# Patient Record
Sex: FEMALE | Race: WHITE
[De-identification: names, ages, dates, MRNs, and addresses within clinical notes are randomized per-mention and may not be internally consistent; named-entity substitution may affect disease eponyms.]

---

## 2017-05-19 ENCOUNTER — HOSPITAL ENCOUNTER (EMERGENCY)
Dept: HOSPITAL 8 - ED | Age: 62
Discharge: HOME | End: 2017-05-19
Payer: SELF-PAY

## 2017-05-19 VITALS — DIASTOLIC BLOOD PRESSURE: 78 MMHG | SYSTOLIC BLOOD PRESSURE: 132 MMHG

## 2017-05-19 VITALS — HEIGHT: 67 IN | BODY MASS INDEX: 25.95 KG/M2 | WEIGHT: 165.35 LBS
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
## 2017-05-19 DIAGNOSIS — S00.83XA: ICD-10-CM
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
## 2017-05-19 DIAGNOSIS — S02.2XXA: Primary | ICD-10-CM
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
## 2017-05-19 LAB
AST SERPL-CCNC: 38 U/L (ref 15–37)
BUN SERPL-MCNC: 13 MG/DL (ref 7–18)
IS PT STATUS REG ER OR PRE ER?: YES

## 2017-05-19 PROCEDURE — 85610 PROTHROMBIN TIME: CPT

## 2017-05-19 PROCEDURE — 70486 CT MAXILLOFACIAL W/O DYE: CPT

## 2017-05-19 PROCEDURE — 93005 ELECTROCARDIOGRAM TRACING: CPT

## 2017-05-19 PROCEDURE — 83605 ASSAY OF LACTIC ACID: CPT

## 2017-05-19 PROCEDURE — 80053 COMPREHEN METABOLIC PANEL: CPT

## 2017-05-19 PROCEDURE — 70450 CT HEAD/BRAIN W/O DYE: CPT

## 2017-05-19 PROCEDURE — 85730 THROMBOPLASTIN TIME PARTIAL: CPT

## 2017-05-19 PROCEDURE — 84484 ASSAY OF TROPONIN QUANT: CPT

## 2017-05-19 PROCEDURE — 36415 COLL VENOUS BLD VENIPUNCTURE: CPT

## 2017-05-19 PROCEDURE — 85025 COMPLETE CBC W/AUTO DIFF WBC: CPT

## 2017-09-09 ENCOUNTER — HOSPITAL ENCOUNTER (OUTPATIENT)
Dept: HOSPITAL 8 - CARD | Age: 62
Discharge: HOME | End: 2017-09-09
Attending: PSYCHIATRY & NEUROLOGY
Payer: MEDICAID

## 2017-09-09 DIAGNOSIS — G40.209: Primary | ICD-10-CM

## 2017-09-09 PROCEDURE — 95819 EEG AWAKE AND ASLEEP: CPT

## 2017-10-10 ENCOUNTER — OFFICE VISIT (OUTPATIENT)
Dept: INTERNAL MEDICINE | Facility: MEDICAL CENTER | Age: 62
End: 2017-10-10
Payer: MEDICAID

## 2017-10-10 VITALS
DIASTOLIC BLOOD PRESSURE: 82 MMHG | SYSTOLIC BLOOD PRESSURE: 104 MMHG | WEIGHT: 153 LBS | TEMPERATURE: 97.4 F | BODY MASS INDEX: 24.59 KG/M2 | HEART RATE: 99 BPM | OXYGEN SATURATION: 96 % | HEIGHT: 66 IN

## 2017-10-10 DIAGNOSIS — Z00.00 HEALTH CARE MAINTENANCE: ICD-10-CM

## 2017-10-10 DIAGNOSIS — R56.9 SEIZURE (HCC): ICD-10-CM

## 2017-10-10 DIAGNOSIS — I10 ESSENTIAL HYPERTENSION: ICD-10-CM

## 2017-10-10 DIAGNOSIS — Z00.00 ENCOUNTER FOR MEDICAL EXAMINATION TO ESTABLISH CARE: ICD-10-CM

## 2017-10-10 DIAGNOSIS — E78.5 DYSLIPIDEMIA: ICD-10-CM

## 2017-10-10 PROCEDURE — 99204 OFFICE O/P NEW MOD 45 MIN: CPT | Mod: GC | Performed by: INTERNAL MEDICINE

## 2017-10-10 RX ORDER — PRAVASTATIN SODIUM 10 MG
10 TABLET ORAL NIGHTLY
COMMUNITY
End: 2017-10-10 | Stop reason: SDUPTHER

## 2017-10-10 RX ORDER — LISINOPRIL AND HYDROCHLOROTHIAZIDE 20; 12.5 MG/1; MG/1
1 TABLET ORAL DAILY
COMMUNITY
End: 2017-10-10

## 2017-10-10 RX ORDER — ASPIRIN 325 MG
325 TABLET ORAL EVERY 6 HOURS PRN
COMMUNITY
End: 2019-08-25

## 2017-10-10 RX ORDER — LEVETIRACETAM 500 MG/1
500 TABLET ORAL DAILY
COMMUNITY

## 2017-10-10 RX ORDER — PRAVASTATIN SODIUM 10 MG
10 TABLET ORAL DAILY
Qty: 30 TAB | Refills: 1 | Status: SHIPPED | OUTPATIENT
Start: 2017-10-10 | End: 2017-10-24 | Stop reason: RX

## 2017-10-10 RX ORDER — LISINOPRIL 20 MG/1
20 TABLET ORAL DAILY
Qty: 30 TAB | Refills: 3 | Status: ON HOLD | OUTPATIENT
Start: 2017-10-10 | End: 2021-04-19

## 2017-10-10 ASSESSMENT — PATIENT HEALTH QUESTIONNAIRE - PHQ9: CLINICAL INTERPRETATION OF PHQ2 SCORE: 0

## 2017-10-10 NOTE — LETTER
[employer]  Jeffry Seals M.D.  [address]  [place] [address]  Fax: [phone]   Authorization for Release/Disclosure of   Protected Health Information   Name: RORO LEON : 1955 SSN: [id number]   Address: [facility] [id number]  [place] [address] Phone:    [phone] (home)    I authorize the entity listed below to release/disclose the PHI below to:   [employer] [facility]/Jeffry Seals M.D. and Jeffry Seals M.D.   Provider or Entity Name: Dr Elmo Guerrero     Address   City, [facility], [facility]   Phone:      Fax:     Reason for request: continuity of care   Information to be released:    [  ] LAST COLONOSCOPY,  including any PATH REPORT and follow-up  [  ] LAST FIT/COLOGUARD RESULT [  ] LAST DEXA  [  ] LAST MAMMOGRAM  [  ] LAST PAP  [  ] LAST LABS [  ] RETINA EXAM REPORT  [  ] IMMUNIZATION RECORDS  [ X ] Release all info      [  ] Check here and initial the line next to each item to release ALL health information INCLUDING  _____ Care and treatment for drug and / or alcohol abuse  _____ HIV testing, infection status, or AIDS  _____ Genetic Testing    DATES OF SERVICE OR TIME PERIOD TO BE DISCLOSED: _____________  I understand and acknowledge that:  * This Authorization may be revoked at any time by you in writing, except if your health information has already been used or disclosed.  * Your health information that will be used or disclosed as a result of you signing this authorization could be re-disclosed by the recipient. If this occurs, your re-disclosed health information may no longer be protected by State or Federal laws.  * You may refuse to sign this Authorization. Your refusal will not affect your ability to obtain treatment.  * This Authorization becomes effective upon signing and will  on (date) __________.      If no date is indicated, this Authorization will  one (1) year from the signature date.    Name: Roro Leon    Signature:   Date:     10/10/2017       PLEASE  FAX REQUESTED RECORDS BACK TO: [phone]

## 2017-10-10 NOTE — LETTER
[employer]  Jeffry Seals M.D.  [address]  [place] [address]  Fax: [phone]   Authorization for Release/Disclosure of   Protected Health Information   Name: KIM LEYVA : 1955 SSN: [id number]   Address: [id number]  [place] [address] Phone:    [phone] (home)    I authorize the entity listed below to release/disclose the PHI below to:   [employer]/Jeffry Seals M.D. and Jeffry Seals M.D.   Provider or Entity Name: [facility]     Address                           [address]  City, State, [place], [place]   Phone:      Fax:     Reason for request: continuity of care   Information to be released:    [  ] LAST COLONOSCOPY,  including any PATH REPORT and follow-up  [  ] LAST FIT/COLOGUARD RESULT [  ] LAST DEXA  [  ] LAST MAMMOGRAM  [  ] LAST PAP  [  ] LAST LABS [  ] RETINA EXAM REPORT  [  ] IMMUNIZATION RECORDS  [ X ] Release all info      [  ] Check here and initial the line next to each item to release ALL health information INCLUDING  _____ Care and treatment for drug and / or alcohol abuse  _____ HIV testing, infection status, or AIDS  _____ Genetic Testing    DATES OF SERVICE OR TIME PERIOD TO BE DISCLOSED: _____________  I understand and acknowledge that:  * This Authorization may be revoked at any time by you in writing, except if your health information has already been used or disclosed.  * Your health information that will be used or disclosed as a result of you signing this authorization could be re-disclosed by the recipient. If this occurs, your re-disclosed health information may no longer be protected by State or Federal laws.  * You may refuse to sign this Authorization. Your refusal will not affect your ability to obtain treatment.  * This Authorization becomes effective upon signing and will  on (date) __________.      If no date is indicated, this Authorization will  one (1) year from the signature date.    Name:  Roro Leon    Signature:   Date:     10/10/2017       PLEASE FAX REQUESTED RECORDS BACK TO: [phone]

## 2017-10-10 NOTE — LETTER
[employer]  Jeffry Seals M.D.  [address]  [place] [address]  Fax: [phone]   Authorization for Release/Disclosure of   Protected Health Information   Name: RORO LEON : 1955 SSN: [id number]   Address: [facility] [id number]  [place] [address] Phone:    [phone] (home)    I authorize the entity listed below to release/disclose the PHI below to:   [facility]/Jeffry Seals M.D. and Jeffry Seals M.D.   Provider or Entity Name: [facility], [facility], [facility]   Phone:      Fax:     Reason for request: continuity of care   Information to be released:    [ X ] LAST COLONOSCOPY,  including any PATH REPORT and follow-up  [  ] LAST FIT/COLOGUARD RESULT [  ] LAST DEXA  [  ] LAST MAMMOGRAM  [  ] LAST PAP  [  ] LAST LABS [  ] RETINA EXAM REPORT  [  ] IMMUNIZATION RECORDS  [  ] Release all info      [  ] Check here and initial the line next to each item to release ALL health information INCLUDING  _____ Care and treatment for drug and / or alcohol abuse  _____ HIV testing, infection status, or AIDS  _____ Genetic Testing    DATES OF SERVICE OR TIME PERIOD TO BE DISCLOSED: _____________  I understand and acknowledge that:  * This Authorization may be revoked at any time by you in writing, except if your health information has already been used or disclosed.  * Your health information that will be used or disclosed as a result of you signing this authorization could be re-disclosed by the recipient. If this occurs, your re-disclosed health information may no longer be protected by State or Federal laws.  * You may refuse to sign this Authorization. Your refusal will not affect your ability to obtain treatment.  * This Authorization becomes effective upon signing and will  on (date) __________.      If no date is indicated, this Authorization will  one (1) year from the signature date.    Name: Roro Leon    Signature:   Date:      10/10/2017       PLEASE FAX REQUESTED RECORDS BACK TO: [phone]

## 2017-10-10 NOTE — PROGRESS NOTES
New Patient    Ms Leon presents today to establish care:    CC: Medication refill, establish care    HPI: Pt is a 62 y.o. female with PMH significant of one episode of seizure, HTN, dyslipidemia  Presented to the clinic as a new pt, with health concerns as follows    Seizure: Patient had unprovoked episode of seizure in May and she was taken to [facility] she had her CT head noncontrast done which was WNL and PET scan which was WNL. Electrolytes were normal, nonalcoholic. She was discharged on Keppra 500 twice a day. Patient is not taking Consistently no further reported seizure since then.  Patient has a neurologist whom she sees    HTN: Patient is taking lisinopril hydrochlorothiazide combination. She is running out and needs medication refill she reports one episode of slight dizziness while she was standing in the queue for the check out no further episodes of dizziness. No presyncope or syncopal episode.    Dyslipidemia: Patient is taking pravastatin 10 mg every day needs refill. No recent lab    Otherwise patient feels relatively well she is not driving right now because she can't have a car although her license was not revoked because of the seizure. 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 She is single with 2 grownup kids    Patient Active Problem List    Diagnosis Date Noted   • HTN (hypertension) 10/10/2017   • Seizure (CMS-HCC) 10/10/2017   • Dyslipidemia 10/10/2017       Past Medical History:   Diagnosis Date   • Seizure (CMS-HCC)    • Subdural hematoma (CMS-[facility])        Current Outpatient Prescriptions   Medication Sig Dispense Refill   • aspirin (ASA) 325 MG Tab Take 325 mg by mouth every 6 hours as needed (Every Morning).     • DiphenhydrAMINE HCl (ALLERGY MED PO) Take  by mouth as needed.     • levetiracetam (KEPPRA) 500 MG Tab Take 500 mg by mouth 2 times a day.     • pravastatin (PRAVACHOL) 10 MG Tab Take 1 Tab by  mouth every day. 30 Tab 1   • lisinopril (PRINIVIL) 20 MG Tab Take 1 Tab by mouth every day. 30 Tab 3     No current facility-administered medications for this visit.        Allergies as of 10/10/2017   • (No Known Allergies)       Social History     Social History   • Marital status: Single     Spouse name: N/A   • Number of children: 2   • Years of education: some college     Occupational History   • medical assistant      [employer]      Social History Main Topics   • Smoking status: Never Smoker   • Smokeless tobacco: Never Used   • Alcohol use 3.6 oz/week     6 Glasses of wine per week   • Drug use: No   • Sexual activity: Not on file     Other Topics Concern   • Not on file     Social History Narrative   • No narrative on file       Family History   Problem Relation Age of Onset   • Cancer Mother 77     colon cancer   • Heart Attack Father 67     MI   • Heart Disease Brother      angina   • Stroke Maternal Grandfather      parkinsons   • Dementia Maternal Aunt      parkinsons and alzhiemers       No past surgical history on file.      ROS: As per HPI.  Constitutional: negative for fever/ chills,  Malaise, weight loss and diaphoresis.   HENT: Negative for nosebleeds and sore throat.    Eyes: Negative for blurred vision and redness, vision loss  Respiratory: Negative for cough, hemoptysis and shortness of breath.    Cardiovascular: Negative for chest pain, palpitations, orthopnea, CABRAL and leg swelling   Gastrointestinal: Negative for heartburn, nausea, vomiting and abdominal pain.   Genitourinary: Negative for dysuria, frequency and hematuria.   Musculoskeletal: negative for joint swelling, pain. Negative for myalgias.  Skin: Negative for itching and rash.   Neurological: Negative for dizziness, tingling, sensory change, focal weakness, seizures and weakness.   Endo/Heme/Allergies: Does not bruise/bleed easily.   Psychiatric/Behavioral: Negative for depression and substance abuse.         /82    "Pulse 99   Temp 36.3 °C (97.4 °F)   Ht 1.664 m (5' 5.5\")   Wt 69.4 kg (153 lb)   SpO2 96%   BMI 25.07 kg/m²     Physical Exam   General: pt appears of stated age. Well developed and nourished. Pleasant and no apparent distress  Constitutional:  Weight same from last visit. Alert and  oriented to person, place, and time.  HENT  Eyes: Pupils are equal, round, and reactive to light. No scleral icterus. conjuctiva clear  Neck: Neck supple. No thyromegaly present. JVD  Cardiovascular: Normal rate, regular rhythm and normal heart sounds.  Exam reveals no gallop and no friction rub.  No murmur heard.  Pulmonary/Chest: b/l Breath sounds normal. Chest wall is not dull to percussion.   Musculoskeletal:   Joint appear normal, full ROM  Lymphadenopathy: no cervical adenopathy  Extremity: Distal pulses present. No edema noted  Neurological: No focal deficit   Skin: No cyanosis. Nails show no clubbing.    Note: I have reviewed all pertinent labs and diagnostic tests associated with this visit with specific comments listed under the assessment and plan below    Assessment and Plan     1. Dyslipidemia  - Patient takes pravastatin 10 mg, refilled no records for previous lipid profile.  - We will repeat lipid profile and adjust medications as on next visit    2. Encounter for medical examination to establish care  - Patient is new - she was welcomed to the practice   - We'll get basic lab work  - Follow up in 7 weeks      3. Health care maintenance  - Vaccinations- DTaP due- declined today patient was in hurry                           Flu- declined doesn't like to take                           Zoster- did not have one, recommended to get one if she wants to otherwise at the age of 65  Screening: We'll discuss screening on the next visit    4. Essential hypertension  - Her goal blood pressure is less than 150/90, her BP was 104/82  - Given her one episode of dizziness, could be secondary to blood pressure  - We'll discontinue " HCTZ and continue lisinopril 20  - Hypertension pressure is not within goal then may increase lisinopril to 40    5. Seizure (CMS-HCC)  - Recommended to take Keppra as directed  - Reinforced to not to drive until neurology clears her  - Has a neurologist    Followup: Return in about 10 weeks (around 12/19/2017).    Risk Assessment (discuss potential complications a function of chronic problems): Discussed    Complexity (discuss number of co-morbidities): Discussed    Signed by: Jeffry Seals M.D.

## 2017-10-17 ENCOUNTER — TELEPHONE (OUTPATIENT)
Dept: INTERNAL MEDICINE | Facility: MEDICAL CENTER | Age: 62
End: 2017-10-17

## 2017-10-17 NOTE — TELEPHONE ENCOUNTER
DOCUMENTATION OF PAR STATUS:    1. Name of Medication & Dose: Pravastatin 10mg     2. Name of Prescription Coverage Company & phone #: [facility] #[phone]    3. Date Prior Auth Submitted: N/A    4. What information was given to obtain insurance decision? N/A    5. Prior Auth Letter Approved or Denied? N/A-Called pt's insurance and they stated pt must try and failed 2 preferred medications. The preferred medications are Simvastatin, lovastatin, and Atorvastatin.   Please Advise regarding medication    6. Action Taken: Pharmacy/Patient Notified: No

## 2017-10-24 RX ORDER — ATORVASTATIN CALCIUM 20 MG/1
20 TABLET, FILM COATED ORAL DAILY
Qty: 30 TAB | Refills: 3 | Status: ON HOLD | OUTPATIENT
Start: 2017-10-24 | End: 2021-06-24

## 2017-10-24 NOTE — TELEPHONE ENCOUNTER
Called patient and notified.  Patient stated that she was confused due to her receiving a letter from her insurance stating that they would cover the Pravastatin if the doctor office send in all the information. She stated that insurance mention to her that because its a new medication and a new Doctor it would be cover.   She stated she will try the Lipitor for now.  She was just upset it was not approved.

## 2017-10-24 NOTE — TELEPHONE ENCOUNTER
- Switched the patient to Lipitor because her insurance is not covering Pravastatin, RX was sent, will discuss with Dr. Seals increasing the statin dose based on thew ASCVD risk

## 2019-07-15 ENCOUNTER — HOSPITAL ENCOUNTER (INPATIENT)
Dept: HOSPITAL 8 - ED | Age: 64
LOS: 7 days | Discharge: NURSING, REHAB OR LONG-TERM CARE | DRG: 640 | End: 2019-07-22
Attending: HOSPITALIST | Admitting: HOSPITALIST
Payer: MEDICAID

## 2019-07-15 VITALS — DIASTOLIC BLOOD PRESSURE: 64 MMHG | SYSTOLIC BLOOD PRESSURE: 101 MMHG

## 2019-07-15 VITALS — SYSTOLIC BLOOD PRESSURE: 103 MMHG | DIASTOLIC BLOOD PRESSURE: 66 MMHG

## 2019-07-15 VITALS — BODY MASS INDEX: 26.92 KG/M2 | HEIGHT: 67 IN | WEIGHT: 171.52 LBS

## 2019-07-15 DIAGNOSIS — K76.0: ICD-10-CM

## 2019-07-15 DIAGNOSIS — Z82.49: ICD-10-CM

## 2019-07-15 DIAGNOSIS — G40.909: ICD-10-CM

## 2019-07-15 DIAGNOSIS — Z80.0: ICD-10-CM

## 2019-07-15 DIAGNOSIS — E83.42: ICD-10-CM

## 2019-07-15 DIAGNOSIS — E87.1: ICD-10-CM

## 2019-07-15 DIAGNOSIS — K85.20: ICD-10-CM

## 2019-07-15 DIAGNOSIS — E83.51: ICD-10-CM

## 2019-07-15 DIAGNOSIS — D75.89: ICD-10-CM

## 2019-07-15 DIAGNOSIS — F10.239: ICD-10-CM

## 2019-07-15 DIAGNOSIS — R32: ICD-10-CM

## 2019-07-15 DIAGNOSIS — F10.229: ICD-10-CM

## 2019-07-15 DIAGNOSIS — I10: ICD-10-CM

## 2019-07-15 DIAGNOSIS — R62.7: Primary | ICD-10-CM

## 2019-07-15 DIAGNOSIS — E83.39: ICD-10-CM

## 2019-07-15 DIAGNOSIS — E16.2: ICD-10-CM

## 2019-07-15 DIAGNOSIS — E87.6: ICD-10-CM

## 2019-07-15 DIAGNOSIS — D72.825: ICD-10-CM

## 2019-07-15 DIAGNOSIS — E86.0: ICD-10-CM

## 2019-07-15 DIAGNOSIS — D53.9: ICD-10-CM

## 2019-07-15 LAB
<PLATELET ESTIMATE>: ADEQUATE
<PLT MORPHOLOGY>: (no result)
ALBUMIN SERPL-MCNC: 2.3 G/DL (ref 3.4–5)
ALP SERPL-CCNC: 155 U/L (ref 45–117)
ALT SERPL-CCNC: 48 U/L (ref 12–78)
ANION GAP SERPL CALC-SCNC: 18 MMOL/L (ref 5–15)
BAND#(MANUAL): 0.83 X10^3/UL
BASOPHILS NFR BLD MANUAL: 1 % (ref 0–1)
BASOS#(MANUAL): 0.1 X10^3/UL (ref 0–0.1)
BILIRUB SERPL-MCNC: 1 MG/DL (ref 0.2–1)
CALCIUM SERPL-MCNC: 9.3 MG/DL (ref 8.5–10.1)
CHLORIDE SERPL-SCNC: 100 MMOL/L (ref 98–107)
CREAT SERPL-MCNC: 1 MG/DL (ref 0.55–1.02)
CULTURE INDICATED?: NO
EOS#(MANUAL): 0.1 X10^3/UL (ref 0–0.4)
EOS% (MANUAL): 1 % (ref 1–7)
ERYTHROCYTE [DISTWIDTH] IN BLOOD BY AUTOMATED COUNT: 16.7 % (ref 9.6–15.2)
LYMPH#(MANUAL): 1.04 X10^3/UL (ref 1–3.4)
LYMPHS% (MANUAL): 10 % (ref 22–44)
MACROCYTES BLD QL SMEAR: (no result)
MCH RBC QN AUTO: 38.3 PG (ref 27–34.8)
MCHC RBC AUTO-ENTMCNC: 33.9 G/DL (ref 32.4–35.8)
MCV RBC AUTO: 112.9 FL (ref 80–100)
MD: YES
METAMYELOCYTES# (MANUAL): 0.1 X10^3/UL (ref 0–0)
METAMYELOCYTES% (MANUAL): 1 % (ref 0–1)
MICROSCOPIC: (no result)
MONOS#(MANUAL): 1.77 X10^3/UL (ref 0.3–2.7)
MONOS% (MANUAL): 17 % (ref 2–9)
MYELOCYTES# (MANUAL): 0.1 X10^3/UL (ref 0–0)
MYELOCYTES% (MANUAL): 1 % (ref 0–0)
NEUTS BAND NFR BLD: 8 % (ref 0–7)
O2 FLOW: (no result) L/MIN
PH BLDV: 7.37 PH (ref 7.32–7.42)
PLATELET # BLD AUTO: 143 X10^3/UL (ref 130–400)
PMV BLD AUTO: 8.2 FL (ref 7.4–10.4)
POLYCHROMASIA BLD QL SMEAR: (no result)
PROT SERPL-MCNC: 6.4 G/DL (ref 6.4–8.2)
RBC # BLD AUTO: 2.78 X10^6/UL (ref 3.82–5.3)
SEG#(MANUAL): 6.34 X10^3/UL (ref 1.8–6.8)
SEGS% (MANUAL): 61 % (ref 42–75)
T4 FREE SERPL-MCNC: 1.35 NG/DL (ref 0.76–1.46)

## 2019-07-15 PROCEDURE — 99285 EMERGENCY DEPT VISIT HI MDM: CPT

## 2019-07-15 PROCEDURE — 36415 COLL VENOUS BLD VENIPUNCTURE: CPT

## 2019-07-15 PROCEDURE — 84439 ASSAY OF FREE THYROXINE: CPT

## 2019-07-15 PROCEDURE — 93005 ELECTROCARDIOGRAM TRACING: CPT

## 2019-07-15 PROCEDURE — 84100 ASSAY OF PHOSPHORUS: CPT

## 2019-07-15 PROCEDURE — 80307 DRUG TEST PRSMV CHEM ANLYZR: CPT

## 2019-07-15 PROCEDURE — 96365 THER/PROPH/DIAG IV INF INIT: CPT

## 2019-07-15 PROCEDURE — 84443 ASSAY THYROID STIM HORMONE: CPT

## 2019-07-15 PROCEDURE — 71045 X-RAY EXAM CHEST 1 VIEW: CPT

## 2019-07-15 PROCEDURE — 82803 BLOOD GASES ANY COMBINATION: CPT

## 2019-07-15 PROCEDURE — 80053 COMPREHEN METABOLIC PANEL: CPT

## 2019-07-15 PROCEDURE — 82962 GLUCOSE BLOOD TEST: CPT

## 2019-07-15 PROCEDURE — 82607 VITAMIN B-12: CPT

## 2019-07-15 PROCEDURE — 85025 COMPLETE CBC W/AUTO DIFF WBC: CPT

## 2019-07-15 PROCEDURE — 76700 US EXAM ABDOM COMPLETE: CPT

## 2019-07-15 PROCEDURE — C9113 INJ PANTOPRAZOLE SODIUM, VIA: HCPCS

## 2019-07-15 PROCEDURE — 83690 ASSAY OF LIPASE: CPT

## 2019-07-15 PROCEDURE — 80048 BASIC METABOLIC PNL TOTAL CA: CPT

## 2019-07-15 PROCEDURE — 0T9B70Z DRAINAGE OF BLADDER WITH DRAINAGE DEVICE, VIA NATURAL OR ARTIFICIAL OPENING: ICD-10-PCS | Performed by: HOSPITALIST

## 2019-07-15 PROCEDURE — 96366 THER/PROPH/DIAG IV INF ADDON: CPT

## 2019-07-15 PROCEDURE — 81003 URINALYSIS AUTO W/O SCOPE: CPT

## 2019-07-15 PROCEDURE — 96361 HYDRATE IV INFUSION ADD-ON: CPT

## 2019-07-15 PROCEDURE — 83735 ASSAY OF MAGNESIUM: CPT

## 2019-07-15 RX ADMIN — ENOXAPARIN SODIUM SCH MG: 40 INJECTION SUBCUTANEOUS at 20:51

## 2019-07-15 RX ADMIN — LORAZEPAM PRN MG: 2 INJECTION INTRAMUSCULAR; INTRAVENOUS at 22:40

## 2019-07-15 RX ADMIN — DIBASIC SODIUM PHOSPHATE, MONOBASIC POTASSIUM PHOSPHATE AND MONOBASIC SODIUM PHOSPHATE SCH MG: 852; 155; 130 TABLET ORAL at 20:49

## 2019-07-15 RX ADMIN — LEVETIRACETAM SCH MLS/HR: 100 INJECTION, SOLUTION, CONCENTRATE INTRAVENOUS at 20:32

## 2019-07-15 RX ADMIN — DIBASIC SODIUM PHOSPHATE, MONOBASIC POTASSIUM PHOSPHATE AND MONOBASIC SODIUM PHOSPHATE SCH MG: 852; 155; 130 TABLET ORAL at 21:00

## 2019-07-15 RX ADMIN — SODIUM CHLORIDE SCH MLS/HR: 0.9 INJECTION, SOLUTION INTRAVENOUS at 20:33

## 2019-07-15 RX ADMIN — PRAVASTATIN SODIUM SCH MG: 20 TABLET ORAL at 20:50

## 2019-07-15 NOTE — NUR
PT REPORTS EPISODE OF URINARY INCONTINENCE. LINENS CHANGED. PUREWICK PLACED. 



PT MEDICATIONS LABELED AND WALKED TO PHARMACY. YELLOW RECEIPT ON CHART.
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
## 2019-07-16 VITALS — DIASTOLIC BLOOD PRESSURE: 58 MMHG | SYSTOLIC BLOOD PRESSURE: 94 MMHG

## 2019-07-16 VITALS — SYSTOLIC BLOOD PRESSURE: 113 MMHG | DIASTOLIC BLOOD PRESSURE: 82 MMHG

## 2019-07-16 VITALS — DIASTOLIC BLOOD PRESSURE: 74 MMHG | SYSTOLIC BLOOD PRESSURE: 109 MMHG

## 2019-07-16 VITALS — DIASTOLIC BLOOD PRESSURE: 74 MMHG | SYSTOLIC BLOOD PRESSURE: 106 MMHG

## 2019-07-16 LAB
<PLATELET ESTIMATE>: ADEQUATE
<PLT MORPHOLOGY>: (no result)
ALBUMIN SERPL-MCNC: 2.2 G/DL (ref 3.4–5)
ALP SERPL-CCNC: 134 U/L (ref 45–117)
ALT SERPL-CCNC: 41 U/L (ref 12–78)
ANION GAP SERPL CALC-SCNC: 10 MMOL/L (ref 5–15)
ANION GAP SERPL CALC-SCNC: 12 MMOL/L (ref 5–15)
ANISOCYTOSIS BLD QL SMEAR: (no result)
BAND#(MANUAL): 1.61 X10^3/UL
BASO STIPL BLD QL SMEAR: (no result)
BILIRUB SERPL-MCNC: 0.5 MG/DL (ref 0.2–1)
CALCIUM SERPL-MCNC: 8.4 MG/DL (ref 8.5–10.1)
CALCIUM SERPL-MCNC: 8.4 MG/DL (ref 8.5–10.1)
CHLORIDE SERPL-SCNC: 107 MMOL/L (ref 98–107)
CHLORIDE SERPL-SCNC: 107 MMOL/L (ref 98–107)
CREAT SERPL-MCNC: 0.85 MG/DL (ref 0.55–1.02)
CREAT SERPL-MCNC: 0.99 MG/DL (ref 0.55–1.02)
ERYTHROCYTE [DISTWIDTH] IN BLOOD BY AUTOMATED COUNT: 16.9 % (ref 9.6–15.2)
LYMPH#(MANUAL): 0.35 X10^3/UL (ref 1–3.4)
LYMPHS% (MANUAL): 3 % (ref 22–44)
MACROCYTES BLD QL SMEAR: (no result)
MCH RBC QN AUTO: 37.2 PG (ref 27–34.8)
MCHC RBC AUTO-ENTMCNC: 33.4 G/DL (ref 32.4–35.8)
MCV RBC AUTO: 111.4 FL (ref 80–100)
MD: YES
METAMYELOCYTES# (MANUAL): 0.12 X10^3/UL (ref 0–0)
METAMYELOCYTES% (MANUAL): 1 % (ref 0–1)
MONOS#(MANUAL): 1.27 X10^3/UL (ref 0.3–2.7)
MONOS% (MANUAL): 11 % (ref 2–9)
MYELOCYTES# (MANUAL): 0.23 X10^3/UL (ref 0–0)
MYELOCYTES% (MANUAL): 2 % (ref 0–0)
NEUTS BAND NFR BLD: 14 % (ref 0–7)
PLATELET # BLD AUTO: 150 X10^3/UL (ref 130–400)
PMV BLD AUTO: 7.9 FL (ref 7.4–10.4)
PROT SERPL-MCNC: 5.5 G/DL (ref 6.4–8.2)
RBC # BLD AUTO: 2.42 X10^6/UL (ref 3.82–5.3)
SEG#(MANUAL): 7.94 X10^3/UL (ref 1.8–6.8)
SEGS% (MANUAL): 69 % (ref 42–75)
TOXIC GRAN: (no result)
TSH SERPL-ACNC: 0.8 MIU/L (ref 0.36–3.74)

## 2019-07-16 RX ADMIN — DOCUSATE SODIUM 50MG AND SENNOSIDES 8.6MG SCH TAB: 8.6; 5 TABLET, FILM COATED ORAL at 07:16

## 2019-07-16 RX ADMIN — DIBASIC SODIUM PHOSPHATE, MONOBASIC POTASSIUM PHOSPHATE AND MONOBASIC SODIUM PHOSPHATE SCH MG: 852; 155; 130 TABLET ORAL at 16:20

## 2019-07-16 RX ADMIN — ENOXAPARIN SODIUM SCH MG: 40 INJECTION SUBCUTANEOUS at 23:23

## 2019-07-16 RX ADMIN — ENOXAPARIN SODIUM SCH MG: 40 INJECTION SUBCUTANEOUS at 22:01

## 2019-07-16 RX ADMIN — POTASSIUM CHLORIDE SCH MLS/HR: 2 INJECTION, SOLUTION, CONCENTRATE INTRAVENOUS at 00:27

## 2019-07-16 RX ADMIN — SODIUM CHLORIDE SCH MLS/HR: 0.9 INJECTION, SOLUTION INTRAVENOUS at 11:00

## 2019-07-16 RX ADMIN — PRAVASTATIN SODIUM SCH MG: 20 TABLET ORAL at 23:23

## 2019-07-16 RX ADMIN — SODIUM CHLORIDE SCH MLS/HR: 0.9 INJECTION, SOLUTION INTRAVENOUS at 03:00

## 2019-07-16 RX ADMIN — PANTOPRAZOLE SODIUM SCH MG: 40 INJECTION, POWDER, FOR SOLUTION INTRAVENOUS at 08:43

## 2019-07-16 RX ADMIN — DIBASIC SODIUM PHOSPHATE, MONOBASIC POTASSIUM PHOSPHATE AND MONOBASIC SODIUM PHOSPHATE SCH MG: 852; 155; 130 TABLET ORAL at 08:44

## 2019-07-16 RX ADMIN — DIBASIC SODIUM PHOSPHATE, MONOBASIC POTASSIUM PHOSPHATE AND MONOBASIC SODIUM PHOSPHATE SCH MG: 852; 155; 130 TABLET ORAL at 22:00

## 2019-07-16 RX ADMIN — SODIUM CHLORIDE SCH MLS/HR: 0.9 INJECTION, SOLUTION INTRAVENOUS at 01:14

## 2019-07-16 RX ADMIN — SODIUM CHLORIDE SCH MLS/HR: 0.9 INJECTION, SOLUTION INTRAVENOUS at 19:00

## 2019-07-16 RX ADMIN — LEVETIRACETAM SCH MLS/HR: 100 INJECTION, SOLUTION, CONCENTRATE INTRAVENOUS at 08:44

## 2019-07-16 RX ADMIN — LORAZEPAM PRN MG: 2 INJECTION INTRAMUSCULAR; INTRAVENOUS at 01:39

## 2019-07-16 RX ADMIN — LEVETIRACETAM SCH MLS/HR: 100 INJECTION, SOLUTION, CONCENTRATE INTRAVENOUS at 23:49

## 2019-07-17 VITALS — SYSTOLIC BLOOD PRESSURE: 113 MMHG | DIASTOLIC BLOOD PRESSURE: 72 MMHG

## 2019-07-17 VITALS — DIASTOLIC BLOOD PRESSURE: 84 MMHG | SYSTOLIC BLOOD PRESSURE: 119 MMHG

## 2019-07-17 VITALS — DIASTOLIC BLOOD PRESSURE: 74 MMHG | SYSTOLIC BLOOD PRESSURE: 109 MMHG

## 2019-07-17 VITALS — DIASTOLIC BLOOD PRESSURE: 73 MMHG | SYSTOLIC BLOOD PRESSURE: 110 MMHG

## 2019-07-17 LAB
<PLATELET ESTIMATE>: ADEQUATE
<PLT MORPHOLOGY>: (no result)
ALBUMIN SERPL-MCNC: 2.2 G/DL (ref 3.4–5)
ALP SERPL-CCNC: 123 U/L (ref 45–117)
ALT SERPL-CCNC: 34 U/L (ref 12–78)
ANION GAP SERPL CALC-SCNC: 8 MMOL/L (ref 5–15)
ANISOCYTOSIS BLD QL SMEAR: (no result)
BAND#(MANUAL): 0.71 X10^3/UL
BASOPHILS NFR BLD MANUAL: 1 % (ref 0–1)
BASOS#(MANUAL): 0.1 X10^3/UL (ref 0–0.1)
BILIRUB SERPL-MCNC: 0.5 MG/DL (ref 0.2–1)
CALCIUM SERPL-MCNC: 7.9 MG/DL (ref 8.5–10.1)
CHLORIDE SERPL-SCNC: 111 MMOL/L (ref 98–107)
CREAT SERPL-MCNC: 0.71 MG/DL (ref 0.55–1.02)
EOS#(MANUAL): 0.1 X10^3/UL (ref 0–0.4)
EOS% (MANUAL): 1 % (ref 1–7)
ERYTHROCYTE [DISTWIDTH] IN BLOOD BY AUTOMATED COUNT: 16.7 % (ref 9.6–15.2)
LYMPH#(MANUAL): 0.71 X10^3/UL (ref 1–3.4)
LYMPHS% (MANUAL): 7 % (ref 22–44)
MACROCYTES BLD QL SMEAR: (no result)
MCH RBC QN AUTO: 37.1 PG (ref 27–34.8)
MCHC RBC AUTO-ENTMCNC: 33.2 G/DL (ref 32.4–35.8)
MCV RBC AUTO: 111.8 FL (ref 80–100)
MD: YES
MONOS#(MANUAL): 0.71 X10^3/UL (ref 0.3–2.7)
MONOS% (MANUAL): 7 % (ref 2–9)
MYELOCYTES# (MANUAL): 0.1 X10^3/UL (ref 0–0)
MYELOCYTES% (MANUAL): 1 % (ref 0–0)
NEUTS BAND NFR BLD: 7 % (ref 0–7)
PLATELET # BLD AUTO: 178 X10^3/UL (ref 130–400)
PMV BLD AUTO: 7.4 FL (ref 7.4–10.4)
POLYCHROMASIA BLD QL SMEAR: (no result)
PROT SERPL-MCNC: 5.5 G/DL (ref 6.4–8.2)
RBC # BLD AUTO: 2.47 X10^6/UL (ref 3.82–5.3)
SEG#(MANUAL): 7.68 X10^3/UL (ref 1.8–6.8)
SEGS% (MANUAL): 76 % (ref 42–75)
TOXIC GRAN: (no result)

## 2019-07-17 RX ADMIN — ENOXAPARIN SODIUM SCH MG: 40 INJECTION SUBCUTANEOUS at 23:31

## 2019-07-17 RX ADMIN — SODIUM CHLORIDE SCH MLS/HR: 0.9 INJECTION, SOLUTION INTRAVENOUS at 03:00

## 2019-07-17 RX ADMIN — SODIUM CHLORIDE SCH MLS/HR: 0.9 INJECTION, SOLUTION INTRAVENOUS at 17:45

## 2019-07-17 RX ADMIN — POTASSIUM CHLORIDE SCH MLS/HR: 2 INJECTION, SOLUTION, CONCENTRATE INTRAVENOUS at 00:37

## 2019-07-17 RX ADMIN — LEVETIRACETAM SCH MLS/HR: 100 INJECTION, SOLUTION, CONCENTRATE INTRAVENOUS at 23:31

## 2019-07-17 RX ADMIN — DOCUSATE SODIUM 50MG AND SENNOSIDES 8.6MG SCH TAB: 8.6; 5 TABLET, FILM COATED ORAL at 07:52

## 2019-07-17 RX ADMIN — PRAVASTATIN SODIUM SCH MG: 20 TABLET ORAL at 21:30

## 2019-07-17 RX ADMIN — LEVETIRACETAM SCH MLS/HR: 100 INJECTION, SOLUTION, CONCENTRATE INTRAVENOUS at 10:52

## 2019-07-17 RX ADMIN — PANTOPRAZOLE SODIUM SCH MG: 40 INJECTION, POWDER, FOR SOLUTION INTRAVENOUS at 07:52

## 2019-07-17 RX ADMIN — DIBASIC SODIUM PHOSPHATE, MONOBASIC POTASSIUM PHOSPHATE AND MONOBASIC SODIUM PHOSPHATE SCH MG: 852; 155; 130 TABLET ORAL at 07:52

## 2019-07-18 VITALS — SYSTOLIC BLOOD PRESSURE: 106 MMHG | DIASTOLIC BLOOD PRESSURE: 73 MMHG

## 2019-07-18 VITALS — SYSTOLIC BLOOD PRESSURE: 117 MMHG | DIASTOLIC BLOOD PRESSURE: 81 MMHG

## 2019-07-18 VITALS — DIASTOLIC BLOOD PRESSURE: 81 MMHG | SYSTOLIC BLOOD PRESSURE: 118 MMHG

## 2019-07-18 VITALS — SYSTOLIC BLOOD PRESSURE: 124 MMHG | DIASTOLIC BLOOD PRESSURE: 86 MMHG

## 2019-07-18 LAB
<PLATELET ESTIMATE>: ADEQUATE
<PLT MORPHOLOGY>: (no result)
ALBUMIN SERPL-MCNC: 2 G/DL (ref 3.4–5)
ALP SERPL-CCNC: 110 U/L (ref 45–117)
ALT SERPL-CCNC: 29 U/L (ref 12–78)
ANION GAP SERPL CALC-SCNC: 6 MMOL/L (ref 5–15)
ANISOCYTOSIS BLD QL SMEAR: (no result)
BAND#(MANUAL): 0.17 X10^3/UL
BILIRUB SERPL-MCNC: 0.4 MG/DL (ref 0.2–1)
CALCIUM SERPL-MCNC: 7.7 MG/DL (ref 8.5–10.1)
CHLORIDE SERPL-SCNC: 115 MMOL/L (ref 98–107)
CREAT SERPL-MCNC: 0.6 MG/DL (ref 0.55–1.02)
EOS#(MANUAL): 0.17 X10^3/UL (ref 0–0.4)
EOS% (MANUAL): 2 % (ref 1–7)
ERYTHROCYTE [DISTWIDTH] IN BLOOD BY AUTOMATED COUNT: 17.1 % (ref 9.6–15.2)
LYMPH#(MANUAL): 1.18 X10^3/UL (ref 1–3.4)
LYMPHS% (MANUAL): 14 % (ref 22–44)
MACROCYTES BLD QL SMEAR: (no result)
MCH RBC QN AUTO: 38 PG (ref 27–34.8)
MCHC RBC AUTO-ENTMCNC: 33.2 G/DL (ref 32.4–35.8)
MCV RBC AUTO: 114.3 FL (ref 80–100)
MD: YES
MONOS#(MANUAL): 0.92 X10^3/UL (ref 0.3–2.7)
MONOS% (MANUAL): 11 % (ref 2–9)
MYELOCYTES# (MANUAL): 0.08 X10^3/UL (ref 0–0)
MYELOCYTES% (MANUAL): 1 % (ref 0–0)
NEUTS BAND NFR BLD: 2 % (ref 0–7)
PLATELET # BLD AUTO: 193 X10^3/UL (ref 130–400)
PMV BLD AUTO: 7.9 FL (ref 7.4–10.4)
PROT SERPL-MCNC: 5.2 G/DL (ref 6.4–8.2)
RBC # BLD AUTO: 2.39 X10^6/UL (ref 3.82–5.3)
SEG#(MANUAL): 5.88 X10^3/UL (ref 1.8–6.8)
SEGS% (MANUAL): 70 % (ref 42–75)

## 2019-07-18 RX ADMIN — POTASSIUM CHLORIDE SCH MLS/HR: 2 INJECTION, SOLUTION, CONCENTRATE INTRAVENOUS at 00:47

## 2019-07-18 RX ADMIN — ENOXAPARIN SODIUM SCH MG: 40 INJECTION SUBCUTANEOUS at 23:18

## 2019-07-18 RX ADMIN — LEVETIRACETAM SCH MLS/HR: 100 INJECTION, SOLUTION, CONCENTRATE INTRAVENOUS at 23:18

## 2019-07-18 RX ADMIN — SODIUM CHLORIDE SCH MLS/HR: 0.9 INJECTION, SOLUTION INTRAVENOUS at 17:41

## 2019-07-18 RX ADMIN — PRAVASTATIN SODIUM SCH MG: 20 TABLET ORAL at 20:33

## 2019-07-18 RX ADMIN — DOCUSATE SODIUM 50MG AND SENNOSIDES 8.6MG SCH TAB: 8.6; 5 TABLET, FILM COATED ORAL at 08:10

## 2019-07-18 RX ADMIN — LEVETIRACETAM SCH MLS/HR: 100 INJECTION, SOLUTION, CONCENTRATE INTRAVENOUS at 12:46

## 2019-07-18 RX ADMIN — PANTOPRAZOLE SODIUM SCH MG: 40 INJECTION, POWDER, FOR SOLUTION INTRAVENOUS at 08:10

## 2019-07-18 RX ADMIN — SODIUM CHLORIDE SCH MLS/HR: 0.9 INJECTION, SOLUTION INTRAVENOUS at 10:00

## 2019-07-18 RX ADMIN — SODIUM CHLORIDE SCH MLS/HR: 0.9 INJECTION, SOLUTION INTRAVENOUS at 02:00

## 2019-07-19 VITALS — SYSTOLIC BLOOD PRESSURE: 146 MMHG | DIASTOLIC BLOOD PRESSURE: 94 MMHG

## 2019-07-19 VITALS — SYSTOLIC BLOOD PRESSURE: 121 MMHG | DIASTOLIC BLOOD PRESSURE: 83 MMHG

## 2019-07-19 VITALS — DIASTOLIC BLOOD PRESSURE: 94 MMHG | SYSTOLIC BLOOD PRESSURE: 131 MMHG

## 2019-07-19 VITALS — DIASTOLIC BLOOD PRESSURE: 82 MMHG | SYSTOLIC BLOOD PRESSURE: 118 MMHG

## 2019-07-19 LAB
ANION GAP SERPL CALC-SCNC: 6 MMOL/L (ref 5–15)
CALCIUM SERPL-MCNC: 7.7 MG/DL (ref 8.5–10.1)
CHLORIDE SERPL-SCNC: 114 MMOL/L (ref 98–107)
CREAT SERPL-MCNC: 0.73 MG/DL (ref 0.55–1.02)

## 2019-07-19 RX ADMIN — DOCUSATE SODIUM 50MG AND SENNOSIDES 8.6MG SCH TAB: 8.6; 5 TABLET, FILM COATED ORAL at 07:19

## 2019-07-19 RX ADMIN — POTASSIUM CHLORIDE SCH MLS/HR: 2 INJECTION, SOLUTION, CONCENTRATE INTRAVENOUS at 00:24

## 2019-07-19 RX ADMIN — PANTOPRAZOLE SODIUM SCH MG: 40 INJECTION, POWDER, FOR SOLUTION INTRAVENOUS at 07:19

## 2019-07-19 RX ADMIN — LEVETIRACETAM SCH MLS/HR: 100 INJECTION, SOLUTION, CONCENTRATE INTRAVENOUS at 11:57

## 2019-07-19 RX ADMIN — LEVETIRACETAM SCH MLS/HR: 100 INJECTION, SOLUTION, CONCENTRATE INTRAVENOUS at 23:21

## 2019-07-19 RX ADMIN — SODIUM CHLORIDE SCH MLS/HR: 0.9 INJECTION, SOLUTION INTRAVENOUS at 01:47

## 2019-07-19 RX ADMIN — ENOXAPARIN SODIUM SCH MG: 40 INJECTION SUBCUTANEOUS at 23:21

## 2019-07-19 RX ADMIN — SODIUM CHLORIDE SCH MLS/HR: 0.9 INJECTION, SOLUTION INTRAVENOUS at 20:27

## 2019-07-19 RX ADMIN — PRAVASTATIN SODIUM SCH MG: 20 TABLET ORAL at 20:27

## 2019-07-19 RX ADMIN — SODIUM CHLORIDE SCH MLS/HR: 0.9 INJECTION, SOLUTION INTRAVENOUS at 11:30

## 2019-07-20 VITALS — DIASTOLIC BLOOD PRESSURE: 80 MMHG | SYSTOLIC BLOOD PRESSURE: 145 MMHG

## 2019-07-20 VITALS — DIASTOLIC BLOOD PRESSURE: 83 MMHG | SYSTOLIC BLOOD PRESSURE: 129 MMHG

## 2019-07-20 VITALS — SYSTOLIC BLOOD PRESSURE: 130 MMHG | DIASTOLIC BLOOD PRESSURE: 87 MMHG

## 2019-07-20 VITALS — SYSTOLIC BLOOD PRESSURE: 127 MMHG | DIASTOLIC BLOOD PRESSURE: 84 MMHG

## 2019-07-20 LAB
<PLATELET ESTIMATE>: ADEQUATE
<PLT MORPHOLOGY>: (no result)
ANION GAP SERPL CALC-SCNC: 9 MMOL/L (ref 5–15)
ANISOCYTOSIS BLD QL SMEAR: (no result)
BASOPHILS NFR BLD MANUAL: 2 % (ref 0–1)
BASOS#(MANUAL): 0.14 X10^3/UL (ref 0–0.1)
CALCIUM SERPL-MCNC: 7.7 MG/DL (ref 8.5–10.1)
CHLORIDE SERPL-SCNC: 112 MMOL/L (ref 98–107)
CREAT SERPL-MCNC: 0.63 MG/DL (ref 0.55–1.02)
EOS#(MANUAL): 0.14 X10^3/UL (ref 0–0.4)
EOS% (MANUAL): 2 % (ref 1–7)
ERYTHROCYTE [DISTWIDTH] IN BLOOD BY AUTOMATED COUNT: 16.9 % (ref 9.6–15.2)
LYMPH#(MANUAL): 1.08 X10^3/UL (ref 1–3.4)
LYMPHS% (MANUAL): 15 % (ref 22–44)
MACROCYTES BLD QL SMEAR: (no result)
MCH RBC QN AUTO: 37.4 PG (ref 27–34.8)
MCHC RBC AUTO-ENTMCNC: 32.9 G/DL (ref 32.4–35.8)
MCV RBC AUTO: 113.8 FL (ref 80–100)
MD: YES
MONOS#(MANUAL): 0.58 X10^3/UL (ref 0.3–2.7)
MONOS% (MANUAL): 8 % (ref 2–9)
PLATELET # BLD AUTO: 230 X10^3/UL (ref 130–400)
PMV BLD AUTO: 8 FL (ref 7.4–10.4)
RBC # BLD AUTO: 2.44 X10^6/UL (ref 3.82–5.3)
SEG#(MANUAL): 5.26 X10^3/UL (ref 1.8–6.8)
SEGS% (MANUAL): 73 % (ref 42–75)

## 2019-07-20 RX ADMIN — LEVETIRACETAM SCH MLS/HR: 100 INJECTION, SOLUTION, CONCENTRATE INTRAVENOUS at 11:59

## 2019-07-20 RX ADMIN — ENOXAPARIN SODIUM SCH MG: 40 INJECTION SUBCUTANEOUS at 23:23

## 2019-07-20 RX ADMIN — SODIUM CHLORIDE SCH MLS/HR: 0.9 INJECTION, SOLUTION INTRAVENOUS at 19:20

## 2019-07-20 RX ADMIN — SODIUM CHLORIDE SCH MLS/HR: 0.9 INJECTION, SOLUTION INTRAVENOUS at 03:39

## 2019-07-20 RX ADMIN — DOCUSATE SODIUM 50MG AND SENNOSIDES 8.6MG SCH TAB: 8.6; 5 TABLET, FILM COATED ORAL at 07:46

## 2019-07-20 RX ADMIN — SODIUM CHLORIDE SCH MLS/HR: 0.9 INJECTION, SOLUTION INTRAVENOUS at 11:59

## 2019-07-20 RX ADMIN — LEVETIRACETAM SCH MLS/HR: 100 INJECTION, SOLUTION, CONCENTRATE INTRAVENOUS at 23:23

## 2019-07-20 RX ADMIN — PRAVASTATIN SODIUM SCH MG: 20 TABLET ORAL at 19:19

## 2019-07-20 RX ADMIN — POTASSIUM CHLORIDE SCH MLS/HR: 2 INJECTION, SOLUTION, CONCENTRATE INTRAVENOUS at 01:07

## 2019-07-20 RX ADMIN — PANTOPRAZOLE SODIUM SCH MG: 40 INJECTION, POWDER, FOR SOLUTION INTRAVENOUS at 07:39

## 2019-07-21 VITALS — SYSTOLIC BLOOD PRESSURE: 130 MMHG | DIASTOLIC BLOOD PRESSURE: 87 MMHG

## 2019-07-21 VITALS — DIASTOLIC BLOOD PRESSURE: 84 MMHG | SYSTOLIC BLOOD PRESSURE: 127 MMHG

## 2019-07-21 VITALS — DIASTOLIC BLOOD PRESSURE: 90 MMHG | SYSTOLIC BLOOD PRESSURE: 133 MMHG

## 2019-07-21 VITALS — DIASTOLIC BLOOD PRESSURE: 91 MMHG | SYSTOLIC BLOOD PRESSURE: 137 MMHG

## 2019-07-21 LAB
ANION GAP SERPL CALC-SCNC: 8 MMOL/L (ref 5–15)
CALCIUM SERPL-MCNC: 7.8 MG/DL (ref 8.5–10.1)
CHLORIDE SERPL-SCNC: 112 MMOL/L (ref 98–107)
CREAT SERPL-MCNC: 0.66 MG/DL (ref 0.55–1.02)

## 2019-07-21 RX ADMIN — POTASSIUM CHLORIDE SCH MLS/HR: 2 INJECTION, SOLUTION, CONCENTRATE INTRAVENOUS at 00:31

## 2019-07-21 RX ADMIN — LEVETIRACETAM SCH MLS/HR: 100 INJECTION, SOLUTION, CONCENTRATE INTRAVENOUS at 23:19

## 2019-07-21 RX ADMIN — SODIUM CHLORIDE SCH MLS/HR: 0.9 INJECTION, SOLUTION INTRAVENOUS at 04:34

## 2019-07-21 RX ADMIN — PANTOPRAZOLE SODIUM SCH MG: 40 TABLET, DELAYED RELEASE ORAL at 08:41

## 2019-07-21 RX ADMIN — ENOXAPARIN SODIUM SCH MG: 40 INJECTION SUBCUTANEOUS at 23:19

## 2019-07-21 RX ADMIN — DOCUSATE SODIUM 50MG AND SENNOSIDES 8.6MG SCH TAB: 8.6; 5 TABLET, FILM COATED ORAL at 07:37

## 2019-07-21 RX ADMIN — LEVETIRACETAM SCH MLS/HR: 100 INJECTION, SOLUTION, CONCENTRATE INTRAVENOUS at 11:36

## 2019-07-21 RX ADMIN — SODIUM CHLORIDE SCH MLS/HR: 0.9 INJECTION, SOLUTION INTRAVENOUS at 11:38

## 2019-07-21 RX ADMIN — PRAVASTATIN SODIUM SCH MG: 20 TABLET ORAL at 21:09

## 2019-07-22 VITALS — DIASTOLIC BLOOD PRESSURE: 78 MMHG | SYSTOLIC BLOOD PRESSURE: 120 MMHG

## 2019-07-22 VITALS — DIASTOLIC BLOOD PRESSURE: 91 MMHG | SYSTOLIC BLOOD PRESSURE: 144 MMHG

## 2019-07-22 LAB
ANION GAP SERPL CALC-SCNC: 7 MMOL/L (ref 5–15)
CALCIUM SERPL-MCNC: 7.9 MG/DL (ref 8.5–10.1)
CHLORIDE SERPL-SCNC: 109 MMOL/L (ref 98–107)
CREAT SERPL-MCNC: 0.63 MG/DL (ref 0.55–1.02)

## 2019-07-22 RX ADMIN — DOCUSATE SODIUM 50MG AND SENNOSIDES 8.6MG SCH TAB: 8.6; 5 TABLET, FILM COATED ORAL at 08:43

## 2019-07-22 RX ADMIN — LEVETIRACETAM SCH MLS/HR: 100 INJECTION, SOLUTION, CONCENTRATE INTRAVENOUS at 11:32

## 2019-07-22 RX ADMIN — PANTOPRAZOLE SODIUM SCH MG: 40 TABLET, DELAYED RELEASE ORAL at 08:45

## 2019-08-20 ENCOUNTER — HOME HEALTH ADMISSION (OUTPATIENT)
Dept: HOME HEALTH SERVICES | Facility: HOME HEALTHCARE | Age: 64
End: 2019-08-20
Payer: MEDICAID

## 2019-08-25 ENCOUNTER — HOME CARE VISIT (OUTPATIENT)
Dept: HOME HEALTH SERVICES | Facility: HOME HEALTHCARE | Age: 64
End: 2019-08-25
Payer: MEDICAID

## 2019-08-25 VITALS
RESPIRATION RATE: 16 BRPM | SYSTOLIC BLOOD PRESSURE: 144 MMHG | DIASTOLIC BLOOD PRESSURE: 84 MMHG | WEIGHT: 128 LBS | HEART RATE: 74 BPM | BODY MASS INDEX: 22.68 KG/M2 | HEIGHT: 63 IN | TEMPERATURE: 98 F | OXYGEN SATURATION: 100 %

## 2019-08-25 PROCEDURE — 665001 SOC-HOME HEALTH

## 2019-08-25 PROCEDURE — G0493 RN CARE EA 15 MIN HH/HOSPICE: HCPCS

## 2019-08-25 ASSESSMENT — ENCOUNTER SYMPTOMS
DIFFICULTY THINKING: 1
VOMITING: DENIES
NAUSEA: DENIES

## 2019-08-25 ASSESSMENT — PATIENT HEALTH QUESTIONNAIRE - PHQ9
CLINICAL INTERPRETATION OF PHQ2 SCORE: 0
1. LITTLE INTEREST OR PLEASURE IN DOING THINGS: 00
2. FEELING DOWN, DEPRESSED, IRRITABLE, OR HOPELESS: 00

## 2019-08-26 ENCOUNTER — ANTICOAGULATION MONITORING (OUTPATIENT)
Dept: MEDICAL GROUP | Facility: PHYSICIAN GROUP | Age: 64
End: 2019-08-26

## 2019-08-26 NOTE — PROGRESS NOTES
Received referral from [facility]. Medications reviewed.     She is on a large dose of aspirin 325 mg every 4 hours.  This can increase her risk of GI bleeds.  She might want to consider another medication especially since it looks like she has stomach problems/GERD and is taking ranitidine.

## 2019-08-27 ENCOUNTER — HOME CARE VISIT (OUTPATIENT)
Dept: HOME HEALTH SERVICES | Facility: HOME HEALTHCARE | Age: 64
End: 2019-08-27
Payer: MEDICAID

## 2019-08-27 ENCOUNTER — HOME CARE VISIT (OUTPATIENT)
Dept: HOME HEALTH SERVICES | Facility: HOME HEALTHCARE | Age: 64
End: 2019-08-27

## 2019-08-27 VITALS
TEMPERATURE: 97.9 F | RESPIRATION RATE: 16 BRPM | SYSTOLIC BLOOD PRESSURE: 100 MMHG | OXYGEN SATURATION: 98 % | DIASTOLIC BLOOD PRESSURE: 68 MMHG | HEART RATE: 88 BPM

## 2019-08-27 VITALS
DIASTOLIC BLOOD PRESSURE: 68 MMHG | SYSTOLIC BLOOD PRESSURE: 100 MMHG | TEMPERATURE: 97.9 F | HEART RATE: 88 BPM | RESPIRATION RATE: 16 BRPM | OXYGEN SATURATION: 98 %

## 2019-08-27 PROCEDURE — G0152 HHCP-SERV OF OT,EA 15 MIN: HCPCS

## 2019-08-27 PROCEDURE — G0151 HHCP-SERV OF PT,EA 15 MIN: HCPCS

## 2019-08-27 ASSESSMENT — ACTIVITIES OF DAILY LIVING (ADL)
TELEPHONE_ASSISTANCE: 0
HOUSEKEEPING_ASSISTANCE: 6
IADLS_COMMENTS: <!--EPICS-->SEE OT EVAL<!--EPICE-->
EATING_ASSISTANCE: 0
MEAL_PREP_ASSISTANCE: 6
TOILETING_ASSISTANCE: 0
TRANSPORTATION_ASSISTANCE: 6
BATHING_ASSISTANCE: 0
LAUNDRY_ASSISTANCE: 0
DRESSING_UB_ASSISTANCE: 0
GROOMING_ASSISTANCE: 0
ORAL_CARE_ASSISTANCE: 0
SHOPPING_ASSISTANCE: 6
ADLS_COMMENTS: <!--EPICS-->SEE OT EVAL<!--EPICE-->
DRESSING_LB_ASSISTANCE: 0

## 2019-08-28 ENCOUNTER — HOME CARE VISIT (OUTPATIENT)
Dept: HOME HEALTH SERVICES | Facility: HOME HEALTHCARE | Age: 64
End: 2019-08-28
Payer: MEDICAID

## 2019-08-28 VITALS
RESPIRATION RATE: 16 BRPM | OXYGEN SATURATION: 98 % | HEART RATE: 86 BPM | DIASTOLIC BLOOD PRESSURE: 64 MMHG | SYSTOLIC BLOOD PRESSURE: 110 MMHG | TEMPERATURE: 98.2 F

## 2019-08-28 PROCEDURE — G0495 RN CARE TRAIN/EDU IN HH: HCPCS

## 2019-08-29 ENCOUNTER — HOME CARE VISIT (OUTPATIENT)
Dept: HOME HEALTH SERVICES | Facility: HOME HEALTHCARE | Age: 64
End: 2019-08-29
Payer: MEDICAID

## 2019-08-29 VITALS
SYSTOLIC BLOOD PRESSURE: 114 MMHG | RESPIRATION RATE: 17 BRPM | TEMPERATURE: 98.6 F | OXYGEN SATURATION: 99 % | HEART RATE: 78 BPM | DIASTOLIC BLOOD PRESSURE: 60 MMHG

## 2019-08-29 PROCEDURE — G0151 HHCP-SERV OF PT,EA 15 MIN: HCPCS

## 2019-09-03 ENCOUNTER — HOME CARE VISIT (OUTPATIENT)
Dept: HOME HEALTH SERVICES | Facility: HOME HEALTHCARE | Age: 64
End: 2019-09-03
Payer: MEDICAID

## 2019-09-03 VITALS
SYSTOLIC BLOOD PRESSURE: 117 MMHG | DIASTOLIC BLOOD PRESSURE: 61 MMHG | HEART RATE: 78 BPM | OXYGEN SATURATION: 99 % | RESPIRATION RATE: 18 BRPM | TEMPERATURE: 98.3 F

## 2019-09-03 VITALS
TEMPERATURE: 98.3 F | RESPIRATION RATE: 16 BRPM | DIASTOLIC BLOOD PRESSURE: 61 MMHG | SYSTOLIC BLOOD PRESSURE: 117 MMHG | OXYGEN SATURATION: 99 % | HEART RATE: 71 BPM

## 2019-09-03 PROCEDURE — G0151 HHCP-SERV OF PT,EA 15 MIN: HCPCS

## 2019-09-03 PROCEDURE — G0495 RN CARE TRAIN/EDU IN HH: HCPCS

## 2019-09-03 ASSESSMENT — ENCOUNTER SYMPTOMS
NAUSEA: DENIED
VOMITING: DENIED

## 2019-09-03 ASSESSMENT — ACTIVITIES OF DAILY LIVING (ADL): TRANSPORTATION COMMENTS: YES

## 2019-09-04 ENCOUNTER — HOME CARE VISIT (OUTPATIENT)
Dept: HOME HEALTH SERVICES | Facility: HOME HEALTHCARE | Age: 64
End: 2019-09-04
Payer: MEDICAID

## 2019-09-04 VITALS
DIASTOLIC BLOOD PRESSURE: 60 MMHG | RESPIRATION RATE: 17 BRPM | OXYGEN SATURATION: 97 % | TEMPERATURE: 98.4 F | SYSTOLIC BLOOD PRESSURE: 117 MMHG | HEART RATE: 85 BPM

## 2019-09-04 PROCEDURE — G0151 HHCP-SERV OF PT,EA 15 MIN: HCPCS

## 2019-09-04 PROCEDURE — G0152 HHCP-SERV OF OT,EA 15 MIN: HCPCS

## 2019-09-05 ENCOUNTER — HOME CARE VISIT (OUTPATIENT)
Dept: HOME HEALTH SERVICES | Facility: HOME HEALTHCARE | Age: 64
End: 2019-09-05
Payer: MEDICAID

## 2019-09-05 VITALS
DIASTOLIC BLOOD PRESSURE: 75 MMHG | HEART RATE: 85 BPM | RESPIRATION RATE: 17 BRPM | TEMPERATURE: 98.4 F | SYSTOLIC BLOOD PRESSURE: 115 MMHG | OXYGEN SATURATION: 97 %

## 2019-09-05 VITALS
OXYGEN SATURATION: 98 % | RESPIRATION RATE: 18 BRPM | HEART RATE: 80 BPM | DIASTOLIC BLOOD PRESSURE: 74 MMHG | TEMPERATURE: 99.2 F | SYSTOLIC BLOOD PRESSURE: 115 MMHG

## 2019-09-05 PROCEDURE — G0495 RN CARE TRAIN/EDU IN HH: HCPCS

## 2019-09-05 ASSESSMENT — ENCOUNTER SYMPTOMS
VOMITING: DENIES ANY VOMITING
NAUSEA: DENIES C/O NAUSEA

## 2019-09-05 ASSESSMENT — ACTIVITIES OF DAILY LIVING (ADL): OASIS_M1830: 05

## 2019-09-09 ENCOUNTER — HOME CARE VISIT (OUTPATIENT)
Dept: HOME HEALTH SERVICES | Facility: HOME HEALTHCARE | Age: 64
End: 2019-09-09
Payer: MEDICAID

## 2019-09-10 ENCOUNTER — HOME CARE VISIT (OUTPATIENT)
Dept: HOME HEALTH SERVICES | Facility: HOME HEALTHCARE | Age: 64
End: 2019-09-10
Payer: MEDICAID

## 2019-09-12 ENCOUNTER — HOME CARE VISIT (OUTPATIENT)
Dept: HOME HEALTH SERVICES | Facility: HOME HEALTHCARE | Age: 64
End: 2019-09-12
Payer: MEDICAID

## 2019-09-12 VITALS
SYSTOLIC BLOOD PRESSURE: 123 MMHG | OXYGEN SATURATION: 98 % | RESPIRATION RATE: 16 BRPM | HEART RATE: 84 BPM | DIASTOLIC BLOOD PRESSURE: 60 MMHG | TEMPERATURE: 98.1 F

## 2019-09-12 PROCEDURE — G0151 HHCP-SERV OF PT,EA 15 MIN: HCPCS

## 2019-09-13 ENCOUNTER — HOME CARE VISIT (OUTPATIENT)
Dept: HOME HEALTH SERVICES | Facility: HOME HEALTHCARE | Age: 64
End: 2019-09-13
Payer: MEDICAID

## 2019-09-13 VITALS
TEMPERATURE: 98.1 F | DIASTOLIC BLOOD PRESSURE: 56 MMHG | OXYGEN SATURATION: 97 % | RESPIRATION RATE: 18 BRPM | HEART RATE: 91 BPM | SYSTOLIC BLOOD PRESSURE: 118 MMHG

## 2019-09-13 PROCEDURE — G0495 RN CARE TRAIN/EDU IN HH: HCPCS

## 2019-09-13 ASSESSMENT — ENCOUNTER SYMPTOMS
DEPRESSED MOOD: 1
VOMITING: DENIES ANY VOMITING
NAUSEA: DENIES C/O NAUSEA

## 2019-09-16 ENCOUNTER — HOME CARE VISIT (OUTPATIENT)
Dept: HOME HEALTH SERVICES | Facility: HOME HEALTHCARE | Age: 64
End: 2019-09-16
Payer: MEDICAID

## 2019-09-16 VITALS
RESPIRATION RATE: 18 BRPM | DIASTOLIC BLOOD PRESSURE: 62 MMHG | TEMPERATURE: 98 F | HEART RATE: 94 BPM | SYSTOLIC BLOOD PRESSURE: 104 MMHG | OXYGEN SATURATION: 96 %

## 2019-09-16 PROCEDURE — G0495 RN CARE TRAIN/EDU IN HH: HCPCS

## 2019-09-16 ASSESSMENT — ENCOUNTER SYMPTOMS
VOMITING: DENIES ANY VOMITING
DEPRESSED MOOD: 1
NAUSEA: DENIES C/O NAUSEA

## 2019-09-18 ENCOUNTER — HOME CARE VISIT (OUTPATIENT)
Dept: HOME HEALTH SERVICES | Facility: HOME HEALTHCARE | Age: 64
End: 2019-09-18
Payer: MEDICAID

## 2019-09-19 ENCOUNTER — HOME CARE VISIT (OUTPATIENT)
Dept: HOME HEALTH SERVICES | Facility: HOME HEALTHCARE | Age: 64
End: 2019-09-19
Payer: MEDICAID

## 2019-09-24 ENCOUNTER — HOME CARE VISIT (OUTPATIENT)
Dept: HOME HEALTH SERVICES | Facility: HOME HEALTHCARE | Age: 64
End: 2019-09-24
Payer: MEDICAID

## 2019-09-24 VITALS
OXYGEN SATURATION: 94 % | SYSTOLIC BLOOD PRESSURE: 118 MMHG | RESPIRATION RATE: 18 BRPM | HEART RATE: 86 BPM | DIASTOLIC BLOOD PRESSURE: 64 MMHG | TEMPERATURE: 98.3 F

## 2019-09-24 PROCEDURE — G0495 RN CARE TRAIN/EDU IN HH: HCPCS

## 2019-09-24 ASSESSMENT — ENCOUNTER SYMPTOMS
DEPRESSED MOOD: 1
NAUSEA: DENIES C/O NAUSEA
VOMITING: DENIES ANY VOMITING

## 2019-09-26 ENCOUNTER — HOME CARE VISIT (OUTPATIENT)
Dept: HOME HEALTH SERVICES | Facility: HOME HEALTHCARE | Age: 64
End: 2019-09-26
Payer: MEDICAID

## 2019-09-27 ENCOUNTER — HOME CARE VISIT (OUTPATIENT)
Dept: HOME HEALTH SERVICES | Facility: HOME HEALTHCARE | Age: 64
End: 2019-09-27
Payer: MEDICAID

## 2019-10-03 ENCOUNTER — HOME CARE VISIT (OUTPATIENT)
Dept: HOME HEALTH SERVICES | Facility: HOME HEALTHCARE | Age: 64
End: 2019-10-03

## 2019-10-04 ENCOUNTER — HOME CARE VISIT (OUTPATIENT)
Dept: HOME HEALTH SERVICES | Facility: HOME HEALTHCARE | Age: 64
End: 2019-10-04
Payer: MEDICAID

## 2019-10-06 ASSESSMENT — ACTIVITIES OF DAILY LIVING (ADL)
OASIS_M1830: 00
HOME_HEALTH_OASIS: 00

## 2019-10-06 ASSESSMENT — ENCOUNTER SYMPTOMS: DEPRESSED MOOD: 1

## 2020-09-08 ENCOUNTER — HOSPITAL ENCOUNTER (OUTPATIENT)
Facility: MEDICAL CENTER | Age: 65
End: 2020-09-08
Attending: NURSE PRACTITIONER
Payer: MEDICARE

## 2020-09-08 PROCEDURE — 87186 SC STD MICRODIL/AGAR DIL: CPT

## 2020-09-08 PROCEDURE — 87086 URINE CULTURE/COLONY COUNT: CPT

## 2020-09-08 PROCEDURE — 87077 CULTURE AEROBIC IDENTIFY: CPT

## 2020-09-13 LAB
BACTERIA UR CULT: ABNORMAL
BACTERIA UR CULT: ABNORMAL
SIGNIFICANT IND 70042: ABNORMAL
SITE SITE: ABNORMAL
SOURCE SOURCE: ABNORMAL

## 2021-03-03 DIAGNOSIS — Z23 NEED FOR VACCINATION: ICD-10-CM

## 2021-04-16 ENCOUNTER — HOSPITAL ENCOUNTER (INPATIENT)
Facility: MEDICAL CENTER | Age: 66
LOS: 3 days | DRG: 918 | End: 2021-04-19
Attending: EMERGENCY MEDICINE | Admitting: HOSPITALIST
Payer: MEDICARE

## 2021-04-16 ENCOUNTER — APPOINTMENT (OUTPATIENT)
Dept: RADIOLOGY | Facility: MEDICAL CENTER | Age: 66
DRG: 918 | End: 2021-04-16
Attending: EMERGENCY MEDICINE
Payer: MEDICARE

## 2021-04-16 DIAGNOSIS — T24.119A: ICD-10-CM

## 2021-04-16 DIAGNOSIS — R53.81 DEBILITY: ICD-10-CM

## 2021-04-16 PROBLEM — R79.89 ELEVATED LACTIC ACID LEVEL: Status: ACTIVE | Noted: 2021-04-16

## 2021-04-16 PROBLEM — T58.91XA TOXIC EFFECT OF CARBON MONOXIDE, UNINTENTIONAL: Status: ACTIVE | Noted: 2021-04-16

## 2021-04-16 PROBLEM — R79.89 ELEVATED SERUM CREATININE: Status: ACTIVE | Noted: 2021-04-16

## 2021-04-16 LAB
ALBUMIN SERPL BCP-MCNC: 3.4 G/DL (ref 3.2–4.9)
ALBUMIN/GLOB SERPL: 1.2 G/DL
ALP SERPL-CCNC: 96 U/L (ref 30–99)
ALT SERPL-CCNC: 10 U/L (ref 2–50)
ANION GAP SERPL CALC-SCNC: 16 MMOL/L (ref 7–16)
AST SERPL-CCNC: 15 U/L (ref 12–45)
BASOPHILS # BLD AUTO: 1 % (ref 0–1.8)
BASOPHILS # BLD: 0.09 K/UL (ref 0–0.12)
BILIRUB SERPL-MCNC: <0.2 MG/DL (ref 0.1–1.5)
BUN SERPL-MCNC: 21 MG/DL (ref 8–22)
CALCIUM SERPL-MCNC: 8.1 MG/DL (ref 8.5–10.5)
CHLORIDE SERPL-SCNC: 106 MMOL/L (ref 96–112)
CO2 SERPL-SCNC: 15 MMOL/L (ref 20–33)
COHGB MFR BLD: 13.2 % (ref 0–4.9)
COHGB MFR BLD: 2.3 % (ref 0–4.9)
CREAT SERPL-MCNC: 1.31 MG/DL (ref 0.5–1.4)
EOSINOPHIL # BLD AUTO: 0.47 K/UL (ref 0–0.51)
EOSINOPHIL NFR BLD: 5 % (ref 0–6.9)
ERYTHROCYTE [DISTWIDTH] IN BLOOD BY AUTOMATED COUNT: 47.6 FL (ref 35.9–50)
FLUAV RNA SPEC QL NAA+PROBE: NEGATIVE
FLUBV RNA SPEC QL NAA+PROBE: NEGATIVE
GLOBULIN SER CALC-MCNC: 2.9 G/DL (ref 1.9–3.5)
GLUCOSE SERPL-MCNC: 92 MG/DL (ref 65–99)
HCT VFR BLD AUTO: 30.9 % (ref 37–47)
HGB BLD-MCNC: 10.1 G/DL (ref 12–16)
IMM GRANULOCYTES # BLD AUTO: 0.03 K/UL (ref 0–0.11)
IMM GRANULOCYTES NFR BLD AUTO: 0.3 % (ref 0–0.9)
LACTATE BLD-SCNC: 3.1 MMOL/L (ref 0.5–2)
LACTATE BLD-SCNC: 3.1 MMOL/L (ref 0.5–2)
LYMPHOCYTES # BLD AUTO: 4.93 K/UL (ref 1–4.8)
LYMPHOCYTES NFR BLD: 52.8 % (ref 22–41)
MCH RBC QN AUTO: 32.9 PG (ref 27–33)
MCHC RBC AUTO-ENTMCNC: 32.7 G/DL (ref 33.6–35)
MCV RBC AUTO: 100.7 FL (ref 81.4–97.8)
MONOCYTES # BLD AUTO: 0.57 K/UL (ref 0–0.85)
MONOCYTES NFR BLD AUTO: 6.1 % (ref 0–13.4)
NEUTROPHILS # BLD AUTO: 3.25 K/UL (ref 2–7.15)
NEUTROPHILS NFR BLD: 34.8 % (ref 44–72)
NRBC # BLD AUTO: 0 K/UL
NRBC BLD-RTO: 0 /100 WBC
PLATELET # BLD AUTO: 376 K/UL (ref 164–446)
PMV BLD AUTO: 10.3 FL (ref 9–12.9)
POTASSIUM SERPL-SCNC: 3.7 MMOL/L (ref 3.6–5.5)
PROT SERPL-MCNC: 6.3 G/DL (ref 6–8.2)
RBC # BLD AUTO: 3.07 M/UL (ref 4.2–5.4)
RSV RNA SPEC QL NAA+PROBE: NEGATIVE
SARS-COV-2 RNA RESP QL NAA+PROBE: NOTDETECTED
SODIUM SERPL-SCNC: 137 MMOL/L (ref 135–145)
SPECIMEN SOURCE: NORMAL
WBC # BLD AUTO: 9.3 K/UL (ref 4.8–10.8)

## 2021-04-16 PROCEDURE — 700105 HCHG RX REV CODE 258: Performed by: HOSPITALIST

## 2021-04-16 PROCEDURE — 85025 COMPLETE CBC W/AUTO DIFF WBC: CPT

## 2021-04-16 PROCEDURE — 99285 EMERGENCY DEPT VISIT HI MDM: CPT

## 2021-04-16 PROCEDURE — C9803 HOPD COVID-19 SPEC COLLECT: HCPCS | Performed by: EMERGENCY MEDICINE

## 2021-04-16 PROCEDURE — 83605 ASSAY OF LACTIC ACID: CPT

## 2021-04-16 PROCEDURE — 82600 ASSAY OF CYANIDE: CPT

## 2021-04-16 PROCEDURE — 99223 1ST HOSP IP/OBS HIGH 75: CPT | Mod: AI | Performed by: HOSPITALIST

## 2021-04-16 PROCEDURE — 71045 X-RAY EXAM CHEST 1 VIEW: CPT

## 2021-04-16 PROCEDURE — 770020 HCHG ROOM/CARE - TELE (206)

## 2021-04-16 PROCEDURE — 700111 HCHG RX REV CODE 636 W/ 250 OVERRIDE (IP): Performed by: HOSPITALIST

## 2021-04-16 PROCEDURE — 82375 ASSAY CARBOXYHB QUANT: CPT

## 2021-04-16 PROCEDURE — 700102 HCHG RX REV CODE 250 W/ 637 OVERRIDE(OP): Performed by: HOSPITALIST

## 2021-04-16 PROCEDURE — 0241U HCHG SARS-COV-2 COVID-19 NFCT DS RESP RNA 4 TRGT MIC: CPT

## 2021-04-16 PROCEDURE — A9270 NON-COVERED ITEM OR SERVICE: HCPCS | Performed by: HOSPITALIST

## 2021-04-16 PROCEDURE — 80053 COMPREHEN METABOLIC PANEL: CPT

## 2021-04-16 PROCEDURE — 700105 HCHG RX REV CODE 258: Performed by: EMERGENCY MEDICINE

## 2021-04-16 RX ORDER — HEPARIN SODIUM 5000 [USP'U]/ML
5000 INJECTION, SOLUTION INTRAVENOUS; SUBCUTANEOUS EVERY 8 HOURS
Status: DISCONTINUED | OUTPATIENT
Start: 2021-04-16 | End: 2021-04-19 | Stop reason: HOSPADM

## 2021-04-16 RX ORDER — AMOXICILLIN 250 MG
2 CAPSULE ORAL 2 TIMES DAILY
Status: DISCONTINUED | OUTPATIENT
Start: 2021-04-17 | End: 2021-04-19 | Stop reason: HOSPADM

## 2021-04-16 RX ORDER — OXYCODONE HYDROCHLORIDE 5 MG/1
5 TABLET ORAL
Status: DISCONTINUED | OUTPATIENT
Start: 2021-04-16 | End: 2021-04-19 | Stop reason: HOSPADM

## 2021-04-16 RX ORDER — SODIUM CHLORIDE 9 MG/ML
INJECTION, SOLUTION INTRAVENOUS CONTINUOUS
Status: ACTIVE | OUTPATIENT
Start: 2021-04-16 | End: 2021-04-17

## 2021-04-16 RX ORDER — LEVETIRACETAM 500 MG/1
500 TABLET ORAL DAILY
Status: DISCONTINUED | OUTPATIENT
Start: 2021-04-17 | End: 2021-04-19 | Stop reason: HOSPADM

## 2021-04-16 RX ORDER — ACETAMINOPHEN 325 MG/1
650 TABLET ORAL EVERY 6 HOURS PRN
Status: DISCONTINUED | OUTPATIENT
Start: 2021-04-16 | End: 2021-04-19 | Stop reason: HOSPADM

## 2021-04-16 RX ORDER — SULFAMETHOXAZOLE AND TRIMETHOPRIM 800; 160 MG/1; MG/1
TABLET ORAL
COMMUNITY
End: 2021-04-16

## 2021-04-16 RX ORDER — POLYETHYLENE GLYCOL 3350 17 G/17G
1 POWDER, FOR SOLUTION ORAL
Status: DISCONTINUED | OUTPATIENT
Start: 2021-04-16 | End: 2021-04-19 | Stop reason: HOSPADM

## 2021-04-16 RX ORDER — OXYCODONE HYDROCHLORIDE 5 MG/1
2.5 TABLET ORAL
Status: DISCONTINUED | OUTPATIENT
Start: 2021-04-16 | End: 2021-04-19 | Stop reason: HOSPADM

## 2021-04-16 RX ORDER — NITROFURANTOIN 25; 75 MG/1; MG/1
CAPSULE ORAL
COMMUNITY
End: 2021-04-16

## 2021-04-16 RX ORDER — SODIUM CHLORIDE, SODIUM LACTATE, POTASSIUM CHLORIDE, CALCIUM CHLORIDE 600; 310; 30; 20 MG/100ML; MG/100ML; MG/100ML; MG/100ML
1000 INJECTION, SOLUTION INTRAVENOUS ONCE
Status: COMPLETED | OUTPATIENT
Start: 2021-04-16 | End: 2021-04-16

## 2021-04-16 RX ORDER — BISACODYL 10 MG
10 SUPPOSITORY, RECTAL RECTAL
Status: DISCONTINUED | OUTPATIENT
Start: 2021-04-16 | End: 2021-04-19 | Stop reason: HOSPADM

## 2021-04-16 RX ORDER — CEPHALEXIN 500 MG/1
CAPSULE ORAL
COMMUNITY
End: 2021-04-16

## 2021-04-16 RX ORDER — HYDROMORPHONE HYDROCHLORIDE 1 MG/ML
0.25 INJECTION, SOLUTION INTRAMUSCULAR; INTRAVENOUS; SUBCUTANEOUS
Status: DISCONTINUED | OUTPATIENT
Start: 2021-04-16 | End: 2021-04-19 | Stop reason: HOSPADM

## 2021-04-16 RX ORDER — NAPROXEN 500 MG/1
500 TABLET ORAL DAILY
COMMUNITY
End: 2021-06-17

## 2021-04-16 RX ORDER — MELOXICAM 15 MG/1
TABLET ORAL
COMMUNITY
End: 2021-04-16

## 2021-04-16 RX ADMIN — ASPIRIN 162 MG: 81 TABLET, COATED ORAL at 23:06

## 2021-04-16 RX ADMIN — OXYCODONE 5 MG: 5 TABLET ORAL at 23:40

## 2021-04-16 RX ADMIN — OXYCODONE 2.5 MG: 5 TABLET ORAL at 20:11

## 2021-04-16 RX ADMIN — HEPARIN SODIUM 5000 UNITS: 5000 INJECTION, SOLUTION INTRAVENOUS; SUBCUTANEOUS at 23:17

## 2021-04-16 RX ADMIN — SODIUM CHLORIDE: 9 INJECTION, SOLUTION INTRAVENOUS at 23:18

## 2021-04-16 RX ADMIN — SODIUM CHLORIDE, POTASSIUM CHLORIDE, SODIUM LACTATE AND CALCIUM CHLORIDE 1000 ML: 600; 310; 30; 20 INJECTION, SOLUTION INTRAVENOUS at 18:16

## 2021-04-16 ASSESSMENT — ENCOUNTER SYMPTOMS
FALLS: 0
NAUSEA: 0
LOSS OF CONSCIOUSNESS: 0
SORE THROAT: 0
SHORTNESS OF BREATH: 0
DOUBLE VISION: 0
SPUTUM PRODUCTION: 0
FEVER: 0
PALPITATIONS: 0
HEARTBURN: 0
BACK PAIN: 1
HEADACHES: 0
DIZZINESS: 0
ABDOMINAL PAIN: 0
CHILLS: 0
COUGH: 0
BLURRED VISION: 0

## 2021-04-16 ASSESSMENT — PAIN DESCRIPTION - PAIN TYPE
TYPE: ACUTE PAIN

## 2021-04-16 ASSESSMENT — LIFESTYLE VARIABLES: DO YOU DRINK ALCOHOL: YES

## 2021-04-17 PROBLEM — N17.9 AKI (ACUTE KIDNEY INJURY) (HCC): Status: ACTIVE | Noted: 2021-04-16

## 2021-04-17 PROBLEM — T24.119A: Status: ACTIVE | Noted: 2021-04-17

## 2021-04-17 PROBLEM — R53.81 DEBILITY: Status: ACTIVE | Noted: 2021-04-17

## 2021-04-17 LAB
ALBUMIN SERPL BCP-MCNC: 3.2 G/DL (ref 3.2–4.9)
ALBUMIN/GLOB SERPL: 1.2 G/DL
ALP SERPL-CCNC: 95 U/L (ref 30–99)
ALT SERPL-CCNC: 9 U/L (ref 2–50)
ANION GAP SERPL CALC-SCNC: 12 MMOL/L (ref 7–16)
AST SERPL-CCNC: 13 U/L (ref 12–45)
BILIRUB SERPL-MCNC: <0.2 MG/DL (ref 0.1–1.5)
BUN SERPL-MCNC: 19 MG/DL (ref 8–22)
CALCIUM SERPL-MCNC: 7.9 MG/DL (ref 8.5–10.5)
CHLORIDE SERPL-SCNC: 110 MMOL/L (ref 96–112)
CO2 SERPL-SCNC: 16 MMOL/L (ref 20–33)
CREAT SERPL-MCNC: 1.13 MG/DL (ref 0.5–1.4)
ERYTHROCYTE [DISTWIDTH] IN BLOOD BY AUTOMATED COUNT: 47.4 FL (ref 35.9–50)
GLOBULIN SER CALC-MCNC: 2.6 G/DL (ref 1.9–3.5)
GLUCOSE SERPL-MCNC: 74 MG/DL (ref 65–99)
HCT VFR BLD AUTO: 28.6 % (ref 37–47)
HGB BLD-MCNC: 9.6 G/DL (ref 12–16)
LACTATE BLD-SCNC: 1.8 MMOL/L (ref 0.5–2)
LACTATE BLD-SCNC: 2.8 MMOL/L (ref 0.5–2)
MCH RBC QN AUTO: 33.6 PG (ref 27–33)
MCHC RBC AUTO-ENTMCNC: 33.6 G/DL (ref 33.6–35)
MCV RBC AUTO: 100 FL (ref 81.4–97.8)
PLATELET # BLD AUTO: 325 K/UL (ref 164–446)
PMV BLD AUTO: 10.2 FL (ref 9–12.9)
POTASSIUM SERPL-SCNC: 4.2 MMOL/L (ref 3.6–5.5)
PROT SERPL-MCNC: 5.8 G/DL (ref 6–8.2)
RBC # BLD AUTO: 2.86 M/UL (ref 4.2–5.4)
SODIUM SERPL-SCNC: 138 MMOL/L (ref 135–145)
WBC # BLD AUTO: 11.9 K/UL (ref 4.8–10.8)

## 2021-04-17 PROCEDURE — 85027 COMPLETE CBC AUTOMATED: CPT

## 2021-04-17 PROCEDURE — 700111 HCHG RX REV CODE 636 W/ 250 OVERRIDE (IP): Performed by: HOSPITALIST

## 2021-04-17 PROCEDURE — 80053 COMPREHEN METABOLIC PANEL: CPT

## 2021-04-17 PROCEDURE — A9270 NON-COVERED ITEM OR SERVICE: HCPCS | Performed by: HOSPITALIST

## 2021-04-17 PROCEDURE — 36415 COLL VENOUS BLD VENIPUNCTURE: CPT

## 2021-04-17 PROCEDURE — 83605 ASSAY OF LACTIC ACID: CPT

## 2021-04-17 PROCEDURE — 700102 HCHG RX REV CODE 250 W/ 637 OVERRIDE(OP): Performed by: HOSPITALIST

## 2021-04-17 PROCEDURE — 700105 HCHG RX REV CODE 258: Performed by: HOSPITALIST

## 2021-04-17 PROCEDURE — 770020 HCHG ROOM/CARE - TELE (206)

## 2021-04-17 PROCEDURE — 99232 SBSQ HOSP IP/OBS MODERATE 35: CPT | Performed by: INTERNAL MEDICINE

## 2021-04-17 RX ADMIN — ASPIRIN 162 MG: 81 TABLET, COATED ORAL at 17:41

## 2021-04-17 RX ADMIN — OXYCODONE 5 MG: 5 TABLET ORAL at 14:27

## 2021-04-17 RX ADMIN — OXYCODONE 5 MG: 5 TABLET ORAL at 06:02

## 2021-04-17 RX ADMIN — HEPARIN SODIUM 5000 UNITS: 5000 INJECTION, SOLUTION INTRAVENOUS; SUBCUTANEOUS at 05:49

## 2021-04-17 RX ADMIN — OXYCODONE 5 MG: 5 TABLET ORAL at 20:15

## 2021-04-17 RX ADMIN — SODIUM CHLORIDE: 9 INJECTION, SOLUTION INTRAVENOUS at 13:15

## 2021-04-17 RX ADMIN — ASPIRIN 162 MG: 81 TABLET, COATED ORAL at 05:49

## 2021-04-17 RX ADMIN — HEPARIN SODIUM 5000 UNITS: 5000 INJECTION, SOLUTION INTRAVENOUS; SUBCUTANEOUS at 14:00

## 2021-04-17 RX ADMIN — HEPARIN SODIUM 5000 UNITS: 5000 INJECTION, SOLUTION INTRAVENOUS; SUBCUTANEOUS at 20:58

## 2021-04-17 RX ADMIN — LEVETIRACETAM 500 MG: 500 TABLET ORAL at 05:49

## 2021-04-17 ASSESSMENT — PAIN DESCRIPTION - PAIN TYPE
TYPE: ACUTE PAIN

## 2021-04-17 ASSESSMENT — COGNITIVE AND FUNCTIONAL STATUS - GENERAL
WALKING IN HOSPITAL ROOM: A LITTLE
CLIMB 3 TO 5 STEPS WITH RAILING: A LITTLE
DAILY ACTIVITIY SCORE: 20
SUGGESTED CMS G CODE MODIFIER DAILY ACTIVITY: CJ
DRESSING REGULAR UPPER BODY CLOTHING: A LITTLE
STANDING UP FROM CHAIR USING ARMS: A LITTLE
PERSONAL GROOMING: A LITTLE
MOBILITY SCORE: 21
DRESSING REGULAR LOWER BODY CLOTHING: A LITTLE
HELP NEEDED FOR BATHING: A LITTLE
SUGGESTED CMS G CODE MODIFIER MOBILITY: CJ

## 2021-04-17 ASSESSMENT — ENCOUNTER SYMPTOMS
SPUTUM PRODUCTION: 0
WHEEZING: 0
FLANK PAIN: 0
SORE THROAT: 0
ABDOMINAL PAIN: 0
BLURRED VISION: 0
CHILLS: 0
NAUSEA: 0
BACK PAIN: 0
MYALGIAS: 0
BLOOD IN STOOL: 0
FOCAL WEAKNESS: 0
FEVER: 0
DIARRHEA: 0
SEIZURES: 0
DIAPHORESIS: 0
HEADACHES: 0
BRUISES/BLEEDS EASILY: 0
COUGH: 0
NECK PAIN: 0
PALPITATIONS: 0
VOMITING: 0
DIZZINESS: 0
SHORTNESS OF BREATH: 1

## 2021-04-17 ASSESSMENT — LIFESTYLE VARIABLES
EVER FELT BAD OR GUILTY ABOUT YOUR DRINKING: NO
HAVE PEOPLE ANNOYED YOU BY CRITICIZING YOUR DRINKING: NO
DOES PATIENT WANT TO STOP DRINKING: NO
AVERAGE NUMBER OF DAYS PER WEEK YOU HAVE A DRINK CONTAINING ALCOHOL: 1
HOW MANY TIMES IN THE PAST YEAR HAVE YOU HAD 5 OR MORE DRINKS IN A DAY: 0
ALCOHOL_USE: YES
HAVE YOU EVER FELT YOU SHOULD CUT DOWN ON YOUR DRINKING: NO
EVER HAD A DRINK FIRST THING IN THE MORNING TO STEADY YOUR NERVES TO GET RID OF A HANGOVER: NO
TOTAL SCORE: 0
CONSUMPTION TOTAL: NEGATIVE
TOTAL SCORE: 0
TOTAL SCORE: 0
ON A TYPICAL DAY WHEN YOU DRINK ALCOHOL HOW MANY DRINKS DO YOU HAVE: 1

## 2021-04-17 ASSESSMENT — FIBROSIS 4 INDEX: FIB4 SCORE: .8666666666666666667

## 2021-04-17 ASSESSMENT — PATIENT HEALTH QUESTIONNAIRE - PHQ9
1. LITTLE INTEREST OR PLEASURE IN DOING THINGS: NOT AT ALL
SUM OF ALL RESPONSES TO PHQ9 QUESTIONS 1 AND 2: 0
2. FEELING DOWN, DEPRESSED, IRRITABLE, OR HOPELESS: NOT AT ALL

## 2021-04-17 NOTE — PROGRESS NOTES
2 RN Skin Check    2 RN skin check complete.     Skin assessed under devices: N\A.  Wound consult placed No.  The following interventions in place Pillows.    Mild erythremia on buttocks  skin intact     Erythremia on bilateral upper legs/knees
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
## 2021-04-17 NOTE — ED NOTES
Med rec complete per interview with pt at bedside and rx bottles at bedside (returned). Allergies reviewed. Pt denies antibiotic use in the past 14 days.   Pt is prescribed keppra 500 mg 1 tab twice daily but is only taking it once a day.
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
## 2021-04-17 NOTE — CARE PLAN
Problem: Safety  Goal: Will remain free from injury  Outcome: PROGRESSING AS EXPECTED     Problem: Infection  Goal: Will remain free from infection  Outcome: PROGRESSING AS EXPECTED     Problem: Communication  Goal: The ability to communicate needs accurately and effectively will improve  Outcome: MET

## 2021-04-17 NOTE — PROGRESS NOTES
Assumed care at 0700. Bedside report received from Paco. Patient's chart and MAR reviewed. Pt complains of pain in knees and foot at this time. Pt is A & O 4. Patient was updated on plan of care for the day. Questions answered and concerns addressed.  Pt denies any additional needs at this time. White board updated. Call light, phone and personal belongings within reach.

## 2021-04-17 NOTE — CARE PLAN
Pt educated regarding plan of care and medications. All questions answered.  Pt educated on the importance fall prevention methods, such as treaded sock and the bed alarm. Pt stated they will use the call light prior to any attempts of ambulation. Ambulatory ability assessed, treaded socks in place, bed locked and in low position, frequent trips to bathroom offered, and call light and phone within reach.

## 2021-04-17 NOTE — PROGRESS NOTES
Pt received from ED. Tele monitor in place. VSS. Pt oriented to room. Educated on use of the call light. Pt demonstrated use of the call light. Discussed POC. All questions answered.
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
## 2021-04-18 LAB
ANION GAP SERPL CALC-SCNC: 6 MMOL/L (ref 7–16)
BASOPHILS # BLD AUTO: 0.9 % (ref 0–1.8)
BASOPHILS # BLD: 0.07 K/UL (ref 0–0.12)
BUN SERPL-MCNC: 14 MG/DL (ref 8–22)
CALCIUM SERPL-MCNC: 8.4 MG/DL (ref 8.5–10.5)
CHLORIDE SERPL-SCNC: 109 MMOL/L (ref 96–112)
CO2 SERPL-SCNC: 21 MMOL/L (ref 20–33)
CREAT SERPL-MCNC: 0.86 MG/DL (ref 0.5–1.4)
EOSINOPHIL # BLD AUTO: 0.4 K/UL (ref 0–0.51)
EOSINOPHIL NFR BLD: 5.1 % (ref 0–6.9)
ERYTHROCYTE [DISTWIDTH] IN BLOOD BY AUTOMATED COUNT: 47.8 FL (ref 35.9–50)
GLUCOSE SERPL-MCNC: 87 MG/DL (ref 65–99)
HCT VFR BLD AUTO: 28.9 % (ref 37–47)
HGB BLD-MCNC: 9.4 G/DL (ref 12–16)
IMM GRANULOCYTES # BLD AUTO: 0.02 K/UL (ref 0–0.11)
IMM GRANULOCYTES NFR BLD AUTO: 0.3 % (ref 0–0.9)
LYMPHOCYTES # BLD AUTO: 3.26 K/UL (ref 1–4.8)
LYMPHOCYTES NFR BLD: 41.7 % (ref 22–41)
MCH RBC QN AUTO: 32.9 PG (ref 27–33)
MCHC RBC AUTO-ENTMCNC: 32.5 G/DL (ref 33.6–35)
MCV RBC AUTO: 101 FL (ref 81.4–97.8)
MONOCYTES # BLD AUTO: 0.95 K/UL (ref 0–0.85)
MONOCYTES NFR BLD AUTO: 12.2 % (ref 0–13.4)
NEUTROPHILS # BLD AUTO: 3.11 K/UL (ref 2–7.15)
NEUTROPHILS NFR BLD: 39.8 % (ref 44–72)
NRBC # BLD AUTO: 0 K/UL
NRBC BLD-RTO: 0 /100 WBC
PLATELET # BLD AUTO: 287 K/UL (ref 164–446)
PMV BLD AUTO: 10.6 FL (ref 9–12.9)
POTASSIUM SERPL-SCNC: 3.6 MMOL/L (ref 3.6–5.5)
RBC # BLD AUTO: 2.86 M/UL (ref 4.2–5.4)
SODIUM SERPL-SCNC: 136 MMOL/L (ref 135–145)
WBC # BLD AUTO: 7.8 K/UL (ref 4.8–10.8)

## 2021-04-18 PROCEDURE — 97166 OT EVAL MOD COMPLEX 45 MIN: CPT

## 2021-04-18 PROCEDURE — 770020 HCHG ROOM/CARE - TELE (206)

## 2021-04-18 PROCEDURE — 700102 HCHG RX REV CODE 250 W/ 637 OVERRIDE(OP): Performed by: HOSPITALIST

## 2021-04-18 PROCEDURE — 80048 BASIC METABOLIC PNL TOTAL CA: CPT

## 2021-04-18 PROCEDURE — 99232 SBSQ HOSP IP/OBS MODERATE 35: CPT | Performed by: INTERNAL MEDICINE

## 2021-04-18 PROCEDURE — 97161 PT EVAL LOW COMPLEX 20 MIN: CPT

## 2021-04-18 PROCEDURE — 85025 COMPLETE CBC W/AUTO DIFF WBC: CPT

## 2021-04-18 PROCEDURE — A9270 NON-COVERED ITEM OR SERVICE: HCPCS | Performed by: HOSPITALIST

## 2021-04-18 PROCEDURE — 700111 HCHG RX REV CODE 636 W/ 250 OVERRIDE (IP): Performed by: HOSPITALIST

## 2021-04-18 RX ADMIN — OXYCODONE 5 MG: 5 TABLET ORAL at 05:20

## 2021-04-18 RX ADMIN — LEVETIRACETAM 500 MG: 500 TABLET ORAL at 05:20

## 2021-04-18 RX ADMIN — HEPARIN SODIUM 5000 UNITS: 5000 INJECTION, SOLUTION INTRAVENOUS; SUBCUTANEOUS at 05:19

## 2021-04-18 RX ADMIN — ASPIRIN 162 MG: 81 TABLET, COATED ORAL at 05:20

## 2021-04-18 RX ADMIN — OXYCODONE 2.5 MG: 5 TABLET ORAL at 11:51

## 2021-04-18 RX ADMIN — HEPARIN SODIUM 5000 UNITS: 5000 INJECTION, SOLUTION INTRAVENOUS; SUBCUTANEOUS at 15:16

## 2021-04-18 RX ADMIN — HEPARIN SODIUM 5000 UNITS: 5000 INJECTION, SOLUTION INTRAVENOUS; SUBCUTANEOUS at 21:27

## 2021-04-18 RX ADMIN — OXYCODONE 2.5 MG: 5 TABLET ORAL at 21:27

## 2021-04-18 RX ADMIN — ASPIRIN 162 MG: 81 TABLET, COATED ORAL at 17:21

## 2021-04-18 ASSESSMENT — GAIT ASSESSMENTS
ASSISTIVE DEVICE: FRONT WHEEL WALKER
DEVIATION: ANTALGIC;STEP TO;OTHER (COMMENT)
GAIT LEVEL OF ASSIST: SUPERVISED
DISTANCE (FEET): 10
DISTANCE (FEET): 20

## 2021-04-18 ASSESSMENT — ACTIVITIES OF DAILY LIVING (ADL): TOILETING: INDEPENDENT

## 2021-04-18 ASSESSMENT — ENCOUNTER SYMPTOMS
BRUISES/BLEEDS EASILY: 0
NECK PAIN: 0
COUGH: 0
ABDOMINAL PAIN: 0
SHORTNESS OF BREATH: 1
CHILLS: 0
FEVER: 0
PALPITATIONS: 0
DIZZINESS: 0
BLOOD IN STOOL: 0
BLURRED VISION: 0
BACK PAIN: 0
HEADACHES: 0
WHEEZING: 0
SORE THROAT: 0
FOCAL WEAKNESS: 0
SEIZURES: 0
SPUTUM PRODUCTION: 0
FLANK PAIN: 0
VOMITING: 0
NAUSEA: 0
MYALGIAS: 0
DIAPHORESIS: 0
DIARRHEA: 0

## 2021-04-18 ASSESSMENT — COGNITIVE AND FUNCTIONAL STATUS - GENERAL
WALKING IN HOSPITAL ROOM: A LITTLE
TURNING FROM BACK TO SIDE WHILE IN FLAT BAD: A LITTLE
MOVING FROM LYING ON BACK TO SITTING ON SIDE OF FLAT BED: A LITTLE
DAILY ACTIVITIY SCORE: 19
MOVING TO AND FROM BED TO CHAIR: A LITTLE
TOILETING: A LITTLE
DRESSING REGULAR UPPER BODY CLOTHING: A LITTLE
HELP NEEDED FOR BATHING: A LITTLE
SUGGESTED CMS G CODE MODIFIER MOBILITY: CK
MOBILITY SCORE: 17
CLIMB 3 TO 5 STEPS WITH RAILING: A LOT
PERSONAL GROOMING: A LITTLE
DRESSING REGULAR LOWER BODY CLOTHING: A LITTLE
SUGGESTED CMS G CODE MODIFIER DAILY ACTIVITY: CK
STANDING UP FROM CHAIR USING ARMS: A LITTLE

## 2021-04-18 ASSESSMENT — PAIN DESCRIPTION - PAIN TYPE
TYPE: ACUTE PAIN

## 2021-04-18 NOTE — PROGRESS NOTES
Bedside report received from day shift RN. POC discussed with patient; communication board updated, call bed within reach and bed locked in lowest position. all questions answered at this time.

## 2021-04-18 NOTE — DISCHARGE PLANNING
SILVIA RN delivered 2nd IMM to patient.  Patient doesn't have her glasses, patient provided verbal signature at 0959.

## 2021-04-18 NOTE — PROGRESS NOTES
Assumed care of patient, bedside report received from MIRIAN Wilcox. Updated on POC, call light within reach and fall precautions in place. Bed locked and in lowest position. Patient instructed to call for assistance before getting out of bed. All questions answered, no other needs at this time.

## 2021-04-18 NOTE — CARE PLAN
Problem: Safety  Goal: Will remain free from injury  Outcome: PROGRESSING AS EXPECTED     Problem: Venous Thromboembolism (VTW)/Deep Vein Thrombosis (DVT) Prevention:  Goal: Patient will participate in Venous Thrombosis (VTE)/Deep Vein Thrombosis (DVT)Prevention Measures  Outcome: PROGRESSING AS EXPECTED     Problem: Infection  Goal: Will remain free from infection  Outcome: MET

## 2021-04-18 NOTE — DISCHARGE PLANNING
Anticipated Discharge Disposition: Home    Action: SILVIA RN met with patient at bedside to obtain DME choice for walker.  Patient provided verbal choice for [facility].  Choice form faxed to PRAVIN Wright.    Barriers to Discharge: Medical clearance     Plan: Case coordination to continue to follow up with medical team to discuss discharge barriers.

## 2021-04-18 NOTE — CARE PLAN
Problem: Safety  Goal: Will remain free from injury  Fall precautions in place. Patient educated.   Outcome: PROGRESSING AS EXPECTED  Goal: Will remain free from falls  Outcome: PROGRESSING AS EXPECTED     Problem: Venous Thromboembolism (VTW)/Deep Vein Thrombosis (DVT) Prevention:  Goal: Patient will participate in Venous Thrombosis (VTE)/Deep Vein Thrombosis (DVT)Prevention Measures  Outcome: PROGRESSING AS EXPECTED     Problem: Knowledge Deficit  Goal: Knowledge of disease process/condition, treatment plan, diagnostic tests, and medications will improve  Discussed POC, answered all current questions.   Outcome: PROGRESSING AS EXPECTED  Goal: Knowledge of the prescribed therapeutic regimen will improve  Outcome: PROGRESSING AS EXPECTED
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
## 2021-04-18 NOTE — DISCHARGE PLANNING
Received Choice form at 9852  Agency/Facility Name: [facility]  Referral sent per Choice form @ 2180
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
## 2021-04-18 NOTE — FACE TO FACE
Face to Face Note  -  Durable Medical Equipment    Willy Dorado M.D. - NPI: [id number]  I certify that this patient is under my care and that they had a durable medical equipment(DME)face to face encounter by myself that meets the physician DME face-to-face encounter requirements with this patient on:    Date of encounter:   Patient:                    MRN:                       YOB: 2021  Roro Leon  [id number]  1955     The encounter with the patient was in whole, or in part, for the following medical condition, which is the primary reason for durable medical equipment:  Other - abnormal gait    I certify that, based on my findings, the following durable medical equipment is medically necessary:  Walkers.    HOME O2 Saturation Measurements:(Values must be present for Home Oxygen orders)         ,     ,         My Clinical findings support the need for the above equipment due to:  Abnormal Gait, Other - difficulty ambulating    Supporting Symptoms: difficulty ambulating

## 2021-04-19 VITALS
WEIGHT: 141.31 LBS | SYSTOLIC BLOOD PRESSURE: 122 MMHG | OXYGEN SATURATION: 97 % | DIASTOLIC BLOOD PRESSURE: 76 MMHG | RESPIRATION RATE: 16 BRPM | HEIGHT: 63 IN | HEART RATE: 81 BPM | BODY MASS INDEX: 25.04 KG/M2 | TEMPERATURE: 98 F

## 2021-04-19 PROBLEM — R79.89 ELEVATED LACTIC ACID LEVEL: Status: RESOLVED | Noted: 2021-04-16 | Resolved: 2021-04-19

## 2021-04-19 PROBLEM — N17.9 AKI (ACUTE KIDNEY INJURY) (HCC): Status: RESOLVED | Noted: 2021-04-16 | Resolved: 2021-04-19

## 2021-04-19 PROBLEM — T24.119A: Status: RESOLVED | Noted: 2021-04-17 | Resolved: 2021-04-19

## 2021-04-19 PROBLEM — T58.91XA TOXIC EFFECT OF CARBON MONOXIDE, UNINTENTIONAL: Status: RESOLVED | Noted: 2021-04-16 | Resolved: 2021-04-19

## 2021-04-19 LAB
ANION GAP SERPL CALC-SCNC: 7 MMOL/L (ref 7–16)
BASOPHILS # BLD AUTO: 1.1 % (ref 0–1.8)
BASOPHILS # BLD: 0.07 K/UL (ref 0–0.12)
BUN SERPL-MCNC: 9 MG/DL (ref 8–22)
CALCIUM SERPL-MCNC: 8.6 MG/DL (ref 8.5–10.5)
CHLORIDE SERPL-SCNC: 114 MMOL/L (ref 96–112)
CO2 SERPL-SCNC: 22 MMOL/L (ref 20–33)
CREAT SERPL-MCNC: 0.8 MG/DL (ref 0.5–1.4)
EOSINOPHIL # BLD AUTO: 0.55 K/UL (ref 0–0.51)
EOSINOPHIL NFR BLD: 8.4 % (ref 0–6.9)
ERYTHROCYTE [DISTWIDTH] IN BLOOD BY AUTOMATED COUNT: 46.5 FL (ref 35.9–50)
GLUCOSE SERPL-MCNC: 82 MG/DL (ref 65–99)
HCT VFR BLD AUTO: 30.5 % (ref 37–47)
HGB BLD-MCNC: 9.7 G/DL (ref 12–16)
IMM GRANULOCYTES # BLD AUTO: 0.02 K/UL (ref 0–0.11)
IMM GRANULOCYTES NFR BLD AUTO: 0.3 % (ref 0–0.9)
LYMPHOCYTES # BLD AUTO: 2.37 K/UL (ref 1–4.8)
LYMPHOCYTES NFR BLD: 36.4 % (ref 22–41)
MCH RBC QN AUTO: 32.2 PG (ref 27–33)
MCHC RBC AUTO-ENTMCNC: 31.8 G/DL (ref 33.6–35)
MCV RBC AUTO: 101.3 FL (ref 81.4–97.8)
MONOCYTES # BLD AUTO: 0.68 K/UL (ref 0–0.85)
MONOCYTES NFR BLD AUTO: 10.4 % (ref 0–13.4)
NEUTROPHILS # BLD AUTO: 2.82 K/UL (ref 2–7.15)
NEUTROPHILS NFR BLD: 43.4 % (ref 44–72)
NRBC # BLD AUTO: 0 K/UL
NRBC BLD-RTO: 0 /100 WBC
PLATELET # BLD AUTO: 272 K/UL (ref 164–446)
PMV BLD AUTO: 10.7 FL (ref 9–12.9)
POTASSIUM SERPL-SCNC: 3.9 MMOL/L (ref 3.6–5.5)
RBC # BLD AUTO: 3.01 M/UL (ref 4.2–5.4)
SODIUM SERPL-SCNC: 143 MMOL/L (ref 135–145)
WBC # BLD AUTO: 6.5 K/UL (ref 4.8–10.8)

## 2021-04-19 PROCEDURE — 80048 BASIC METABOLIC PNL TOTAL CA: CPT

## 2021-04-19 PROCEDURE — 700111 HCHG RX REV CODE 636 W/ 250 OVERRIDE (IP): Performed by: HOSPITALIST

## 2021-04-19 PROCEDURE — 85025 COMPLETE CBC W/AUTO DIFF WBC: CPT

## 2021-04-19 PROCEDURE — 700102 HCHG RX REV CODE 250 W/ 637 OVERRIDE(OP): Performed by: HOSPITALIST

## 2021-04-19 PROCEDURE — A9270 NON-COVERED ITEM OR SERVICE: HCPCS | Performed by: HOSPITALIST

## 2021-04-19 PROCEDURE — 99239 HOSP IP/OBS DSCHRG MGMT >30: CPT | Performed by: INTERNAL MEDICINE

## 2021-04-19 RX ORDER — OXYCODONE HYDROCHLORIDE 5 MG/1
5 TABLET ORAL EVERY 6 HOURS PRN
Qty: 20 TABLET | Refills: 0 | Status: SHIPPED | OUTPATIENT
Start: 2021-04-19 | End: 2021-04-24

## 2021-04-19 RX ADMIN — HEPARIN SODIUM 5000 UNITS: 5000 INJECTION, SOLUTION INTRAVENOUS; SUBCUTANEOUS at 05:14

## 2021-04-19 RX ADMIN — OXYCODONE 2.5 MG: 5 TABLET ORAL at 05:14

## 2021-04-19 RX ADMIN — ASPIRIN 162 MG: 81 TABLET, COATED ORAL at 05:14

## 2021-04-19 RX ADMIN — LEVETIRACETAM 500 MG: 500 TABLET ORAL at 05:14

## 2021-04-19 ASSESSMENT — PAIN DESCRIPTION - PAIN TYPE
TYPE: ACUTE PAIN
TYPE: CHRONIC PAIN

## 2021-04-19 NOTE — DISCHARGE INSTRUCTIONS
Burn Care, Adult  A burn is an injury to the skin or the tissues under the skin. There are three types of burns:  · First degree. These burns may cause the skin to be red and a bit swollen.  · Second degree. These burns are very painful and cause the skin to be very red. The skin may also leak fluid, look shiny, and start to have blisters.  · Third degree. These burns cause permanent damage. They turn the skin white or black and make it look charred, dry, and leathery.  Taking care of your burn properly can help to prevent pain and infection. It can also help the burn to heal more quickly.  How is this treated?  Right after a burn:  · Rinse or soak the burn under cool water. Do this for several minutes. Do not put ice on your burn. That can cause more damage.  · Lightly cover the burn with a clean (sterile) cloth (dressing).  Burn care  · Raise (elevate) the injured area above the level of your heart while sitting or lying down.  · Follow instructions from your doctor about:  ? How to clean and take care of the burn.  ? When to change and remove the cloth.  · Check your burn every day for signs of infection. Check for:  ? More redness, swelling, or pain.  ? Warmth.  ? Pus or a bad smell.  Medicine    · Take over-the-counter and prescription medicines only as told by your doctor.  · If you were prescribed antibiotic medicine, take or apply it as told by your doctor. Do not stop using the antibiotic even if your condition improves.  General instructions  · To prevent infection:  ? Do not put butter, oil, or other home treatments on the burn.  ? Do not scratch or pick at the burn.  ? Do not break any blisters.  ? Do not peel skin.  · Do not rub your burn, even when you are cleaning it.  · Protect your burn from the sun.  Contact a doctor if:  · Your condition does not get better.  · Your condition gets worse.  · You have a fever.  · Your burn looks different or starts to have black or red spots on it.  · Your burn  feels warm to the touch.  · Your pain is not controlled with medicine.  Get help right away if:  · You have redness, swelling, or pain at the site of the burn.  · You have fluid, blood, or pus coming from your burn.  · You have red streaks near the burn.  · You have very bad pain.  This information is not intended to replace advice given to you by your health care provider. Make sure you discuss any questions you have with your health care provider.  Document Released: 09/26/2009 Document Revised: 04/08/2020 Document Reviewed: 06/06/2017  [employer] Patient Education © 2020 [employer]    Discharge Instructions    Discharged to home by car with relative. Discharged via wheelchair, hospital escort: Yes.  Special equipment needed: Not Applicable    Be sure to schedule a follow-up appointment with your primary care doctor or any specialists as instructed.     Discharge Plan:   Diet Plan: Discussed  Activity Level: Discussed  Confirmed Follow up Appointment: Patient to Call and Schedule Appointment  Confirmed Symptoms Management: Discussed  Medication Reconciliation Updated: Yes    I understand that a diet low in cholesterol, fat, and sodium is recommended for good health. Unless I have been given specific instructions below for another diet, I accept this instruction as my diet prescription.   Other diet: Regular    Special Instructions: None    · Is patient discharged on Warfarin / Coumadin?   No     Depression / Suicide Risk    As you are discharged from this [facility], it is important to learn how to keep safe from harming yourself.    Recognize the warning signs:  · Abrupt changes in personality, positive or negative- including increase in energy   · Giving away possessions  · Change in eating patterns- significant weight changes-  positive or negative  · Change in sleeping patterns- unable to sleep or sleeping all the time   · Unwillingness or inability to communicate  · Depression  · Unusual sadness,  discouragement and loneliness  · Talk of wanting to die  · Neglect of personal appearance   · Rebelliousness- reckless behavior  · Withdrawal from people/activities they love  · Confusion- inability to concentrate     If you or a loved one observes any of these behaviors or has concerns about self-harm, here's what you can do:  · Talk about it- your feelings and reasons for harming yourself  · Remove any means that you might use to hurt yourself (examples: pills, rope, extension cords, firearm)  · Get professional help from the community (Mental Health, Substance Abuse, psychological counseling)  · Do not be alone:Call your Safe Contact- someone whom you trust who will be there for you.  · Call your local CRISIS HOTLINE 679-5147 or 570-882-0482  · Call your local Children's Mobile Crisis Response Team [place] (696) 457-1401 or www.[employer]  · Call the toll free National Suicide Prevention Hotlines   · National Suicide Prevention Lifeline 240-960-SQRV (7243)  · National Hope Line Network 800-SUICIDE (134-2444)
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
## 2021-04-19 NOTE — PROGRESS NOTES
Patient discharged home in stable condition, PIVs removed, discharge instructions reviewed and signed on chart, all questions answered.  Awaiting pick-up in discharge lounge.

## 2021-04-19 NOTE — PROGRESS NOTES
Patient sent to discharge beléne. Patient left with clothes, walker, and boot placed on foot. Patient didn't have shoes when she came to hospital.

## 2021-04-19 NOTE — PROGRESS NOTES
Received evening report from day RN. Pt is comfortable. Bed is locked in low position with call light and belongings within reach. POC discussed and all questions answered. All needs and requirements met at this time.

## 2021-04-19 NOTE — CARE PLAN
Problem: Safety  Goal: Will remain free from injury  Outcome: PROGRESSING AS EXPECTED  Goal: Will remain free from falls  Outcome: PROGRESSING AS EXPECTED     Problem: Discharge Barriers/Planning  Goal: Patient's continuum of care needs will be met  Outcome: PROGRESSING AS EXPECTED    Pt understands needing to use commode vs the purwik, but have appropriate help in transporting.
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
## 2021-04-22 LAB — CYANIDE BLD-MCNC: NORMAL MCG/ML

## 2021-05-09 ENCOUNTER — HOSPITAL ENCOUNTER (EMERGENCY)
Facility: MEDICAL CENTER | Age: 66
End: 2021-05-09
Attending: EMERGENCY MEDICINE
Payer: MEDICARE

## 2021-05-09 VITALS
RESPIRATION RATE: 16 BRPM | WEIGHT: 141.09 LBS | DIASTOLIC BLOOD PRESSURE: 73 MMHG | TEMPERATURE: 97 F | HEART RATE: 106 BPM | OXYGEN SATURATION: 100 % | HEIGHT: 64 IN | SYSTOLIC BLOOD PRESSURE: 116 MMHG | BODY MASS INDEX: 24.09 KG/M2

## 2021-05-09 DIAGNOSIS — H55.00 NYSTAGMUS: ICD-10-CM

## 2021-05-09 DIAGNOSIS — R42 DIZZINESS: ICD-10-CM

## 2021-05-09 DIAGNOSIS — Z59.00 HOMELESSNESS: ICD-10-CM

## 2021-05-09 PROCEDURE — A9270 NON-COVERED ITEM OR SERVICE: HCPCS | Performed by: EMERGENCY MEDICINE

## 2021-05-09 PROCEDURE — 700102 HCHG RX REV CODE 250 W/ 637 OVERRIDE(OP): Performed by: EMERGENCY MEDICINE

## 2021-05-09 PROCEDURE — 99283 EMERGENCY DEPT VISIT LOW MDM: CPT

## 2021-05-09 RX ORDER — MECLIZINE HYDROCHLORIDE 25 MG/1
25 TABLET ORAL ONCE
Status: COMPLETED | OUTPATIENT
Start: 2021-05-09 | End: 2021-05-09

## 2021-05-09 RX ADMIN — MECLIZINE HYDROCHLORIDE 25 MG: 25 TABLET ORAL at 14:02

## 2021-05-09 SDOH — ECONOMIC STABILITY - HOUSING INSECURITY: HOMELESSNESS UNSPECIFIED: Z59.00

## 2021-05-09 ASSESSMENT — FIBROSIS 4 INDEX: FIB4 SCORE: 1.04

## 2021-05-09 NOTE — ED NOTES
"Pt assisted to get dressed.  Pt reports that she has an apartment and just has to call her friend who has her keys to get into the home.  Pt than asked the tech for housing resources.  Registration verified pt address and pt reported to them that she has an apartment to go to.  Homeless resources provided to pt but pt reports \"Im not going to a shelter\", discussed with pt that she just said that she could call her friend to get her into her home.  Pt than reports \"but I don't know if he will answer, and I cant stay in the [address] anymore.\"  Provided pt with her belongings so she could call her friend.    "

## 2021-05-09 NOTE — ED PROVIDER NOTES
"ED Provider Note    Scribed for Kris Rose M.D. by Stefano Harvey. 5/9/2021  1:22 PM    Primary care provider: Ellis Art M.D.  Means of arrival: Ambulance  History obtained from: Patient  History limited by: None    CHIEF COMPLAINT  Chief Complaint   Patient presents with   • Homeless       HPI  Roro Leon is a 65 y.o. female who presents to the Emergency Department for \"dizziness\" when walking. She adds that she has a right foot injury and has been using a walker for approximately a year. Patient denies being evicted from a [employer] at this time. Patient notes that she takes Keppra for seizures. She adds that she is supposed to take 500 mg twice a day, but she only takes it once a day. Patient denies any nausea, vomiting, or room spinning dizziness. She adds that she has been taking this medication for multiple years. Patient states that she sometimes experiences difficulty ambulating to the restroom. Patient states that she has an apartment and is not interested in a homeless shelter. Patient states that she will take a cab to her apartment. Patient has history of hypertension, seizure, and subdural hemorrhage. Per triage note, patient denies any tobacco use.   PPE Note: I personally donned full PPE for all patient encounters during this visit, including being clean-shaven with an N95 respirator mask and gloves.    Scribe remained outside the patient's room and did not have any contact with the patient for the duration of patient encounter.     REVIEW OF SYSTEMS  Pertinent negatives include no medical complaints.  See HPI for further details.     PAST MEDICAL HISTORY   has a past medical history of Hypertension, Seizure (HCC), and Subdural hematoma (HCC).    SURGICAL HISTORY  patient denies any surgical history    SOCIAL HISTORY  Social History     Tobacco Use   • Smoking status: Never Smoker   • Smokeless tobacco: Never Used   Substance Use Topics   • Alcohol use: Yes     Alcohol/week: 3.6 oz     " "Types: 6 Glasses of wine per week   • Drug use: No      Social History     Substance and Sexual Activity   Drug Use No       FAMILY HISTORY  Family History   Problem Relation Age of Onset   • Cancer Mother 77        colon cancer   • Heart Attack Father 67        MI   • Heart Disease Brother         angina   • Stroke Maternal Grandfather         parkinsons   • Dementia Maternal Aunt         parkinsons and alzhiemers       CURRENT MEDICATIONS  Home Medications     Reviewed by Schuyler B Collett, R.N. (Registered Nurse) on 05/09/21 at 1126  Med List Status: <None>   Medication Last Dose Status   alendronate (FOSAMAX) 70 MG Tab  Active   aspirin 81 MG EC tablet  Active   atorvastatin (LIPITOR) 20 MG Tab  Active   B Complex Vitamins (B COMPLEX 1 PO)  Active   CHOLECALCIFEROL PO  Active   levetiracetam (KEPPRA) 500 MG Tab  Active   lisinopril-hydrochlorothiazide (PRINZIDE, ZESTORETIC) 20-12.5 MG per tablet  Active   naproxen (NAPROSYN) 500 MG Tab  Active   VITAMIN E PO  Active                ALLERGIES  No Known Allergies    PHYSICAL EXAM  VITAL SIGNS: /69   Pulse 60   Temp 37.1 °C (98.7 °F) (Temporal)   Resp 16   Ht 1.626 m (5' 4\")   Wt 64 kg (141 lb 1.5 oz)   SpO2 97%   BMI 24.22 kg/m²   Constitutional: Well developed, Well nourished, No acute distress, Non-toxic appearance.   HENT: normal  Eyes: normal  Neck: normal  Skin: normal  Back: normal  Extremities: normal   Neurologic: Alert. No focal deficits.     COURSE & MEDICAL DECISION MAKING  Nursing notes, VS, PMSFHx reviewed in chart.    1:22 PM Patient seen and examined at bedside. Patient was informed that she is to be discharged in stable condition. Patient verbalized her understanding and agreement with the plan of discharge.       The patient unfortunately was not truthful about her situation and being evicted from [employer].  She says that she has an apartment which is not correct.  She is very difficult to get history from.  I think she has some " medication induced nystagmus and I gave her single dose meclizine to improve her dizziness but she needs to have a follow-up appointment with Dr. Whitfield, her primary care doctor to review her medications.    Patient has had high blood pressure while in the emergency department, felt likely secondary to medical condition. Counseled patient to monitor blood pressure at home and follow up with primary care physician.      The patient will return for new or worsening symptoms and is stable at the time of discharge.    DISPOSITION:  Patient will be discharged home in stable condition.  Patient is ambulatory with her walker    FINAL IMPRESSION  1. Dizziness    2. Nystagmus    3. Homelessness          Stefano VENEGAS (Shanice), am scribing for, and in the presence of, Kris Rose M.D..    Electronically signed by: Stefano Harvey (Shanice), 5/9/2021    IKris M.D. personally performed the services described in this documentation, as scribed by Stefano Harvey in my presence, and it is both accurate and complete. E    The note accurately reflects work and decisions made by me.  Kris Rose M.D.  5/9/2021  2:45 PM

## 2021-05-09 NOTE — ED NOTES
Pt c/o chronic pain and reports that she can't walk but than started to get out of w/c by herself.  Pt pt reports pain to left foot, and back.  Pt called 911 in lobby while waiting for room to go to [facility], Charge RN aware and going to come and talk with pt again.

## 2021-05-09 NOTE — ED NOTES
"Charge RN: Spoke with patient. Apologized for wait times. Educated patient to refrain from asking other patients around her when she will go back.   Patient states, \"I've been here for hours.\" Pt educated on recorded wait time of one hour. Again apologized for wait time. Assured patient that this RN is working on a room assignment and patient will be brought back soon as possible.    "

## 2021-05-09 NOTE — ED NOTES
Patient disturbing other patients asking them if they know when she's going to get a room, security spoke to patient about not disturbing other patients as well as triage process. Apologized for wait time.

## 2021-05-09 NOTE — ED TRIAGE NOTES
"Roro Leon presents to triage via ems, wearing a mask, reporting:  Chief Complaint   Patient presents with   • Homeless   Per ems pt was just evicted from the [employer] and has nowhere to go. Pt denies acute medical complaints but reports \"I have trouble walking\" thus was unable to vacate motel.    Pt denies any respiratory or flu like symptoms at this time.    Pt speaking in full sentences, NAD noted. Pt educated of triage process, placed back in waiting area pending room assignment.    "

## 2021-05-09 NOTE — ED NOTES
"Patient continues to ask about room assignment, apologized for wait time. Pt stating \"should I go to another hospital via ambulance\"   "

## 2021-05-09 NOTE — ED NOTES
Discharge instructions given.  All questions answered.  Pt to follow-up with PCP, return to ER if symptoms worsen as discussed.  Pt verbalized understanding.  All belongings with pt.  Pt escorted to lobby.

## 2021-06-17 ENCOUNTER — APPOINTMENT (OUTPATIENT)
Dept: RADIOLOGY | Facility: MEDICAL CENTER | Age: 66
DRG: 380 | End: 2021-06-17
Attending: HOSPITALIST
Payer: MEDICARE

## 2021-06-17 ENCOUNTER — APPOINTMENT (OUTPATIENT)
Dept: RADIOLOGY | Facility: MEDICAL CENTER | Age: 66
DRG: 380 | End: 2021-06-17
Attending: EMERGENCY MEDICINE
Payer: MEDICARE

## 2021-06-17 ENCOUNTER — HOSPITAL ENCOUNTER (INPATIENT)
Facility: MEDICAL CENTER | Age: 66
LOS: 9 days | DRG: 380 | End: 2021-06-26
Attending: EMERGENCY MEDICINE | Admitting: INTERNAL MEDICINE
Payer: MEDICARE

## 2021-06-17 DIAGNOSIS — D64.9 SEVERE ANEMIA: ICD-10-CM

## 2021-06-17 DIAGNOSIS — K92.1 GASTROINTESTINAL HEMORRHAGE WITH MELENA: ICD-10-CM

## 2021-06-17 PROBLEM — K92.2 GI BLEED: Status: ACTIVE | Noted: 2021-06-17

## 2021-06-17 PROBLEM — A41.9 SEPSIS (HCC): Status: ACTIVE | Noted: 2021-06-17

## 2021-06-17 PROBLEM — I95.9 HYPOTENSION: Status: ACTIVE | Noted: 2021-06-17

## 2021-06-17 PROBLEM — S06.5XAA SDH (SUBDURAL HEMATOMA) (HCC): Status: ACTIVE | Noted: 2021-06-17

## 2021-06-17 LAB
ABO + RH BLD: NORMAL
ABO GROUP BLD: NORMAL
ALBUMIN SERPL BCP-MCNC: 2.8 G/DL (ref 3.2–4.9)
ALBUMIN/GLOB SERPL: 1.1 G/DL
ALP SERPL-CCNC: 74 U/L (ref 30–99)
ALT SERPL-CCNC: 43 U/L (ref 2–50)
AMORPH CRY #/AREA URNS HPF: PRESENT /HPF
ANION GAP SERPL CALC-SCNC: 19 MMOL/L (ref 7–16)
ANION GAP SERPL CALC-SCNC: 19 MMOL/L (ref 7–16)
ANISOCYTOSIS BLD QL SMEAR: ABNORMAL
APPEARANCE UR: CLEAR
AST SERPL-CCNC: 57 U/L (ref 12–45)
BACTERIA #/AREA URNS HPF: ABNORMAL /HPF
BARCODED ABORH UBTYP: 2800
BARCODED ABORH UBTYP: 5100
BARCODED ABORH UBTYP: 6200
BARCODED ABORH UBTYP: 8400
BARCODED PRD CODE UBPRD: NORMAL
BARCODED UNIT NUM UBUNT: NORMAL
BASOPHILS # BLD AUTO: 0 % (ref 0–1.8)
BASOPHILS # BLD: 0 K/UL (ref 0–0.12)
BILIRUB SERPL-MCNC: <0.2 MG/DL (ref 0.1–1.5)
BILIRUB UR QL STRIP.AUTO: NEGATIVE
BLD GP AB SCN SERPL QL: NORMAL
BUN SERPL-MCNC: 116 MG/DL (ref 8–22)
BUN SERPL-MCNC: 87 MG/DL (ref 8–22)
CALCIUM SERPL-MCNC: 8.3 MG/DL (ref 8.5–10.5)
CALCIUM SERPL-MCNC: 8.4 MG/DL (ref 8.5–10.5)
CHLORIDE SERPL-SCNC: 105 MMOL/L (ref 96–112)
CHLORIDE SERPL-SCNC: 108 MMOL/L (ref 96–112)
CK SERPL-CCNC: 891 U/L (ref 0–154)
CO2 SERPL-SCNC: 12 MMOL/L (ref 20–33)
CO2 SERPL-SCNC: 14 MMOL/L (ref 20–33)
COLOR UR: YELLOW
COMPONENT F 8504F: NORMAL
COMPONENT P 8504P: NORMAL
COMPONENT R 8504R: NORMAL
CREAT SERPL-MCNC: 2.19 MG/DL (ref 0.5–1.4)
CREAT SERPL-MCNC: 3.18 MG/DL (ref 0.5–1.4)
EOSINOPHIL # BLD AUTO: 0 K/UL (ref 0–0.51)
EOSINOPHIL NFR BLD: 0 % (ref 0–6.9)
EPI CELLS #/AREA URNS HPF: ABNORMAL /HPF
ERYTHROCYTE [DISTWIDTH] IN BLOOD BY AUTOMATED COUNT: 77.1 FL (ref 35.9–50)
ETHANOL BLD-MCNC: <10.1 MG/DL (ref 0–10)
GLOBULIN SER CALC-MCNC: 2.6 G/DL (ref 1.9–3.5)
GLUCOSE SERPL-MCNC: 107 MG/DL (ref 65–99)
GLUCOSE SERPL-MCNC: 114 MG/DL (ref 65–99)
GLUCOSE UR STRIP.AUTO-MCNC: NEGATIVE MG/DL
HCT VFR BLD AUTO: 9.2 % (ref 37–47)
HGB BLD-MCNC: 2.9 G/DL (ref 12–16)
HGB BLD-MCNC: 9.1 G/DL (ref 12–16)
HGB BLD-MCNC: 9.9 G/DL (ref 12–16)
HYALINE CASTS #/AREA URNS LPF: ABNORMAL /LPF
HYPOCHROMIA BLD QL SMEAR: ABNORMAL
KETONES UR STRIP.AUTO-MCNC: NEGATIVE MG/DL
LACTATE BLD-SCNC: 1.5 MMOL/L (ref 0.5–2)
LACTATE BLD-SCNC: 11.3 MMOL/L (ref 0.5–2)
LACTATE BLD-SCNC: 4.9 MMOL/L (ref 0.5–2)
LEUKOCYTE ESTERASE UR QL STRIP.AUTO: ABNORMAL
LG PLATELETS BLD QL SMEAR: NORMAL
LIPASE SERPL-CCNC: 40 U/L (ref 11–82)
LYMPHOCYTES # BLD AUTO: 0.81 K/UL (ref 1–4.8)
LYMPHOCYTES NFR BLD: 5.2 % (ref 22–41)
MACROCYTES BLD QL SMEAR: ABNORMAL
MANUAL DIFF BLD: NORMAL
MCH RBC QN AUTO: 32.2 PG (ref 27–33)
MCHC RBC AUTO-ENTMCNC: 31.5 G/DL (ref 33.6–35)
MCV RBC AUTO: 102.2 FL (ref 81.4–97.8)
MICRO URNS: ABNORMAL
MICROCYTES BLD QL SMEAR: ABNORMAL
MONOCYTES # BLD AUTO: 1.76 K/UL (ref 0–0.85)
MONOCYTES NFR BLD AUTO: 11.3 % (ref 0–13.4)
MORPHOLOGY BLD-IMP: NORMAL
MYELOCYTES NFR BLD MANUAL: 1.7 %
NEUTROPHILS # BLD AUTO: 12.76 K/UL (ref 2–7.15)
NEUTROPHILS NFR BLD: 81.8 % (ref 44–72)
NITRITE UR QL STRIP.AUTO: NEGATIVE
NRBC # BLD AUTO: 0.77 K/UL
NRBC BLD-RTO: 4.9 /100 WBC
PH UR STRIP.AUTO: 5 [PH] (ref 5–8)
PLATELET # BLD AUTO: 223 K/UL (ref 164–446)
PLATELET BLD QL SMEAR: NORMAL
PMV BLD AUTO: 11.5 FL (ref 9–12.9)
POLYCHROMASIA BLD QL SMEAR: NORMAL
POTASSIUM SERPL-SCNC: 4.1 MMOL/L (ref 3.6–5.5)
POTASSIUM SERPL-SCNC: 5.3 MMOL/L (ref 3.6–5.5)
PROCALCITONIN SERPL-MCNC: 0.4 NG/ML
PROCALCITONIN SERPL-MCNC: 0.51 NG/ML
PRODUCT TYPE UPROD: NORMAL
PROT SERPL-MCNC: 5.4 G/DL (ref 6–8.2)
PROT UR QL STRIP: NEGATIVE MG/DL
RBC # BLD AUTO: 0.9 M/UL (ref 4.2–5.4)
RBC # URNS HPF: ABNORMAL /HPF
RBC BLD AUTO: PRESENT
RBC UR QL AUTO: ABNORMAL
RH BLD: NORMAL
SODIUM SERPL-SCNC: 136 MMOL/L (ref 135–145)
SODIUM SERPL-SCNC: 141 MMOL/L (ref 135–145)
SP GR UR STRIP.AUTO: 1.02
TARGETS BLD QL SMEAR: NORMAL
TROPONIN T SERPL-MCNC: 56 NG/L (ref 6–19)
UNIT STATUS USTAT: NORMAL
UROBILINOGEN UR STRIP.AUTO-MCNC: 0.2 MG/DL
WBC # BLD AUTO: 15.6 K/UL (ref 4.8–10.8)
WBC #/AREA URNS HPF: ABNORMAL /HPF

## 2021-06-17 PROCEDURE — 99291 CRITICAL CARE FIRST HOUR: CPT | Mod: 25 | Performed by: INTERNAL MEDICINE

## 2021-06-17 PROCEDURE — 99223 1ST HOSP IP/OBS HIGH 75: CPT | Mod: AI | Performed by: HOSPITALIST

## 2021-06-17 PROCEDURE — 81001 URINALYSIS AUTO W/SCOPE: CPT

## 2021-06-17 PROCEDURE — 700105 HCHG RX REV CODE 258: Performed by: INTERNAL MEDICINE

## 2021-06-17 PROCEDURE — 70450 CT HEAD/BRAIN W/O DYE: CPT | Mod: ME

## 2021-06-17 PROCEDURE — 700111 HCHG RX REV CODE 636 W/ 250 OVERRIDE (IP): Mod: JA | Performed by: NURSE PRACTITIONER

## 2021-06-17 PROCEDURE — 06JY3ZZ INSPECTION OF LOWER VEIN, PERCUTANEOUS APPROACH: ICD-10-PCS | Performed by: EMERGENCY MEDICINE

## 2021-06-17 PROCEDURE — 770022 HCHG ROOM/CARE - ICU (200)

## 2021-06-17 PROCEDURE — 84145 PROCALCITONIN (PCT): CPT

## 2021-06-17 PROCEDURE — 700111 HCHG RX REV CODE 636 W/ 250 OVERRIDE (IP): Performed by: HOSPITALIST

## 2021-06-17 PROCEDURE — 86901 BLOOD TYPING SEROLOGIC RH(D): CPT

## 2021-06-17 PROCEDURE — 83690 ASSAY OF LIPASE: CPT

## 2021-06-17 PROCEDURE — 96374 THER/PROPH/DIAG INJ IV PUSH: CPT

## 2021-06-17 PROCEDURE — 83605 ASSAY OF LACTIC ACID: CPT | Mod: 91

## 2021-06-17 PROCEDURE — B548ZZA ULTRASONOGRAPHY OF SUPERIOR VENA CAVA, GUIDANCE: ICD-10-PCS | Performed by: INTERNAL MEDICINE

## 2021-06-17 PROCEDURE — 36556 INSERT NON-TUNNEL CV CATH: CPT

## 2021-06-17 PROCEDURE — 85007 BL SMEAR W/DIFF WBC COUNT: CPT

## 2021-06-17 PROCEDURE — 700105 HCHG RX REV CODE 258: Performed by: EMERGENCY MEDICINE

## 2021-06-17 PROCEDURE — 99291 CRITICAL CARE FIRST HOUR: CPT

## 2021-06-17 PROCEDURE — 76775 US EXAM ABDO BACK WALL LIM: CPT

## 2021-06-17 PROCEDURE — 700111 HCHG RX REV CODE 636 W/ 250 OVERRIDE (IP)

## 2021-06-17 PROCEDURE — 700105 HCHG RX REV CODE 258: Performed by: HOSPITALIST

## 2021-06-17 PROCEDURE — C9113 INJ PANTOPRAZOLE SODIUM, VIA: HCPCS | Performed by: INTERNAL MEDICINE

## 2021-06-17 PROCEDURE — 85027 COMPLETE CBC AUTOMATED: CPT

## 2021-06-17 PROCEDURE — 71045 X-RAY EXAM CHEST 1 VIEW: CPT

## 2021-06-17 PROCEDURE — C1751 CATH, INF, PER/CENT/MIDLINE: HCPCS

## 2021-06-17 PROCEDURE — 96375 TX/PRO/DX INJ NEW DRUG ADDON: CPT

## 2021-06-17 PROCEDURE — 36430 TRANSFUSION BLD/BLD COMPNT: CPT

## 2021-06-17 PROCEDURE — P9016 RBC LEUKOCYTES REDUCED: HCPCS

## 2021-06-17 PROCEDURE — 82550 ASSAY OF CK (CPK): CPT

## 2021-06-17 PROCEDURE — 86923 COMPATIBILITY TEST ELECTRIC: CPT

## 2021-06-17 PROCEDURE — 86900 BLOOD TYPING SEROLOGIC ABO: CPT

## 2021-06-17 PROCEDURE — 30243N1 TRANSFUSION OF NONAUTOLOGOUS RED BLOOD CELLS INTO CENTRAL VEIN, PERCUTANEOUS APPROACH: ICD-10-PCS | Performed by: EMERGENCY MEDICINE

## 2021-06-17 PROCEDURE — 700111 HCHG RX REV CODE 636 W/ 250 OVERRIDE (IP): Performed by: INTERNAL MEDICINE

## 2021-06-17 PROCEDURE — 700105 HCHG RX REV CODE 258: Performed by: NURSE PRACTITIONER

## 2021-06-17 PROCEDURE — P9017 PLASMA 1 DONOR FRZ W/IN 8 HR: HCPCS | Mod: 91

## 2021-06-17 PROCEDURE — 02HV33Z INSERTION OF INFUSION DEVICE INTO SUPERIOR VENA CAVA, PERCUTANEOUS APPROACH: ICD-10-PCS | Performed by: INTERNAL MEDICINE

## 2021-06-17 PROCEDURE — 80048 BASIC METABOLIC PNL TOTAL CA: CPT | Mod: 91

## 2021-06-17 PROCEDURE — 85018 HEMOGLOBIN: CPT

## 2021-06-17 PROCEDURE — 700101 HCHG RX REV CODE 250: Performed by: INTERNAL MEDICINE

## 2021-06-17 PROCEDURE — P9034 PLATELETS, PHERESIS: HCPCS

## 2021-06-17 PROCEDURE — 84484 ASSAY OF TROPONIN QUANT: CPT | Mod: 91

## 2021-06-17 PROCEDURE — 80053 COMPREHEN METABOLIC PANEL: CPT

## 2021-06-17 PROCEDURE — HZ2ZZZZ DETOXIFICATION SERVICES FOR SUBSTANCE ABUSE TREATMENT: ICD-10-PCS | Performed by: INTERNAL MEDICINE

## 2021-06-17 PROCEDURE — 82077 ASSAY SPEC XCP UR&BREATH IA: CPT

## 2021-06-17 PROCEDURE — 700111 HCHG RX REV CODE 636 W/ 250 OVERRIDE (IP): Performed by: EMERGENCY MEDICINE

## 2021-06-17 PROCEDURE — 86850 RBC ANTIBODY SCREEN: CPT

## 2021-06-17 PROCEDURE — 36556 INSERT NON-TUNNEL CV CATH: CPT | Mod: RT | Performed by: INTERNAL MEDICINE

## 2021-06-17 PROCEDURE — 87040 BLOOD CULTURE FOR BACTERIA: CPT

## 2021-06-17 PROCEDURE — C9113 INJ PANTOPRAZOLE SODIUM, VIA: HCPCS | Performed by: HOSPITALIST

## 2021-06-17 RX ORDER — LEVETIRACETAM 5 MG/ML
500 INJECTION INTRAVASCULAR EVERY 12 HOURS
Status: DISCONTINUED | OUTPATIENT
Start: 2021-06-17 | End: 2021-06-21

## 2021-06-17 RX ORDER — SODIUM CHLORIDE, SODIUM LACTATE, POTASSIUM CHLORIDE, AND CALCIUM CHLORIDE .6; .31; .03; .02 G/100ML; G/100ML; G/100ML; G/100ML
30 INJECTION, SOLUTION INTRAVENOUS ONCE
Status: DISCONTINUED | OUTPATIENT
Start: 2021-06-17 | End: 2021-06-17

## 2021-06-17 RX ORDER — HEPARIN SODIUM 5000 [USP'U]/ML
5000 INJECTION, SOLUTION INTRAVENOUS; SUBCUTANEOUS EVERY 8 HOURS
Status: DISCONTINUED | OUTPATIENT
Start: 2021-06-18 | End: 2021-06-17

## 2021-06-17 RX ORDER — SODIUM CHLORIDE, SODIUM LACTATE, POTASSIUM CHLORIDE, CALCIUM CHLORIDE 600; 310; 30; 20 MG/100ML; MG/100ML; MG/100ML; MG/100ML
INJECTION, SOLUTION INTRAVENOUS CONTINUOUS
Status: DISCONTINUED | OUTPATIENT
Start: 2021-06-17 | End: 2021-06-19

## 2021-06-17 RX ORDER — SODIUM CHLORIDE 9 MG/ML
INJECTION, SOLUTION INTRAVENOUS CONTINUOUS
Status: ACTIVE | OUTPATIENT
Start: 2021-06-17 | End: 2021-06-18

## 2021-06-17 RX ORDER — LORAZEPAM 2 MG/ML
4 INJECTION INTRAMUSCULAR
Status: DISCONTINUED | OUTPATIENT
Start: 2021-06-17 | End: 2021-06-26 | Stop reason: HOSPADM

## 2021-06-17 RX ORDER — CALCIUM CHLORIDE 100 MG/ML
2 INJECTION INTRAVENOUS; INTRAVENTRICULAR ONCE
Status: DISCONTINUED | OUTPATIENT
Start: 2021-06-17 | End: 2021-06-17

## 2021-06-17 RX ORDER — ENALAPRILAT 1.25 MG/ML
1.25 INJECTION INTRAVENOUS EVERY 6 HOURS PRN
Status: DISCONTINUED | OUTPATIENT
Start: 2021-06-17 | End: 2021-06-17

## 2021-06-17 RX ORDER — PANTOPRAZOLE SODIUM 40 MG/10ML
80 INJECTION, POWDER, LYOPHILIZED, FOR SOLUTION INTRAVENOUS ONCE
Status: DISCONTINUED | OUTPATIENT
Start: 2021-06-17 | End: 2021-06-17

## 2021-06-17 RX ORDER — SODIUM CHLORIDE, SODIUM LACTATE, POTASSIUM CHLORIDE, AND CALCIUM CHLORIDE .6; .31; .03; .02 G/100ML; G/100ML; G/100ML; G/100ML
1000 INJECTION, SOLUTION INTRAVENOUS
Status: DISCONTINUED | OUTPATIENT
Start: 2021-06-17 | End: 2021-06-19

## 2021-06-17 RX ORDER — LABETALOL HYDROCHLORIDE 5 MG/ML
10 INJECTION, SOLUTION INTRAVENOUS EVERY 4 HOURS PRN
Status: DISCONTINUED | OUTPATIENT
Start: 2021-06-17 | End: 2021-06-26 | Stop reason: HOSPADM

## 2021-06-17 RX ORDER — AMOXICILLIN 250 MG
2 CAPSULE ORAL 2 TIMES DAILY
Status: DISCONTINUED | OUTPATIENT
Start: 2021-06-17 | End: 2021-06-26 | Stop reason: HOSPADM

## 2021-06-17 RX ORDER — SODIUM CHLORIDE, SODIUM LACTATE, POTASSIUM CHLORIDE, AND CALCIUM CHLORIDE .6; .31; .03; .02 G/100ML; G/100ML; G/100ML; G/100ML
30 INJECTION, SOLUTION INTRAVENOUS ONCE
Status: COMPLETED | OUTPATIENT
Start: 2021-06-17 | End: 2021-06-17

## 2021-06-17 RX ORDER — ACETAMINOPHEN 325 MG/1
650 TABLET ORAL EVERY 6 HOURS PRN
Status: DISCONTINUED | OUTPATIENT
Start: 2021-06-17 | End: 2021-06-26 | Stop reason: HOSPADM

## 2021-06-17 RX ORDER — ONDANSETRON 2 MG/ML
8 INJECTION INTRAMUSCULAR; INTRAVENOUS ONCE
Status: COMPLETED | OUTPATIENT
Start: 2021-06-17 | End: 2021-06-17

## 2021-06-17 RX ORDER — NOREPINEPHRINE BITARTRATE 0.03 MG/ML
0-30 INJECTION, SOLUTION INTRAVENOUS CONTINUOUS
Status: DISCONTINUED | OUTPATIENT
Start: 2021-06-17 | End: 2021-06-19

## 2021-06-17 RX ORDER — POLYETHYLENE GLYCOL 3350 17 G/17G
1 POWDER, FOR SOLUTION ORAL
Status: DISCONTINUED | OUTPATIENT
Start: 2021-06-17 | End: 2021-06-26 | Stop reason: HOSPADM

## 2021-06-17 RX ORDER — LORAZEPAM 2 MG/ML
INJECTION INTRAMUSCULAR
Status: COMPLETED
Start: 2021-06-17 | End: 2021-06-17

## 2021-06-17 RX ORDER — LORAZEPAM 2 MG/ML
1 INJECTION INTRAMUSCULAR ONCE
Status: COMPLETED | OUTPATIENT
Start: 2021-06-17 | End: 2021-06-17

## 2021-06-17 RX ORDER — BISACODYL 10 MG
10 SUPPOSITORY, RECTAL RECTAL
Status: DISCONTINUED | OUTPATIENT
Start: 2021-06-17 | End: 2021-06-26 | Stop reason: HOSPADM

## 2021-06-17 RX ADMIN — OCTREOTIDE ACETATE 50 MCG/HR: 200 INJECTION, SOLUTION INTRAVENOUS; SUBCUTANEOUS at 16:08

## 2021-06-17 RX ADMIN — SODIUM CHLORIDE, POTASSIUM CHLORIDE, SODIUM LACTATE AND CALCIUM CHLORIDE: 600; 310; 30; 20 INJECTION, SOLUTION INTRAVENOUS at 14:55

## 2021-06-17 RX ADMIN — CALCIUM CHLORIDE 1000 MG: 100 INJECTION, SOLUTION INTRAVENOUS at 14:56

## 2021-06-17 RX ADMIN — SODIUM CHLORIDE, POTASSIUM CHLORIDE, SODIUM LACTATE AND CALCIUM CHLORIDE: 600; 310; 30; 20 INJECTION, SOLUTION INTRAVENOUS at 21:18

## 2021-06-17 RX ADMIN — LORAZEPAM 1 MG: 2 INJECTION INTRAMUSCULAR at 13:00

## 2021-06-17 RX ADMIN — NOREPINEPHRINE BITARTRATE 5 MCG/MIN: 1 INJECTION, SOLUTION, CONCENTRATE INTRAVENOUS at 15:20

## 2021-06-17 RX ADMIN — ONDANSETRON 8 MG: 2 INJECTION INTRAMUSCULAR; INTRAVENOUS at 13:50

## 2021-06-17 RX ADMIN — LEVETIRACETAM INJECTION 500 MG: 5 INJECTION INTRAVENOUS at 18:18

## 2021-06-17 RX ADMIN — CEFTRIAXONE SODIUM 2 G: 10 INJECTION, POWDER, FOR SOLUTION INTRAVENOUS at 11:24

## 2021-06-17 RX ADMIN — PANTOPRAZOLE SODIUM 80 MG: 40 INJECTION, POWDER, FOR SOLUTION INTRAVENOUS at 14:56

## 2021-06-17 RX ADMIN — PANTOPRAZOLE SODIUM 8 MG/HR: 40 INJECTION, POWDER, FOR SOLUTION INTRAVENOUS at 15:11

## 2021-06-17 RX ADMIN — SODIUM CHLORIDE 2000 MG: 9 INJECTION, SOLUTION INTRAVENOUS at 10:55

## 2021-06-17 RX ADMIN — SODIUM CHLORIDE, POTASSIUM CHLORIDE, SODIUM LACTATE AND CALCIUM CHLORIDE 1905 ML: 600; 310; 30; 20 INJECTION, SOLUTION INTRAVENOUS at 11:13

## 2021-06-17 RX ADMIN — LORAZEPAM 1 MG: 2 INJECTION INTRAMUSCULAR; INTRAVENOUS at 13:00

## 2021-06-17 ASSESSMENT — COPD QUESTIONNAIRES
DO YOU EVER COUGH UP ANY MUCUS OR PHLEGM?: NO/ONLY WITH OCCASIONAL COLDS OR INFECTIONS
DURING THE PAST 4 WEEKS HOW MUCH DID YOU FEEL SHORT OF BREATH: NONE/LITTLE OF THE TIME
COPD SCREENING SCORE: 2
HAVE YOU SMOKED AT LEAST 100 CIGARETTES IN YOUR ENTIRE LIFE: NO/DON'T KNOW

## 2021-06-17 ASSESSMENT — ENCOUNTER SYMPTOMS
WEIGHT LOSS: 0
VOMITING: 0
SORE THROAT: 0
VOMITING: 1
FOCAL WEAKNESS: 0
BLOOD IN STOOL: 0
HALLUCINATIONS: 0
WEAKNESS: 1
STRIDOR: 0
SHORTNESS OF BREATH: 0
DIARRHEA: 0
FEVER: 0
BLURRED VISION: 0
COUGH: 0
NAUSEA: 0
SHORTNESS OF BREATH: 1
MUSCULOSKELETAL NEGATIVE: 1
HEADACHES: 0
BACK PAIN: 0
NERVOUS/ANXIOUS: 0
TINGLING: 0
SINUS PAIN: 0
CHILLS: 0
TREMORS: 0
SPUTUM PRODUCTION: 0
DEPRESSION: 0
NAUSEA: 1
MYALGIAS: 0
SEIZURES: 1
CLAUDICATION: 0
DOUBLE VISION: 0
SPEECH CHANGE: 0
ABDOMINAL PAIN: 1
EYE DISCHARGE: 0
ABDOMINAL PAIN: 0
DIZZINESS: 0
CONSTIPATION: 0
SENSORY CHANGE: 0
PALPITATIONS: 0

## 2021-06-17 ASSESSMENT — PATIENT HEALTH QUESTIONNAIRE - PHQ9
2. FEELING DOWN, DEPRESSED, IRRITABLE, OR HOPELESS: NOT AT ALL
SUM OF ALL RESPONSES TO PHQ9 QUESTIONS 1 AND 2: 0
1. LITTLE INTEREST OR PLEASURE IN DOING THINGS: NOT AT ALL

## 2021-06-17 ASSESSMENT — COGNITIVE AND FUNCTIONAL STATUS - GENERAL
SUGGESTED CMS G CODE MODIFIER DAILY ACTIVITY: CK
SUGGESTED CMS G CODE MODIFIER MOBILITY: CK
MOBILITY SCORE: 18
STANDING UP FROM CHAIR USING ARMS: A LITTLE
TURNING FROM BACK TO SIDE WHILE IN FLAT BAD: A LITTLE
MOVING TO AND FROM BED TO CHAIR: A LITTLE
WALKING IN HOSPITAL ROOM: A LITTLE
PERSONAL GROOMING: A LITTLE
DRESSING REGULAR LOWER BODY CLOTHING: A LITTLE
EATING MEALS: A LITTLE
HELP NEEDED FOR BATHING: A LITTLE
DRESSING REGULAR UPPER BODY CLOTHING: A LITTLE
DAILY ACTIVITIY SCORE: 18
MOVING FROM LYING ON BACK TO SITTING ON SIDE OF FLAT BED: A LITTLE
TOILETING: A LITTLE
CLIMB 3 TO 5 STEPS WITH RAILING: A LITTLE

## 2021-06-17 ASSESSMENT — LIFESTYLE VARIABLES
AVERAGE NUMBER OF DAYS PER WEEK YOU HAVE A DRINK CONTAINING ALCOHOL: 5
CONSUMPTION TOTAL: POSITIVE
HOW MANY TIMES IN THE PAST YEAR HAVE YOU HAD 5 OR MORE DRINKS IN A DAY: 250
EVER FELT BAD OR GUILTY ABOUT YOUR DRINKING: NO
TOTAL SCORE: 0
ALCOHOL_USE: YES
ON A TYPICAL DAY WHEN YOU DRINK ALCOHOL HOW MANY DRINKS DO YOU HAVE: 4
TOTAL SCORE: 0
HAVE YOU EVER FELT YOU SHOULD CUT DOWN ON YOUR DRINKING: NO
HAVE PEOPLE ANNOYED YOU BY CRITICIZING YOUR DRINKING: NO
TOTAL SCORE: 0
EVER HAD A DRINK FIRST THING IN THE MORNING TO STEADY YOUR NERVES TO GET RID OF A HANGOVER: NO
DOES PATIENT WANT TO STOP DRINKING: NO

## 2021-06-17 ASSESSMENT — FIBROSIS 4 INDEX
FIB4 SCORE: 2.53
FIB4 SCORE: 1.04

## 2021-06-17 ASSESSMENT — PAIN DESCRIPTION - PAIN TYPE
TYPE: ACUTE PAIN

## 2021-06-17 NOTE — ASSESSMENT & PLAN NOTE
EEG 6/18 found Peptic ulcer disease with duodenal ulcers x3. Unable to obtain biopsy  Start Omeprazole 20mg PO BID, sucralfate solution 1 gram PO 4 times daily for 14 days- will check for H-pylori, and will stop  pantoprazole gtt  Serial monitor hg and transfuse < 7

## 2021-06-17 NOTE — ASSESSMENT & PLAN NOTE
S/p massive transfusion protocol in ER for Hg of 2.9 with shock 2nd to GI bleed  EEG today found Peptic ulcer disease with duodenal ulcers x3. Unable to obtain biopsy  6/18Omeprazole 20mg PO BID, sucralfate solution 1 gram PO 4 times daily for 14 days- will check for H-pylori, and will stop  pantoprazole gtt  6/19 HH stable- continue to monitor HH and transfuse based on assessment and lab findings- if remains stable this afternoon then will transfer

## 2021-06-17 NOTE — ED NOTES
Admitting MD and RNs at bedside.    1315 timeout conducted  1320 R IJ placed by MD with ultrasound guidance  1324 mass transfusion protocol initiated. Pt received 2 units of RBCs, 4 units of FFP, 2 more units of RBCs and 1 unit of platelets.    Pt briefly nauseous during transfusion, given prn zofran  VSS at termination of transfusion stable and patient in NAD

## 2021-06-17 NOTE — ASSESSMENT & PLAN NOTE
NOT SEPSIS  Pt was tachycardic appropriately for severe anemia  Mild leukocytosis likely stress response      Resolved.

## 2021-06-17 NOTE — PROGRESS NOTES
Sterile placed central line in cordis for further access since multiple IV infusing medications    Jesus Velásquez MD  Critical Care Medicine

## 2021-06-17 NOTE — ASSESSMENT & PLAN NOTE
keppra 500mg BID  Seizure precautions  Occurred after a Subdural hematoma in 2004  aspirationr precautions.  Head CT benign

## 2021-06-17 NOTE — ED TRIAGE NOTES
Roro Leon  65 y.o. female  Chief Complaint   Patient presents with   • Weakness     Patient brought in by EMS from home. Friend called EMS regarding patient had been altered and lethargic the past few days. Upon arrival, EMS reports blood pressure was 50/30, 500 ml of LR given en route. EMS states patient was also covered in urine. History of seizures, patient states she has not been taking her Keppra.     Pre-hospital vitals:   BP 60/30    RR 22  Oxygen Saturation 96% on 6 liters per nasal cannula

## 2021-06-17 NOTE — ASSESSMENT & PLAN NOTE
S/p EGD 6/18 with findings of 3 peptic ulcers and severe errosive esophagitis  No interventions on EGD as pt desaturated not allowing time  Cont PPI   Follow H&H; transfuse to goal >7.

## 2021-06-17 NOTE — CONSULTS
Gastroenterology Consult Note:    KIMBERLYN Castillo  Date & Time note created:    6/17/2021   1:55 PM     Referring MD:  Dr. Jesus Velásquez    Patient ID:  Name:             Roro Leon     YOB: 1955  Age:                 65 y.o.  female   MRN:               [id number]                                                             Reason for Consult:      1.  Melena  2.  Weakness x4 days  3.  Profound anemia    History of Present Illness:    This is a 65-year-old female, PMH seizures secondary to subdural hematoma 10 years ago, hypertension, alcohol induced pancreatitis, depression, alcohol abuse, who presented to the emergency room 6/17/2021 with progressive weakness over the past 4-5 days.  Complains of severe nausea with vomiting.  She has not been drinking or eating much over the last few days.  Denies hematemesis/coffee-ground emesis/melena/hematochezia.  Paramedics-hypotensive, systolic 50s.  Then she had a tonic clonic seizure in the emergency room.  Alcohol use-usually 1-2 drinks every other day.  States she had a history of alcohol abuse several years ago.    ER labs: WBC 15.6.  Hemoglobin 2.9.  Hematocrit 9.2.  Platelet count 223.  Sodium 136.  Potassium 5.3.  Glucose 107.  .  Creatinine 3.18.  AST 57.  ALT 43.  Alkaline phosphatase 74.  Total bilirubin<0.2.  Lactic acid 11.3.  Poor calcitonin 0.51.    Received 4 units packed red blood cells, 4 FFP and 2 platelets.    Review of Systems:      Review of Systems   Constitutional: Positive for malaise/fatigue.   HENT: Negative for congestion and nosebleeds.    Eyes: Negative for blurred vision and double vision.   Respiratory: Positive for shortness of breath. Negative for cough and sputum production.    Cardiovascular: Negative for chest pain and palpitations.   Gastrointestinal: Positive for abdominal pain, melena, nausea and vomiting.   Genitourinary: Negative.    Musculoskeletal: Negative.    Skin: Negative.     Neurological: Positive for seizures and weakness.             Physical Exam:  Vitals/ General Appearance:   Weight/BMI: Body mass index is 22.6 kg/m².    Vitals:    06/17/21 1300 06/17/21 1341 06/17/21 1345 06/17/21 1350   BP: 107/63 (!) 97/72 108/68 112/54   Pulse: (!) 110 (!) 193     Resp: (!) 25 (!) 21 (!) 23 18   Temp:       TempSrc:       SpO2: 96% 89%  95%   Weight:       Height:         Oxygen Therapy:  Pulse Oximetry: 95 %, O2 (LPM): 6, O2 Delivery Device: Silicone Nasal Cannula    Physical Exam  Constitutional:       General: She is in acute distress.      Appearance: She is ill-appearing and toxic-appearing.   HENT:      Head: Normocephalic and atraumatic.      Mouth/Throat:      Mouth: Mucous membranes are dry.   Eyes:      General: No scleral icterus.     Extraocular Movements: Extraocular movements intact.      Pupils: Pupils are equal, round, and reactive to light.   Neck:      Comments: Central line right side of neck  Cardiovascular:      Rate and Rhythm: Regular rhythm. Tachycardia present.   Pulmonary:      Breath sounds: Rhonchi and rales present. No wheezing.   Abdominal:      General: Abdomen is flat. Bowel sounds are normal.      Palpations: Abdomen is soft.      Tenderness: There is abdominal tenderness (epigastric/RUQ).   Musculoskeletal:      Cervical back: Normal range of motion and neck supple.      Right lower leg: No edema.      Left lower leg: Edema present.   Skin:     General: Skin is warm and dry.   Neurological:      Mental Status: She is alert and oriented to person, place, and time.      Motor: Weakness present.         Past Medical History:   Past Medical History:   Diagnosis Date   • YEISON (acute kidney injury) (HCC)    • Hypertension    • Seizure (HCC)    • Subdural hematoma (HCC)        Past Surgical History:  History reviewed. No pertinent surgical history.    Hospital Medications:    Current Facility-Administered Medications:   •  levETIRAcetam (Keppra) 500 mg in 100 mL NaCl  IV premix, 500 mg, Intravenous, Q12HRS, CHELSEY MotaO.  •  senna-docusate (PERICOLACE or SENOKOT S) 8.6-50 MG per tablet 2 tablet, 2 tablet, Oral, BID **AND** polyethylene glycol/lytes (MIRALAX) PACKET 1 Packet, 1 Packet, Oral, QDAY PRN **AND** magnesium hydroxide (MILK OF MAGNESIA) suspension 30 mL, 30 mL, Oral, QDAY PRN **AND** bisacodyl (DULCOLAX) suppository 10 mg, 10 mg, Rectal, QDAY PRN, CHELSEY MotaO.  •  Pharmacy Consult Request - to monitor for nephrotoxic agents, 1 Each, Other, PHARMACY TO DOSE, Luis Gil D.O.  •  Respiratory Therapy Consult, , Nebulization, Continuous RT, FERNANDA Mota.MICHELLE.  •  lactated ringers infusion (BOLUS), 1,000 mL, Intravenous, Once PRN, Luis Gil D.O.  •  lactated ringers infusion, , Intravenous, Continuous, FERNANDA Mota.O.  •  acetaminophen (Tylenol) tablet 650 mg, 650 mg, Oral, Q6HRS PRN, CHELSEY MotaO.  •  [START ON 6/18/2021] cefTRIAXone (Rocephin) syringe 2 g, 2 g, Intravenous, DAILY, CHELSEY MotaO.  •  labetalol (NORMODYNE/TRANDATE) injection 10 mg, 10 mg, Intravenous, Q4HRS PRN, CHELSEY MotaO.  •  LORazepam (ATIVAN) injection 4 mg, 4 mg, Intravenous, Q10 MIN PRN, CHELSEY MotaO.  •  NS infusion, , Intravenous, Continuous, Chon Trievdi M.D.  •  calcium CHLORIDE injection 2 g, 2 g, Intravenous, Once, Chon Trivedi M.D.  •  pantoprazole (PROTONIX) 80 mg in  mL IVPB, 80 mg, Intravenous, Once, CHELSEY MotaO.  •  pantoprazole (PROTONIX) 80 mg in  mL Infusion, 8 mg/hr, Intravenous, Continuous, Jesus Velásquez M.D.    Current Outpatient Medications:   •  alendronate (FOSAMAX) 70 MG Tab, Take 70 mg by mouth every 7 days. Take 70 mg every Wednesday, Disp: , Rfl:   •  lisinopril-hydrochlorothiazide (PRINZIDE, ZESTORETIC) 20-12.5 MG per tablet, Take 1 Tab by mouth every day., Disp: , Rfl:   •  aspirin 81 MG EC tablet, Take 162 mg by mouth 2 times a day as needed (Pain). 2 tablets = 162 mg., Disp: , Rfl:    •  atorvastatin (LIPITOR) 20 MG Tab, Take 1 Tab by mouth every day. (Patient not taking: Reported on 4/16/2021), Disp: 30 Tab, Rfl: 3  •  levetiracetam (KEPPRA) 500 MG Tab, Take 500 mg by mouth 2 times a day., Disp: , Rfl:     Current Outpatient Medications:  Current Facility-Administered Medications   Medication Dose Route Frequency Provider Last Rate Last Admin   • levETIRAcetam (Keppra) 500 mg in 100 mL NaCl IV premix  500 mg Intravenous Q12HRS Luis Gil D.O.       • senna-docusate (PERICOLACE or SENOKOT S) 8.6-50 MG per tablet 2 tablet  2 tablet Oral BID Luis Gil D.O.        And   • polyethylene glycol/lytes (MIRALAX) PACKET 1 Packet  1 Packet Oral QDAY PRN Luis Gil D.O.        And   • magnesium hydroxide (MILK OF MAGNESIA) suspension 30 mL  30 mL Oral QDAY PRN Luis Gil D.O.        And   • bisacodyl (DULCOLAX) suppository 10 mg  10 mg Rectal QDAY PRN Luis Gil D.O.       • Pharmacy Consult Request - to monitor for nephrotoxic agents  1 Each Other PHARMACY TO DOSE Luis Gil D.O.       • Respiratory Therapy Consult   Nebulization Continuous RT Luis Gil D.O.       • lactated ringers infusion (BOLUS)  1,000 mL Intravenous Once PRN Luis Gil D.O.       • lactated ringers infusion   Intravenous Continuous Luis Gil D.O.       • acetaminophen (Tylenol) tablet 650 mg  650 mg Oral Q6HRS PRN Luis Gil D.O.       • [START ON 6/18/2021] cefTRIAXone (Rocephin) syringe 2 g  2 g Intravenous DAILY Luis Gil D.O.       • labetalol (NORMODYNE/TRANDATE) injection 10 mg  10 mg Intravenous Q4HRS PRN Luis Gil D.O.       • LORazepam (ATIVAN) injection 4 mg  4 mg Intravenous Q10 MIN PRN Luis Gil D.O.       • NS infusion   Intravenous Continuous Chon Trivedi M.D.       • calcium CHLORIDE injection 2 g  2 g Intravenous Once Chon Trivedi M.D.       • pantoprazole (PROTONIX) 80 mg in  mL IVPB  80 mg Intravenous Once Luis Gil D.O.        • pantoprazole (PROTONIX) 80 mg in  mL Infusion  8 mg/hr Intravenous Continuous Jesus Velásquez M.D.         Current Outpatient Medications   Medication Sig Dispense Refill   • alendronate (FOSAMAX) 70 MG Tab Take 70 mg by mouth every 7 days. Take 70 mg every Wednesday     • lisinopril-hydrochlorothiazide (PRINZIDE, ZESTORETIC) 20-12.5 MG per tablet Take 1 Tab by mouth every day.     • aspirin 81 MG EC tablet Take 162 mg by mouth 2 times a day as needed (Pain). 2 tablets = 162 mg.     • atorvastatin (LIPITOR) 20 MG Tab Take 1 Tab by mouth every day. (Patient not taking: Reported on 4/16/2021) 30 Tab 3   • levetiracetam (KEPPRA) 500 MG Tab Take 500 mg by mouth 2 times a day.         Medication Allergy:  No Known Allergies    Family History:  Family History   Problem Relation Age of Onset   • Cancer Mother 77        colon cancer   • Heart Attack Father 67        MI   • Heart Disease Brother         angina   • Stroke Maternal Grandfather         parkinsons   • Dementia Maternal Aunt         parkinsons and alzhiemers       Social History:  Social History     Socioeconomic History   • Marital status: Single     Spouse name: Not on file   • Number of children: 2   • Years of education: some college   • Highest education level: Not on file   Occupational History   • Occupation: medical assistant     Comment: [employer] [place]    Tobacco Use   • Smoking status: Never Smoker   • Smokeless tobacco: Never Used   Vaping Use   • Vaping Use: Never assessed   Substance and Sexual Activity   • Alcohol use: Yes     Alcohol/week: 3.6 oz     Types: 6 Glasses of wine per week     Comment: weekly   • Drug use: No   • Sexual activity: Not on file   Other Topics Concern   • Not on file   Social History Narrative   • Not on file     Social Determinants of Health     Financial Resource Strain:    • Difficulty of Paying Living Expenses:    Food Insecurity:    • Worried About Running Out of Food in the Last Year:    • Ran Out of  Food in the Last Year:    Transportation Needs:    • Lack of Transportation (Medical):    • Lack of Transportation (Non-Medical):    Physical Activity:    • Days of Exercise per Week:    • Minutes of Exercise per Session:    Stress:    • Feeling of Stress :    Social Connections:    • Frequency of Communication with Friends and Family:    • Frequency of Social Gatherings with Friends and Family:    • Attends [employer] Services:    • Active Member of Clubs or Organizations:    • Attends Club or Organization Meetings:    • Marital Status:    Intimate Partner Violence:    • Fear of Current or Ex-Partner:    • Emotionally Abused:    • Physically Abused:    • Sexually Abused:          MDM (Data Review):     Records reviewed and summarized in current documentation    Lab Data Review:  Recent Results (from the past 24 hour(s))   CMP    Collection Time: 06/17/21  9:50 AM   Result Value Ref Range    Sodium 136 135 - 145 mmol/L    Potassium 5.3 3.6 - 5.5 mmol/L    Chloride 105 96 - 112 mmol/L    Co2 12 (L) 20 - 33 mmol/L    Anion Gap 19.0 (H) 7.0 - 16.0    Glucose 107 (H) 65 - 99 mg/dL    Bun 116 (HH) 8 - 22 mg/dL    Creatinine 3.18 (H) 0.50 - 1.40 mg/dL    Calcium 8.3 (L) 8.5 - 10.5 mg/dL    AST(SGOT) 57 (H) 12 - 45 U/L    ALT(SGPT) 43 2 - 50 U/L    Alkaline Phosphatase 74 30 - 99 U/L    Total Bilirubin <0.2 0.1 - 1.5 mg/dL    Albumin 2.8 (L) 3.2 - 4.9 g/dL    Total Protein 5.4 (L) 6.0 - 8.2 g/dL    Globulin 2.6 1.9 - 3.5 g/dL    A-G Ratio 1.1 g/dL   PROCALCITONIN    Collection Time: 06/17/21  9:50 AM   Result Value Ref Range    Procalcitonin 0.51 (H) <0.25 ng/mL   ESTIMATED GFR    Collection Time: 06/17/21  9:50 AM   Result Value Ref Range    GFR If  18 (A) >60 mL/min/1.73 m 2    GFR If Non African American 15 (A) >60 mL/min/1.73 m 2   ABO Rh Confirm    Collection Time: 06/17/21  9:50 AM   Result Value Ref Range    ABO Rh Confirm O POS    LACTIC ACID    Collection Time: 06/17/21 10:20 AM   Result Value Ref  Range    Lactic Acid 11.3 (HH) 0.5 - 2.0 mmol/L   URINALYSIS,CULTURE IF INDICATED    Collection Time: 06/17/21 11:21 AM    Specimen: Urine   Result Value Ref Range    Color Yellow     Character Clear     Specific Gravity 1.016 <1.035    Ph 5.0 5.0 - 8.0    Glucose Negative Negative mg/dL    Ketones Negative Negative mg/dL    Protein Negative Negative mg/dL    Bilirubin Negative Negative    Urobilinogen, Urine 0.2 Negative    Nitrite Negative Negative    Leukocyte Esterase Small (A) Negative    Occult Blood Trace (A) Negative    Micro Urine Req Microscopic    URINE MICROSCOPIC (W/UA)    Collection Time: 06/17/21 11:21 AM   Result Value Ref Range    WBC 2-5 /hpf    RBC 0-2 /hpf    Bacteria Rare (A) None /hpf    Epithelial Cells Few /hpf    Amorphous Crystal Present /hpf    Hyaline Cast 0-2 /lpf   CBC WITH DIFFERENTIAL    Collection Time: 06/17/21 11:40 AM   Result Value Ref Range    WBC 15.6 (H) 4.8 - 10.8 K/uL    RBC 0.90 (L) 4.20 - 5.40 M/uL    Hemoglobin 2.9 (LL) 12.0 - 16.0 g/dL    Hematocrit 9.2 (LL) 37.0 - 47.0 %    .2 (H) 81.4 - 97.8 fL    MCH 32.2 27.0 - 33.0 pg    MCHC 31.5 (L) 33.6 - 35.0 g/dL    RDW 77.1 (H) 35.9 - 50.0 fL    Platelet Count 223 164 - 446 K/uL    MPV 11.5 9.0 - 12.9 fL    Neutrophils-Polys 81.80 (H) 44.00 - 72.00 %    Lymphocytes 5.20 (L) 22.00 - 41.00 %    Monocytes 11.30 0.00 - 13.40 %    Eosinophils 0.00 0.00 - 6.90 %    Basophils 0.00 0.00 - 1.80 %    Nucleated RBC 4.90 /100 WBC    Neutrophils (Absolute) 12.76 (H) 2.00 - 7.15 K/uL    Lymphs (Absolute) 0.81 (L) 1.00 - 4.80 K/uL    Monos (Absolute) 1.76 (H) 0.00 - 0.85 K/uL    Eos (Absolute) 0.00 0.00 - 0.51 K/uL    Baso (Absolute) 0.00 0.00 - 0.12 K/uL    NRBC (Absolute) 0.77 K/uL    Hypochromia 2+ (A)     Anisocytosis 3+ (A)     Macrocytosis 2+ (A)     Microcytosis 1+    COD - Adult (Type and Screen)    Collection Time: 06/17/21 11:40 AM   Result Value Ref Range    ABO Grouping Only O     Rh Grouping Only POS     Antibody  Screen-Cod NEG    DIFFERENTIAL MANUAL    Collection Time: 06/17/21 11:40 AM   Result Value Ref Range    Myelocytes 1.70 %    Manual Diff Status PERFORMED    PERIPHERAL SMEAR REVIEW    Collection Time: 06/17/21 11:40 AM   Result Value Ref Range    Peripheral Smear Review see below    PLATELET ESTIMATE    Collection Time: 06/17/21 11:40 AM   Result Value Ref Range    Plt Estimation Normal    MORPHOLOGY    Collection Time: 06/17/21 11:40 AM   Result Value Ref Range    RBC Morphology Present     Large Platelets 1+     Polychromia 1+     Target Cells 1+    LACTIC ACID    Collection Time: 06/17/21 12:28 PM   Result Value Ref Range    Lactic Acid 4.9 (HH) 0.5 - 2.0 mmol/L   MASSIVE TRANSFUSION    Collection Time: 06/17/21 12:49 PM   Result Value Ref Range    Component R       R99                 Red Cells, LR       [id number]   issued       06/17/21   12:52      Product Type R99     Dispense Status issued     Unit Number (Barcoded) [id number]     Product Code (Barcoded) [id number]     Blood Type (Barcoded) 5100     Component R       R33                 Red Blood Cells     [id number]   issued       06/17/21   12:52      Product Type Red Blood Cells LR Pheresis     Dispense Status issued     Unit Number (Barcoded) [id number]     Product Code (Barcoded) W6566K14     Blood Type (Barcoded) 5100     Component R       R33                 Red Blood Cells     [id number]   issued       06/17/21   12:52      Product Type Red Blood Cells LR Pheresis     Dispense Status issued     Unit Number (Barcoded) [id number]     Product Code (Barcoded) S4139C11     Blood Type (Barcoded) 5100     Component R       R33                 Red Blood Cells     [id number]   issued       06/17/21   12:52      Product Type Red Blood Cells LR Pheresis     Dispense Status issued     Unit Number (Barcoded) [id number]     Product Code (Barcoded) G9397R54     Blood Type (Barcoded) 5100     Component F       TA2                 Thawed Plasma  2     [id number]   issued       06/17/21   12:52      Product Type Thawed Apheresis Plasma 2nd Cont     Dispense Status issued     Unit Number (Barcoded) [id number]     Product Code (Barcoded) I0928W28     Blood Type (Barcoded) 8400     Component F       TA2                 Thawed Plasma 2     [id number]   issued       06/17/21   12:52      Product Type Thawed Apheresis Plasma 2nd Cont     Dispense Status issued     Unit Number (Barcoded) [id number]     Product Code (Barcoded) F0153U42     Blood Type (Barcoded) 2800     Component F       TA3                 Thawed Plasma 3     [id number]   issued       06/17/21   12:52      Product Type Thawed Apheresis Plasma 3rd Cont     Dispense Status issued     Unit Number (Barcoded) [id number]     Product Code (Barcoded) M1568E65     Blood Type (Barcoded) 8400     Component F       TA1                 Thawed Plasma 1     [id number]   issued       06/17/21   12:52      Product Type Thawed Apheresis Plasma 1st Cont     Dispense Status issued     Unit Number (Barcoded) [id number]     Product Code (Barcoded) V4652L13     Blood Type (Barcoded) 8400     Component P       P0                  Plts,Pheresis       [id number]   issued       06/17/21   12:52      Product Type Platelets  Pheresis LR     Dispense Status issued     Unit Number (Barcoded) [id number]     Product Code (Barcoded) I4141A47     Blood Type (Barcoded) 6200        Imaging/Procedures Review:      CT-HEAD W/O   Final Result    Examination is limited by patient motion.   1.  No acute intracranial abnormality is identified.   2.  Atrophy   3.  There are periventricular and subcortical white matter changes present.  This finding is nonspecific and could be from previous small vessel ischemia, demyelination, or gliosis.      DX-CHEST-PORTABLE (1 VIEW)   Final Result      No acute cardiopulmonary findings.      US-RENAL    (Results Pending)   DX-CHEST-PORTABLE (1 VIEW)    (Results Pending)         MDM (Assessment and Plan):     Patient Active Problem List    Diagnosis Date Noted   • Severe anemia 06/17/2021   • Sepsis (HCC) 06/17/2021   • Hypotension 06/17/2021   • GI bleed 06/17/2021   • SDH (subdural hematoma) (HCC) 06/17/2021   • Debility 04/17/2021   • YEISON (acute kidney injury) ([facility]) 04/16/2021   • Lactate blood increased 04/16/2021   • Essential hypertension 10/10/2017   • Seizure (HCC) 10/10/2017   • Dyslipidemia 10/10/2017     This is a 65-year-old female, with a 4-5-day history of progressive weakness, nausea/vomiting, anorexia-minimal fluids who was found to be hypotensive-systolic 50s, WBC 15.6.  Hemoglobin 2.9.  .  Creatinine 3.18.  Lactic acid 11.3.  She has received multiple units of PRBCs, FFP and platelets.  Continues to complain of severe nausea, dry heaving.  Denies hematemesis/coffee-ground emesis.  Rectal exam-melenic stool.  Denies hematochezia.  Takes Advil occasionally.  Alcohol-1-2 drinks of wine every other day.  History of alcohol abuse many years ago.    ASSESSMENT:  1. GI bleed: Likely upper GI bleed with melenic stool.  2.  Profound anemia-hemoglobin 2.9.  3.  Hypotension-secondary to #1 #2   4.  YEISON-.  Creatinine 3.18.  Admits to not drinking fluids or eating for the past several days due to nausea/vomiting  5.  Nausea/vomiting-denies hematemesis or coffee-ground emesis  6.  Sepsis  7.  Tachycardia-likely due to #2  8.  Alcoholic abuse-patient stated she has a history of alcohol abuse.  Currently drinking 1-2 glasses of wine every other day.  Denies history of alcohol withdrawals.  9.  Elevated lactic acid-lactic acid level 0.3.  10. Covid: negative    PLAN:  1.  Risk versus benefits of EGD was discussed with patient.  Risk include heart and lung event secondary to anesthesia.  Other risk include bleeding, perforation, infection.  Questions were answered.  2.  Continue Protonix 8 mg/h IV  3.  Continue ceftriaxone 2 g IV  4.  Trend hemoglobin, transfuse >7  8.   N.p.o.  9.   Covid negative  10.  Start octreotide 50 mcg/h IV  11.  IV fluid resuscitation, antiemetics IV per intensivist  12. [facility] protocol      Thank your for the opportunity to assist in the care of your patient.  Please call for any questions or concerns.    RADHA Castillo.

## 2021-06-17 NOTE — ED NOTES
Pt had tonic/clonic seizure while MD at bedside, ativan pulled but held as seizure stopped, pt post-ictal protecting airway, VSS on 6L NC, RN's remain at bedside.

## 2021-06-17 NOTE — CONSULTS
Critical Care Consultation    Date of consult: 6/17/2021    Referring Physician  YASMIN Trivedi    Reason for Consultation  Hemorrhagic shock    History of Presenting Illness  65 y.o. female who presented 6/17/2021 with SDH, sz, HTN, that present for 1 weak of weakness and poor oral intake. No fever chills, cough or shortness of breath. That was bought by EMS was hypotensive. In ER she had generalized tonic clonic seizure a lactate post seizure or 11 that repeat was 4.9, yeison Na 136, k 5.3, co2 12, BUN > 112, cr 3.18, hg was 2.9 and rectal showed elia melena. She had a cordis placed and massive transfusion protocol given. I have been consulted for the above. No prior GI bleeds occasion wine drinking, no NSAIDs or blood thinners per patient.     Code Status  Full Code    Review of Systems  Review of Systems   Constitutional: Negative for fever, malaise/fatigue and weight loss.   HENT: Negative for hearing loss, sinus pain and sore throat.    Eyes: Negative for double vision and discharge.   Respiratory: Negative for cough and shortness of breath.    Cardiovascular: Negative for chest pain, claudication and leg swelling.   Gastrointestinal: Negative for abdominal pain, blood in stool, constipation, diarrhea, melena, nausea and vomiting.   Genitourinary: Negative for hematuria and urgency.   Musculoskeletal: Negative for joint pain and myalgias.   Neurological: Positive for seizures and weakness. Negative for tingling, tremors, sensory change, speech change, focal weakness and headaches.   Psychiatric/Behavioral: Negative for depression and hallucinations. The patient is not nervous/anxious.        Past Medical History   has a past medical history of YEISON (acute kidney injury) (HCC), Hypertension, Seizure (HCC), and Subdural hematoma (HCC). She also has no past medical history of Anginal syndrome (HCC), Arrhythmia, Arthritis, Asthma, At risk for sleep apnea, Back pain, Blood clotting disorder (HCC), Bronchitis, Cancer  (HCC), Cataract, Congestive heart failure (HCC), COPD (chronic obstructive pulmonary disease) (HCC), Diabetes (HCC), Dialysis patient (HCC), Disorder of thyroid, Fall, Glaucoma, Gynecological disorder, Heart murmur, Heart valve disease, Hemorrhagic disorder (HCC), Indigestion, Infectious disease, Jaundice, Myocardial infarct (HCC), Pacemaker, Pneumonia, Psychiatric problem, Rheumatic fever, Stroke (HCC), or Urinary incontinence.    Surgical History   has no past surgical history on file.    Family History  family history includes Cancer (age of onset: 77) in her mother; Dementia in her maternal aunt; Heart Attack (age of onset: 67) in her father; Heart Disease in her brother; Stroke in her maternal grandfather.    Social History   reports that she has never smoked. She has never used smokeless tobacco. She reports current alcohol use of about 3.6 oz of alcohol per week. She reports that she does not use drugs.    Medications  Home Medications     Reviewed by Angelina Uriostegui (Pharmacy Tech) on 06/17/21 at 1222  Med List Status: Complete   Medication Last Dose Status   alendronate (FOSAMAX) 70 MG Tab >3 weeks Active   aspirin 81 MG EC tablet 6/15/2021 Active   atorvastatin (LIPITOR) 20 MG Tab NOT TAKING Active   levetiracetam (KEPPRA) 500 MG Tab >3 weeks Active   lisinopril-hydrochlorothiazide (PRINZIDE, ZESTORETIC) 20-12.5 MG per tablet >1 week Active              Current Facility-Administered Medications   Medication Dose Route Frequency Provider Last Rate Last Admin   • levETIRAcetam (Keppra) 500 mg in 100 mL NaCl IV premix  500 mg Intravenous Q12HRS Luis Gil D.O.       • senna-docusate (PERICOLACE or SENOKOT S) 8.6-50 MG per tablet 2 tablet  2 tablet Oral BID Luis Gil D.O.        And   • polyethylene glycol/lytes (MIRALAX) PACKET 1 Packet  1 Packet Oral QDAY PRN Luis Gil D.O.        And   • magnesium hydroxide (MILK OF MAGNESIA) suspension 30 mL  30 mL Oral QDAY PRN Luis Gil D.O.         And   • bisacodyl (DULCOLAX) suppository 10 mg  10 mg Rectal QDAY PRN Luis Gil D.O.       • Pharmacy Consult Request - to monitor for nephrotoxic agents  1 Each Other PHARMACY TO DOSE Luis Gil D.O.       • Respiratory Therapy Consult   Nebulization Continuous RT Luis Gil D.O.       • lactated ringers infusion (BOLUS)  1,000 mL Intravenous Once PRN Luis Gil D.O.       • lactated ringers infusion   Intravenous Continuous Luis Gil D.O.       • acetaminophen (Tylenol) tablet 650 mg  650 mg Oral Q6HRS PRN Luis Gil D.O.       • [START ON 6/18/2021] cefTRIAXone (Rocephin) syringe 2 g  2 g Intravenous DAILY Luis Gil D.O.       • labetalol (NORMODYNE/TRANDATE) injection 10 mg  10 mg Intravenous Q4HRS PRN Luis Gil D.O.       • LORazepam (ATIVAN) injection 4 mg  4 mg Intravenous Q10 MIN PRN Luis Gil D.O.       • NS infusion   Intravenous Continuous Chon Trivedi M.D.       • calcium CHLORIDE injection 2 g  2 g Intravenous Once Chon Trivedi M.D.       • pantoprazole (PROTONIX) 80 mg in  mL IVPB  80 mg Intravenous Once Luis Gil D.O.       • pantoprazole (PROTONIX) 80 mg in  mL Infusion  8 mg/hr Intravenous Continuous Jesus Velásquez M.D.         Current Outpatient Medications   Medication Sig Dispense Refill   • alendronate (FOSAMAX) 70 MG Tab Take 70 mg by mouth every 7 days. Take 70 mg every Wednesday     • lisinopril-hydrochlorothiazide (PRINZIDE, ZESTORETIC) 20-12.5 MG per tablet Take 1 Tab by mouth every day.     • aspirin 81 MG EC tablet Take 162 mg by mouth 2 times a day as needed (Pain). 2 tablets = 162 mg.     • atorvastatin (LIPITOR) 20 MG Tab Take 1 Tab by mouth every day. (Patient not taking: Reported on 4/16/2021) 30 Tab 3   • levetiracetam (KEPPRA) 500 MG Tab Take 500 mg by mouth 2 times a day.         Allergies  No Known Allergies    Vital Signs last 24 hours  Temp:  [35.9 °C (96.7 °F)] 35.9 °C (96.7 °F)  Pulse:  []  193  Resp:  [18-51] 18  BP: ()/(22-72) 112/54  SpO2:  [84 %-99 %] 95 %    Physical Exam  Physical Exam  Vitals and nursing note reviewed.   Constitutional:       Appearance: She is ill-appearing. She is not toxic-appearing or diaphoretic.      Comments: Pale older then stated age   HENT:      Head: Normocephalic.      Mouth/Throat:      Mouth: Mucous membranes are dry.   Eyes:      Pupils: Pupils are equal, round, and reactive to light.      Comments: conjunctiva pallor   Cardiovascular:      Rate and Rhythm: Tachycardia present.      Heart sounds: No murmur heard.   No friction rub. No gallop.    Pulmonary:      Effort: No respiratory distress.      Breath sounds: No stridor. No wheezing or rhonchi.   Abdominal:      General: There is no distension.      Palpations: There is no mass.      Tenderness: There is no abdominal tenderness. There is no guarding or rebound.      Hernia: No hernia is present.   Musculoskeletal:         General: No swelling or tenderness.   Skin:     Coloration: Skin is pale. Skin is not jaundiced.      Findings: No bruising or erythema.   Neurological:      Mental Status: She is alert and oriented to person, place, and time.      Cranial Nerves: No cranial nerve deficit.      Sensory: No sensory deficit.      Motor: No weakness.      Coordination: Coordination normal.   Psychiatric:         Mood and Affect: Mood normal.         Fluids    Intake/Output Summary (Last 24 hours) at 6/17/2021 1354  Last data filed at 6/17/2021 1113  Gross per 24 hour   Intake 100 ml   Output --   Net 100 ml       Laboratory  Recent Results (from the past 48 hour(s))   CMP    Collection Time: 06/17/21  9:50 AM   Result Value Ref Range    Sodium 136 135 - 145 mmol/L    Potassium 5.3 3.6 - 5.5 mmol/L    Chloride 105 96 - 112 mmol/L    Co2 12 (L) 20 - 33 mmol/L    Anion Gap 19.0 (H) 7.0 - 16.0    Glucose 107 (H) 65 - 99 mg/dL    Bun 116 (HH) 8 - 22 mg/dL    Creatinine 3.18 (H) 0.50 - 1.40 mg/dL    Calcium 8.3  (L) 8.5 - 10.5 mg/dL    AST(SGOT) 57 (H) 12 - 45 U/L    ALT(SGPT) 43 2 - 50 U/L    Alkaline Phosphatase 74 30 - 99 U/L    Total Bilirubin <0.2 0.1 - 1.5 mg/dL    Albumin 2.8 (L) 3.2 - 4.9 g/dL    Total Protein 5.4 (L) 6.0 - 8.2 g/dL    Globulin 2.6 1.9 - 3.5 g/dL    A-G Ratio 1.1 g/dL   PROCALCITONIN    Collection Time: 06/17/21  9:50 AM   Result Value Ref Range    Procalcitonin 0.51 (H) <0.25 ng/mL   ESTIMATED GFR    Collection Time: 06/17/21  9:50 AM   Result Value Ref Range    GFR If  18 (A) >60 mL/min/1.73 m 2    GFR If Non African American 15 (A) >60 mL/min/1.73 m 2   ABO Rh Confirm    Collection Time: 06/17/21  9:50 AM   Result Value Ref Range    ABO Rh Confirm O POS    LACTIC ACID    Collection Time: 06/17/21 10:20 AM   Result Value Ref Range    Lactic Acid 11.3 (HH) 0.5 - 2.0 mmol/L   URINALYSIS,CULTURE IF INDICATED    Collection Time: 06/17/21 11:21 AM    Specimen: Urine   Result Value Ref Range    Color Yellow     Character Clear     Specific Gravity 1.016 <1.035    Ph 5.0 5.0 - 8.0    Glucose Negative Negative mg/dL    Ketones Negative Negative mg/dL    Protein Negative Negative mg/dL    Bilirubin Negative Negative    Urobilinogen, Urine 0.2 Negative    Nitrite Negative Negative    Leukocyte Esterase Small (A) Negative    Occult Blood Trace (A) Negative    Micro Urine Req Microscopic    URINE MICROSCOPIC (W/UA)    Collection Time: 06/17/21 11:21 AM   Result Value Ref Range    WBC 2-5 /hpf    RBC 0-2 /hpf    Bacteria Rare (A) None /hpf    Epithelial Cells Few /hpf    Amorphous Crystal Present /hpf    Hyaline Cast 0-2 /lpf   CBC WITH DIFFERENTIAL    Collection Time: 06/17/21 11:40 AM   Result Value Ref Range    WBC 15.6 (H) 4.8 - 10.8 K/uL    RBC 0.90 (L) 4.20 - 5.40 M/uL    Hemoglobin 2.9 (LL) 12.0 - 16.0 g/dL    Hematocrit 9.2 (LL) 37.0 - 47.0 %    .2 (H) 81.4 - 97.8 fL    MCH 32.2 27.0 - 33.0 pg    MCHC 31.5 (L) 33.6 - 35.0 g/dL    RDW 77.1 (H) 35.9 - 50.0 fL    Platelet Count 223  164 - 446 K/uL    MPV 11.5 9.0 - 12.9 fL    Neutrophils-Polys 81.80 (H) 44.00 - 72.00 %    Lymphocytes 5.20 (L) 22.00 - 41.00 %    Monocytes 11.30 0.00 - 13.40 %    Eosinophils 0.00 0.00 - 6.90 %    Basophils 0.00 0.00 - 1.80 %    Nucleated RBC 4.90 /100 WBC    Neutrophils (Absolute) 12.76 (H) 2.00 - 7.15 K/uL    Lymphs (Absolute) 0.81 (L) 1.00 - 4.80 K/uL    Monos (Absolute) 1.76 (H) 0.00 - 0.85 K/uL    Eos (Absolute) 0.00 0.00 - 0.51 K/uL    Baso (Absolute) 0.00 0.00 - 0.12 K/uL    NRBC (Absolute) 0.77 K/uL    Hypochromia 2+ (A)     Anisocytosis 3+ (A)     Macrocytosis 2+ (A)     Microcytosis 1+    COD - Adult (Type and Screen)    Collection Time: 06/17/21 11:40 AM   Result Value Ref Range    ABO Grouping Only O     Rh Grouping Only POS     Antibody Screen-Cod NEG    DIFFERENTIAL MANUAL    Collection Time: 06/17/21 11:40 AM   Result Value Ref Range    Myelocytes 1.70 %    Manual Diff Status PERFORMED    PERIPHERAL SMEAR REVIEW    Collection Time: 06/17/21 11:40 AM   Result Value Ref Range    Peripheral Smear Review see below    PLATELET ESTIMATE    Collection Time: 06/17/21 11:40 AM   Result Value Ref Range    Plt Estimation Normal    MORPHOLOGY    Collection Time: 06/17/21 11:40 AM   Result Value Ref Range    RBC Morphology Present     Large Platelets 1+     Polychromia 1+     Target Cells 1+    LACTIC ACID    Collection Time: 06/17/21 12:28 PM   Result Value Ref Range    Lactic Acid 4.9 (HH) 0.5 - 2.0 mmol/L   MASSIVE TRANSFUSION    Collection Time: 06/17/21 12:49 PM   Result Value Ref Range    Component R       R99                 Red Cells, LR       [id number]   issued       06/17/21   12:52      Product Type R99     Dispense Status issued     Unit Number (Barcoded) [id number]     Product Code (Barcoded) V8232V57     Blood Type (Barcoded) 5100     Component R       R33                 Red Blood Cells     [id number]   issued       06/17/21   12:52      Product Type Red Blood Cells LR Pheresis      Dispense Status issued     Unit Number (Barcoded) [id number]     Product Code (Barcoded) A1088B61     Blood Type (Barcoded) 5100     Component R       R33                 Red Blood Cells     [id number]   issued       06/17/21   12:52      Product Type Red Blood Cells LR Pheresis     Dispense Status issued     Unit Number (Barcoded) [id number]     Product Code (Barcoded) W3038K00     Blood Type (Barcoded) 5100     Component R       R33                 Red Blood Cells     [id number]   issued       06/17/21   12:52      Product Type Red Blood Cells LR Pheresis     Dispense Status issued     Unit Number (Barcoded) [id number]     Product Code (Barcoded) R7376V95     Blood Type (Barcoded) 5100     Component F       TA2                 Thawed Plasma 2     [id number]   issued       06/17/21   12:52      Product Type Thawed Apheresis Plasma 2nd Cont     Dispense Status issued     Unit Number (Barcoded) [id number]     Product Code (Barcoded) N6871X44     Blood Type (Barcoded) 8400     Component F       TA2                 Thawed Plasma 2     [id number]   issued       06/17/21   12:52      Product Type Thawed Apheresis Plasma 2nd Cont     Dispense Status issued     Unit Number (Barcoded) [id number]     Product Code (Barcoded) O8432V14     Blood Type (Barcoded) 2800     Component F       TA3                 Thawed Plasma 3     [id number]   issued       06/17/21   12:52      Product Type Thawed Apheresis Plasma 3rd Cont     Dispense Status issued     Unit Number (Barcoded) [id number]     Product Code (Barcoded) Y3394S28     Blood Type (Barcoded) 8400     Component F       TA1                 Thawed Plasma 1     [id number]   issued       06/17/21   12:52      Product Type Thawed Apheresis Plasma 1st Cont     Dispense Status issued     Unit Number (Barcoded) [id number]     Product Code (Barcoded) H7794H53     Blood Type (Barcoded) 8400     Component P       P0                  Plts,Pheresis        [id number]   issued       06/17/21   12:52      Product Type Platelets  Pheresis LR     Dispense Status issued     Unit Number (Barcoded) [id number]     Product Code (Barcoded) S0523K91     Blood Type (Barcoded) 6200        Imaging  CT-HEAD W/O   Final Result    Examination is limited by patient motion.   1.  No acute intracranial abnormality is identified.   2.  Atrophy   3.  There are periventricular and subcortical white matter changes present.  This finding is nonspecific and could be from previous small vessel ischemia, demyelination, or gliosis.      DX-CHEST-PORTABLE (1 VIEW)   Final Result      No acute cardiopulmonary findings.      US-RENAL    (Results Pending)   DX-CHEST-PORTABLE (1 VIEW)    (Results Pending)       Assessment/Plan  SDH (subdural hematoma) (HCC)  Assessment & Plan  Hx of    GI bleed  Assessment & Plan  Melena on rectal with severe anemia and BUN > then expected for hydration  GI consulted   PPI gtt  Serial monitor hg and transfuse < 7  Correct and monitor coagulopathy      Hypotension  Assessment & Plan  Hemorrhagic shock    Sepsis (HCC)  Assessment & Plan  Doubt uti likely all severe anemia   Follow up cultures and de-escalate     Severe anemia  Assessment & Plan  Hg 2.9 with shock   S/p massive transfusion protocol in ER  From GI bleed    Lactate blood increased  Assessment & Plan  Related to post seizure and hemorrhagic shock  Doubt main  is sepsis    YEISON (acute kidney injury) ([facility])- (present on admission)  Assessment & Plan  Maintain euvolemia and monitor fluid responsiveness (avoid NaCL and renal congestion)  MAP > 65 uses pressors or inotropic trial  Monitor urine output and I&O's  Avoid and review nephrotoxin medication  Rule out post obstruction  Consider renal U/S if no renal images  U/a and CPK    Greater then appreciated with GI bleed  Intravascular dehydration and blood loss    Seizure (HCC)- (present on admission)  Assessment & Plan  History of with medication  noncompliant  S/p SZ in ER  Secondary since SDH  Seizure precautions  Avoid seizure lower drugs  Prn Ativan  keppra    Essential hypertension  Assessment & Plan  Hx of hold with antihypertensives with hemorrhagic shock      Discussed patient condition and risk of morbidity and/or mortality with Hospitalist, RN, RT, Pharmacy, Charge nurse / hot rounds and Patient.      The patient remains critically ill with gi bleed and hemorrhagic shock s/p massive transfusion protocol.  Critical care time = 50 minutes in directly providing and coordinating critical care and extensive data review.  No time overlap and excludes procedures.

## 2021-06-17 NOTE — ED NOTES
Lab called re possible diluted CBC, redraw sent, pt resting in bed, VSS on 2L NC, GCS 14 mentation improving s/p seizure x 1, NAD, aware POC now for CT.

## 2021-06-17 NOTE — ED NOTES
Pt brought to CT by RN. Pt became hypotensive in CT 56/47. Pt brought back to room. MD Trivedi made aware. Called lab to initiate red box per MD. MD to bedside to place cordis.

## 2021-06-17 NOTE — ED PROVIDER NOTES
ED Provider Note    CHIEF COMPLAINT  Chief Complaint   Patient presents with   • Weakness       HPI  Roro Leon is a 65 y.o. female who presents brought in by EMS after patient's neighbor called 911.  Apparently patient's neighbor was concerned this patient has had episodes of confusion and they were worried about her.  Patient has a history of subdural hemorrhage, seizure disorder and hypertension.  Patient reports that she has not taken her seizure medication for the last few weeks.  She is unsure if she has had any seizures but she does not believe she has.  Patient denies any fevers or chills.  She denies any associated chest pain shortness of breath or pain otherwise.  Patient denies any head trauma.  Patient has no complaints.  Patient denies any black or bloody stool hematemesis or bleeding otherwise.    REVIEW OF SYSTEMS  ROS    See HPI for further details. All other systems are negative.     PAST MEDICAL HISTORY   has a past medical history of YEISON (acute kidney injury) (HCC), Hypertension, Seizure (HCC), and Subdural hematoma (HCC).    SOCIAL HISTORY  Social History     Tobacco Use   • Smoking status: Never Smoker   • Smokeless tobacco: Never Used   Vaping Use   • Vaping Use: Never assessed   Substance and Sexual Activity   • Alcohol use: Yes     Alcohol/week: 3.6 oz     Types: 6 Glasses of wine per week     Comment: weekly   • Drug use: No   • Sexual activity: Not on file       SURGICAL HISTORY  patient denies any surgical history    CURRENT MEDICATIONS  Home Medications    **Home medications have not yet been reviewed for this encounter**         ALLERGIES  No Known Allergies    PHYSICAL EXAM  Vitals:    06/17/21 0951   BP: (!) 95/54   Pulse: 99   Resp: (!) 22   Temp: 35.9 °C (96.7 °F)   SpO2: 98%       Physical Exam  Constitutional:       Appearance: She is well-developed.   HENT:      Head: Normocephalic and atraumatic.   Eyes:      Conjunctiva/sclera: Conjunctivae normal.   Cardiovascular:       Rate and Rhythm: Normal rate and regular rhythm.   Pulmonary:      Effort: Pulmonary effort is normal.      Breath sounds: Normal breath sounds.   Abdominal:      General: Bowel sounds are normal. There is no distension.      Palpations: Abdomen is soft.      Tenderness: There is no abdominal tenderness. There is no rebound.   Musculoskeletal:      Cervical back: Normal range of motion and neck supple.   Skin:     General: Skin is warm and dry.      Findings: No rash.   Neurological:      Mental Status: She is alert and oriented to person, place, and time.      Comments: Distal and proximal strength 5/5 in upper and lower extremities. Normal gait. No dysmetria. No sensation deficits. No visual field deficits. Cranial nerves intact.       While I was tonic-clonic seizure with associated postictal phase, this terminated without intervention   Psychiatric:         Behavior: Behavior normal.             DIAGNOSTIC STUDIES / PROCEDURES    LABS  Results for orders placed or performed during the hospital encounter of 06/17/21   CMP   Result Value Ref Range    Sodium 136 135 - 145 mmol/L    Potassium 5.3 3.6 - 5.5 mmol/L    Chloride 105 96 - 112 mmol/L    Co2 12 (L) 20 - 33 mmol/L    Anion Gap 19.0 (H) 7.0 - 16.0    Glucose 107 (H) 65 - 99 mg/dL    Bun 116 (HH) 8 - 22 mg/dL    Creatinine 3.18 (H) 0.50 - 1.40 mg/dL    Calcium 8.3 (L) 8.5 - 10.5 mg/dL    AST(SGOT) 57 (H) 12 - 45 U/L    ALT(SGPT) 43 2 - 50 U/L    Alkaline Phosphatase 74 30 - 99 U/L    Total Bilirubin <0.2 0.1 - 1.5 mg/dL    Albumin 2.8 (L) 3.2 - 4.9 g/dL    Total Protein 5.4 (L) 6.0 - 8.2 g/dL    Globulin 2.6 1.9 - 3.5 g/dL    A-G Ratio 1.1 g/dL   URINALYSIS,CULTURE IF INDICATED    Specimen: Urine   Result Value Ref Range    Color Yellow     Character Clear     Specific Gravity 1.016 <1.035    Ph 5.0 5.0 - 8.0    Glucose Negative Negative mg/dL    Ketones Negative Negative mg/dL    Protein Negative Negative mg/dL    Bilirubin Negative Negative    Urobilinogen,  Urine 0.2 Negative    Nitrite Negative Negative    Leukocyte Esterase Small (A) Negative    Occult Blood Trace (A) Negative    Micro Urine Req Microscopic    LACTIC ACID   Result Value Ref Range    Lactic Acid 11.3 (HH) 0.5 - 2.0 mmol/L   PROCALCITONIN   Result Value Ref Range    Procalcitonin 0.51 (H) <0.25 ng/mL   ESTIMATED GFR   Result Value Ref Range    GFR If  18 (A) >60 mL/min/1.73 m 2    GFR If Non African American 15 (A) >60 mL/min/1.73 m 2   URINE MICROSCOPIC (W/UA)   Result Value Ref Range    WBC 2-5 /hpf    RBC 0-2 /hpf    Bacteria Rare (A) None /hpf    Epithelial Cells Few /hpf    Amorphous Crystal Present /hpf    Hyaline Cast 0-2 /lpf   CBC WITH DIFFERENTIAL   Result Value Ref Range    WBC 15.6 (H) 4.8 - 10.8 K/uL    RBC 0.90 (L) 4.20 - 5.40 M/uL    Hemoglobin 2.9 (LL) 12.0 - 16.0 g/dL    Hematocrit 9.2 (LL) 37.0 - 47.0 %    .2 (H) 81.4 - 97.8 fL    MCH 32.2 27.0 - 33.0 pg    MCHC 31.5 (L) 33.6 - 35.0 g/dL    RDW 77.1 (H) 35.9 - 50.0 fL    Platelet Count 223 164 - 446 K/uL    MPV 11.5 9.0 - 12.9 fL    Neutrophils-Polys 81.80 (H) 44.00 - 72.00 %    Lymphocytes 5.20 (L) 22.00 - 41.00 %    Monocytes 11.30 0.00 - 13.40 %    Eosinophils 0.00 0.00 - 6.90 %    Basophils 0.00 0.00 - 1.80 %    Nucleated RBC 4.90 /100 WBC    Neutrophils (Absolute) 12.76 (H) 2.00 - 7.15 K/uL    Lymphs (Absolute) 0.81 (L) 1.00 - 4.80 K/uL    Monos (Absolute) 1.76 (H) 0.00 - 0.85 K/uL    Eos (Absolute) 0.00 0.00 - 0.51 K/uL    Baso (Absolute) 0.00 0.00 - 0.12 K/uL    NRBC (Absolute) 0.77 K/uL    Hypochromia 2+ (A)     Anisocytosis 3+ (A)     Macrocytosis 2+ (A)     Microcytosis 1+    LACTIC ACID   Result Value Ref Range    Lactic Acid 4.9 (HH) 0.5 - 2.0 mmol/L   COD - Adult (Type and Screen)   Result Value Ref Range    ABO Grouping Only O     Rh Grouping Only POS     Antibody Screen-Cod NEG    ABO Rh Confirm   Result Value Ref Range    ABO Rh Confirm O POS    MASSIVE TRANSFUSION   Result Value Ref Range     Component R       R99                 Red Cells, LR       [id number]   issued       06/17/21   12:52      Product Type R99     Dispense Status issued     Unit Number (Barcoded) [id number]     Product Code (Barcoded) T1009A74     Blood Type (Barcoded) 5100     Component R       R33                 Red Blood Cells     [id number]   issued       06/17/21   12:52      Product Type Red Blood Cells LR Pheresis     Dispense Status issued     Unit Number (Barcoded) [id number]     Product Code (Barcoded) A8251H49     Blood Type (Barcoded) 5100     Component R       R33                 Red Blood Cells     [id number]   issued       06/17/21   12:52      Product Type Red Blood Cells LR Pheresis     Dispense Status issued     Unit Number (Barcoded) [id number]     Product Code (Barcoded) F9945F96     Blood Type (Barcoded) 5100     Component R       R33                 Red Blood Cells     [id number]   issued       06/17/21   12:52      Product Type Red Blood Cells LR Pheresis     Dispense Status issued     Unit Number (Barcoded) [id number]     Product Code (Barcoded) X6006L44     Blood Type (Barcoded) 5100     Component F       TA2                 Thawed Plasma 2     [id number]   issued       06/17/21   12:52      Product Type Thawed Apheresis Plasma 2nd Cont     Dispense Status issued     Unit Number (Barcoded) [id number]     Product Code (Barcoded) S3809Y15     Blood Type (Barcoded) 8400     Component F       TA2                 Thawed Plasma 2     [id number]   issued       06/17/21   12:52      Product Type Thawed Apheresis Plasma 2nd Cont     Dispense Status issued     Unit Number (Barcoded) [id number]     Product Code (Barcoded) X0621U51     Blood Type (Barcoded) 2800     Component F       TA3                 Thawed Plasma 3     [id number]   issued       06/17/21   12:52      Product Type Thawed Apheresis Plasma 3rd Cont     Dispense Status issued     Unit Number (Barcoded) [id number]      Product Code (Barcoded) R5693S05     Blood Type (Barcoded) 8400     Component F       TA1                 Thawed Plasma 1     [id number]   issued       06/17/21   12:52      Product Type Thawed Apheresis Plasma 1st Cont     Dispense Status issued     Unit Number (Barcoded) [id number]     Product Code (Barcoded) Q1276O73     Blood Type (Barcoded) 8400     Component P       P0                  Plts,Pheresis       [id number]   issued       06/17/21   12:52      Product Type Platelets  Pheresis LR     Dispense Status issued     Unit Number (Barcoded) [id number]     Product Code (Barcoded) A8705U10     Blood Type (Barcoded) 6200    DIFFERENTIAL MANUAL   Result Value Ref Range    Myelocytes 1.70 %    Manual Diff Status PERFORMED    PERIPHERAL SMEAR REVIEW   Result Value Ref Range    Peripheral Smear Review see below    PLATELET ESTIMATE   Result Value Ref Range    Plt Estimation Normal    MORPHOLOGY   Result Value Ref Range    RBC Morphology Present     Large Platelets 1+     Polychromia 1+     Target Cells 1+          RADIOLOGY  CT-HEAD W/O   Final Result    Examination is limited by patient motion.   1.  No acute intracranial abnormality is identified.   2.  Atrophy   3.  There are periventricular and subcortical white matter changes present.  This finding is nonspecific and could be from previous small vessel ischemia, demyelination, or gliosis.      DX-CHEST-PORTABLE (1 VIEW)   Final Result      No acute cardiopulmonary findings.      US-RENAL    (Results Pending)   DX-CHEST-PORTABLE (1 VIEW)    (Results Pending)       45 minutes of critical care were spent with this patient    Central Line Insertion    Date/Time: 6/17/2021 3:29 PM  Performed by: Chon Trivedi M.D.  Authorized by: Chon Trivedi M.D.     Consent:     Consent obtained:  Emergent situation    Consent given by:  Patient    Risks discussed:  Arterial puncture, bleeding, infection and incorrect placement  Pre-procedure details:     Hand  hygiene: Hand hygiene performed prior to insertion      Sterile barrier technique: All elements of maximal sterile technique followed      Skin preparation:  2% chlorhexidine    Skin preparation agent: Skin preparation agent completely dried prior to procedure    Procedure details:     Location:  L femoral    Patient position:  Flat    Catheter size: cordis.    Landmarks identified: yes      Ultrasound guidance: yes      Sterile ultrasound techniques: Sterile gel and sterile probe covers were used      Number of attempts:  2    Successful placement: no    Post-procedure details:     Patient tolerance of procedure:  Procedure terminated at patient's request  Comments:      intensivist offered to place cordis in R IJ after I had some difficulty threading the wire on patient's left femoral vein, he switched to the right IJ and placed successfully        COURSE & MEDICAL DECISION MAKING  Pertinent Labs & Imaging studies reviewed. (See chart for details)    Patient here with untreated seizure disorder, actively seizing in the emergency department.  This stopped without intervention.  Patient returned to baseline mental status after a brief period of postictal confusion, patient was loaded with Keppra.  Will check basic labs.  Patient is hypotensive here without clear etiology.  Sending lactic acid and blood cultures though patient without any obvious evidence of infection.  We will also send procalcitonin.  Patient satting at 96% but desaturated when having her seizure.  I believe the confusion may have been secondary to a seizure as an outpatient.  I question whether patient is able to continue taking care of herself I do believe that she would benefit from admission.  Patient's lactic acid is significantly elevated, this is likely secondary to patient's recent seizure, she has been given aggressive fluids.  I have covered patient with ceftriaxone.  Patient basic labs reveal YEISON, and significantly elevated BUN.  Her  hemoglobin is critically low.  I performed a rectal exam on patient, she has gross melena from below.  Patient started on pantoprazole.  Patient case discussed with GI.  Patient is hypotensive.  I discussed with critical care, they would like me to place a Cordis and start patient on massive transfusion protocol which I have started.  I was placing patient's Cordis in her right groin when the intensivist came in, as my initial attempt was unsuccessful they offered to take over the procedure and placed a Cordis in patient's right IJ.  Patient admitted to the ICU.  Lactic acidosis has improved.    he patient will return for worsening symptoms and is stable at the time of discharge. The patient verbalizes understanding and will comply.    FINAL IMPRESSION    1. Severe anemia    Acute upper GI bleed, seizure disorder, lactic acidosis, hypovolemic shock         Electronically signed by: Chon Trivedi M.D., 6/17/2021 10:14 AM

## 2021-06-17 NOTE — ED NOTES
Med rec completed per Pt at bedside and Pt's pharmacy [facility] on [address] ([phone], Pt has also filled at [facility] on [address]).  Allergies reviewed with Pt. No known drug allergies.  Pt states that she is OUT of her alendronate, levetiracetam, and lisinopril-hydrochlorothiazide. Pt's pharmacy was contacted to confirm dosing for Pt's medications, however, per Pt's pharmacy, Pt had been dispensed an 84 day supply of alendronate on 3/23/2021, a 30 day supply of levetiracetam on 5/23/2021, and a 90 day supply of lisinopril-hydrochlorothiazide on 5/23/2021.  Unable to verify times of last doses.  Pt denies oral antibiotic usage in last 14 days. Per [facility], no oral antibiotics dispensed in last 30 days.

## 2021-06-17 NOTE — ASSESSMENT & PLAN NOTE
Maintain euvolemia and monitor fluid responsiveness (avoid NaCL and renal congestion)  MAP > 65 uses pressors or inotropic trial  Monitor urine output and I&O's  Avoid and review nephrotoxin medication  Cr imporving

## 2021-06-17 NOTE — ASSESSMENT & PLAN NOTE
Prerenal due to hemorrhagic shock and severe anemia  Improving now s/p resuscitation  Con to follow BMP  Renally dose medications as appropriate    Resolved.

## 2021-06-17 NOTE — H&P
Hospital Medicine History & Physical Note    Date of Service  6/17/2021    Primary Care Physician  Ellis Art M.D.    Consultants  Intensivist    Code Status  Full Code    Chief Complaint  Chief Complaint   Patient presents with   • Weakness       History of Presenting Illness  65 y.o. female with a history of seizures secondary to subdural hematoma over 10 years ago, history of hypertension, who presented 6/17/2021 with increasing weakness over the past week.  Patient states that she has not been drinking much and last week was having episodes of vomiting every time she ate.  She denies any hematochezia or melena nor any bright red blood in emesis.  Paramedics had noted the patient initially with a low blood pressure in the 50s out in the field she was given fluids on arrival had improved blood pressures subsequently she was noted to have a tonic-clonic seizure in the ER.  She was later found to have a hemoglobin of 2.9 and a WBC of 15.  Patient denies any recent illnesses her urine does show questionable UTI.  The patient presents with new acute renal failure, hypotension.  New acute anemia and possible sepsis.  I have discussed the case with Dr. Trivedi emergency room physician as well as ICU intensivist Dr. Kenneth Velásquez.    Review of Systems  Review of Systems   Constitutional: Positive for malaise/fatigue. Negative for chills and fever.   HENT: Negative for sore throat.    Eyes: Negative for discharge.   Respiratory: Negative for cough, shortness of breath and stridor.    Cardiovascular: Negative for chest pain, palpitations and leg swelling.   Gastrointestinal: Positive for nausea and vomiting. Negative for abdominal pain, blood in stool, diarrhea and melena.   Genitourinary: Negative for dysuria and hematuria.   Musculoskeletal: Negative for back pain and joint pain.   Skin: Negative for rash.   Neurological: Negative for dizziness, speech change and headaches.   Psychiatric/Behavioral: The patient is not  nervous/anxious.        Past Medical History   has a past medical history of YEISON (acute kidney injury) (HCC), Hypertension, Seizure (HCC), and Subdural hematoma (HCC).    Surgical History  Cataract surgery     Family History  family history includes Cancer (age of onset: 77) in her mother; Dementia in her maternal aunt; Heart Attack (age of onset: 67) in her father; Heart Disease in her brother; Stroke in her maternal grandfather.  Father  of acute heart attack age 66, mother  of cancer at age 77    Social History   reports that she has never smoked. She has never used smokeless tobacco. She reports current alcohol use of about 3.6 oz of alcohol per week. She reports that she does not use drugs.  Patient is single she has 2 children.  She is retired.    Allergies  No Known Allergies    Medications  Prior to Admission Medications   Prescriptions Last Dose Informant Patient Reported? Taking?   alendronate (FOSAMAX) 70 MG Tab Unknown at Unknown time Patient's Home Pharmacy Yes No   Sig: Take 70 mg by mouth every 7 days. Take 70 mg every Wednesday   aspirin 81 MG EC tablet 6/15/2021 at PRN Patient Yes No   Sig: Take 162 mg by mouth 2 times a day as needed (Pain). 2 tablets = 162 mg.   atorvastatin (LIPITOR) 20 MG Tab NOT TAKING at NOT TAKING Patient No No   Sig: Take 1 Tab by mouth every day.   Patient not taking: Reported on 2021   levetiracetam (KEPPRA) 500 MG Tab Unknown at Unknown time Patient's Home Pharmacy Yes No   Sig: Take 500 mg by mouth 2 times a day.   lisinopril-hydrochlorothiazide (PRINZIDE, ZESTORETIC) 20-12.5 MG per tablet Unknown at Unknown time Patient's Home Pharmacy Yes No   Sig: Take 1 Tab by mouth every day.      Facility-Administered Medications: None       Physical Exam  Temp:  [35.9 °C (96.7 °F)] 35.9 °C (96.7 °F)  Pulse:  [] 110  Resp:  [18-26] 26  BP: ()/(41-71) 118/55  SpO2:  [91 %-99 %] 99 %    Physical Exam  Vitals reviewed.   Constitutional:       Appearance:  Normal appearance. She is not diaphoretic.   HENT:      Head: Normocephalic and atraumatic.      Nose: Nose normal.      Mouth/Throat:      Mouth: Mucous membranes are moist.      Pharynx: No oropharyngeal exudate.   Eyes:      General: No scleral icterus.        Right eye: No discharge.         Left eye: No discharge.      Extraocular Movements: Extraocular movements intact.      Conjunctiva/sclera: Conjunctivae normal.   Neck:      Vascular: No carotid bruit.   Cardiovascular:      Rate and Rhythm: Regular rhythm. Tachycardia present.      Pulses:           Radial pulses are 2+ on the right side and 2+ on the left side.        Dorsalis pedis pulses are 2+ on the right side and 2+ on the left side.      Heart sounds: No murmur heard.     Pulmonary:      Effort: Pulmonary effort is normal. No respiratory distress.      Breath sounds: Normal breath sounds. No wheezing or rales.   Abdominal:      General: Bowel sounds are normal. There is no distension.      Palpations: Abdomen is soft. There is no mass.      Tenderness: There is no abdominal tenderness.   Musculoskeletal:         General: No swelling or tenderness.      Cervical back: No muscular tenderness.      Right lower leg: Edema (trace ankle) present.      Left lower leg: Edema (trace ankle) present.   Lymphadenopathy:      Cervical: No cervical adenopathy.   Skin:     Coloration: Skin is pale. Skin is not jaundiced.   Neurological:      General: No focal deficit present.      Mental Status: She is alert and oriented to person, place, and time. Mental status is at baseline.      Cranial Nerves: No cranial nerve deficit.      Motor: Tremor present.   Psychiatric:         Mood and Affect: Mood normal.         Behavior: Behavior normal.         Laboratory:      Recent Labs     06/17/21  0950   SODIUM 136   POTASSIUM 5.3   CHLORIDE 105   CO2 12*   GLUCOSE 107*   BUN >112*   CREATININE 3.18*   CALCIUM 8.3*     Recent Labs     06/17/21  0950   ALTSGPT 43   ASTSGOT  57*   ALKPHOSPHAT 74   TBILIRUBIN <0.2   GLUCOSE 107*         No results for input(s): NTPROBNP in the last 72 hours.      No results for input(s): TROPONINT in the last 72 hours.    Imaging:  CT-HEAD W/O   Final Result    Examination is limited by patient motion.   1.  No acute intracranial abnormality is identified.   2.  Atrophy   3.  There are periventricular and subcortical white matter changes present.  This finding is nonspecific and could be from previous small vessel ischemia, demyelination, or gliosis.      DX-CHEST-PORTABLE (1 VIEW)   Final Result      No acute cardiopulmonary findings.      US-RENAL    (Results Pending)         Assessment/Plan:  I anticipate this patient will require at least two midnights for appropriate medical management, necessitating inpatient admission.    Hypotension  Assessment & Plan  IV fluids and blood transfusions  Check cortisol  Cbc   May need pressor support to keep SBP >90 and MAP>65  Strict I/O's    Sepsis ([facility])  Assessment & Plan  This is Sepsis Present on admission  SIRS criteria identified on my evaluation include: Tachycardia, with heart rate greater than 90 BPM and Leukocytosis, with WBC greater than 12,000  Source is UTI   Sepsis protocol initiated  Fluid resuscitation ordered per protocol  IV antibiotics as appropriate for source of sepsis  While organ dysfunction may be noted elsewhere in this problem list or in the chart, degree of organ dysfunction does not meet CMS criteria for severe sepsis    Severe anemia  Assessment & Plan  Unclear etiology.  Bilirubin normal.  Denies hx of bleeding.  Guaiac patient  Transfuse to keep Hgb>7  Check reticulocyte count  Start PPI    YEISON (acute kidney injury) ([facility])- (present on admission)  Assessment & Plan  IV fluids and blood transfusions  Checking U/S Renal if no improvement  Monitor BMP  Strict I/O's  Avoiding nephrotoxins.    Seizure ([facility])- (present on admission)  Assessment & Plan  keppra 500mg IV BID  Seizure  precautions  Occurred after a Subdural hematoma in 2004  aspirationr precautions.  Head CT pending.    Debility- (present on admission)  Assessment & Plan  Uses walker at home due to shake  She states she doesn't know if she has parkinsons and isn't on any home meds for such.  PT/OT once stable.

## 2021-06-17 NOTE — ASSESSMENT & PLAN NOTE
Uses walker at home due to shake  She states she doesn't know if she has parkinsons and isn't on any home meds for such.  PT/OT-recommend postacute placement

## 2021-06-17 NOTE — PROCEDURES
Central Line Insertion    Date/Time: 6/17/2021 1:41 PM  Performed by: Jesus Velásquez M.D.  Authorized by: Jesus Velásquez M.D.     Consent:     Consent obtained:  Emergent situation    Consent given by:  Patient    Risks discussed:  Arterial puncture, infection, bleeding and pneumothorax    Alternatives discussed:  No treatment  Pre-procedure details:     Hand hygiene: Hand hygiene performed prior to insertion      Sterile barrier technique: All elements of maximal sterile technique followed      Skin preparation:  2% chlorhexidine    Skin preparation agent: Skin preparation agent completely dried prior to procedure    Anesthesia:     Anesthesia method:  Local infiltration    Local anesthetic:  Lidocaine 1% w/o epi  Procedure details:     Location:  R internal jugular    Patient position:  Reverse Trendelenburg    Procedural supplies:  Cordis    Catheter size:  7 Fr    Landmarks identified: yes      Ultrasound guidance: yes      Sterile ultrasound techniques: Sterile gel and sterile probe covers were used      Number of attempts:  2    Successful placement: yes    Post-procedure details:     Post-procedure:  Dressing applied    Assessment:  Blood return through all ports and free fluid flow    Patient tolerance of procedure:  Tolerated well, no immediate complications  Comments:      US guided and confirmed right IJ cordis placed for massive transfusion.

## 2021-06-17 NOTE — ASSESSMENT & PLAN NOTE
History of with medication noncompliant  S/p SZ in ER  Secondary since SDH  Seizure precautions  Avoid seizure lower drugs  Prn Ativan  keppra  XIANG protocol- librium - 4 doses

## 2021-06-17 NOTE — ED NOTES
Lab called with critical lactic of 11.3, this lab was pulled as a redraw after the pt had a seizure, Dr. Trivedi notified of critical lab. Pt now GCS 14 confused to year and situation but oriented to self and location, VSS on 2L NC, NAD, aware POC for CT scan and medication.

## 2021-06-18 LAB
ANION GAP SERPL CALC-SCNC: 10 MMOL/L (ref 7–16)
ANION GAP SERPL CALC-SCNC: 11 MMOL/L (ref 7–16)
ANION GAP SERPL CALC-SCNC: 15 MMOL/L (ref 7–16)
BASOPHILS # BLD AUTO: 0.4 % (ref 0–1.8)
BASOPHILS # BLD: 0.06 K/UL (ref 0–0.12)
BUN SERPL-MCNC: 59 MG/DL (ref 8–22)
BUN SERPL-MCNC: 77 MG/DL (ref 8–22)
BUN SERPL-MCNC: 89 MG/DL (ref 8–22)
CALCIUM SERPL-MCNC: 8 MG/DL (ref 8.5–10.5)
CALCIUM SERPL-MCNC: 8.2 MG/DL (ref 8.5–10.5)
CALCIUM SERPL-MCNC: 8.3 MG/DL (ref 8.5–10.5)
CHLORIDE SERPL-SCNC: 107 MMOL/L (ref 96–112)
CHLORIDE SERPL-SCNC: 111 MMOL/L (ref 96–112)
CHLORIDE SERPL-SCNC: 112 MMOL/L (ref 96–112)
CO2 SERPL-SCNC: 15 MMOL/L (ref 20–33)
CO2 SERPL-SCNC: 17 MMOL/L (ref 20–33)
CO2 SERPL-SCNC: 20 MMOL/L (ref 20–33)
CREAT SERPL-MCNC: 1.79 MG/DL (ref 0.5–1.4)
CREAT SERPL-MCNC: 2.2 MG/DL (ref 0.5–1.4)
CREAT SERPL-MCNC: 2.27 MG/DL (ref 0.5–1.4)
EOSINOPHIL # BLD AUTO: 0.07 K/UL (ref 0–0.51)
EOSINOPHIL NFR BLD: 0.4 % (ref 0–6.9)
ERYTHROCYTE [DISTWIDTH] IN BLOOD BY AUTOMATED COUNT: 54.9 FL (ref 35.9–50)
GLUCOSE SERPL-MCNC: 123 MG/DL (ref 65–99)
GLUCOSE SERPL-MCNC: 134 MG/DL (ref 65–99)
GLUCOSE SERPL-MCNC: 203 MG/DL (ref 65–99)
HCT VFR BLD AUTO: 25.1 % (ref 37–47)
HGB BLD-MCNC: 8.4 G/DL (ref 12–16)
IMM GRANULOCYTES # BLD AUTO: 0.16 K/UL (ref 0–0.11)
IMM GRANULOCYTES NFR BLD AUTO: 1 % (ref 0–0.9)
LACTATE BLD-SCNC: 1 MMOL/L (ref 0.5–2)
LACTATE BLD-SCNC: 1.2 MMOL/L (ref 0.5–2)
LYMPHOCYTES # BLD AUTO: 1.17 K/UL (ref 1–4.8)
LYMPHOCYTES NFR BLD: 7.5 % (ref 22–41)
MCH RBC QN AUTO: 29.9 PG (ref 27–33)
MCHC RBC AUTO-ENTMCNC: 33.5 G/DL (ref 33.6–35)
MCV RBC AUTO: 89.3 FL (ref 81.4–97.8)
MONOCYTES # BLD AUTO: 1.53 K/UL (ref 0–0.85)
MONOCYTES NFR BLD AUTO: 9.8 % (ref 0–13.4)
NEUTROPHILS # BLD AUTO: 12.62 K/UL (ref 2–7.15)
NEUTROPHILS NFR BLD: 80.9 % (ref 44–72)
NRBC # BLD AUTO: 0.67 K/UL
NRBC BLD-RTO: 4.3 /100 WBC
PLATELET # BLD AUTO: 202 K/UL (ref 164–446)
PMV BLD AUTO: 10.9 FL (ref 9–12.9)
POTASSIUM SERPL-SCNC: 3.6 MMOL/L (ref 3.6–5.5)
POTASSIUM SERPL-SCNC: 3.8 MMOL/L (ref 3.6–5.5)
POTASSIUM SERPL-SCNC: 4.1 MMOL/L (ref 3.6–5.5)
RBC # BLD AUTO: 2.81 M/UL (ref 4.2–5.4)
SODIUM SERPL-SCNC: 137 MMOL/L (ref 135–145)
SODIUM SERPL-SCNC: 139 MMOL/L (ref 135–145)
SODIUM SERPL-SCNC: 142 MMOL/L (ref 135–145)
TROPONIN T SERPL-MCNC: 63 NG/L (ref 6–19)
WBC # BLD AUTO: 15.6 K/UL (ref 4.8–10.8)

## 2021-06-18 PROCEDURE — 0DJ08ZZ INSPECTION OF UPPER INTESTINAL TRACT, VIA NATURAL OR ARTIFICIAL OPENING ENDOSCOPIC: ICD-10-PCS | Performed by: INTERNAL MEDICINE

## 2021-06-18 PROCEDURE — 160207 HCHG ENDO MINUTES - EA ADDL 1 MIN LEVEL 3: Performed by: INTERNAL MEDICINE

## 2021-06-18 PROCEDURE — 83605 ASSAY OF LACTIC ACID: CPT

## 2021-06-18 PROCEDURE — 700101 HCHG RX REV CODE 250: Performed by: NURSE PRACTITIONER

## 2021-06-18 PROCEDURE — 99156 MOD SED OTH PHYS/QHP 5/>YRS: CPT | Performed by: INTERNAL MEDICINE

## 2021-06-18 PROCEDURE — 92950 HEART/LUNG RESUSCITATION CPR: CPT

## 2021-06-18 PROCEDURE — C9113 INJ PANTOPRAZOLE SODIUM, VIA: HCPCS | Performed by: INTERNAL MEDICINE

## 2021-06-18 PROCEDURE — 700111 HCHG RX REV CODE 636 W/ 250 OVERRIDE (IP): Performed by: HOSPITALIST

## 2021-06-18 PROCEDURE — 700105 HCHG RX REV CODE 258: Performed by: HOSPITALIST

## 2021-06-18 PROCEDURE — 160202 HCHG ENDO MINUTES - 1ST 30 MINS LEVEL 3: Performed by: INTERNAL MEDICINE

## 2021-06-18 PROCEDURE — 85025 COMPLETE CBC W/AUTO DIFF WBC: CPT

## 2021-06-18 PROCEDURE — 700105 HCHG RX REV CODE 258: Performed by: INTERNAL MEDICINE

## 2021-06-18 PROCEDURE — A9270 NON-COVERED ITEM OR SERVICE: HCPCS | Performed by: INTERNAL MEDICINE

## 2021-06-18 PROCEDURE — 700111 HCHG RX REV CODE 636 W/ 250 OVERRIDE (IP): Performed by: INTERNAL MEDICINE

## 2021-06-18 PROCEDURE — 770022 HCHG ROOM/CARE - ICU (200)

## 2021-06-18 PROCEDURE — 94799 UNLISTED PULMONARY SVC/PX: CPT

## 2021-06-18 PROCEDURE — 99291 CRITICAL CARE FIRST HOUR: CPT | Performed by: NURSE PRACTITIONER

## 2021-06-18 PROCEDURE — 160048 HCHG OR STATISTICAL LEVEL 1-5: Performed by: INTERNAL MEDICINE

## 2021-06-18 PROCEDURE — 700111 HCHG RX REV CODE 636 W/ 250 OVERRIDE (IP): Performed by: NURSE PRACTITIONER

## 2021-06-18 PROCEDURE — 80048 BASIC METABOLIC PNL TOTAL CA: CPT

## 2021-06-18 PROCEDURE — 700102 HCHG RX REV CODE 250 W/ 637 OVERRIDE(OP): Performed by: INTERNAL MEDICINE

## 2021-06-18 RX ORDER — LORAZEPAM 2 MG/ML
1-2 INJECTION INTRAMUSCULAR
Status: DISCONTINUED | OUTPATIENT
Start: 2021-06-18 | End: 2021-06-19

## 2021-06-18 RX ORDER — CLONIDINE HYDROCHLORIDE 0.1 MG/1
0.1 TABLET ORAL
Status: DISCONTINUED | OUTPATIENT
Start: 2021-06-18 | End: 2021-06-26 | Stop reason: HOSPADM

## 2021-06-18 RX ORDER — OMEPRAZOLE 20 MG/1
20 CAPSULE, DELAYED RELEASE ORAL 2 TIMES DAILY
Status: DISCONTINUED | OUTPATIENT
Start: 2021-06-18 | End: 2021-06-26 | Stop reason: HOSPADM

## 2021-06-18 RX ORDER — GAUZE BANDAGE 2" X 2"
100 BANDAGE TOPICAL DAILY
Status: DISPENSED | OUTPATIENT
Start: 2021-06-19 | End: 2021-06-23

## 2021-06-18 RX ORDER — SUCRALFATE ORAL 1 G/10ML
1 SUSPENSION ORAL EVERY 6 HOURS
Status: DISCONTINUED | OUTPATIENT
Start: 2021-06-18 | End: 2021-06-26 | Stop reason: HOSPADM

## 2021-06-18 RX ORDER — LORAZEPAM 2 MG/ML
2 INJECTION INTRAMUSCULAR EVERY 4 HOURS
Status: DISCONTINUED | OUTPATIENT
Start: 2021-06-18 | End: 2021-06-19

## 2021-06-18 RX ORDER — DEXMEDETOMIDINE HYDROCHLORIDE 4 UG/ML
.1-1 INJECTION, SOLUTION INTRAVENOUS CONTINUOUS
Status: DISCONTINUED | OUTPATIENT
Start: 2021-06-18 | End: 2021-06-18

## 2021-06-18 RX ORDER — PROPOFOL 10 MG/ML
50-200 INJECTION, EMULSION INTRAVENOUS ONCE
Status: COMPLETED | OUTPATIENT
Start: 2021-06-18 | End: 2021-06-18

## 2021-06-18 RX ORDER — FOLIC ACID 1 MG/1
1 TABLET ORAL DAILY
Status: DISPENSED | OUTPATIENT
Start: 2021-06-19 | End: 2021-06-23

## 2021-06-18 RX ADMIN — PANTOPRAZOLE SODIUM 8 MG/HR: 40 INJECTION, POWDER, FOR SOLUTION INTRAVENOUS at 01:26

## 2021-06-18 RX ADMIN — THIAMINE HYDROCHLORIDE: 100 INJECTION, SOLUTION INTRAMUSCULAR; INTRAVENOUS at 16:12

## 2021-06-18 RX ADMIN — SUCRALFATE 1 G: 1 SUSPENSION ORAL at 17:51

## 2021-06-18 RX ADMIN — CEFTRIAXONE SODIUM 2 G: 10 INJECTION, POWDER, FOR SOLUTION INTRAVENOUS at 05:02

## 2021-06-18 RX ADMIN — LEVETIRACETAM INJECTION 500 MG: 5 INJECTION INTRAVENOUS at 17:52

## 2021-06-18 RX ADMIN — OMEPRAZOLE 20 MG: 20 CAPSULE, DELAYED RELEASE ORAL at 17:51

## 2021-06-18 RX ADMIN — PROPOFOL 140 MG: 10 INJECTION, EMULSION INTRAVENOUS at 13:19

## 2021-06-18 RX ADMIN — LORAZEPAM 2 MG: 2 INJECTION INTRAMUSCULAR; INTRAVENOUS at 16:20

## 2021-06-18 RX ADMIN — SODIUM CHLORIDE, POTASSIUM CHLORIDE, SODIUM LACTATE AND CALCIUM CHLORIDE: 600; 310; 30; 20 INJECTION, SOLUTION INTRAVENOUS at 11:30

## 2021-06-18 RX ADMIN — SODIUM CHLORIDE, POTASSIUM CHLORIDE, SODIUM LACTATE AND CALCIUM CHLORIDE: 600; 310; 30; 20 INJECTION, SOLUTION INTRAVENOUS at 03:43

## 2021-06-18 RX ADMIN — FENTANYL CITRATE 50 MCG: 50 INJECTION, SOLUTION INTRAMUSCULAR; INTRAVENOUS at 13:20

## 2021-06-18 RX ADMIN — LORAZEPAM 2 MG: 2 INJECTION INTRAMUSCULAR; INTRAVENOUS at 22:35

## 2021-06-18 RX ADMIN — LEVETIRACETAM INJECTION 500 MG: 5 INJECTION INTRAVENOUS at 05:02

## 2021-06-18 RX ADMIN — SUCRALFATE 1 G: 1 SUSPENSION ORAL at 13:24

## 2021-06-18 RX ADMIN — PANTOPRAZOLE SODIUM 8 MG/HR: 40 INJECTION, POWDER, FOR SOLUTION INTRAVENOUS at 11:30

## 2021-06-18 ASSESSMENT — LIFESTYLE VARIABLES
AUDITORY DISTURBANCES: NOT PRESENT
TREMOR: MODERATE TREMOR WITH ARMS EXTENDED
ORIENTATION AND CLOUDING OF SENSORIUM: ORIENTED AND CAN DO SERIAL ADDITIONS
NAUSEA AND VOMITING: NO NAUSEA AND NO VOMITING
TOTAL SCORE: 5
VISUAL DISTURBANCES: NOT PRESENT
NAUSEA AND VOMITING: NO NAUSEA AND NO VOMITING
TOTAL SCORE: 5
PAROXYSMAL SWEATS: NO SWEAT VISIBLE
VISUAL DISTURBANCES: NOT PRESENT
HEADACHE, FULLNESS IN HEAD: NOT PRESENT
AUDITORY DISTURBANCES: NOT PRESENT
ANXIETY: MILDLY ANXIOUS
ANXIETY: MILDLY ANXIOUS
HEADACHE, FULLNESS IN HEAD: NOT PRESENT
AGITATION: NORMAL ACTIVITY
TREMOR: MODERATE TREMOR WITH ARMS EXTENDED
ORIENTATION AND CLOUDING OF SENSORIUM: ORIENTED AND CAN DO SERIAL ADDITIONS
PAROXYSMAL SWEATS: NO SWEAT VISIBLE
AGITATION: NORMAL ACTIVITY

## 2021-06-18 ASSESSMENT — ENCOUNTER SYMPTOMS
MYALGIAS: 1
PALPITATIONS: 0
NAUSEA: 0
HEARTBURN: 0
CHILLS: 0
ABDOMINAL PAIN: 0
FEVER: 0
BRUISES/BLEEDS EASILY: 0
VOMITING: 0
BLURRED VISION: 0
HEMOPTYSIS: 0
SPUTUM PRODUCTION: 0
COUGH: 0
WEAKNESS: 1
SHORTNESS OF BREATH: 0

## 2021-06-18 ASSESSMENT — PAIN DESCRIPTION - PAIN TYPE
TYPE: ACUTE PAIN

## 2021-06-18 NOTE — DOCUMENTATION QUERY
[facility]                                                                       Query Response Note      PATIENT:               KIM LEYVA  ACCT #:                  [id number]  MRN:                     [id number]  :                      1955  ADMIT DATE:       2021 9:33 AM  DISCH DATE:          RESPONDING  PROVIDER #:        [id number]           QUERY TEXT:    Anemia is documented in the presence of an acute hemorrhagic condition in the Medical Record. Please further specify the type of anemia, if possible.    NOTE:  If the appropriate response is not listed below, please respond with a new note.    The patient's Clinical Indicators include:  Clinical indicators-  Per Critical Care Consult: Severe anemia. Hg 2.9 with shock. S/p massive transfusion protocol in ER. From GI bleed   H&H 2.9 / 9.2, .2    Treatments-  Central line placement; Massive transfusion protocol; Repeat H&H    Risks-  Severe anemia; Melena; Hemorrhagic shock    Thank you,  KATILYN Ruggiero RN  Connect via Voalte  Options provided:   -- Blood loss anemia, acute   -- Macrocytic anemia   -- Normocytic anemia   -- Other type of anemia, (please specify type of anemia)   -- Unable to determine      Query created by: Shivani Haskins on 2021 10:04 AM    RESPONSE TEXT:    Blood loss anemia, acute          Electronically signed by:  CRAIG NARAYANAN MD 2021 3:46 PM

## 2021-06-18 NOTE — PROGRESS NOTES
Critical Care Progress Note    Date of admission  6/17/2021    Chief Complaint  65 y.o. female admitted 6/17/2021 with generalized weakness    Hospital Course  65 y.o. female who presented 6/17/2021 with SDH, sz, HTN, that present for 1 weak of weakness and poor oral intake. No fever chills, cough or shortness of breath. That was bought by EMS was hypotensive. In ER she had generalized tonic clonic seizure a lactate post seizure or 11 that repeat was 4.9, nohemy Na 136, k 5.3, co2 12, BUN > 112, cr 3.18, hg was 2.9 and rectal showed elia melena. She had a cordis placed and massive transfusion protocol given. Critical care team consulted for the above. No prior GI bleeds drinks approx 2-3 wine bottles a week, regular use of NSAIDs, denies taking blood thinners.       Interval Problem Update  Reviewed last 24 hour events:  EEG today found Peptic ulcer disease with duodenal ulcers x3. Unable to obtain biopsy, Severe ulcerative esophagitis with melena and anemia  Start Omeprazole 20mg PO BID, sucralfate solution 1 gram PO 4 times daily for 14 days- will check for H-pylori, and will stop  pantoprazole gtt. If remains stable then likely dc to floor tomorrow am (6/19/21)      Review of Systems  Review of Systems   Constitutional: Positive for malaise/fatigue. Negative for chills and fever.   Eyes: Negative for blurred vision.   Respiratory: Negative for cough, hemoptysis, sputum production and shortness of breath.    Cardiovascular: Negative for chest pain and palpitations.   Gastrointestinal: Negative for abdominal pain, heartburn, nausea and vomiting.   Musculoskeletal: Positive for myalgias.   Skin: Negative for rash.   Neurological: Positive for weakness.        Difficulty with balance    Endo/Heme/Allergies: Does not bruise/bleed easily.        Vital Signs for last 24 hours   Temp:  [36.1 °C (96.9 °F)-36.6 °C (97.9 °F)] 36.5 °C (97.7 °F)  Pulse:  [] 98  Resp:  [11-44] 26  BP: ()/(51-87) 137/72  SpO2:  [78  %-100 %] 99 %    Hemodynamic parameters for last 24 hours       Respiratory Information for the last 24 hours       Physical Exam   Physical Exam  Vitals and nursing note reviewed.   Constitutional:       Appearance: Normal appearance.   HENT:      Head: Normocephalic and atraumatic.      Mouth/Throat:      Mouth: Mucous membranes are moist.   Eyes:      Pupils: Pupils are equal, round, and reactive to light.   Cardiovascular:      Rate and Rhythm: Normal rate.      Comments: +2 pedal edema  SR 90s  Abdominal:      General: Abdomen is flat. Bowel sounds are normal.      Palpations: Abdomen is soft.   Genitourinary:     Comments: Hood to down drain clear yellow urine  Musculoskeletal:         General: Normal range of motion.   Skin:     General: Skin is warm.   Neurological:      Mental Status: She is alert and oriented to person, place, and time.      GCS: GCS eye subscore is 4. GCS verbal subscore is 5. GCS motor subscore is 6.      Sensory: Sensation is intact.      Motor: Motor function is intact.      Comments: Tremor present to upper extrem   Psychiatric:         Attention and Perception: Attention and perception normal.         Mood and Affect: Mood normal.         Speech: Speech normal.         Behavior: Behavior normal.         Medications  Current Facility-Administered Medications   Medication Dose Route Frequency Provider Last Rate Last Admin   • sucralfate (CARAFATE) 1 GM/10ML suspension 1 g  1 g Oral Q6HRS Frankie Castellon M.D.   1 g at 06/18/21 1324   • omeprazole (PRILOSEC) capsule 20 mg  20 mg Oral BID Frankie Castellon M.D.       • LORazepam (ATIVAN) injection 2 mg  2 mg Intravenous Q4HRS Jyothi Amor A.P.R.N.       • LORazepam (ATIVAN) injection 1-2 mg  1-2 mg Intravenous Q2HRS PRN Jyothi Amor A.P.R.N.       • detox IV 1000 mL (D5LR + magnesium 1 g + thiamine 100 mg + folic acid 1 mg) infusion   Intravenous Once GHAZALA Partida.P.R.N.        And   • [START ON 6/19/2021] thiamine (Vitamin B-1)  tablet 100 mg  100 mg Oral DAILY Jyothi Amor A.P.R.N.        And   • [START ON 6/19/2021] multivitamin (THERAGRAN) tablet 1 tablet  1 tablet Oral DAILY Jyothi Amor A.P.R.N.        And   • [START ON 6/19/2021] folic acid (FOLVITE) tablet 1 mg  1 mg Oral DAILY Jyotih Amor A.P.R.N.       • cloNIDine (CATAPRES) tablet 0.1 mg  0.1 mg Oral Q HOUR PRN Jyothi Amor A.P.R.N.       • levETIRAcetam (Keppra) 500 mg in 100 mL NaCl IV premix  500 mg Intravenous Q12HRS Luis Gil D.O.   Stopped at 06/18/21 0517   • senna-docusate (PERICOLACE or SENOKOT S) 8.6-50 MG per tablet 2 tablet  2 tablet Oral BID Luis Gil D.O.        And   • polyethylene glycol/lytes (MIRALAX) PACKET 1 Packet  1 Packet Oral QDAY PRN Luis Gil D.O.        And   • magnesium hydroxide (MILK OF MAGNESIA) suspension 30 mL  30 mL Oral QDAY PRN Luis Gil D.O.        And   • bisacodyl (DULCOLAX) suppository 10 mg  10 mg Rectal QDAY PRN Luis Gil D.O.       • Pharmacy Consult Request - to monitor for nephrotoxic agents  1 Each Other PHARMACY TO DOSE Luis Gil D.O.       • Respiratory Therapy Consult   Nebulization Continuous RT Luis Gil D.O.       • lactated ringers infusion (BOLUS)  1,000 mL Intravenous Once PRN Luis Gil D.O.       • lactated ringers infusion   Intravenous Continuous Luis Gil D.O. 150 mL/hr at 06/18/21 1130 New Bag at 06/18/21 1130   • acetaminophen (Tylenol) tablet 650 mg  650 mg Oral Q6HRS PRN Luis Gil D.O.       • cefTRIAXone (Rocephin) syringe 2 g  2 g Intravenous DAILY CHELSEY MotaOSabrina   2 g at 06/18/21 0502   • labetalol (NORMODYNE/TRANDATE) injection 10 mg  10 mg Intravenous Q4HRS PRN Luis Gil D.O.       • LORazepam (ATIVAN) injection 4 mg  4 mg Intravenous Q10 MIN PRN Luis Gil D.O.       • norepinephrine (Levophed) 8 mg in 250 mL NS infusion (premix)  0-30 mcg/min Intravenous Continuous Jesus Velásquez M.D. 11.3 mL/hr at 06/18/21 1417 6 mcg/min at 06/18/21  1417       Fluids    Intake/Output Summary (Last 24 hours) at 6/18/2021 1559  Last data filed at 6/18/2021 1500  Gross per 24 hour   Intake 5203.17 ml   Output 2310 ml   Net 2893.17 ml       Laboratory      Recent Labs     06/17/21  1600   CPKTOTAL 891*     Recent Labs     06/17/21  1600 06/17/21  2300 06/18/21  0540   SODIUM 141 137 139   POTASSIUM 4.1 4.1 3.8   CHLORIDE 108 107 111   CO2 14* 15* 17*   BUN 87* 89* 77*   CREATININE 2.19* 2.27* 2.20*   CALCIUM 8.4* 8.3* 8.2*     Recent Labs     06/17/21  0950 06/17/21  0950 06/17/21 1600 06/17/21 2300 06/18/21  0540   ALTSGPT 43  --   --   --   --    ASTSGOT 57*  --   --   --   --    ALKPHOSPHAT 74  --   --   --   --    TBILIRUBIN <0.2  --   --   --   --    LIPASE  --   --  40  --   --    GLUCOSE 107*   < > 114* 134* 123*    < > = values in this interval not displayed.     Recent Labs     06/17/21  0950 06/17/21  1140 06/18/21  0540   WBC  --  15.6* 15.6*   NEUTSPOLYS  --  81.80* 80.90*   LYMPHOCYTES  --  5.20* 7.50*   MONOCYTES  --  11.30 9.80   EOSINOPHILS  --  0.00 0.40   BASOPHILS  --  0.00 0.40   ASTSGOT 57*  --   --    ALTSGPT 43  --   --    ALKPHOSPHAT 74  --   --    TBILIRUBIN <0.2  --   --      Recent Labs     06/17/21  1140 06/17/21  1140 06/17/21 1600 06/17/21 2300 06/18/21  0540   RBC 0.90*  --   --   --  2.81*   HEMOGLOBIN 2.9*   < > 9.9* 9.1* 8.4*   HEMATOCRIT 9.2*  --   --   --  25.1*   PLATELETCT 223  --   --   --  202    < > = values in this interval not displayed.       Imaging  X-Ray:  I have personally reviewed the images and compared with prior images.    Assessment/Plan  SDH (subdural hematoma) (HCC)  Assessment & Plan  Hx of    GI bleed  Assessment & Plan  EEG today found Peptic ulcer disease with duodenal ulcers x3. Unable to obtain biopsy  Start Omeprazole 20mg PO BID, sucralfate solution 1 gram PO 4 times daily for 14 days- will check for H-pylori, and will stop  pantoprazole gtt  Serial monitor hg and transfuse <  7        Hypotension  Assessment & Plan  Hemorrhagic shock    Sepsis (HCC)  Assessment & Plan  Doubt uti likely all severe anemia   Follow up cultures and de-escalate     Severe anemia  Assessment & Plan  S/p massive transfusion protocol in ER for Hg of 2.9 with shock 2nd to GI bleed  EEG today found Peptic ulcer disease with duodenal ulcers x3. Unable to obtain biopsy  Start Omeprazole 20mg PO BID, sucralfate solution 1 gram PO 4 times daily for 14 days- will check for H-pylori, and will stop  pantoprazole gtt    Lactate blood increased  Assessment & Plan  Related to post seizure and hemorrhagic shock  Doubt main  is sepsis    YEISON (acute kidney injury) (HCC)- (present on admission)  Assessment & Plan  Maintain euvolemia and monitor fluid responsiveness (avoid NaCL and renal congestion)  MAP > 65 uses pressors or inotropic trial  Monitor urine output and I&O's  Avoid and review nephrotoxin medication  Rule out post obstruction  Consider renal U/S if no renal images  U/a and CPK    Greater then appreciated with GI bleed  Intravascular dehydration and blood loss    Seizure (HCC)- (present on admission)  Assessment & Plan  History of with medication noncompliant  S/p SZ in ER  Secondary since SDH  Seizure precautions  Avoid seizure lower drugs  Prn Ativan  keppra  XIANG protocol    Essential hypertension  Assessment & Plan  Hx of hold with antihypertensives with hemorrhagic shock       VTE:  Contraindicated  Ulcer: PPI  Lines: Central Line  Ongoing indication addressed    I have performed a physical exam and reviewed and updated ROS and Plan today (6/18/2021). In review of yesterday's note (6/17/2021), there are no changes except as documented above.     Discussed patient condition and risk of morbidity and/or mortality with Hospitalist, RN, Therapies, Pharmacy, , Patient and GI  The patient remains critically ill.  Critical care time =36minutes in directly providing and coordinating critical care and  extensive data review.  No time overlap and excludes procedures.

## 2021-06-18 NOTE — CARE PLAN
The patient is Watcher - Medium risk of patient condition declining or worsening    Shift Goals  Clinical Goals: Stable Hgb, MAP < 65  Patient Goals: Rest  Family Goals: N/A        Problem: Knowledge Deficit - Standard  Goal: Patient and family/care givers will demonstrate understanding of plan of care, disease process/condition, diagnostic tests and medications  Outcome: Progressing  Note: POC discussed with patient. Educated regarding need for NPO-GI procedure pending.      Problem: Risk for Fluid Volume Deficit Related to Bleeding  Goal: Patient will show no signs and symptoms of excessive bleeding  Note: Continuous iv fluids running. Patient on vasopressor, titrating as appropriate.

## 2021-06-18 NOTE — OR SURGEON
Immediate Post OP Note    PreOp Diagnosis: melena, upper GI hemorrhage, anemia      PostOp Diagnosis: Duodenal ulcers, ulcerative esophagitis, gastritis, melena, upper GI hemorrhage, anemia       Procedure(s):  COLONOSCOPY  GASTROSCOPY    Surgeon(s):  Frankie Castellon M.D.    Anesthesiologist/Type of Anesthesia:  Anesthesiologist: (Unknown)/* No anesthesia type entered *    Surgical Staff:  Circulator: (Unknown)  Endoscopy Technician: (Unknown)  Endoscopy Team: (Unknown)  Endoscopy Nurse: (Unknown)    Specimens removed if any:  * No specimens in log *    Estimated Blood Loss: < 5 cc    Findings: see detailed dictated note.  1)Duodenal ulcers x three  2) Severe LA Class Grade D uilcerative esophagitis      Complications: no immediate          6/18/2021 1:07 PM Frankie Castellon M.D.

## 2021-06-18 NOTE — DISCHARGE PLANNING
Hospital Care Management Team Assessment    In the case of an emergency, pt's NOK are Chetna Gagnon (Daughter) and Hong Gagnon (Son) phone numbers unknown.     This RNCM spoke with the patient at the bedside and obtained the information used in this assessment. Pt verified accuracy of facesheet.     Pt lives alone in a one story apartment. Pt has housing through "[employer] and previously was at [facility]. Pt wants to change her pharmacy to [facility] on [address]- pharmacy added to demographics. Prior to current hospitalization, pt was independent with ADLS and some IADLS. Pt unable to drive, cook, or shop for herself. Pt has meals on wheels and receives taxi vouchers from [employer]. Pt has a shower chair and a walker at home. Pt has no income but states she has no monthly expenses. Pt has prescription coverage through Medicaid. Pt states she is going to quit drinking and requesting SA resources- resources provided. Pt declined need for MH resources. Pt has support from her friends and states her two children live in [place]. Pt could not remember her children's phone numbers and doesn't have her cell phone with her. Pt has no AD, packet provided. Pt updated that currently there isn't a notary for the hospital but discussed other options to have double witness/ outside notary. Pt's discharge plan is likely home with HH. Pt would like HH services on d/c and states she was happy with [facility] in the past- choice form provided so she can choose second and third options. RN CM discussed having PT/OT assess her for HH need as she gets closer to d/c. RN CM will follow up with MD for PT, OT, HH orders.     Care Transition Team Assessment    Information Source  Orientation Level: Oriented X4  Information Given By: Patient  Who is responsible for making decisions for patient? : Patient    Readmission Evaluation  Is this a readmission?: No    Elopement Risk  Legal Hold: No  Ambulatory or Self Mobile in  Wheelchair: No-Not an Elopement Risk  Elopement Risk: Not at Risk for Elopement    Interdisciplinary Discharge Planning  Lives with - Patient's Self Care Capacity: Alone and Able to Care For Self  Patient or legal guardian wants to designate a caregiver: No  Support Systems: Friends / Neighbors  Housing / Facility: 1 Story Apartment / Condo  Prior Services: Meals on Wheels  Patient Prefers to be Discharged to:: home with home health  Durable Medical Equipment: Walker, Other - Specify (shower chair)    Discharge Preparedness  What is your plan after discharge?: Uncertain - pending medical team collaboration  What are your discharge supports?: Other (comment) (friends)  Prior Functional Level: Ambulatory, Independent with Activities of Daily Living, Uses Walker  Difficulity with ADLs: None  Difficulity with IADLs: Driving, Cooking, Shopping  Difficulity with IADL Comments: Pt is unable drive, cook, or shop    Functional Assesment  Prior Functional Level: Ambulatory, Independent with Activities of Daily Living, Uses Walker    Finances  Financial Barriers to Discharge: Yes  Average Monthly Income: 0 $ (Pt however has no monthly expenses )  Source of Income: None  Prescription Coverage: Yes    Vision / Hearing Impairment  Vision Impairment : Yes  Right Eye Vision: Impaired, Wears Glasses  Left Eye Vision: Impaired, Wears Glasses  Hearing Impairment : No    Advance Directive  Advance Directive?: None  Advance Directive offered?: AD Booklet given    Domestic Abuse  Have you ever been the victim of abuse or violence?: No  Physical Abuse or Sexual Abuse: No  Verbal Abuse or Emotional Abuse: No  Possible Abuse/Neglect Reported to:: Not Applicable    Psychological Assessment  History of Substance Abuse: Alcohol  Substance Abuse Comments: Pt accepted SA resources  History of Psychiatric Problems: No  Non-compliant with Treatment: No  Newly Diagnosed Illness: Yes    Anticipated Discharge Information  Discharge Disposition: Still  a Patient (30)

## 2021-06-18 NOTE — PROGRESS NOTES
Critical care progress note.    Seen per myself and seen/discussed with APRN.  Agree with findings except as noted in my note.  Please see my note below for further details.    65 y.o. female who presented 6/17/2021 with past medical history of subdural hematoma, seizure disorder and hypertension presented for 1 weak of weakness and poor oral intake. No fever chills, cough or shortness of breath. Hypotensive on arrival.  In ER she had generalized tonic clonic seizure.  Elevated lactic acid that normalized.  YEISON present on labs and hemoglobin 2.9.  Rectal exam showed melena.  She had a cordis placed and massive transfusion protocol given. I have been consulted for the above. No prior GI bleeds occasional wine drinking, no NSAIDs or blood thinners per patient. Hemoglobin up to 9.9, slowly downtrended to 8.4 overnight.  Metabolic acidosis improving but present.  Lactic acidosis resolved.  K improved to 3.8, prior hyperkalemia.  Negative blood cultures.  CT head negative for acute pathology.  US renal no hydronephrosis. Cxray post central line adequate placement.  No pneumothorax and no consolidation for pneumonia or significant edema.  On ppi drip, octreotide. Levophed for map > 65 and sbp > 90.    Addendum  EGD afternoon with duodenal ulcers, multiple, no active bleeding. + esophagitis. Changed to omeprazole 20 mg bid, stopped ppi drip.  Sucralfate scheduled.  Ok to eat.    Reviewed chart notes, orders, labs and imaging    Assessment and Plan  Seizure, resolved  Severe anemia, down to 2.9, s/p transfusions. Cordis in place in event of needing massive transfusion.    GI bleed, melena present, gi consulted.  ETOH abuse, ciwa for now, may need precedex.    Patient is critically ill. Levophed for map > 65 and sbp > 90.  Active Gi bleeding.  PPI drip, octreotide drip.  Transfuse for hb less than 7.  If untreated there is a high chance of deterioration and eventually death. The critical that has been undertaken is medically  complex.     See APRN note for critical care billing time.

## 2021-06-18 NOTE — PROCEDURES
DATE OF PROCEDURE:  06/18/2021     PROCEDURE PERFORMED:  Esophagogastroduodenoscopy.     INSTRUMENT UTILIZED:  Olympus flexible forward viewing gastroscope.     INDICATIONS:  The patient with a history of alcohol abuse, presents with   melena and profound anemia.     CONSENT:  Full RBA discussion held prior to the procedure and signed a   witnessed consent form placed on the chart.     SEDATION/ANESTHESIA:  Deep sedation was provided by Dr. Lozano.     TOLERANCE:  Fair.  The patient had transient hypoxia, which responded to   supplemental oxygen and jaw thrust.     The patient was also on pressors and we did have to escalate the dose slightly   during the procedure due to relative hypotension.     PATHOLOGY SPECIMENS:  None.     PROCEDURAL DETAIL:  After adequate sedation, the Olympus flexible forward   viewing gastroscope was advanced per the oral route into the esophagus.  The   esophageal mucosa was noted to be inflamed with severe circumferential [place]   Venecia classification grade D ulcerative esophagitis extending from   approximately 28 cm distal to the incisors down to the level of the   gastroesophageal junction at 35 cm from the incisors.  There was no active   bleeding nor visible vessel noted.  The instrument was passed into the stomach   where air was insufflated and the gastric mucosa inspected.  There was   clear-to-yellow opaque fluid within the dependent portion of the stomach,   which was removed via the suction channel of the endoscope.  The instrument   was passed through the patent pyloric channel into the duodenum.  In the   distal duodenal bulb, there were three clean-based ulcerations measuring 4 mm,   5 mm and 8 mm respectively.  The most proximal ulceration with the 8 mm   ulceration and it had a flat purple pigmented spot within the center.  No   visible vessel, no active bleeding, no adherent clot.  The instrument was   carefully advanced beyond the ulcerations into the proximal third  portion of   the duodenum.  There was intermittent free flow of clear to yellow bile per   the ampullary orifice.  There was no evidence of active hemorrhage nor old   blood on this examination.     No immediate complications.     IMPRESSIONS AND FINDINGS:  1.  Peptic ulcer disease with duodenal ulcers x3.  I was unable to biopsy the   antrum and fundus secondary to the patient's transient hypoxia, which   necessitated aborting the procedure.  2.  Severe ulcerative esophagitis.  3.  Melena - see above.  4.  Anemia - see above.     PLAN AND RECOMMENDATIONS:  1.  Observe for any adverse events from this procedure.  2.  Omeprazole 20 mg orally twice daily.  Stop pantoprazole drip now.  3.  Sucralfate solution 1 gram orally 4 times daily for the next 14 days.  4.  No nonsteroidal anti-inflammatory type medications.  5.  Check Helicobacter pylori stool antigen test and treat if positive.  6.  I will plan to arrange for followup in our clinic in approximately 12-16   weeks.  GI to sign off now.  Okay to advance diet as tolerated from a GI   standpoint.        ______________________________  MD ERIC NELSON/DAVID    DD:  06/18/2021 13:14  DT:  06/18/2021 14:22    Job#:  [id number]    CC:MD Ellis WINTER MD

## 2021-06-18 NOTE — HOSPITAL COURSE
65 y.o. female who presented 6/17/2021 with SDH, sz, HTN, that present for 1 weak of weakness and poor oral intake. No fever chills, cough or shortness of breath. That was bought by EMS was hypotensive. In ER she had generalized tonic clonic seizure a lactate post seizure or 11 that repeat was 4.9, nohemy Na 136, k 5.3, co2 12, BUN > 112, cr 3.18, hg was 2.9 and rectal showed elia melena. She had a cordis placed and massive transfusion protocol given. Critical care team consulted for the above. No prior GI bleeds drinks approx 2-3 wine bottles a week, regular use of NSAIDs, denies taking blood thinners.   6/18- EEG found Peptic ulcer disease with duodenal ulcers x3. Unable to obtain biopsy, Severe ulcerative esophagitis with melena and anemia  Start Omeprazole 20mg PO BID, sucralfate solution 1 gram PO 4 times daily for 14 days- will check for H-pylori, and will stop  pantoprazole gt

## 2021-06-18 NOTE — PROCEDURES
Procedures  Moderate conscious sedation  Reason, EGD per Dr Castellon for severe anemia and GI bleed  Details:  Given multiple doses of propofol for total 140 mg and 50 mcg fentanyl and tolerated adequately with exception of at end of procedure hypoxia to 65-80% short duration requiring bag mask ventilation with quick resolution of hypoxia.  Patient arousable following procedure and adequate hemodynamics.  Levophed titrated from 3 mcg/min up to 10 mcg/min with sedation medications and weaned following procedure.      Total time of procedure 15 minutes

## 2021-06-18 NOTE — DOCUMENTATION QUERY
[facility]                                                                       Query Response Note      PATIENT:               KIM LEYVA  ACCT #:                  [id number]  MRN:                     [id number]  :                      1955  ADMIT DATE:       2021 9:33 AM  DISCH DATE:          RESPONDING  PROVIDER #:        [id number]           QUERY TEXT:    Lactic acidosis is documented in the ER Physician provider notes. Please clarify status of this condition:    NOTE:  If an appropriate response is not listed below, please respond with a new note.    The patient's Clinical Indicators include:  Clinical indicators-   Lactic acid: 11.3  Admitted with GI bleed, seizure, hemorrhagic shock, anemia, acute renal failure, ?sepsis, UTI  Per ER MD PN: Lactic acidosis. Lactic acid is significantly elevated, likely 2/2 seizure, has been given aggressive fluids. Covered with ceftriaxone.     Treatments-  Aggressive IVF; Repeat lactic acid level; Sepsis protocol    Risks-  Lactic acidosis per ER MD PN; Seizure; Hemorrhagic shock; YEISON; ?Sepsis    Thank you,  Shivani Haskins, KAITLYN RN  Connect via Voalte  Options provided:   -- Lactic acidosis is ruled in   -- Lactic acidosis is ruled out   -- Unable to determine      Query created by: Shivani Haskins on 2021 9:56 AM    RESPONSE TEXT:    Lactic acidosis is ruled in          Electronically signed by:  CRAIG NARAYANAN MD 2021 2:28 PM

## 2021-06-18 NOTE — PROGRESS NOTES
Dr. Carter notified of pupillary changes, at bedside. Order for neuro check q2h.  Per Dr. Carter, discontinue troponin q6h.

## 2021-06-19 PROBLEM — F10.10 ETOH ABUSE: Status: ACTIVE | Noted: 2021-06-19

## 2021-06-19 LAB
ANION GAP SERPL CALC-SCNC: 9 MMOL/L (ref 7–16)
BUN SERPL-MCNC: 50 MG/DL (ref 8–22)
CALCIUM SERPL-MCNC: 8.4 MG/DL (ref 8.5–10.5)
CHLORIDE SERPL-SCNC: 110 MMOL/L (ref 96–112)
CO2 SERPL-SCNC: 21 MMOL/L (ref 20–33)
CREAT SERPL-MCNC: 1.51 MG/DL (ref 0.5–1.4)
GLUCOSE SERPL-MCNC: 107 MG/DL (ref 65–99)
HCT VFR BLD AUTO: 25 % (ref 37–47)
HGB BLD-MCNC: 7.8 G/DL (ref 12–16)
HGB BLD-MCNC: 8 G/DL (ref 12–16)
MAGNESIUM SERPL-MCNC: 1.7 MG/DL (ref 1.5–2.5)
PHOSPHATE SERPL-MCNC: 2.8 MG/DL (ref 2.5–4.5)
POTASSIUM SERPL-SCNC: 3.6 MMOL/L (ref 3.6–5.5)
SODIUM SERPL-SCNC: 140 MMOL/L (ref 135–145)

## 2021-06-19 PROCEDURE — 84100 ASSAY OF PHOSPHORUS: CPT

## 2021-06-19 PROCEDURE — A9270 NON-COVERED ITEM OR SERVICE: HCPCS | Performed by: INTERNAL MEDICINE

## 2021-06-19 PROCEDURE — 770001 HCHG ROOM/CARE - MED/SURG/GYN PRIV*

## 2021-06-19 PROCEDURE — 99233 SBSQ HOSP IP/OBS HIGH 50: CPT | Performed by: NURSE PRACTITIONER

## 2021-06-19 PROCEDURE — 700111 HCHG RX REV CODE 636 W/ 250 OVERRIDE (IP): Performed by: INTERNAL MEDICINE

## 2021-06-19 PROCEDURE — 83735 ASSAY OF MAGNESIUM: CPT

## 2021-06-19 PROCEDURE — A9270 NON-COVERED ITEM OR SERVICE: HCPCS | Performed by: HOSPITALIST

## 2021-06-19 PROCEDURE — 700102 HCHG RX REV CODE 250 W/ 637 OVERRIDE(OP): Performed by: HOSPITALIST

## 2021-06-19 PROCEDURE — 700111 HCHG RX REV CODE 636 W/ 250 OVERRIDE (IP): Performed by: HOSPITALIST

## 2021-06-19 PROCEDURE — 700102 HCHG RX REV CODE 250 W/ 637 OVERRIDE(OP): Performed by: NURSE PRACTITIONER

## 2021-06-19 PROCEDURE — 85018 HEMOGLOBIN: CPT | Mod: 91

## 2021-06-19 PROCEDURE — 85014 HEMATOCRIT: CPT

## 2021-06-19 PROCEDURE — 700102 HCHG RX REV CODE 250 W/ 637 OVERRIDE(OP): Performed by: INTERNAL MEDICINE

## 2021-06-19 PROCEDURE — 700105 HCHG RX REV CODE 258: Performed by: INTERNAL MEDICINE

## 2021-06-19 PROCEDURE — 700111 HCHG RX REV CODE 636 W/ 250 OVERRIDE (IP): Performed by: NURSE PRACTITIONER

## 2021-06-19 PROCEDURE — 99233 SBSQ HOSP IP/OBS HIGH 50: CPT | Performed by: HOSPITALIST

## 2021-06-19 PROCEDURE — A9270 NON-COVERED ITEM OR SERVICE: HCPCS | Performed by: NURSE PRACTITIONER

## 2021-06-19 PROCEDURE — 80048 BASIC METABOLIC PNL TOTAL CA: CPT

## 2021-06-19 PROCEDURE — 700105 HCHG RX REV CODE 258: Performed by: NURSE PRACTITIONER

## 2021-06-19 RX ORDER — CHLORDIAZEPOXIDE HYDROCHLORIDE 25 MG/1
50 CAPSULE, GELATIN COATED ORAL EVERY 6 HOURS
Status: COMPLETED | OUTPATIENT
Start: 2021-06-19 | End: 2021-06-20

## 2021-06-19 RX ORDER — LORAZEPAM 2 MG/ML
.5-1 INJECTION INTRAMUSCULAR
Status: DISCONTINUED | OUTPATIENT
Start: 2021-06-19 | End: 2021-06-26 | Stop reason: HOSPADM

## 2021-06-19 RX ORDER — MAGNESIUM SULFATE HEPTAHYDRATE 40 MG/ML
2 INJECTION, SOLUTION INTRAVENOUS ONCE
Status: COMPLETED | OUTPATIENT
Start: 2021-06-19 | End: 2021-06-19

## 2021-06-19 RX ORDER — SODIUM CHLORIDE, SODIUM LACTATE, POTASSIUM CHLORIDE, CALCIUM CHLORIDE 600; 310; 30; 20 MG/100ML; MG/100ML; MG/100ML; MG/100ML
INJECTION, SOLUTION INTRAVENOUS CONTINUOUS
Status: DISCONTINUED | OUTPATIENT
Start: 2021-06-19 | End: 2021-06-21

## 2021-06-19 RX ORDER — POTASSIUM CHLORIDE 14.9 MG/ML
20 INJECTION INTRAVENOUS ONCE
Status: COMPLETED | OUTPATIENT
Start: 2021-06-19 | End: 2021-06-19

## 2021-06-19 RX ORDER — CALCIUM CHLORIDE 100 MG/ML
1 INJECTION INTRAVENOUS; INTRAVENTRICULAR ONCE
Status: DISCONTINUED | OUTPATIENT
Start: 2021-06-19 | End: 2021-06-19

## 2021-06-19 RX ADMIN — THERA TABS 1 TABLET: TAB at 05:31

## 2021-06-19 RX ADMIN — LEVETIRACETAM INJECTION 500 MG: 5 INJECTION INTRAVENOUS at 17:45

## 2021-06-19 RX ADMIN — DOCUSATE SODIUM 50 MG AND SENNOSIDES 8.6 MG 2 TABLET: 8.6; 5 TABLET, FILM COATED ORAL at 05:30

## 2021-06-19 RX ADMIN — MAGNESIUM SULFATE IN WATER 2 G: 40 INJECTION, SOLUTION INTRAVENOUS at 07:45

## 2021-06-19 RX ADMIN — LORAZEPAM 2 MG: 2 INJECTION INTRAMUSCULAR; INTRAVENOUS at 01:59

## 2021-06-19 RX ADMIN — FOLIC ACID 1 MG: 1 TABLET ORAL at 05:31

## 2021-06-19 RX ADMIN — SODIUM CHLORIDE, POTASSIUM CHLORIDE, SODIUM LACTATE AND CALCIUM CHLORIDE: 600; 310; 30; 20 INJECTION, SOLUTION INTRAVENOUS at 09:00

## 2021-06-19 RX ADMIN — SUCRALFATE 1 G: 1 SUSPENSION ORAL at 17:43

## 2021-06-19 RX ADMIN — OMEPRAZOLE 20 MG: 20 CAPSULE, DELAYED RELEASE ORAL at 17:43

## 2021-06-19 RX ADMIN — SUCRALFATE 1 G: 1 SUSPENSION ORAL at 05:55

## 2021-06-19 RX ADMIN — Medication 100 MG: at 05:31

## 2021-06-19 RX ADMIN — DOCUSATE SODIUM 50 MG AND SENNOSIDES 8.6 MG 2 TABLET: 8.6; 5 TABLET, FILM COATED ORAL at 17:42

## 2021-06-19 RX ADMIN — CHLORDIAZEPOXIDE HYDROCHLORIDE 50 MG: 25 CAPSULE ORAL at 17:42

## 2021-06-19 RX ADMIN — SODIUM CHLORIDE, POTASSIUM CHLORIDE, SODIUM LACTATE AND CALCIUM CHLORIDE: 600; 310; 30; 20 INJECTION, SOLUTION INTRAVENOUS at 18:00

## 2021-06-19 RX ADMIN — SUCRALFATE 1 G: 1 SUSPENSION ORAL at 00:49

## 2021-06-19 RX ADMIN — OMEPRAZOLE 20 MG: 20 CAPSULE, DELAYED RELEASE ORAL at 05:31

## 2021-06-19 RX ADMIN — CALCIUM CHLORIDE 1000 MG: 100 INJECTION, SOLUTION INTRAVENOUS at 09:00

## 2021-06-19 RX ADMIN — LEVETIRACETAM INJECTION 500 MG: 5 INJECTION INTRAVENOUS at 05:31

## 2021-06-19 RX ADMIN — CEFTRIAXONE SODIUM 2 G: 10 INJECTION, POWDER, FOR SOLUTION INTRAVENOUS at 06:06

## 2021-06-19 RX ADMIN — CHLORDIAZEPOXIDE HYDROCHLORIDE 50 MG: 25 CAPSULE ORAL at 11:51

## 2021-06-19 RX ADMIN — LORAZEPAM 0.5 MG: 2 INJECTION INTRAMUSCULAR; INTRAVENOUS at 15:39

## 2021-06-19 RX ADMIN — LORAZEPAM 1 MG: 2 INJECTION INTRAMUSCULAR; INTRAVENOUS at 07:15

## 2021-06-19 RX ADMIN — SUCRALFATE 1 G: 1 SUSPENSION ORAL at 11:51

## 2021-06-19 RX ADMIN — POTASSIUM CHLORIDE 20 MEQ: 14.9 INJECTION, SOLUTION INTRAVENOUS at 10:00

## 2021-06-19 ASSESSMENT — ENCOUNTER SYMPTOMS
SPUTUM PRODUCTION: 0
HEMOPTYSIS: 0
CHILLS: 0
BACK PAIN: 0
SEIZURES: 0
MYALGIAS: 1
NAUSEA: 0
DIZZINESS: 0
VOMITING: 0
BRUISES/BLEEDS EASILY: 0
BLURRED VISION: 0
HEADACHES: 0
LOSS OF CONSCIOUSNESS: 0
TREMORS: 1
ABDOMINAL PAIN: 0
FEVER: 0
PALPITATIONS: 0
SORE THROAT: 0
SHORTNESS OF BREATH: 0
DOUBLE VISION: 0
COUGH: 0
DIARRHEA: 0
HEARTBURN: 0

## 2021-06-19 ASSESSMENT — LIFESTYLE VARIABLES
HEADACHE, FULLNESS IN HEAD: NOT PRESENT
TREMOR: MODERATE TREMOR WITH ARMS EXTENDED
TOTAL SCORE: 5
AUDITORY DISTURBANCES: NOT PRESENT
AUDITORY DISTURBANCES: NOT PRESENT
AGITATION: SOMEWHAT MORE THAN NORMAL ACTIVITY
ORIENTATION AND CLOUDING OF SENSORIUM: ORIENTED AND CAN DO SERIAL ADDITIONS
VISUAL DISTURBANCES: NOT PRESENT
ANXIETY: NO ANXIETY (AT EASE)
PAROXYSMAL SWEATS: NO SWEAT VISIBLE
NAUSEA AND VOMITING: NO NAUSEA AND NO VOMITING
NAUSEA AND VOMITING: NO NAUSEA AND NO VOMITING
PAROXYSMAL SWEATS: NO SWEAT VISIBLE
ANXIETY: NO ANXIETY (AT EASE)
HEADACHE, FULLNESS IN HEAD: NOT PRESENT
AGITATION: SOMEWHAT MORE THAN NORMAL ACTIVITY
VISUAL DISTURBANCES: NOT PRESENT
TOTAL SCORE: 5
TREMOR: MODERATE TREMOR WITH ARMS EXTENDED
ORIENTATION AND CLOUDING OF SENSORIUM: ORIENTED AND CAN DO SERIAL ADDITIONS

## 2021-06-19 NOTE — CARE PLAN
Problem: Knowledge Deficit - Standard  Goal: Patient and family/care givers will demonstrate understanding of plan of care, disease process/condition, diagnostic tests and medications  Outcome: Progressing     Problem: Hemodynamics  Goal: Patient's hemodynamics, fluid balance and neurologic status will be stable or improve  Outcome: Progressing     Problem: Fluid Volume  Goal: Fluid volume balance will be maintained  Outcome: Progressing     Problem: Urinary - Renal Perfusion  Goal: Ability to achieve and maintain adequate renal perfusion and functioning will improve  Outcome: Progressing     Problem: Respiratory  Goal: Patient will achieve/maintain optimum respiratory ventilation and gas exchange  Outcome: Progressing     Problem: Mechanical Ventilation  Goal: Safe management of artificial airway and ventilation  Outcome: Progressing  Goal: Successful weaning off mechanical ventilator, spontaneously maintains adequate gas exchange  Outcome: Progressing  Goal: Patient will be able to express needs and understand communication  Outcome: Progressing     Problem: Physical Regulation  Goal: Diagnostic test results will improve  Outcome: Progressing  Goal: Signs and symptoms of infection will decrease  Outcome: Progressing     Problem: Skin Integrity  Goal: Skin integrity is maintained or improved  Outcome: Progressing     Problem: Fall Risk  Goal: Patient will remain free from falls  Outcome: Progressing     Problem: Risk for Fluid Volume Deficit Related to Bleeding  Goal: Fluid volume balance will be maintained  Outcome: Progressing   The patient is Stable - Low risk of patient condition declining or worsening    Shift Goals  Clinical Goals: Stable Hgb, MAP < 65  Patient Goals: Rest  Family Goals: N/A    Progress made toward(s) clinical / shift goals:        Patient is not progressing towards the following goals:

## 2021-06-19 NOTE — PROGRESS NOTES
Critical Care Progress Note    Date of admission  6/17/2021    Chief Complaint  65 y.o. female admitted 6/17/2021 with generalized weakness    Hospital Course  65 y.o. female who presented 6/17/2021 with SDH, sz, HTN, that present for 1 weak of weakness and poor oral intake. No fever chills, cough or shortness of breath. That was bought by EMS was hypotensive. In ER she had generalized tonic clonic seizure a lactate post seizure or 11 that repeat was 4.9, nohemy Na 136, k 5.3, co2 12, BUN > 112, cr 3.18, hg was 2.9 and rectal showed elai melena. She had a cordis placed and massive transfusion protocol given. Critical care team consulted for the above. No prior GI bleeds drinks approx 2-3 wine bottles a week, regular use of NSAIDs, denies taking blood thinners.   6/18- EEG found Peptic ulcer disease with duodenal ulcers x3. Unable to obtain biopsy, Severe ulcerative esophagitis with melena and anemia  Start Omeprazole 20mg PO BID, sucralfate solution 1 gram PO 4 times daily for 14 days- will check for H-pylori, and will stop  pantoprazole gt      Interval Problem Update  Reviewed last 24 hour events:  started librium X 4 doses for tremors/ETOH withdrawl  HH remains stable  Today- dc to floor     Review of Systems  Review of Systems   Constitutional: Positive for malaise/fatigue. Negative for chills and fever.   Eyes: Negative for blurred vision.   Respiratory: Negative for cough, hemoptysis, sputum production and shortness of breath.    Cardiovascular: Negative for chest pain and palpitations.   Gastrointestinal: Negative for abdominal pain, heartburn, nausea and vomiting.   Genitourinary: Negative for dysuria.   Musculoskeletal: Positive for myalgias.   Skin: Negative for rash.   Neurological: Positive for tremors. Negative for seizures and headaches.        Gen weakness   Endo/Heme/Allergies: Does not bruise/bleed easily.        Vital Signs for last 24 hours   Temp:  [36.7 °C (98.1 °F)-37.2 °C (99 °F)] 36.7 °C (98.1  °F)  Pulse:  [] 102  Resp:  [14-31] 19  BP: (115-164)/(67-90) 147/81  SpO2:  [87 %-99 %] 96 %    Hemodynamic parameters for last 24 hours       Respiratory Information for the last 24 hours       Physical Exam   Physical Exam  Vitals and nursing note reviewed.   Constitutional:       Appearance: Normal appearance.   HENT:      Head: Normocephalic and atraumatic.      Mouth/Throat:      Mouth: Mucous membranes are moist.   Eyes:      Pupils: Pupils are equal, round, and reactive to light.   Cardiovascular:      Rate and Rhythm: Normal rate and regular rhythm.      Comments:   SR 70-90;s  Abdominal:      General: Abdomen is flat. Bowel sounds are normal.      Palpations: Abdomen is soft.   Genitourinary:     Comments: Hood  In place  clear yellow urine  Musculoskeletal:         General: Normal range of motion.   Skin:     General: Skin is warm.   Neurological:      Mental Status: She is alert and oriented to person, place, and time. Mental status is at baseline.      GCS: GCS eye subscore is 4. GCS verbal subscore is 5. GCS motor subscore is 6.      Sensory: Sensation is intact.      Motor: Motor function is intact.      Comments: Sleepy but wakes to stim  Tremor bilat extrem   Psychiatric:         Attention and Perception: Attention and perception normal.         Mood and Affect: Mood normal.         Speech: Speech normal.         Behavior: Behavior normal.         Medications  Current Facility-Administered Medications   Medication Dose Route Frequency Provider Last Rate Last Admin   • lactated ringers infusion   Intravenous Continuous Antolin Lozano M.D. 75 mL/hr at 06/19/21 1400 Rate Change at 06/19/21 1400   • chlordiazePOXIDE (LIBRIUM) capsule 50 mg  50 mg Oral Q6HRS Jyothi Amor ASabrinaP.R.N.   50 mg at 06/19/21 1151   • LORazepam (ATIVAN) injection 0.5-1 mg  0.5-1 mg Intravenous Q2HRS PRN Antolin Lozano M.D.   0.5 mg at 06/19/21 1539   • sucralfate (CARAFATE) 1 GM/10ML suspension 1 g  1 g Oral Q6HRS Frankie ESPINOZA  YASMIN Castellon   1 g at 06/19/21 1151   • omeprazole (PRILOSEC) capsule 20 mg  20 mg Oral BID Frankie Castellon M.D.   20 mg at 06/19/21 0531   • thiamine (Vitamin B-1) tablet 100 mg  100 mg Oral DAILY Jyothi Amor A.P.R.N.   100 mg at 06/19/21 0531    And   • multivitamin (THERAGRAN) tablet 1 tablet  1 tablet Oral DAILY Jyothi Amor A.P.R.N.   1 tablet at 06/19/21 0531    And   • folic acid (FOLVITE) tablet 1 mg  1 mg Oral DAILY Jyothi Amor A.P.R.N.   1 mg at 06/19/21 0531   • cloNIDine (CATAPRES) tablet 0.1 mg  0.1 mg Oral Q HOUR PRN Jyothi Amor A.P.R.N.       • levETIRAcetam (Keppra) 500 mg in 100 mL NaCl IV premix  500 mg Intravenous Q12HRS Luis Gil D.O.   Stopped at 06/19/21 0546   • senna-docusate (PERICOLACE or SENOKOT S) 8.6-50 MG per tablet 2 tablet  2 tablet Oral BID CHELSEY MotaOSabrina   2 tablet at 06/19/21 0530    And   • polyethylene glycol/lytes (MIRALAX) PACKET 1 Packet  1 Packet Oral QDAY PRN Luis Gil D.O.        And   • magnesium hydroxide (MILK OF MAGNESIA) suspension 30 mL  30 mL Oral QDAY PRN Luis Gil D.O.        And   • bisacodyl (DULCOLAX) suppository 10 mg  10 mg Rectal QDAY PRN Luis Gil D.O.       • Pharmacy Consult Request - to monitor for nephrotoxic agents  1 Each Other PHARMACY TO DOSE Luis Gil D.O.       • Respiratory Therapy Consult   Nebulization Continuous RT Luis Gil D.O.       • lactated ringers infusion (BOLUS)  1,000 mL Intravenous Once PRN Luis Gil D.O.       • acetaminophen (Tylenol) tablet 650 mg  650 mg Oral Q6HRS PRN Luis Gil D.O.       • labetalol (NORMODYNE/TRANDATE) injection 10 mg  10 mg Intravenous Q4HRS PRN Luis Gil D.O.       • LORazepam (ATIVAN) injection 4 mg  4 mg Intravenous Q10 MIN PRN CHELSEY MotaO.           Fluids    Intake/Output Summary (Last 24 hours) at 6/19/2021 1550  Last data filed at 6/19/2021 1400  Gross per 24 hour   Intake 2239.64 ml   Output 2130 ml   Net 109.64 ml        Laboratory      Recent Labs     06/17/21  1600   CPKTOTAL 891*     Recent Labs     06/18/21  0540 06/18/21  1750 06/19/21  0302   SODIUM 139 142 140   POTASSIUM 3.8 3.6 3.6   CHLORIDE 111 112 110   CO2 17* 20 21   BUN 77* 59* 50*   CREATININE 2.20* 1.79* 1.51*   MAGNESIUM  --   --  1.7   PHOSPHORUS  --   --  2.8   CALCIUM 8.2* 8.0* 8.4*     Recent Labs     06/17/21  0950 06/17/21  0950 06/17/21  1600 06/17/21  2300 06/18/21  0540 06/18/21  1750 06/19/21  0302   ALTSGPT 43  --   --   --   --   --   --    ASTSGOT 57*  --   --   --   --   --   --    ALKPHOSPHAT 74  --   --   --   --   --   --    TBILIRUBIN <0.2  --   --   --   --   --   --    LIPASE  --   --  40  --   --   --   --    GLUCOSE 107*   < > 114*   < > 123* 203* 107*    < > = values in this interval not displayed.     Recent Labs     06/17/21  0950 06/17/21  1140 06/18/21  0540   WBC  --  15.6* 15.6*   NEUTSPOLYS  --  81.80* 80.90*   LYMPHOCYTES  --  5.20* 7.50*   MONOCYTES  --  11.30 9.80   EOSINOPHILS  --  0.00 0.40   BASOPHILS  --  0.00 0.40   ASTSGOT 57*  --   --    ALTSGPT 43  --   --    ALKPHOSPHAT 74  --   --    TBILIRUBIN <0.2  --   --      Recent Labs     06/17/21  1140 06/17/21  1600 06/18/21  0540 06/19/21  0034 06/19/21  1250   RBC 0.90*  --  2.81*  --   --    HEMOGLOBIN 2.9*   < > 8.4* 7.8* 8.0*   HEMATOCRIT 9.2*  --  25.1*  --  25.0*   PLATELETCT 223  --  202  --   --     < > = values in this interval not displayed.       Imaging  X-Ray:  I have personally reviewed the images and compared with prior images.    Assessment/Plan  SDH (subdural hematoma) (HCC)  Assessment & Plan  Hx of    GI bleed  Assessment & Plan  EEG 6/18 found Peptic ulcer disease with duodenal ulcers x3. Unable to obtain biopsy  Start Omeprazole 20mg PO BID, sucralfate solution 1 gram PO 4 times daily for 14 days- will check for H-pylori, and will stop  pantoprazole gtt  Serial monitor hg and transfuse < 7        Hypotension  Assessment & Plan  Hemorrhagic  shock    Sepsis (HCC)  Assessment & Plan  Doubt uti likely all severe anemia   Follow up cultures and de-escalate     Severe anemia  Assessment & Plan  S/p massive transfusion protocol in ER for Hg of 2.9 with shock 2nd to GI bleed  EEG today found Peptic ulcer disease with duodenal ulcers x3. Unable to obtain biopsy  6/18Omeprazole 20mg PO BID, sucralfate solution 1 gram PO 4 times daily for 14 days- will check for H-pylori, and will stop  pantoprazole gtt  6/19 HH stable- continue to monitor HH and transfuse based on assessment and lab findings- if remains stable this afternoon then will transfer    Lactate blood increased  Assessment & Plan  Related to post seizure and hemorrhagic shock  Doubt main  is sepsis    YEISON (acute kidney injury) (HCC)- (present on admission)  Assessment & Plan  Maintain euvolemia and monitor fluid responsiveness (avoid NaCL and renal congestion)  MAP > 65 uses pressors or inotropic trial  Monitor urine output and I&O's  Avoid and review nephrotoxin medication  Cr imporving      Seizure (HCC)- (present on admission)  Assessment & Plan  History of with medication noncompliant  S/p SZ in ER  Secondary since SDH  Seizure precautions  Avoid seizure lower drugs  Prn Ativan  keppra  XIANG protocol- librium - 4 doses     Essential hypertension  Assessment & Plan  Hx of hold with antihypertensives with hemorrhagic shock       VTE:  Contraindicated  Ulcer: PPI  Lines: Central Line  Ongoing indication addressed    I have performed a physical exam and reviewed and updated ROS and Plan today (6/19/2021). In review of yesterday's note (6/18/2021), there are no changes except as documented above.     Discussed patient condition and risk of morbidity and/or mortality with Hospitalist, RN, Therapies, Pharmacy, , Charge nurse / hot rounds and Patient  The patient remains critically ill.  Critical care time =34 minutes in directly providing and coordinating critical care and extensive data  review.  No time overlap and excludes procedures.

## 2021-06-19 NOTE — PROGRESS NOTES
Critical care progress note.    Seen per myself and seen/discussed with APRN.  Agree with findings except as noted in my note.  Please see my note below for further details.    65 y.o. female who presented 6/17/2021 with past medical history of subdural hematoma, seizure disorder and hypertension presented for 1 weak of weakness and poor oral intake. No fever chills, cough or shortness of breath. Hypotensive on arrival.  In ER she had generalized tonic clonic seizure.  Elevated lactic acid that normalized.  YEISON present on labs and hemoglobin 2.9.  Rectal exam showed melena.  She had a cordis placed and massive transfusion protocol given. I have been consulted for the above. No prior GI bleeds occasional wine drinking, no NSAIDs or blood thinners per patient. Hemoglobin up to 9.9, slowly downtrended to 8.4 overnight.  Metabolic acidosis improving but present.  Lactic acidosis resolved.  K improved to 3.8, prior hyperkalemia.  Negative blood cultures.  CT head negative for acute pathology.  US renal no hydronephrosis. Cxray post central line adequate placement.  No pneumothorax and no consolidation for pneumonia or significant edema.  On ppi drip, octreotide. Levophed for map > 65 and sbp > 90.    6/19  EGD afternoon with duodenal ulcers, multiple, no active bleeding. + esophagitis. Changed to omeprazole 20 mg bid, stopped ppi drip.  Sucralfate scheduled.  Ok to eat.    6/20: worsening etoh withdrawal, librium scheduled, sensitive to dosing of ativan adjusted scale.  Off levophed at midnight. Hb at 8 this afternoon, downtrended overnight but rebounded.  OK to transfer out of ICU.    Reviewed chart notes, orders, labs and imaging    Assessment and Plan  Seizure, resolved  Severe anemia, down to 2.9, s/p transfusions. Cordis in place in event of needing massive transfusion.    GI bleed, melena present, gi consulted, signed off after EGD.  ppi.  ETOH abuse and withdrawal, ciwa for now, librium    See APRN note for  billing.

## 2021-06-19 NOTE — PROGRESS NOTES
[facility] Medicine Daily Progress Note    Date of Service  6/19/2021    Chief Complaint  65 y.o. female admitted 6/17/2021 with weakness    Hospital Course  64yo PMHx Sz following SDH 10 ya, ETOH, HTN with weakness x several weeks.  EMS found pt with SBP 50 on scene and had a Sz in the ED.  Hgb 2.9.  EGD on 6/18 with findings of 3 peptic ulcers, severe ulcerative colitis.      Interval Problem Update  ROS limited as pt is slightly confused  Pt is hungry, c/o being sleepy  Sinus on monitor  No BM today      Consultants/Specialty  GI    Code Status  Full Code    Disposition  OK to floor    Review of Systems  Review of Systems   Unable to perform ROS: Other   Constitutional: Negative for chills and fever.   HENT: Negative for nosebleeds and sore throat.    Eyes: Negative for blurred vision and double vision.   Respiratory: Negative for cough and shortness of breath.    Cardiovascular: Negative for chest pain, palpitations and leg swelling.   Gastrointestinal: Negative for abdominal pain, diarrhea, nausea and vomiting.   Genitourinary: Negative for dysuria and urgency.   Musculoskeletal: Negative for back pain.   Skin: Negative for rash.   Neurological: Negative for dizziness, loss of consciousness and headaches.        Physical Exam  Temp:  [36.7 °C (98.1 °F)-37.2 °C (99 °F)] 36.7 °C (98.1 °F)  Pulse:  [] 83  Resp:  [14-44] 15  BP: (115-164)/(56-88) 164/81  SpO2:  [87 %-99 %] 97 %    Physical Exam  Vitals reviewed.   Constitutional:       General: She is not in acute distress.     Appearance: She is well-developed. She is not diaphoretic.   HENT:      Head: Normocephalic and atraumatic.   Neck:      Vascular: No JVD.   Cardiovascular:      Rate and Rhythm: Normal rate and regular rhythm.      Heart sounds: Murmur heard.     Pulmonary:      Effort: Pulmonary effort is normal. No respiratory distress.      Breath sounds: No stridor. No wheezing or rales.   Abdominal:      Palpations: Abdomen is soft.       Tenderness: There is no abdominal tenderness. There is no guarding or rebound.   Musculoskeletal:         General: No tenderness.      Right lower leg: No edema.      Left lower leg: No edema.   Skin:     General: Skin is warm and dry.      Findings: No rash.   Neurological:      Mental Status: She is oriented to person, place, and time. Mental status is at baseline.   Psychiatric:         Thought Content: Thought content normal.         Fluids    Intake/Output Summary (Last 24 hours) at 6/19/2021 1337  Last data filed at 6/19/2021 1200  Gross per 24 hour   Intake 1989.64 ml   Output 1580 ml   Net 409.64 ml       Laboratory  Recent Labs     06/17/21  1140 06/17/21  1600 06/18/21  0540 06/19/21  0034 06/19/21  1250   WBC 15.6*  --  15.6*  --   --    RBC 0.90*  --  2.81*  --   --    HEMOGLOBIN 2.9*   < > 8.4* 7.8* 8.0*   HEMATOCRIT 9.2*  --  25.1*  --  25.0*   .2*  --  89.3  --   --    MCH 32.2  --  29.9  --   --    MCHC 31.5*  --  33.5*  --   --    RDW 77.1*  --  54.9*  --   --    PLATELETCT 223  --  202  --   --    MPV 11.5  --  10.9  --   --     < > = values in this interval not displayed.     Recent Labs     06/18/21  0540 06/18/21  1750 06/19/21  0302   SODIUM 139 142 140   POTASSIUM 3.8 3.6 3.6   CHLORIDE 111 112 110   CO2 17* 20 21   GLUCOSE 123* 203* 107*   BUN 77* 59* 50*   CREATININE 2.20* 1.79* 1.51*   CALCIUM 8.2* 8.0* 8.4*                   Imaging  US-RENAL   Final Result      No hydronephrosis.      DX-CHEST-PORTABLE (1 VIEW)   Final Result      Status post right internal jugular catheter placement with the tip projecting over the superior vena cava. No pneumothorax is identified.      CT-HEAD W/O   Final Result    Examination is limited by patient motion.   1.  No acute intracranial abnormality is identified.   2.  Atrophy   3.  There are periventricular and subcortical white matter changes present.  This finding is nonspecific and could be from previous small vessel ischemia, demyelination, or  gliosis.      DX-CHEST-PORTABLE (1 VIEW)   Final Result      No acute cardiopulmonary findings.           Assessment/Plan  ETOH abuse  Assessment & Plan  Thiamine  Folate  MVI  CIWA protocol  Encourage cessation    SDH (subdural hematoma) ([facility])  Assessment & Plan  Distant Hx   Head CT on this admission unremarkable    GI bleed  Assessment & Plan  PUD x 3  BID PPI  Needs to quit drinking  Denies NSAID use    Hypotension  Assessment & Plan  Hemorrhagic shock resolved s/p resuscitation    Sepsis ([facility])  Assessment & Plan  NOT SEPSIS  Pt was tachycardic appropriately for sever anemia  Mild leukocytosis likely stress response    Severe anemia  Assessment & Plan  S/p EGD 6/18 with findings of 3 peptic ulcers and severe errosive esophagitis  No interventions on EGD as pt desaturated not allowing time  Cont PPI   Follow H&H; transfuse to goal >7    Debility- (present on admission)  Assessment & Plan  Uses walker at home due to shake  She states she doesn't know if she has parkinsons and isn't on any home meds for such.  PT/OT once stable.    Lactate blood increased  Assessment & Plan  Secondary to severe anemia    YEISON (acute kidney injury) ([facility])- (present on admission)  Assessment & Plan  Prerenal due to hemorrhagic shock and severe anemia  Improving now s/p resuscitation  Con to follow BMP  Renally dose medications as appropriate    Seizure ([facility])- (present on admission)  Assessment & Plan  keppra 500mg IV BID  Seizure precautions  Occurred after a Subdural hematoma in 2004  aspirationr precautions.  Head CT benign    Essential hypertension  Assessment & Plan  On lisinopril/HCTZ as outpt  Follow pressures and resume as appropriate       VTE prophylaxis: SCDs

## 2021-06-20 LAB
ANION GAP SERPL CALC-SCNC: 7 MMOL/L (ref 7–16)
BUN SERPL-MCNC: 29 MG/DL (ref 8–22)
CALCIUM SERPL-MCNC: 8.2 MG/DL (ref 8.5–10.5)
CHLORIDE SERPL-SCNC: 113 MMOL/L (ref 96–112)
CO2 SERPL-SCNC: 23 MMOL/L (ref 20–33)
CREAT SERPL-MCNC: 1.07 MG/DL (ref 0.5–1.4)
GLUCOSE SERPL-MCNC: 102 MG/DL (ref 65–99)
HCT VFR BLD AUTO: 23.7 % (ref 37–47)
HGB BLD-MCNC: 7.6 G/DL (ref 12–16)
POTASSIUM SERPL-SCNC: 3.8 MMOL/L (ref 3.6–5.5)
SODIUM SERPL-SCNC: 143 MMOL/L (ref 135–145)

## 2021-06-20 PROCEDURE — A9270 NON-COVERED ITEM OR SERVICE: HCPCS | Performed by: INTERNAL MEDICINE

## 2021-06-20 PROCEDURE — 92610 EVALUATE SWALLOWING FUNCTION: CPT

## 2021-06-20 PROCEDURE — 80048 BASIC METABOLIC PNL TOTAL CA: CPT

## 2021-06-20 PROCEDURE — 99232 SBSQ HOSP IP/OBS MODERATE 35: CPT | Performed by: HOSPITALIST

## 2021-06-20 PROCEDURE — 700102 HCHG RX REV CODE 250 W/ 637 OVERRIDE(OP): Performed by: NURSE PRACTITIONER

## 2021-06-20 PROCEDURE — 85014 HEMATOCRIT: CPT

## 2021-06-20 PROCEDURE — 700105 HCHG RX REV CODE 258: Performed by: INTERNAL MEDICINE

## 2021-06-20 PROCEDURE — 700102 HCHG RX REV CODE 250 W/ 637 OVERRIDE(OP): Performed by: INTERNAL MEDICINE

## 2021-06-20 PROCEDURE — A9270 NON-COVERED ITEM OR SERVICE: HCPCS | Performed by: NURSE PRACTITIONER

## 2021-06-20 PROCEDURE — 700111 HCHG RX REV CODE 636 W/ 250 OVERRIDE (IP): Performed by: HOSPITALIST

## 2021-06-20 PROCEDURE — 85018 HEMOGLOBIN: CPT

## 2021-06-20 PROCEDURE — 770006 HCHG ROOM/CARE - MED/SURG/GYN SEMI*

## 2021-06-20 RX ADMIN — LEVETIRACETAM INJECTION 500 MG: 5 INJECTION INTRAVENOUS at 17:14

## 2021-06-20 RX ADMIN — CHLORDIAZEPOXIDE HYDROCHLORIDE 50 MG: 25 CAPSULE ORAL at 00:22

## 2021-06-20 RX ADMIN — LEVETIRACETAM INJECTION 500 MG: 5 INJECTION INTRAVENOUS at 06:02

## 2021-06-20 RX ADMIN — SUCRALFATE 1 G: 1 SUSPENSION ORAL at 16:59

## 2021-06-20 RX ADMIN — SUCRALFATE 1 G: 1 SUSPENSION ORAL at 00:22

## 2021-06-20 RX ADMIN — SUCRALFATE 1 G: 1 SUSPENSION ORAL at 12:13

## 2021-06-20 RX ADMIN — CHLORDIAZEPOXIDE HYDROCHLORIDE 50 MG: 25 CAPSULE ORAL at 06:01

## 2021-06-20 RX ADMIN — OMEPRAZOLE 20 MG: 20 CAPSULE, DELAYED RELEASE ORAL at 16:59

## 2021-06-20 RX ADMIN — OMEPRAZOLE 20 MG: 20 CAPSULE, DELAYED RELEASE ORAL at 06:02

## 2021-06-20 RX ADMIN — SUCRALFATE 1 G: 1 SUSPENSION ORAL at 06:02

## 2021-06-20 RX ADMIN — SODIUM CHLORIDE, POTASSIUM CHLORIDE, SODIUM LACTATE AND CALCIUM CHLORIDE: 600; 310; 30; 20 INJECTION, SOLUTION INTRAVENOUS at 16:22

## 2021-06-20 RX ADMIN — SUCRALFATE 1 G: 1 SUSPENSION ORAL at 23:12

## 2021-06-20 ASSESSMENT — ENCOUNTER SYMPTOMS
CHILLS: 0
BACK PAIN: 0
BLURRED VISION: 0
COUGH: 0
NAUSEA: 0
DIZZINESS: 0
HEADACHES: 0
ABDOMINAL PAIN: 0
DOUBLE VISION: 0
SHORTNESS OF BREATH: 0
VOMITING: 0
DIARRHEA: 0
FEVER: 0
LOSS OF CONSCIOUSNESS: 0
PALPITATIONS: 0

## 2021-06-20 ASSESSMENT — PAIN DESCRIPTION - PAIN TYPE
TYPE: ACUTE PAIN
TYPE: ACUTE PAIN

## 2021-06-20 NOTE — PROGRESS NOTES
Pt arrived to unit via gurney at 1600 and was transferred to hospital bed, bed alarm is in place. Pt is on 1.5 LO2 via nasal cannula,SPO2% is 95%. Pt had BMtania is in plcePt oriented to room, unit, and plan of care. LR running at 75 ml/hr. 1All questions answered at this time. Call light within reach; fall precautions in place.     Security was called to check in item including pt's ring and a watch. Placed in sealed bag.
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
## 2021-06-20 NOTE — PROGRESS NOTES
Pt given AM librium, carafate, and omeprazole. Held othe PO AM medications till swallow eval can be completed.

## 2021-06-20 NOTE — PROGRESS NOTES
On 06/20/21 one ring and one watch were sent with the patient on her transfer to [place] [id number]

## 2021-06-20 NOTE — PROGRESS NOTES
[facility] Medicine Daily Progress Note    Date of Service  6/20/2021    Chief Complaint  65 y.o. female admitted 6/17/2021 with weakness    Hospital Course  64yo PMHx Sz following SDH 10 ya, ETOH, HTN with weakness x several weeks.  EMS found pt with SBP 50 on scene and had a Sz in the ED.  Hgb 2.9.  EGD on 6/18 with findings of 3 peptic ulcers, severe ulcerative colitis.      Interval Problem Update  ROS limited as pt is slightly confused  Pt is hungry, c/o being sleepy  Sinus on monitor  No BM today      Consultants/Specialty  GI    Code Status  Full Code    Disposition  OK to floor    Review of Systems  Review of Systems   Unable to perform ROS: Other   Constitutional: Negative for chills and fever.   Eyes: Negative for blurred vision and double vision.   Respiratory: Negative for cough and shortness of breath.    Cardiovascular: Negative for chest pain, palpitations and leg swelling.   Gastrointestinal: Negative for abdominal pain, diarrhea, nausea and vomiting.   Genitourinary: Negative for dysuria and urgency.   Musculoskeletal: Negative for back pain.   Skin: Negative for rash.   Neurological: Negative for dizziness, loss of consciousness and headaches.        Physical Exam  Temp:  [36.1 °C (97 °F)-37.3 °C (99.1 °F)] 36.8 °C (98.2 °F)  Pulse:  [] 90  Resp:  [18-21] 18  BP: (134-160)/(72-95) 134/76  SpO2:  [91 %-99 %] 95 %    Physical Exam  Vitals reviewed.   Constitutional:       General: She is not in acute distress.     Appearance: She is well-developed. She is not diaphoretic.   HENT:      Head: Normocephalic and atraumatic.   Neck:      Vascular: No JVD.   Cardiovascular:      Rate and Rhythm: Normal rate and regular rhythm.      Heart sounds: Murmur heard.     Pulmonary:      Effort: Pulmonary effort is normal. No respiratory distress.      Breath sounds: No stridor. No wheezing or rales.   Abdominal:      Palpations: Abdomen is soft.      Tenderness: There is no abdominal tenderness. There is no  guarding or rebound.   Musculoskeletal:         General: No tenderness.      Right lower leg: No edema.      Left lower leg: No edema.   Skin:     General: Skin is warm and dry.      Findings: No rash.   Neurological:      Mental Status: She is oriented to person, place, and time. Mental status is at baseline.   Psychiatric:         Mood and Affect: Mood normal.         Thought Content: Thought content normal.         Fluids    Intake/Output Summary (Last 24 hours) at 6/20/2021 1356  Last data filed at 6/20/2021 0800  Gross per 24 hour   Intake 2270 ml   Output 1625 ml   Net 645 ml       Laboratory  Recent Labs     06/18/21  0540 06/18/21  0540 06/19/21  0034 06/19/21  1250 06/20/21  0315   WBC 15.6*  --   --   --   --    RBC 2.81*  --   --   --   --    HEMOGLOBIN 8.4*   < > 7.8* 8.0* 7.6*   HEMATOCRIT 25.1*  --   --  25.0* 23.7*   MCV 89.3  --   --   --   --    MCH 29.9  --   --   --   --    MCHC 33.5*  --   --   --   --    RDW 54.9*  --   --   --   --    PLATELETCT 202  --   --   --   --    MPV 10.9  --   --   --   --     < > = values in this interval not displayed.     Recent Labs     06/18/21  1750 06/19/21  0302 06/20/21  0315   SODIUM 142 140 143   POTASSIUM 3.6 3.6 3.8   CHLORIDE 112 110 113*   CO2 20 21 23   GLUCOSE 203* 107* 102*   BUN 59* 50* 29*   CREATININE 1.79* 1.51* 1.07   CALCIUM 8.0* 8.4* 8.2*                   Imaging  US-RENAL   Final Result      No hydronephrosis.      DX-CHEST-PORTABLE (1 VIEW)   Final Result      Status post right internal jugular catheter placement with the tip projecting over the superior vena cava. No pneumothorax is identified.      CT-HEAD W/O   Final Result    Examination is limited by patient motion.   1.  No acute intracranial abnormality is identified.   2.  Atrophy   3.  There are periventricular and subcortical white matter changes present.  This finding is nonspecific and could be from previous small vessel ischemia, demyelination, or gliosis.      DX-CHEST-PORTABLE  (1 VIEW)   Final Result      No acute cardiopulmonary findings.           Assessment/Plan  ETOH abuse  Assessment & Plan  Thiamine  Folate  MVI  CIWA protocol  Encourage cessation    SDH (subdural hematoma) ([facility])  Assessment & Plan  Distant Hx   Head CT on this admission unremarkable    GI bleed  Assessment & Plan  PUD x 3  BID PPI  Needs to quit drinking  Denies NSAID use    Hypotension  Assessment & Plan  Hemorrhagic shock resolved s/p resuscitation    Sepsis ([facility])  Assessment & Plan  NOT SEPSIS  Pt was tachycardic appropriately for sever anemia  Mild leukocytosis likely stress response    Severe anemia  Assessment & Plan  S/p EGD 6/18 with findings of 3 peptic ulcers and severe errosive esophagitis  No interventions on EGD as pt desaturated not allowing time  Cont PPI   Follow H&H; transfuse to goal >7    Debility- (present on admission)  Assessment & Plan  Uses walker at home due to shake  She states she doesn't know if she has parkinsons and isn't on any home meds for such.  PT/OT once stable.    Lactate blood increased  Assessment & Plan  Secondary to severe anemia    YEISON (acute kidney injury) ([facility])- (present on admission)  Assessment & Plan  Prerenal due to hemorrhagic shock and severe anemia  Improving now s/p resuscitation  Con to follow BMP  Renally dose medications as appropriate    Seizure ([facility])- (present on admission)  Assessment & Plan  keppra 500mg IV BID  Seizure precautions  Occurred after a Subdural hematoma in 2004  aspirationr precautions.  Head CT benign    Essential hypertension  Assessment & Plan  On lisinopril/HCTZ as outpt  Follow pressures and resume as appropriate       VTE prophylaxis: SCDs

## 2021-06-20 NOTE — PROGRESS NOTES
[facility] with all personal belongings including pair of gold earings (in pink denture case), red/black sitdown walker, and shirt after giving report to Alpesh.

## 2021-06-20 NOTE — CARE PLAN
The patient is Watcher - Medium risk of patient condition declining or worsening    Shift Goals  Clinical Goals: Stable H&H, RASS= 0 to -1.  Patient Goals: Eat/Drink  Family Goals: N/A    Progress made toward(s) clinical / shift goals:  Pt has received ativan x2 for RASS = +1/2.  H&H remains stable, checking Q12.      Patient is not progressing towards the following goals:    Problem: Risk for Aspiration  Goal: Patient's risk for aspiration will be absent or decrease  6/19/2021 1926 by Thea Doss, R.N.  Outcome: Not Progressing  Note: Pt given 1800 meds, started coughing and drooling out medication.  Pt made NPO.  6/19/2021 1924 by Thea Doss, R.N.  Outcome: Progressing     Problem: Nutrition  Goal: Patient's nutritional and fluid intake will be adequate or improve  Outcome: Not Progressing  Note: Pt has been lethargic all day, has had minimal PO intake.  With 1800 po meds pt started coughing and drooling out meds.  Pt made NPO.  Swallow eval ordered.       Problem: Respiratory  Goal: Patient will achieve/maintain optimum respiratory ventilation and gas exchange  Outcome: Progressing  Note: Pt weaned from 5L oxymask to 2L nasal cannula through the day.  Attempted room air but pt would desat down to 85%

## 2021-06-21 PROBLEM — K20.90 ESOPHAGITIS: Status: ACTIVE | Noted: 2021-06-21

## 2021-06-21 LAB
ANION GAP SERPL CALC-SCNC: 8 MMOL/L (ref 7–16)
BUN SERPL-MCNC: 15 MG/DL (ref 8–22)
CALCIUM SERPL-MCNC: 8.2 MG/DL (ref 8.5–10.5)
CHLORIDE SERPL-SCNC: 109 MMOL/L (ref 96–112)
CO2 SERPL-SCNC: 22 MMOL/L (ref 20–33)
CREAT SERPL-MCNC: 0.83 MG/DL (ref 0.5–1.4)
GLUCOSE SERPL-MCNC: 85 MG/DL (ref 65–99)
HCT VFR BLD AUTO: 27.5 % (ref 37–47)
HGB BLD-MCNC: 8.8 G/DL (ref 12–16)
MAGNESIUM SERPL-MCNC: 1.2 MG/DL (ref 1.5–2.5)
PHOSPHATE SERPL-MCNC: 1.7 MG/DL (ref 2.5–4.5)
POTASSIUM SERPL-SCNC: 3.6 MMOL/L (ref 3.6–5.5)
SODIUM SERPL-SCNC: 139 MMOL/L (ref 135–145)

## 2021-06-21 PROCEDURE — 700111 HCHG RX REV CODE 636 W/ 250 OVERRIDE (IP): Performed by: HOSPITALIST

## 2021-06-21 PROCEDURE — 84100 ASSAY OF PHOSPHORUS: CPT

## 2021-06-21 PROCEDURE — 700102 HCHG RX REV CODE 250 W/ 637 OVERRIDE(OP): Performed by: INTERNAL MEDICINE

## 2021-06-21 PROCEDURE — 80048 BASIC METABOLIC PNL TOTAL CA: CPT

## 2021-06-21 PROCEDURE — A9270 NON-COVERED ITEM OR SERVICE: HCPCS | Performed by: HOSPITALIST

## 2021-06-21 PROCEDURE — 700101 HCHG RX REV CODE 250: Performed by: STUDENT IN AN ORGANIZED HEALTH CARE EDUCATION/TRAINING PROGRAM

## 2021-06-21 PROCEDURE — 99232 SBSQ HOSP IP/OBS MODERATE 35: CPT | Performed by: STUDENT IN AN ORGANIZED HEALTH CARE EDUCATION/TRAINING PROGRAM

## 2021-06-21 PROCEDURE — 770006 HCHG ROOM/CARE - MED/SURG/GYN SEMI*

## 2021-06-21 PROCEDURE — A9270 NON-COVERED ITEM OR SERVICE: HCPCS | Performed by: NURSE PRACTITIONER

## 2021-06-21 PROCEDURE — A9270 NON-COVERED ITEM OR SERVICE: HCPCS | Performed by: STUDENT IN AN ORGANIZED HEALTH CARE EDUCATION/TRAINING PROGRAM

## 2021-06-21 PROCEDURE — 83735 ASSAY OF MAGNESIUM: CPT

## 2021-06-21 PROCEDURE — 85018 HEMOGLOBIN: CPT

## 2021-06-21 PROCEDURE — 700102 HCHG RX REV CODE 250 W/ 637 OVERRIDE(OP): Performed by: NURSE PRACTITIONER

## 2021-06-21 PROCEDURE — 36415 COLL VENOUS BLD VENIPUNCTURE: CPT

## 2021-06-21 PROCEDURE — 700102 HCHG RX REV CODE 250 W/ 637 OVERRIDE(OP): Performed by: HOSPITALIST

## 2021-06-21 PROCEDURE — A9270 NON-COVERED ITEM OR SERVICE: HCPCS | Performed by: INTERNAL MEDICINE

## 2021-06-21 PROCEDURE — 85014 HEMATOCRIT: CPT

## 2021-06-21 PROCEDURE — 700102 HCHG RX REV CODE 250 W/ 637 OVERRIDE(OP): Performed by: STUDENT IN AN ORGANIZED HEALTH CARE EDUCATION/TRAINING PROGRAM

## 2021-06-21 PROCEDURE — 700111 HCHG RX REV CODE 636 W/ 250 OVERRIDE (IP): Performed by: STUDENT IN AN ORGANIZED HEALTH CARE EDUCATION/TRAINING PROGRAM

## 2021-06-21 RX ORDER — LEVETIRACETAM 500 MG/1
500 TABLET ORAL 2 TIMES DAILY
Status: DISCONTINUED | OUTPATIENT
Start: 2021-06-21 | End: 2021-06-26 | Stop reason: HOSPADM

## 2021-06-21 RX ORDER — MAGNESIUM SULFATE HEPTAHYDRATE 40 MG/ML
2 INJECTION, SOLUTION INTRAVENOUS ONCE
Status: COMPLETED | OUTPATIENT
Start: 2021-06-21 | End: 2021-06-21

## 2021-06-21 RX ORDER — LEVETIRACETAM 100 MG/ML
500 SOLUTION ORAL EVERY 12 HOURS
Status: DISCONTINUED | OUTPATIENT
Start: 2021-06-21 | End: 2021-06-21

## 2021-06-21 RX ADMIN — MAGNESIUM SULFATE IN WATER 2 G: 40 INJECTION, SOLUTION INTRAVENOUS at 11:32

## 2021-06-21 RX ADMIN — DOCUSATE SODIUM 50 MG AND SENNOSIDES 8.6 MG 2 TABLET: 8.6; 5 TABLET, FILM COATED ORAL at 17:52

## 2021-06-21 RX ADMIN — SUCRALFATE 1 G: 1 SUSPENSION ORAL at 17:53

## 2021-06-21 RX ADMIN — OMEPRAZOLE 20 MG: 20 CAPSULE, DELAYED RELEASE ORAL at 17:52

## 2021-06-21 RX ADMIN — THERA TABS 1 TABLET: TAB at 05:09

## 2021-06-21 RX ADMIN — LEVETIRACETAM 500 MG: 500 TABLET ORAL at 17:54

## 2021-06-21 RX ADMIN — SUCRALFATE 1 G: 1 SUSPENSION ORAL at 13:39

## 2021-06-21 RX ADMIN — SUCRALFATE 1 G: 1 SUSPENSION ORAL at 05:09

## 2021-06-21 RX ADMIN — FOLIC ACID 1 MG: 1 TABLET ORAL at 05:09

## 2021-06-21 RX ADMIN — POTASSIUM PHOSPHATE, MONOBASIC AND POTASSIUM PHOSPHATE, DIBASIC 30 MMOL: 224; 236 INJECTION, SOLUTION, CONCENTRATE INTRAVENOUS at 13:41

## 2021-06-21 RX ADMIN — LEVETIRACETAM 500 MG: 500 TABLET ORAL at 11:32

## 2021-06-21 RX ADMIN — OMEPRAZOLE 20 MG: 20 CAPSULE, DELAYED RELEASE ORAL at 05:10

## 2021-06-21 RX ADMIN — Medication 100 MG: at 05:09

## 2021-06-21 RX ADMIN — ACETAMINOPHEN 650 MG: 325 TABLET, FILM COATED ORAL at 17:54

## 2021-06-21 RX ADMIN — LEVETIRACETAM INJECTION 500 MG: 5 INJECTION INTRAVENOUS at 05:09

## 2021-06-21 ASSESSMENT — ENCOUNTER SYMPTOMS
FEVER: 0
SHORTNESS OF BREATH: 0
VOMITING: 0
PALPITATIONS: 0
COUGH: 0
HEADACHES: 0
NAUSEA: 0
DIZZINESS: 0
BACK PAIN: 0
ABDOMINAL PAIN: 0
CHILLS: 0
DOUBLE VISION: 0
DIARRHEA: 0
LOSS OF CONSCIOUSNESS: 0
BLURRED VISION: 0

## 2021-06-21 ASSESSMENT — PAIN DESCRIPTION - PAIN TYPE: TYPE: ACUTE PAIN

## 2021-06-21 NOTE — CARE PLAN
The patient is Stable - Low risk of patient condition declining or worsening    Shift Goals  Clinical Goals: Patient will remain comfortable and seizure free throughout shift.   Patient Goals: Rest  Family Goals: N/A    Progress made toward(s) clinical / shift goals:  Lack of seizure activity throughout shift. Denies need for administration of pain medication, resting comfortably.    Patient is not progressing towards the following goals:

## 2021-06-21 NOTE — CARE PLAN
The patient is Stable - Low risk of patient condition declining or worsening    Shift Goals  Clinical Goals: Pt will remain free from seizures   Patient Goals: Rest  Family Goals: N/A    Problem: Seizure Precautions  Goal: Implementation of seizure precautions  Outcome: Progressing     Problem: Risk for Aspiration  Goal: Patient's risk for aspiration will be absent or decrease  Outcome: Progressing       Progress made toward(s) clinical / shift goals: seizure precautions in place; pt remains free from seizures on this shift. Head of bed elevated.

## 2021-06-21 NOTE — PROGRESS NOTES
[facility] Medicine Daily Progress Note    Date of Service  6/21/2021    Chief Complaint  65 y.o. female admitted 6/17/2021 with weakness    Hospital Course  64yo PMHx Sz following SDH 10 ya, ETOH, HTN with weakness x several weeks.  EMS found pt with SBP 50 on scene and had a Sz in the ED.  Hgb 2.9.  EGD on 6/18 with findings of 3 peptic ulcers, severe ulcerative esophagitis    Interval Problem Update  Patient seen and examined at bedside this morning.  No acute events overnight.     Patient very cooperative on examination this morning. Alert and oriented x3-4.  Hemoglobin remained stable.  No complaints of seizures the last 48 hours.  Labs with findings of hypophosphatemia and hypomagnesemia.  Repleted.  Pending PT/OT evaluation for disposition.    Consultants/Specialty  GI    Code Status  Full Code    Disposition  Pending PT/OT.    Review of Systems  Review of Systems   Constitutional: Negative for chills and fever.   Eyes: Negative for blurred vision and double vision.   Respiratory: Negative for cough and shortness of breath.    Cardiovascular: Negative for chest pain, palpitations and leg swelling.   Gastrointestinal: Negative for abdominal pain, diarrhea, nausea and vomiting.   Genitourinary: Negative for dysuria and urgency.   Musculoskeletal: Negative for back pain.   Skin: Negative for rash.   Neurological: Negative for dizziness, loss of consciousness and headaches.        Physical Exam  Temp:  [36.2 °C (97.1 °F)-37.7 °C (99.8 °F)] 36.2 °C (97.1 °F)  Pulse:  [77-98] 77  Resp:  [16-18] 18  BP: (120-161)/(69-93) 143/88  SpO2:  [94 %-98 %] 94 %    Physical Exam  Vitals reviewed.   Constitutional:       General: She is not in acute distress.     Appearance: She is well-developed. She is not diaphoretic.   HENT:      Head: Normocephalic and atraumatic.   Neck:      Vascular: No JVD.   Cardiovascular:      Rate and Rhythm: Normal rate and regular rhythm.      Heart sounds: Murmur heard.     Pulmonary:       Effort: Pulmonary effort is normal. No respiratory distress.      Breath sounds: No stridor. No wheezing or rales.   Abdominal:      Palpations: Abdomen is soft.      Tenderness: There is no abdominal tenderness. There is no guarding or rebound.   Musculoskeletal:         General: No tenderness.      Right lower leg: No edema.      Left lower leg: No edema.   Skin:     General: Skin is warm and dry.      Findings: No rash.   Neurological:      Mental Status: She is oriented to person, place, and time. Mental status is at baseline.   Psychiatric:         Mood and Affect: Mood normal.         Thought Content: Thought content normal.         Fluids    Intake/Output Summary (Last 24 hours) at 6/21/2021 1123  Last data filed at 6/21/2021 1000  Gross per 24 hour   Intake 1530 ml   Output 300 ml   Net 1230 ml       Laboratory  Recent Labs     06/19/21  1250 06/20/21  0315 06/21/21  0909   HEMOGLOBIN 8.0* 7.6* 8.8*   HEMATOCRIT 25.0* 23.7* 27.5*     Recent Labs     06/19/21  0302 06/20/21  0315 06/21/21  0909   SODIUM 140 143 139   POTASSIUM 3.6 3.8 3.6   CHLORIDE 110 113* 109   CO2 21 23 22   GLUCOSE 107* 102* 85   BUN 50* 29* 15   CREATININE 1.51* 1.07 0.83   CALCIUM 8.4* 8.2* 8.2*                   Imaging  US-RENAL   Final Result      No hydronephrosis.      DX-CHEST-PORTABLE (1 VIEW)   Final Result      Status post right internal jugular catheter placement with the tip projecting over the superior vena cava. No pneumothorax is identified.      CT-HEAD W/O   Final Result    Examination is limited by patient motion.   1.  No acute intracranial abnormality is identified.   2.  Atrophy   3.  There are periventricular and subcortical white matter changes present.  This finding is nonspecific and could be from previous small vessel ischemia, demyelination, or gliosis.      DX-CHEST-PORTABLE (1 VIEW)   Final Result      No acute cardiopulmonary findings.           Assessment/Plan  * GI bleed  Assessment & Plan  PUD x 3  BID  PPI  Needs to quit drinking  Denies NSAID use    Seizure ([facility])- (present on admission)  Assessment & Plan  keppra 500mg BID  Seizure precautions  Occurred after a Subdural hematoma in 2004  aspirationr precautions.  Head CT benign    Esophagitis  Assessment & Plan  S/p EGD.  On PPI.    ETOH abuse  Assessment & Plan  Thiamine  Folate  MVI  [facility] protocol  Encourage cessation    SDH (subdural hematoma) ([facility])  Assessment & Plan  Distant Hx   Head CT on this admission unremarkable    Hypotension  Assessment & Plan  Hemorrhagic shock resolved s/p resuscitation    Sepsis ([facility])  Assessment & Plan  NOT SEPSIS  Pt was tachycardic appropriately for severe anemia  Mild leukocytosis likely stress response      Resolved.    Severe anemia  Assessment & Plan  S/p EGD 6/18 with findings of 3 peptic ulcers and severe errosive esophagitis  No interventions on EGD as pt desaturated not allowing time  Cont PPI   Follow H&H; transfuse to goal >7.    Debility- (present on admission)  Assessment & Plan  Uses walker at home due to shake  She states she doesn't know if she has parkinsons and isn't on any home meds for such.  PT/OT consulted to evaluate patient.    Lactate blood increased  Assessment & Plan  Secondary to severe anemia    Resolved.    YEISON (acute kidney injury) ([facility])- (present on admission)  Assessment & Plan  Prerenal due to hemorrhagic shock and severe anemia  Improving now s/p resuscitation  Con to follow BMP  Renally dose medications as appropriate    Resolved.    Essential hypertension  Assessment & Plan  On lisinopril/HCTZ as outpt  Follow pressures and resume as appropriate       VTE prophylaxis: SCDs

## 2021-06-21 NOTE — PROGRESS NOTES
Pt is A&Ox4, lethargic. Pt is resting in bed, no signs of labored breathing on 1.5 L via NC. No reports of pain. Seizure precautions in place. Call light & personal belongings within reach, bed in lowest position & locked, and bed alarm is on. Fall precautions in place and education provided on how to use call light. Pt running LR at 75mL/hr. Pt updated on plan of care for the shift. Pt declines any additional needs at this time. Hourly rounding in place.

## 2021-06-21 NOTE — CARE PLAN
The patient is Stable - Low risk of patient condition declining or worsening    Shift Goals  Clinical Goals: Maintain stable H&H; improve RASS  Patient Goals: Rest  Family Goals: N/A    Progress made toward(s) clinical / shift goals:  Pt remains stable at this time.     Patient is not progressing towards the following goals:

## 2021-06-21 NOTE — PROGRESS NOTES
4 Eyes Skin Assessment Completed by MIRIAN Betts and MIRIAN Gutierrez.    Head WDL  Ears WDL  Nose WDL  Mouth WDL  Neck WDL  Breast/Chest WDL  Shoulder Blades WDL  Spine WDL  (R) Arm/Elbow/Hand Blanching  (L) Arm/Elbow/Hand Redness left elbow redness, blanching  Abdomen Blanching  Groin purewick in place  Scrotum/Coccyx/Buttocks Blanching  (R) Leg Blanching  (L) Leg Blanching  (R) Heel/Foot/Toe Blanching  Red   (L) Heel/Foot/Toe Blanching redness on pressure point on left medial area on 1st toe          Devices In Places Nasal Cannula purewick in place      Interventions In Place NC W/Ear Foams, Heel Mepilex, Elbow Mepilex, Q2 Turns, Barrier Cream and Heels Loaded W/Pillows    Possible Skin Injury No    Pictures Uploaded Into Epic N/A  Wound Consult Placed N/A  RN Wound Prevention Protocol Ordered No

## 2021-06-21 NOTE — PROGRESS NOTES
AAOx4. C/o 0/10 pain, declining intervention at this time. -N/V. -N/T. Denies new onset of chest pain/SOB. +BS in all 4 quadrants. Room air, satting > 90%. LR running at 75 ml. Purwick in place. POC discussed, denies further needs at this time. Bed alarm on, call light within reach & hourly rounding in place.

## 2021-06-22 PROBLEM — R79.89 LACTATE BLOOD INCREASED: Status: RESOLVED | Noted: 2021-04-16 | Resolved: 2021-06-22

## 2021-06-22 PROBLEM — N17.9 AKI (ACUTE KIDNEY INJURY) (HCC): Status: RESOLVED | Noted: 2021-04-16 | Resolved: 2021-06-22

## 2021-06-22 LAB
ANION GAP SERPL CALC-SCNC: 7 MMOL/L (ref 7–16)
BACTERIA BLD CULT: NORMAL
BACTERIA BLD CULT: NORMAL
BUN SERPL-MCNC: 9 MG/DL (ref 8–22)
CALCIUM SERPL-MCNC: 7.6 MG/DL (ref 8.5–10.5)
CHLORIDE SERPL-SCNC: 107 MMOL/L (ref 96–112)
CO2 SERPL-SCNC: 23 MMOL/L (ref 20–33)
CREAT SERPL-MCNC: 0.73 MG/DL (ref 0.5–1.4)
ERYTHROCYTE [DISTWIDTH] IN BLOOD BY AUTOMATED COUNT: 55.8 FL (ref 35.9–50)
GLUCOSE SERPL-MCNC: 86 MG/DL (ref 65–99)
HCT VFR BLD AUTO: 26.2 % (ref 37–47)
HGB BLD-MCNC: 8.4 G/DL (ref 12–16)
MAGNESIUM SERPL-MCNC: 1.5 MG/DL (ref 1.5–2.5)
MCH RBC QN AUTO: 29 PG (ref 27–33)
MCHC RBC AUTO-ENTMCNC: 32.1 G/DL (ref 33.6–35)
MCV RBC AUTO: 90.3 FL (ref 81.4–97.8)
PHOSPHATE SERPL-MCNC: 2.2 MG/DL (ref 2.5–4.5)
PLATELET # BLD AUTO: 294 K/UL (ref 164–446)
PMV BLD AUTO: 10.6 FL (ref 9–12.9)
POTASSIUM SERPL-SCNC: 3.1 MMOL/L (ref 3.6–5.5)
RBC # BLD AUTO: 2.9 M/UL (ref 4.2–5.4)
SIGNIFICANT IND 70042: NORMAL
SIGNIFICANT IND 70042: NORMAL
SITE SITE: NORMAL
SITE SITE: NORMAL
SODIUM SERPL-SCNC: 137 MMOL/L (ref 135–145)
SOURCE SOURCE: NORMAL
SOURCE SOURCE: NORMAL
WBC # BLD AUTO: 8.7 K/UL (ref 4.8–10.8)

## 2021-06-22 PROCEDURE — 700102 HCHG RX REV CODE 250 W/ 637 OVERRIDE(OP): Performed by: STUDENT IN AN ORGANIZED HEALTH CARE EDUCATION/TRAINING PROGRAM

## 2021-06-22 PROCEDURE — 700102 HCHG RX REV CODE 250 W/ 637 OVERRIDE(OP): Performed by: INTERNAL MEDICINE

## 2021-06-22 PROCEDURE — A9270 NON-COVERED ITEM OR SERVICE: HCPCS | Performed by: NURSE PRACTITIONER

## 2021-06-22 PROCEDURE — 36415 COLL VENOUS BLD VENIPUNCTURE: CPT

## 2021-06-22 PROCEDURE — 99232 SBSQ HOSP IP/OBS MODERATE 35: CPT | Performed by: INTERNAL MEDICINE

## 2021-06-22 PROCEDURE — 97162 PT EVAL MOD COMPLEX 30 MIN: CPT

## 2021-06-22 PROCEDURE — 700101 HCHG RX REV CODE 250: Performed by: INTERNAL MEDICINE

## 2021-06-22 PROCEDURE — 770006 HCHG ROOM/CARE - MED/SURG/GYN SEMI*

## 2021-06-22 PROCEDURE — 83735 ASSAY OF MAGNESIUM: CPT

## 2021-06-22 PROCEDURE — 700102 HCHG RX REV CODE 250 W/ 637 OVERRIDE(OP): Performed by: NURSE PRACTITIONER

## 2021-06-22 PROCEDURE — A9270 NON-COVERED ITEM OR SERVICE: HCPCS | Performed by: STUDENT IN AN ORGANIZED HEALTH CARE EDUCATION/TRAINING PROGRAM

## 2021-06-22 PROCEDURE — 84100 ASSAY OF PHOSPHORUS: CPT

## 2021-06-22 PROCEDURE — 80048 BASIC METABOLIC PNL TOTAL CA: CPT

## 2021-06-22 PROCEDURE — A9270 NON-COVERED ITEM OR SERVICE: HCPCS | Performed by: INTERNAL MEDICINE

## 2021-06-22 PROCEDURE — 85027 COMPLETE CBC AUTOMATED: CPT

## 2021-06-22 RX ADMIN — POTASSIUM PHOSPHATE, MONOBASIC AND POTASSIUM PHOSPHATE, DIBASIC 30 MMOL: 224; 236 INJECTION, SOLUTION, CONCENTRATE INTRAVENOUS at 12:11

## 2021-06-22 RX ADMIN — LEVETIRACETAM 500 MG: 500 TABLET ORAL at 05:45

## 2021-06-22 RX ADMIN — SUCRALFATE 1 G: 1 SUSPENSION ORAL at 05:45

## 2021-06-22 RX ADMIN — FOLIC ACID 1 MG: 1 TABLET ORAL at 05:46

## 2021-06-22 RX ADMIN — SUCRALFATE 1 G: 1 SUSPENSION ORAL at 12:20

## 2021-06-22 RX ADMIN — OMEPRAZOLE 20 MG: 20 CAPSULE, DELAYED RELEASE ORAL at 17:45

## 2021-06-22 RX ADMIN — SUCRALFATE 1 G: 1 SUSPENSION ORAL at 00:16

## 2021-06-22 RX ADMIN — THERA TABS 1 TABLET: TAB at 05:46

## 2021-06-22 RX ADMIN — OMEPRAZOLE 20 MG: 20 CAPSULE, DELAYED RELEASE ORAL at 05:45

## 2021-06-22 RX ADMIN — SUCRALFATE 1 G: 1 SUSPENSION ORAL at 17:45

## 2021-06-22 RX ADMIN — Medication 100 MG: at 05:46

## 2021-06-22 RX ADMIN — LEVETIRACETAM 500 MG: 500 TABLET ORAL at 17:45

## 2021-06-22 ASSESSMENT — COGNITIVE AND FUNCTIONAL STATUS - GENERAL
MOBILITY SCORE: 8
CLIMB 3 TO 5 STEPS WITH RAILING: TOTAL
SUGGESTED CMS G CODE MODIFIER MOBILITY: CM
MOVING TO AND FROM BED TO CHAIR: UNABLE
MOVING FROM LYING ON BACK TO SITTING ON SIDE OF FLAT BED: UNABLE
TURNING FROM BACK TO SIDE WHILE IN FLAT BAD: A LOT
WALKING IN HOSPITAL ROOM: TOTAL
STANDING UP FROM CHAIR USING ARMS: A LOT

## 2021-06-22 ASSESSMENT — PAIN DESCRIPTION - PAIN TYPE
TYPE: ACUTE PAIN
TYPE: ACUTE PAIN

## 2021-06-22 ASSESSMENT — ENCOUNTER SYMPTOMS
PALPITATIONS: 0
NAUSEA: 0
DIZZINESS: 0
HEADACHES: 0
LOSS OF CONSCIOUSNESS: 0
BACK PAIN: 0
BLURRED VISION: 0
ABDOMINAL PAIN: 0
CHILLS: 0
DOUBLE VISION: 0
SHORTNESS OF BREATH: 0
FEVER: 0
WEAKNESS: 1
VOMITING: 0
DIARRHEA: 0
COUGH: 0

## 2021-06-22 ASSESSMENT — GAIT ASSESSMENTS: GAIT LEVEL OF ASSIST: UNABLE TO PARTICIPATE

## 2021-06-22 NOTE — PROGRESS NOTES
Hospital Medicine Daily Progress Note    Date of Service  6/22/2021    Chief Complaint  65 y.o. female admitted 6/17/2021 with weakness    Hospital Course  64yo PMHx Sz following SDH 10 ya, ETOH, HTN with weakness x several weeks.  EMS found pt with SBP 50 on scene and had a witnessed tonic clonic seizure in the ER. On admission Hgb 2.9 and WBC 15.  Patient treated with IV fluids and blood transfusion as well as started on antibiotics for possible sepsis from UTI.  Antibiotics stopped as likely vitals and WBC count due to her severe anemia and not sepsis.  GI was consulted and patient started on PPI therapy as well as octreotide.  EGD on 6/18 with findings of 3 peptic ulcers, severe ulcerative esophagitis.  She was transitioned to oral omeprazole and sucralfate.  Patient has a known history of seizures that started after a subdural hematoma in 2004 she was restarted on Keppra and head CT showed no acute findings.  While admitted patient also had alcohol withdrawal treated with CIWA protocol and Ativan as needed.  Patient was able to be downgraded out of the ICU.    Interval Problem Update  Hemoglobin stable at 8.4.  Potassium 3.1 and phosphorus 2.2, replacement ordered.  PT evaluated the patient and recommended postacute placement, pending OT evaluation.  Referral for PMNR and SNF pending.  Patient has no acute complaints today, she does report that she feels tired and still feels weak with difficulty with standing.     Consultants/Specialty  GI  Critical Care- signed off     Code Status  Full Code    Disposition  Pending SNF vs rehab     Review of Systems  Review of Systems   Constitutional: Positive for malaise/fatigue. Negative for chills and fever.   Eyes: Negative for blurred vision and double vision.   Respiratory: Negative for cough and shortness of breath.    Cardiovascular: Negative for chest pain, palpitations and leg swelling.   Gastrointestinal: Negative for abdominal pain, diarrhea, nausea and vomiting.    Genitourinary: Negative for dysuria and urgency.   Musculoskeletal: Negative for back pain.   Skin: Negative for rash.   Neurological: Positive for weakness. Negative for dizziness, loss of consciousness and headaches.        Physical Exam  Temp:  [36.4 °C (97.5 °F)-37.2 °C (98.9 °F)] 36.6 °C (97.8 °F)  Pulse:  [75-91] 75  Resp:  [17-18] 17  BP: (122-125)/(82-88) 122/82  SpO2:  [91 %-94 %] 91 %    Physical Exam  Vitals reviewed.   Constitutional:       General: She is not in acute distress.     Appearance: She is well-developed. She is not diaphoretic.   HENT:      Head: Normocephalic and atraumatic.   Eyes:      Conjunctiva/sclera: Conjunctivae normal.   Neck:      Vascular: No JVD.   Cardiovascular:      Rate and Rhythm: Normal rate and regular rhythm.      Heart sounds: Murmur heard.     Pulmonary:      Effort: Pulmonary effort is normal. No respiratory distress.      Breath sounds: No stridor. No wheezing or rales.   Abdominal:      Palpations: Abdomen is soft.      Tenderness: There is no abdominal tenderness. There is no guarding or rebound.   Musculoskeletal:         General: No tenderness.      Right lower leg: No edema.      Left lower leg: No edema.   Skin:     General: Skin is warm and dry.      Findings: No rash.   Neurological:      Mental Status: She is oriented to person, place, and time. Mental status is at baseline.   Psychiatric:         Mood and Affect: Mood normal.         Thought Content: Thought content normal.         Fluids    Intake/Output Summary (Last 24 hours) at 6/22/2021 1530  Last data filed at 6/22/2021 1000  Gross per 24 hour   Intake 2235 ml   Output --   Net 2235 ml       Laboratory  Recent Labs     06/20/21  0315 06/21/21  0909 06/22/21  0607   WBC  --   --  8.7   RBC  --   --  2.90*   HEMOGLOBIN 7.6* 8.8* 8.4*   HEMATOCRIT 23.7* 27.5* 26.2*   MCV  --   --  90.3   MCH  --   --  29.0   MCHC  --   --  32.1*   RDW  --   --  55.8*   PLATELETCT  --   --  294   MPV  --   --  10.6      Recent Labs     06/20/21  0315 06/21/21  0909 06/22/21  0607   SODIUM 143 139 137   POTASSIUM 3.8 3.6 3.1*   CHLORIDE 113* 109 107   CO2 23 22 23   GLUCOSE 102* 85 86   BUN 29* 15 9   CREATININE 1.07 0.83 0.73   CALCIUM 8.2* 8.2* 7.6*                   Imaging  US-RENAL   Final Result      No hydronephrosis.      DX-CHEST-PORTABLE (1 VIEW)   Final Result      Status post right internal jugular catheter placement with the tip projecting over the superior vena cava. No pneumothorax is identified.      CT-HEAD W/O   Final Result    Examination is limited by patient motion.   1.  No acute intracranial abnormality is identified.   2.  Atrophy   3.  There are periventricular and subcortical white matter changes present.  This finding is nonspecific and could be from previous small vessel ischemia, demyelination, or gliosis.      DX-CHEST-PORTABLE (1 VIEW)   Final Result      No acute cardiopulmonary findings.           Assessment/Plan  * GI bleed  Assessment & Plan  PUD x 3  BID PPI  Needs to quit drinking  Denies NSAID use    Esophagitis  Assessment & Plan  S/p EGD.  On PPI.    ETOH abuse  Assessment & Plan  Thiamine  Folate  MVI  CIWA protocol  Encourage cessation    SDH (subdural hematoma) (HCC)  Assessment & Plan  Distant Hx   Head CT on this admission unremarkable    Hypotension  Assessment & Plan  Hemorrhagic shock resolved s/p resuscitation    Sepsis (HCC)  Assessment & Plan  NOT SEPSIS  Pt was tachycardic appropriately for severe anemia  Mild leukocytosis likely stress response      Resolved.    Severe anemia  Assessment & Plan  S/p EGD 6/18 with findings of 3 peptic ulcers and severe errosive esophagitis  No interventions on EGD as pt desaturated not allowing time  Cont PPI   Follow H&H; transfuse to goal >7.    Debility- (present on admission)  Assessment & Plan  Uses walker at home due to shake  She states she doesn't know if she has parkinsons and isn't on any home meds for such.  PT/OT-recommend postacute  placement    Seizure (HCC)- (present on admission)  Assessment & Plan  keppra 500mg BID  Seizure precautions  Occurred after a Subdural hematoma in 2004  aspirationr precautions.  Head CT benign    Essential hypertension  Assessment & Plan  On lisinopril/HCTZ as outpt  Follow pressures and resume as appropriate       VTE prophylaxis: SCDs

## 2021-06-22 NOTE — PROGRESS NOTES
Assessment/description of ears? Pink & intact, blanching.  Which preventative measures are in place for the ears? N/A    Assessment/description of elbows? Redness & intact, blanching.   Which preventative measures are in place for the elbows? Encouraging frequent changes in position while in bed. Assisting with turns. Mepilex in place.     Assessment/description of sacrum? Pink/red & intact. Blanching.   Which preventative measures are in place for the sacrum? Q2 turns. Checking frequently for incontinence.     Assessment/description of heels? Pink/red & intact - blanching.   Which preventative measures are in place for the heels? Mepilex. Encouraging heels to be floated while in bed.     Which devices are in place? Purwick. PIV. Nasal cannula.   Description of skin under devices: Intact.   Which preventative measures are in place under devices? N/A  Other:

## 2021-06-22 NOTE — PROGRESS NOTES
Received report from MIRIAN Whitaker and assumed care of patient.   Pt is resting in bed, A&Ox4, on RA, VSS.   Assessment completed, POC discussed.   Denies pain or discomfort this time.   Bed is in lowest, locked position, call bell and belongings are in reach.   Hourly rounding and safety precautions in place.   No further needs at this time.

## 2021-06-22 NOTE — CARE PLAN
The patient is Stable - Low risk of patient condition declining or worsening    Shift Goals  Clinical Goals: Patient will rest comfortably throughout the night    Progress made toward(s) clinical / shift goals: Patient denies pain or discomfort this shift. Patient is resting comfortably. No needs at this time.     Patient is not progressing towards the following goals: N/A.    Problem: Skin Integrity  Goal: Skin integrity is maintained or improved  Outcome: Progressing  Note: Patient turned and positioned every two hours. Incontinence care provided and barrier paste applied as needed. Purewick in place.      Problem: Pain - Standard  Goal: Alleviation of pain or a reduction in pain to the patient’s comfort goal  Outcome: Progressing  Flowsheets (Taken 6/21/2021 2000)  Pain Rating Scale (NPRS): 0  Note: Patient assessed for pain regularly and medicated PRN per MAR. Patient denies pain or discomfort this shift.

## 2021-06-22 NOTE — DISCHARGE PLANNING
Medical Social Work    PASRR: [id number]  LOC: [id number]    Anticipated Discharge Disposition: Skilled Nursing Facility (SNF)    Action: Pt discussed in IDT rounds. Pt pending therapy recommendations for possible SNF need. Per chart review, SLP and PT recommending post-acute placement. MD notified and MD states that PMR consult was placed.      LSW met w/ pt at bedside to discuss Acute Rehab choice and pt signed choice for [facility].      Barriers to Discharge: Post-acute placement needed    Plan: LSW will continue to follow for post-acute acceptance and placement.
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
## 2021-06-22 NOTE — DISCHARGE PLANNING
Received Choice form at [phone]  Agency/Facility Name: [facility]  Referral sent per Choice form @ [phone]

## 2021-06-22 NOTE — PROGRESS NOTES
Assessment/description of ears? Intact, pink, blanching.   Which preventative measures are in place for the ears? N/A.    Assessment/description of elbows? Intact, pink, blanching; scab on right elbow.   Which preventative measures are in place for the elbows?  Q2 hour turns, patient encouraged to turn self in bed.     Assessment/description of sacrum? Intact, blanchable erythema.   Which preventative measures are in place for the sacrum?  Q2 hour turns, frequent incontinence checks and prn linen changes.     Assessment/description of heels? Intact, pink, red, blanching.   Which preventative measures are in place for the heels?  Mepilex, Q2 hour turns, patient encouraged to turn self in bed, heels floated on pillows as patient tolerates.     Which devices are in place? Nasal cannula, PIV, purewick.  Description of skin under devices: Clean, dry, intact.   Which preventative measures are in place under devices?  Q shift assessment, PIV dressing changes per protocol and PRN, frequent incontinence checks.     Other: Loose, fragile skin.

## 2021-06-23 ENCOUNTER — HOSPITAL ENCOUNTER (INPATIENT)
Facility: REHABILITATION | Age: 66
End: 2021-06-23
Attending: PHYSICAL MEDICINE & REHABILITATION | Admitting: PHYSICAL MEDICINE & REHABILITATION
Payer: MEDICARE

## 2021-06-23 PROBLEM — A41.9 SEPSIS (HCC): Status: RESOLVED | Noted: 2021-06-17 | Resolved: 2021-06-23

## 2021-06-23 PROBLEM — I95.9 HYPOTENSION: Status: RESOLVED | Noted: 2021-06-17 | Resolved: 2021-06-23

## 2021-06-23 LAB
ALBUMIN SERPL BCP-MCNC: 2.2 G/DL (ref 3.2–4.9)
ANION GAP SERPL CALC-SCNC: 8 MMOL/L (ref 7–16)
BUN SERPL-MCNC: 7 MG/DL (ref 8–22)
CALCIUM SERPL-MCNC: 7.2 MG/DL (ref 8.5–10.5)
CHLORIDE SERPL-SCNC: 108 MMOL/L (ref 96–112)
CO2 SERPL-SCNC: 23 MMOL/L (ref 20–33)
CREAT SERPL-MCNC: 0.65 MG/DL (ref 0.5–1.4)
ERYTHROCYTE [DISTWIDTH] IN BLOOD BY AUTOMATED COUNT: 56.4 FL (ref 35.9–50)
GLUCOSE SERPL-MCNC: 84 MG/DL (ref 65–99)
HCT VFR BLD AUTO: 26 % (ref 37–47)
HGB BLD-MCNC: 8.2 G/DL (ref 12–16)
MCH RBC QN AUTO: 28.5 PG (ref 27–33)
MCHC RBC AUTO-ENTMCNC: 31.5 G/DL (ref 33.6–35)
MCV RBC AUTO: 90.3 FL (ref 81.4–97.8)
PHOSPHATE SERPL-MCNC: 2.2 MG/DL (ref 2.5–4.5)
PLATELET # BLD AUTO: 326 K/UL (ref 164–446)
PMV BLD AUTO: 10.8 FL (ref 9–12.9)
POTASSIUM SERPL-SCNC: 3.9 MMOL/L (ref 3.6–5.5)
RBC # BLD AUTO: 2.88 M/UL (ref 4.2–5.4)
SODIUM SERPL-SCNC: 139 MMOL/L (ref 135–145)
WBC # BLD AUTO: 8.2 K/UL (ref 4.8–10.8)

## 2021-06-23 PROCEDURE — 80069 RENAL FUNCTION PANEL: CPT

## 2021-06-23 PROCEDURE — A9270 NON-COVERED ITEM OR SERVICE: HCPCS | Performed by: INTERNAL MEDICINE

## 2021-06-23 PROCEDURE — 700102 HCHG RX REV CODE 250 W/ 637 OVERRIDE(OP): Performed by: INTERNAL MEDICINE

## 2021-06-23 PROCEDURE — 770006 HCHG ROOM/CARE - MED/SURG/GYN SEMI*

## 2021-06-23 PROCEDURE — A9270 NON-COVERED ITEM OR SERVICE: HCPCS | Performed by: HOSPITALIST

## 2021-06-23 PROCEDURE — 36415 COLL VENOUS BLD VENIPUNCTURE: CPT

## 2021-06-23 PROCEDURE — 85027 COMPLETE CBC AUTOMATED: CPT

## 2021-06-23 PROCEDURE — A9270 NON-COVERED ITEM OR SERVICE: HCPCS | Performed by: STUDENT IN AN ORGANIZED HEALTH CARE EDUCATION/TRAINING PROGRAM

## 2021-06-23 PROCEDURE — 99232 SBSQ HOSP IP/OBS MODERATE 35: CPT | Performed by: INTERNAL MEDICINE

## 2021-06-23 PROCEDURE — 97166 OT EVAL MOD COMPLEX 45 MIN: CPT

## 2021-06-23 PROCEDURE — 700102 HCHG RX REV CODE 250 W/ 637 OVERRIDE(OP): Performed by: STUDENT IN AN ORGANIZED HEALTH CARE EDUCATION/TRAINING PROGRAM

## 2021-06-23 PROCEDURE — 700102 HCHG RX REV CODE 250 W/ 637 OVERRIDE(OP): Performed by: HOSPITALIST

## 2021-06-23 RX ADMIN — LEVETIRACETAM 500 MG: 500 TABLET ORAL at 18:00

## 2021-06-23 RX ADMIN — OMEPRAZOLE 20 MG: 20 CAPSULE, DELAYED RELEASE ORAL at 04:52

## 2021-06-23 RX ADMIN — SUCRALFATE 1 G: 1 SUSPENSION ORAL at 00:00

## 2021-06-23 RX ADMIN — OMEPRAZOLE 20 MG: 20 CAPSULE, DELAYED RELEASE ORAL at 18:00

## 2021-06-23 RX ADMIN — LEVETIRACETAM 500 MG: 500 TABLET ORAL at 04:52

## 2021-06-23 RX ADMIN — DIBASIC SODIUM PHOSPHATE, MONOBASIC POTASSIUM PHOSPHATE AND MONOBASIC SODIUM PHOSPHATE 500 MG: 852; 155; 130 TABLET ORAL at 08:47

## 2021-06-23 RX ADMIN — SUCRALFATE 1 G: 1 SUSPENSION ORAL at 11:55

## 2021-06-23 RX ADMIN — SUCRALFATE 1 G: 1 SUSPENSION ORAL at 04:52

## 2021-06-23 RX ADMIN — SUCRALFATE 1 G: 1 SUSPENSION ORAL at 18:00

## 2021-06-23 RX ADMIN — ACETAMINOPHEN 650 MG: 325 TABLET, FILM COATED ORAL at 11:55

## 2021-06-23 ASSESSMENT — ENCOUNTER SYMPTOMS
ABDOMINAL PAIN: 0
DIZZINESS: 0
HEADACHES: 0
WEAKNESS: 1
COUGH: 0
SHORTNESS OF BREATH: 0
NAUSEA: 0
FEVER: 0
PALPITATIONS: 0
BLURRED VISION: 0
CHILLS: 0
VOMITING: 0
LOSS OF CONSCIOUSNESS: 0
BACK PAIN: 0
DOUBLE VISION: 0
DIARRHEA: 0

## 2021-06-23 ASSESSMENT — COGNITIVE AND FUNCTIONAL STATUS - GENERAL
HELP NEEDED FOR BATHING: A LOT
EATING MEALS: A LITTLE
SUGGESTED CMS G CODE MODIFIER DAILY ACTIVITY: CK
DAILY ACTIVITIY SCORE: 15
DRESSING REGULAR LOWER BODY CLOTHING: A LOT
DRESSING REGULAR UPPER BODY CLOTHING: A LITTLE
PERSONAL GROOMING: A LITTLE
TOILETING: A LOT

## 2021-06-23 ASSESSMENT — PAIN DESCRIPTION - PAIN TYPE
TYPE: ACUTE PAIN
TYPE: ACUTE PAIN

## 2021-06-23 ASSESSMENT — ACTIVITIES OF DAILY LIVING (ADL): TOILETING: INDEPENDENT

## 2021-06-23 NOTE — DISCHARGE PLANNING
[facility] Transitional Care Coordination    Physiatry consult complete.  Dr. Kohler recommending -     -Based on her functional status and limited discharge support, recommend SNF when medically cleared    Voalte update to S6 YOSELIN Hinton.      Thank you for the referral.

## 2021-06-23 NOTE — CARE PLAN
The patient is Stable - Low risk of patient condition declining or worsening    Shift Goals  Clinical Goals: Patient will remain comfortable throughout shift  Patient Goals: Rest  Family Goals: N/A    Progress made toward(s) clinical / shift goals:  Denies need for pain management during shift. Assisted with turns. Hair brushed & shower cap applied.    Patient is not progressing towards the following goals:

## 2021-06-23 NOTE — PROGRESS NOTES
AAOx4, forgetful & confused at times. C/o 0/10 pain, declining intervention at this time. -N/V. -N/T. Denies new onset of chest pain/SOB. +BS in all 4 quadrants, last BM today. Room air, satting > 90%. Purwick in place. POC discussed, denies further needs at this time - plan to work with OT today. Bed alarm on, call light within reach & hourly rounding in place.

## 2021-06-23 NOTE — PROGRESS NOTES
Pt alert and oriented x4, on room air. Declined any pain/sob. L arm PIV infiltrated. Inserted new PIV on R forearm aseptically. Purewick in place, draining urine well. Fall precautions and seizure precautions in place. All needs attended.    Assessment/description of ears? Intact and blanching  Which preventative measures are in place for the ears? n/a    Assessment/description of elbows? Intact and blanching  Which preventative measures are in place for the elbows? Waffle mattress. Q2 turns. Pillows for positioning and support. Mepilex on L elbow. Removed mepilex on R elbow, refused new application.    Assessment/description of sacrum? Intact and blanching  Which preventative measures are in place for the sacrum? Waffle mattress. Q2 turns. Pillows for positioning and support.    Assessment/description of heels? Intact and blanching  Which preventative measures are in place for the heels? New mepilexes applied. Pillows for offloading, positioning and support. Waffle mattress.    Which devices are in place? Purewick, PIV.  Description of skin under devices: intact and blanching  Which preventative measures are in place under devices? Purewick and PIV care.    Other: L thigh 3 spots discoloration, purple in color, not blanching. Wound consult placed.  BLE edema noted. General bruising.

## 2021-06-23 NOTE — CARE PLAN
The patient is Stable - Low risk of patient condition declining or worsening    Shift Goals  Clinical Goals: Patient will participate with OT  Patient Goals: Rest  Family Goals: N/A    Progress made toward(s) clinical / shift goals:  Patient participated in activities with OT this AM.    Patient is not progressing towards the following goals:

## 2021-06-23 NOTE — PROGRESS NOTES
Assessment/description of ears? Pink & intact, blanching.  Which preventative measures are in place for the ears? N/A     Assessment/description of elbows? Redness & intact, blanching.   Which preventative measures are in place for the elbows? Encouraging frequent changes in position while in bed. Assisting with turns. Mepilex in place.      Assessment/description of sacrum? Pink/red & intact. Blanching.   Which preventative measures are in place for the sacrum? Q2 turns. Checking frequently for incontinence.      Assessment/description of heels? Pink/red & intact - blanching.   Which preventative measures are in place for the heels? Mepilex. Encouraging heels to be floated while in bed.      Which devices are in place? Purwick. PIV.   Description of skin under devices: Intact.   Which preventative measures are in place under devices? N/A  Other:

## 2021-06-23 NOTE — PROGRESS NOTES
AAOx4, forgetful & confused at times. C/o 0/10 pain, declining intervention at this time. -N/V. -N/T. Denies new onset of chest pain/SOB. +BS in all 4 quadrants. Room air, satting > 90%. Purwick in place. POC discussed, denies further needs at this time. Bed alarm on, call light within reach & hourly rounding in place.

## 2021-06-23 NOTE — DISCHARGE PLANNING
[facility] Transitional [facility] Coordination    Referral from:  Dr. Yolie Wheatley  Insurance Provider on Facesheet:  Medicare/Medicaid FFS  Potential Rehab Diagnosis:  Debility/Non-traumatic Brain    Chart review indicates patient has on going medical management and therapy needs    D/C support: Limited discharge support - lives alone     Physiatry to consult.  Would welcome OT eval to assist with determination for post acute care needs.     Last Covid test:  4/16/2021 not detected     Thank you for the referral.

## 2021-06-23 NOTE — CARE PLAN
The patient is Stable - Low risk of patient condition declining or worsening    Shift Goals  Clinical Goals: Pt skinintegrity will remain intact  Patient Goals: Rest  Family Goals: N/A    Progress made toward(s) clinical / shift goals:  Pt BLE edema noted, legs elevated with pillows. Heel mepilexes applied on  bilateral heels and elbows. Waffle mattress on. Encouraged pt to frequently turn in bed. Frequent incontinent checks and linen changes.      Problem: Skin Integrity  Goal: Skin integrity is maintained or improved  Outcome: Progressing

## 2021-06-23 NOTE — THERAPY
Occupational Therapy   Initial Evaluation     Patient Name: Roro Leon  Age:  65 y.o., Sex:  female  Medical Record #: [id number]  Today's Date: 6/23/2021     Precautions  Precautions: Fall Risk, Swallow Precautions ( See Comments)  Comments: aspiration and reflux precautions    Assessment  Patient is 65 y.o. female with a diagnosis of etoh, weakness, GI bleed.  Additional factors influencing patient status / progress: weakness, fatigue, impaired balance, impaired safety awareness.      Plan    Recommend Occupational Therapy 3 times per week until therapy goals are met for the following treatments:  Adaptive Equipment, Cognitive Skill Development, Neuro Re-Education / Balance, Self Care/Activities of Daily Living, Therapeutic Activities and Therapeutic Exercises.    DC Equipment Recommendations: Unable to determine at this time  Discharge Recommendations: Recommend post-acute placement for additional occupational therapy services prior to discharge home     Subjective    Pleasant and cooperative     Objective       06/23/21 0844   Prior Living Situation   Prior Services Meals on Wheels   Housing / Facility 1 Story Apartment / Condo   Bathroom Set up Bathtub / Shower Combination;Shower Chair   Equipment Owned 4-Wheel Walker;Tub / Shower Seat   Lives with - Patient's Self Care Capacity Alone and Able to Care For Self   Comments Pt has neighbors that help her out with grocery runs   Prior Level of ADL Function   Self Feeding Independent   Grooming / Hygiene Independent   Bathing Independent   Dressing Independent   Toileting Independent   Prior Level of IADL Function   Medication Management Independent   Laundry Independent   Kitchen Mobility Independent   Finances Independent   Home Management Independent   Shopping Requires Assist   Prior Level Of Mobility Independent With Device in Home;Independent With Device in Community   Driving / Transportation Utilizes [employer] for Transportation;Utilizes Public  Transportation;Relatives / Others Provide Transportation   Occupation (Pre-Hospital Vocational) Retired Due To Age   Precautions   Precautions Fall Risk;Swallow Precautions ( See Comments)   Pain 0 - 10 Group   Therapist Pain Assessment Nurse Notified;0   Cognition    Cognition / Consciousness X   Level of Consciousness Alert   Sequencing Impaired   Comments pleasant and cooperative   Active ROM Upper Body   Active ROM Upper Body  WDL   Strength Upper Body   Upper Body Strength  WDL   Balance Assessment   Sitting Balance (Static) Fair   Sitting Balance (Dynamic) Fair   Standing Balance (Static) Poor +   Standing Balance (Dynamic) Poor -   Weight Shift Sitting Fair   Weight Shift Standing Poor   Comments w/ FWW, pain in L foot limiting weight shifting   Bed Mobility    Supine to Sit Supervised   Scooting Supervised   ADL Assessment   Grooming Supervision;Seated  (oral care, wash face, wash hands)   Upper Body Dressing Minimal Assist  (gown)   Lower Body Dressing Maximal Assist  (socks)   Toileting Maximal Assist  (incontinent BM/urine)   How much help from another person does the patient currently need...   Putting on and taking off regular lower body clothing? 2   Bathing (including washing, rinsing, and drying)? 2   Toileting, which includes using a toilet, bedpan, or urinal? 2   Putting on and taking off regular upper body clothing? 3   Taking care of personal grooming such as brushing teeth? 3   Eating meals? 3   6 Clicks Daily Activity Score 15   Functional Mobility   Sit to Stand Moderate Assist   Bed, Chair, Wheelchair Transfer Moderate Assist   Transfer Method Stand Pivot   Mobility EOB>STS>pivot to wesly   Comments w/ fww   Patient / Family Goals   Patient / Family Goal #1 to get stronger   Short Term Goals   Short Term Goal # 1 pt will demo toilet txf with supv   Short Term Goal # 2 pt will dress LB with supv and AE prn   Short Term Goal # 3 pt will groom in stance at the sink with supv                  detailed exam EOMI

## 2021-06-23 NOTE — CONSULTS
Physical Medicine and Rehabilitation Consultation         Initial Consult      Initial Consultation Date: 6/23/2021  Consulting provider: Dr. Yolie Wheatley  Reason for consultation: assess for acute inpatient rehab appropriateness  LOS: 6 Day(s)      Chief complaint: severe anemia        HPI:   The patient is a 65 y.o. female with a past medical history of seizures, hx of SDH (2004), alcohol, HTN;  who presented on 6/17/2021  9:33 AM with weakness for several weeks, found to be hypotensive and had tonic clonic seizure in the ER. CT head without acute findings. Hgb 2.9 on admission, requiring IVF and transfusions. Concern for UTI/sepsis, but antibiotics stopped given acute findings are likely due to profound anemia. EGD on 6/18 showed peptic ulcer disease with duodenal ulcers x3, severe ulcerative esophagitis. Hospital course includes alcohol withdrawal managed on CIWA.          Social Hx:  Pre-morbidly, this patient lived in:   1 [place] home  With 0 steps to enter  Lives independently       Current level of function:   PT, 6/22: UATP gait or transfers; Min A bed mobility; Mod A STS  OT, pending eval   SLP, 6/20: minced/moist, mildly thick liquids; 1:1 superv        PMH:  Past Medical History:   Diagnosis Date   • YEISON (acute kidney injury) (HCC)    • ETOH abuse 6/19/2021   • Hypertension    • Seizure (HCC)    • Subdural hematoma (HCC)          PSH:  Past Surgical History:   Procedure Laterality Date   • PB UPPER GI ENDOSCOPY,DIAGNOSIS  6/18/2021    Procedure: GASTROSCOPY;  Surgeon: Frankie Castellon M.D.;  Location: ENDOSCOPY [facility];  Service: Gastroenterology         FHX: Reviewed.  Family History   Problem Relation Age of Onset   • Cancer Mother 77        colon cancer   • Heart Attack Father 67        MI   • Heart Disease Brother         angina   • Stroke Maternal Grandfather         parkinsons   • Dementia Maternal Aunt         parkinsons and alzhiemers                 Medications:  Current  "Facility-Administered Medications   Medication Dose   • levETIRAcetam (KEPPRA) tablet 500 mg  500 mg   • LORazepam (ATIVAN) injection 0.5-1 mg  0.5-1 mg   • sucralfate (CARAFATE) 1 GM/10ML suspension 1 g  1 g   • omeprazole (PRILOSEC) capsule 20 mg  20 mg   • cloNIDine (CATAPRES) tablet 0.1 mg  0.1 mg   • senna-docusate (PERICOLACE or SENOKOT S) 8.6-50 MG per tablet 2 tablet  2 tablet    And   • polyethylene glycol/lytes (MIRALAX) PACKET 1 Packet  1 Packet    And   • magnesium hydroxide (MILK OF MAGNESIA) suspension 30 mL  30 mL    And   • bisacodyl (DULCOLAX) suppository 10 mg  10 mg   • Respiratory Therapy Consult     • acetaminophen (Tylenol) tablet 650 mg  650 mg   • labetalol (NORMODYNE/TRANDATE) injection 10 mg  10 mg   • LORazepam (ATIVAN) injection 4 mg  4 mg       Allergies:  No Known Allergies      Vitals: /82   Pulse 76   Temp 36.8 °C (98.2 °F) (Temporal)   Resp 16   Ht 1.677 m (5' 6.02\")   Wt 64.5 kg (142 lb 3.2 oz)   SpO2 94%         Labs: Reviewed and significant for 6/23: Hgb 8.2  Recent Labs     06/21/21  0909 06/22/21  0607 06/23/21  0514   RBC  --  2.90* 2.88*   HEMOGLOBIN 8.8* 8.4* 8.2*   HEMATOCRIT 27.5* 26.2* 26.0*   PLATELETCT  --  294 326     Recent Labs     06/21/21  0909 06/22/21  0607 06/23/21  0514   SODIUM 139 137 139   POTASSIUM 3.6 3.1* 3.9   CHLORIDE 109 107 108   CO2 22 23 23   GLUCOSE 85 86 84   BUN 15 9 7*   CREATININE 0.83 0.73 0.65   CALCIUM 8.2* 7.6* 7.2*     Recent Results (from the past 24 hour(s))   Basic Metabolic Panel (BMP)    Collection Time: 06/23/21  5:14 AM   Result Value Ref Range    Sodium 139 135 - 145 mmol/L    Potassium 3.9 3.6 - 5.5 mmol/L    Chloride 108 96 - 112 mmol/L    Co2 23 20 - 33 mmol/L    Glucose 84 65 - 99 mg/dL    Bun 7 (L) 8 - 22 mg/dL    Creatinine 0.65 0.50 - 1.40 mg/dL    Calcium 7.2 (L) 8.5 - 10.5 mg/dL    Anion Gap 8.0 7.0 - 16.0   CBC WITHOUT DIFFERENTIAL    Collection Time: 06/23/21  5:14 AM   Result Value Ref Range    WBC 8.2 4.8 - " 10.8 K/uL    RBC 2.88 (L) 4.20 - 5.40 M/uL    Hemoglobin 8.2 (L) 12.0 - 16.0 g/dL    Hematocrit 26.0 (L) 37.0 - 47.0 %    MCV 90.3 81.4 - 97.8 fL    MCH 28.5 27.0 - 33.0 pg    MCHC 31.5 (L) 33.6 - 35.0 g/dL    RDW 56.4 (H) 35.9 - 50.0 fL    Platelet Count 326 164 - 446 K/uL    MPV 10.8 9.0 - 12.9 fL   Renal Function Panel    Collection Time: 06/23/21  5:14 AM   Result Value Ref Range    Phosphorus 2.2 (L) 2.5 - 4.5 mg/dL    Albumin 2.2 (L) 3.2 - 4.9 g/dL   ESTIMATED GFR    Collection Time: 06/23/21  5:14 AM   Result Value Ref Range    GFR If African American >60 >60 mL/min/1.73 m 2    GFR If Non African American >60 >60 mL/min/1.73 m 2           Imaging:  CT-HEAD W/O  Result Date: 6/17/2021 6/17/2021 12:21 PM HISTORY/REASON FOR EXAM:  Mental status change, unknown cause. TECHNIQUE/EXAM DESCRIPTION AND NUMBER OF VIEWS: CT of the head without contrast. Contiguous axial sections were obtained from the skull base through the vertex. Up to date radiation dose reduction adjustments have been utilized to meet ALARA standards for radiation dose reduction. COMPARISON:  None available FINDINGS: Examination is limited by patient motion. The is no evidence of intraparenchymal, intraventricular and extra-axial hemorrhage.  The ventricles and cortical sulci are prominent compatible with age related change.  There are periventricular and subcortical white matter changes present. There is no  midline shift. There is mild mucosal thickening in the left maxillary sinus. Remaining visualized paranasal sinuses and mastoid air cells are clear. The calvarium is intact.      Examination is limited by patient motion. 1.  No acute intracranial abnormality is identified. 2.  Atrophy 3.  There are periventricular and subcortical white matter changes present.  This finding is nonspecific and could be from previous small vessel ischemia, demyelination, or gliosis.              ASSESSMENT:  Patient is a 65 y.o. female admitted with severe  anemia and weakness, requiring transfusion. Found to have peptic ulcer disease with duodenal ulcers x3, severe ulcerative esophagitis on EGD.     #Rehab:   -Vitals: stable, RA   -Insurance: Medicare/Medicaid FFS  -Discharge support: limited discharge support   -Rehab Impairment Code: 0016 - Debility (Non-Cardiac, Non-Pulmonary)  -Pending OT eval  -Based on her functional status and limited discharge support, recommend SNF when medically cleared    #GI: EGD on 6/18 due to melena and anemia, showed peptic ulcer disease with duodenal ulcers x3, severe ulcerative esophagitis  -omeprazole, sucralfate  -avoid NSAIDs   -Hpylori stool antigen check recommended by GI after EGD     #Anemia: improved, monitor     #Neuro: CT head without acute findings; witnessed tonic clonic seizure in the ER  -Keppra     #Pain: PRN Tylenol     #HTN: outpatient BP meds on hold     #Bowel: 2x 6/22, 1x 6/23,  bowel meds    #Bladder: external cath, voiding     #DVT PPX: SCDs            Thank you for allowing us to participate in the care of this patient.             Sunny Kohler MD  Physical Medicine and Rehabilitation   6/23/2021

## 2021-06-23 NOTE — PROGRESS NOTES
[facility] Medicine Daily Progress Note    Date of Service  6/23/2021    Chief Complaint  65 y.o. female admitted 6/17/2021 with weakness    Hospital Course  66yo PMHx Sz following SDH 10 ya, ETOH, HTN with weakness x several weeks.  EMS found pt with SBP 50 on scene and had a witnessed tonic clonic seizure in the ER. On admission Hgb 2.9 and WBC 15.  Patient treated with IV fluids and blood transfusion as well as started on antibiotics for possible sepsis from UTI.  Antibiotics stopped as likely vitals and WBC count due to her severe anemia and not sepsis.  GI was consulted and patient started on PPI therapy as well as octreotide.  EGD on 6/18 with findings of 3 peptic ulcers, severe ulcerative esophagitis.  She was transitioned to oral omeprazole and sucralfate.  Patient has a known history of seizures that started after a subdural hematoma in 2004 she was restarted on Keppra and head CT showed no acute findings.  While admitted patient also had alcohol withdrawal treated with CIWA protocol and Ativan as needed.  Patient was able to be downgraded out of the ICU.    Interval Problem Update  No acute events overnight.  Hemoglobin 8.2, stable. Phosphorus 2.2, replacement ordered.  OT recommending postacute placement, PM&R consulted and recommending SNF placement.  Patient has no new complaints.    Consultants/Specialty  GI  Critical Care- signed off     Code Status  Full Code    Disposition  Pending SNF     Review of Systems  Review of Systems   Constitutional: Positive for malaise/fatigue. Negative for chills and fever.   Eyes: Negative for blurred vision and double vision.   Respiratory: Negative for cough and shortness of breath.    Cardiovascular: Negative for chest pain, palpitations and leg swelling.   Gastrointestinal: Negative for abdominal pain, diarrhea, nausea and vomiting.   Genitourinary: Negative for dysuria and urgency.   Musculoskeletal: Negative for back pain.   Skin: Negative for rash.   Neurological:  Positive for weakness. Negative for dizziness, loss of consciousness and headaches.        Physical Exam  Temp:  [36.3 °C (97.4 °F)-36.9 °C (98.5 °F)] 36.8 °C (98.2 °F)  Pulse:  [76-90] 76  Resp:  [16-17] 16  BP: (113-124)/(74-82) 117/82  SpO2:  [94 %-97 %] 94 %    Physical Exam  Vitals reviewed.   Constitutional:       General: She is not in acute distress.     Appearance: She is well-developed.      Comments: Sitting up in a chair at bedside eating   HENT:      Head: Normocephalic and atraumatic.   Eyes:      Conjunctiva/sclera: Conjunctivae normal.   Neck:      Vascular: No JVD.   Cardiovascular:      Rate and Rhythm: Normal rate and regular rhythm.      Heart sounds: Murmur heard.     Pulmonary:      Effort: Pulmonary effort is normal. No respiratory distress.      Breath sounds: No stridor. No wheezing or rales.   Abdominal:      Palpations: Abdomen is soft.      Tenderness: There is no abdominal tenderness. There is no guarding or rebound.   Musculoskeletal:         General: No tenderness.      Right lower leg: No edema.      Left lower leg: No edema.   Skin:     General: Skin is warm and dry.      Findings: No rash.   Neurological:      Mental Status: She is alert and oriented to person, place, and time. Mental status is at baseline.   Psychiatric:         Mood and Affect: Mood normal.         Thought Content: Thought content normal.         Fluids    Intake/Output Summary (Last 24 hours) at 6/23/2021 1454  Last data filed at 6/23/2021 0847  Gross per 24 hour   Intake 270 ml   Output 800 ml   Net -530 ml       Laboratory  Recent Labs     06/21/21  0909 06/22/21  0607 06/23/21  0514   WBC  --  8.7 8.2   RBC  --  2.90* 2.88*   HEMOGLOBIN 8.8* 8.4* 8.2*   HEMATOCRIT 27.5* 26.2* 26.0*   MCV  --  90.3 90.3   MCH  --  29.0 28.5   MCHC  --  32.1* 31.5*   RDW  --  55.8* 56.4*   PLATELETCT  --  294 326   MPV  --  10.6 10.8     Recent Labs     06/21/21  0909 06/22/21  0607 06/23/21  0514   SODIUM 139 137 139    POTASSIUM 3.6 3.1* 3.9   CHLORIDE 109 107 108   CO2 22 23 23   GLUCOSE 85 86 84   BUN 15 9 7*   CREATININE 0.83 0.73 0.65   CALCIUM 8.2* 7.6* 7.2*                   Imaging  US-RENAL   Final Result      No hydronephrosis.      DX-CHEST-PORTABLE (1 VIEW)   Final Result      Status post right internal jugular catheter placement with the tip projecting over the superior vena cava. No pneumothorax is identified.      CT-HEAD W/O   Final Result    Examination is limited by patient motion.   1.  No acute intracranial abnormality is identified.   2.  Atrophy   3.  There are periventricular and subcortical white matter changes present.  This finding is nonspecific and could be from previous small vessel ischemia, demyelination, or gliosis.      DX-CHEST-PORTABLE (1 VIEW)   Final Result      No acute cardiopulmonary findings.           Assessment/Plan  * GI bleed  Assessment & Plan  PUD x 3  BID PPI  Needs to quit drinking  Denies NSAID use    Esophagitis  Assessment & Plan  S/p EGD.  On PPI.    ETOH abuse  Assessment & Plan  Thiamine  Folate  MVI  CIWA protocol  Encourage cessation    SDH (subdural hematoma) (HCC)  Assessment & Plan  Distant Hx   Head CT on this admission unremarkable    Severe anemia  Assessment & Plan  S/p EGD 6/18 with findings of 3 peptic ulcers and severe errosive esophagitis  No interventions on EGD as pt desaturated not allowing time  Cont PPI   Follow H&H; transfuse to goal >7.    Debility- (present on admission)  Assessment & Plan  Uses walker at home due to shake  She states she doesn't know if she has parkinsons and isn't on any home meds for such.  PT/OT-recommend postacute placement    Seizure (HCC)- (present on admission)  Assessment & Plan  keppra 500mg BID  Seizure precautions  Occurred after a Subdural hematoma in 2004  aspirationr precautions.  Head CT benign    Essential hypertension  Assessment & Plan  On lisinopril/HCTZ as outpt  Follow pressures and resume as appropriate       VTE  prophylaxis: SCDs

## 2021-06-24 ENCOUNTER — PATIENT OUTREACH (OUTPATIENT)
Dept: HEALTH INFORMATION MANAGEMENT | Facility: OTHER | Age: 66
End: 2021-06-24

## 2021-06-24 PROBLEM — K92.2 GI BLEED: Status: RESOLVED | Noted: 2021-06-17 | Resolved: 2021-06-24

## 2021-06-24 LAB
ANION GAP SERPL CALC-SCNC: 8 MMOL/L (ref 7–16)
BUN SERPL-MCNC: 6 MG/DL (ref 8–22)
CALCIUM SERPL-MCNC: 7.6 MG/DL (ref 8.5–10.5)
CHLORIDE SERPL-SCNC: 111 MMOL/L (ref 96–112)
CO2 SERPL-SCNC: 23 MMOL/L (ref 20–33)
CREAT SERPL-MCNC: 0.7 MG/DL (ref 0.5–1.4)
GLUCOSE SERPL-MCNC: 87 MG/DL (ref 65–99)
POTASSIUM SERPL-SCNC: 4.1 MMOL/L (ref 3.6–5.5)
SODIUM SERPL-SCNC: 142 MMOL/L (ref 135–145)

## 2021-06-24 PROCEDURE — 770006 HCHG ROOM/CARE - MED/SURG/GYN SEMI*

## 2021-06-24 PROCEDURE — 80048 BASIC METABOLIC PNL TOTAL CA: CPT

## 2021-06-24 PROCEDURE — A9270 NON-COVERED ITEM OR SERVICE: HCPCS | Performed by: INTERNAL MEDICINE

## 2021-06-24 PROCEDURE — A9270 NON-COVERED ITEM OR SERVICE: HCPCS | Performed by: STUDENT IN AN ORGANIZED HEALTH CARE EDUCATION/TRAINING PROGRAM

## 2021-06-24 PROCEDURE — 700102 HCHG RX REV CODE 250 W/ 637 OVERRIDE(OP): Performed by: INTERNAL MEDICINE

## 2021-06-24 PROCEDURE — 99231 SBSQ HOSP IP/OBS SF/LOW 25: CPT | Performed by: INTERNAL MEDICINE

## 2021-06-24 PROCEDURE — A9270 NON-COVERED ITEM OR SERVICE: HCPCS | Performed by: HOSPITALIST

## 2021-06-24 PROCEDURE — 36415 COLL VENOUS BLD VENIPUNCTURE: CPT

## 2021-06-24 PROCEDURE — 700102 HCHG RX REV CODE 250 W/ 637 OVERRIDE(OP): Performed by: HOSPITALIST

## 2021-06-24 PROCEDURE — 700102 HCHG RX REV CODE 250 W/ 637 OVERRIDE(OP): Performed by: STUDENT IN AN ORGANIZED HEALTH CARE EDUCATION/TRAINING PROGRAM

## 2021-06-24 RX ORDER — OMEPRAZOLE 20 MG/1
20 CAPSULE, DELAYED RELEASE ORAL 2 TIMES DAILY
Qty: 30 CAPSULE | Status: SHIPPED
Start: 2021-06-24

## 2021-06-24 RX ORDER — SUCRALFATE ORAL 1 G/10ML
1 SUSPENSION ORAL EVERY 6 HOURS
Qty: 414 ML | Refills: 0 | Status: SHIPPED
Start: 2021-06-24 | End: 2021-12-10

## 2021-06-24 RX ORDER — ACETAMINOPHEN 325 MG/1
650 TABLET ORAL EVERY 6 HOURS PRN
Qty: 30 TABLET | Refills: 0 | Status: SHIPPED
Start: 2021-06-24 | End: 2021-12-10

## 2021-06-24 RX ADMIN — OMEPRAZOLE 20 MG: 20 CAPSULE, DELAYED RELEASE ORAL at 04:42

## 2021-06-24 RX ADMIN — SUCRALFATE 1 G: 1 SUSPENSION ORAL at 04:42

## 2021-06-24 RX ADMIN — LEVETIRACETAM 500 MG: 500 TABLET ORAL at 16:39

## 2021-06-24 RX ADMIN — SUCRALFATE 1 G: 1 SUSPENSION ORAL at 16:39

## 2021-06-24 RX ADMIN — SUCRALFATE 1 G: 1 SUSPENSION ORAL at 11:00

## 2021-06-24 RX ADMIN — SUCRALFATE 1 G: 1 SUSPENSION ORAL at 00:11

## 2021-06-24 RX ADMIN — ACETAMINOPHEN 650 MG: 325 TABLET, FILM COATED ORAL at 11:00

## 2021-06-24 RX ADMIN — ACETAMINOPHEN 650 MG: 325 TABLET, FILM COATED ORAL at 00:14

## 2021-06-24 RX ADMIN — OMEPRAZOLE 20 MG: 20 CAPSULE, DELAYED RELEASE ORAL at 16:39

## 2021-06-24 RX ADMIN — LEVETIRACETAM 500 MG: 500 TABLET ORAL at 04:42

## 2021-06-24 RX ADMIN — SUCRALFATE 1 G: 1 SUSPENSION ORAL at 23:38

## 2021-06-24 RX ADMIN — ACETAMINOPHEN 650 MG: 325 TABLET, FILM COATED ORAL at 21:31

## 2021-06-24 ASSESSMENT — ENCOUNTER SYMPTOMS
CHILLS: 0
BACK PAIN: 0
WEAKNESS: 1
ABDOMINAL PAIN: 0
PALPITATIONS: 0
COUGH: 0
BLURRED VISION: 0
SHORTNESS OF BREATH: 0
NAUSEA: 0
LOSS OF CONSCIOUSNESS: 0
VOMITING: 0
HEADACHES: 0
DOUBLE VISION: 0
DIARRHEA: 0
FEVER: 0
DIZZINESS: 0

## 2021-06-24 ASSESSMENT — PAIN DESCRIPTION - PAIN TYPE: TYPE: ACUTE PAIN

## 2021-06-24 NOTE — DISCHARGE PLANNING
Agency/Facility Name: [facility]  Spoke To: [place]  Outcome: Referral received and under review.

## 2021-06-24 NOTE — PROGRESS NOTES
1 RN Skin Assessment completed.   Patient's risk of skin breakdown: Medium    Devices in place and skin assessed under:   [] Pulse Ox  [x] PIV [] Central Line [] SCDs [x]Purewick  [] Hood  []Condom Cath   [] BMS        []  Cortrak   []  Oxymask   [] Bipap    [] Nasal Canula   [] N/A   [] Other(specify):     Right Ear  [x] WDL                or           [] Skin issue present (please provide assessment/description below)    Left Ear  [x] WDL                or           [] Skin issue present (please provide assessment/description below)    Right Elbow:  [x] WDL               or           [] Skin issue present (please provide assessment/description below)  Redness     Left Elbow:   [x] WDL                or           [] Skin issue present (please provide assessment/description below)    Coccyx/Buttocks:  [x] WDL                or           [] Skin issue present (please provide assessment/description below)    Right Heel/Foot/Toes:  [x] WDL                or           [] Skin issue present (please provide assessment/description below)    Left Heel/Foot/Toes:   [x] WDL              or           [] Skin issue present (please provide assessment/description below)      **Describe any other skin issues in areas not already listed from above list:   [x] N/A    Interventions In Place: Heel Mepilex, Pillows, Elbow Mepilex, Q2 Turns, Heels Loaded W/Pillows and Pressure Redistribution Mattress , waffle mattress    **If any new or digressing skin issues are found, fill out the selection below.    Others: L upper thigh discoloration, 3 spots color getting lighter.

## 2021-06-24 NOTE — DISCHARGE PLANNING
Medical Social Work    Anticipated Discharge Disposition: Skilled Nursing Facility    Action: LSW notified that IRF declined and pt appropriate and needing SNF for post-acute placement.  LSW spoke w/ pt regarding SNF Choice and pt agreeable to 1st choice - [facility] and 2nd choice - [facility].  Choice faxed to [facility].     Barriers to Discharge: Acceptance and bed availability at SNF    Plan: LSW will continue to monitor for discharge barriers.

## 2021-06-24 NOTE — DISCHARGE PLANNING
Received Choice form at 0907  Agency/Facility Name: [facility]  Referral sent per Choice form @ [facility].

## 2021-06-24 NOTE — CARE PLAN
The patient is Stable - Low risk of patient condition declining or worsening    Shift Goals  Clinical Goals: pt will allow q2 turns this shift  Patient Goals: Rest  Family Goals: N/A    Progress made toward(s) clinical / shift goals: Pt is currently bedbound, on q2 turns but pt refuses Q2 turns. Able to turn to sides on linen changes and incontinent hygiene care.    Patient is not progressing towards the following goals: Educated on importance of Q2 turns skin offloading. Pt needs reinforcement.

## 2021-06-24 NOTE — PROGRESS NOTES
Security called to retrieve patients jewelery that was logged in on Sunday. Per safekeeping a security will be by to return items to the patient.

## 2021-06-24 NOTE — PROGRESS NOTES
Hospital Medicine Daily Progress Note    Date of Service  6/24/2021    Chief Complaint  65 y.o. female admitted 6/17/2021 with weakness    Hospital Course  66yo PMHx Sz following SDH 10 ya, ETOH, HTN with weakness x several weeks.  EMS found pt with SBP 50 on scene and had a witnessed tonic clonic seizure in the ER. On admission Hgb 2.9 and WBC 15.  Patient treated with IV fluids and blood transfusion as well as started on antibiotics for possible sepsis from UTI.  Antibiotics stopped as likely vitals and WBC count due to her severe anemia and not sepsis.  GI was consulted and patient started on PPI therapy as well as octreotide.  EGD on 6/18 with findings of 3 peptic ulcers, severe ulcerative esophagitis.  She was transitioned to oral omeprazole and sucralfate.  Patient has a known history of seizures that started after a subdural hematoma in 2004 she was restarted on Keppra and head CT showed no acute findings.  While admitted patient also had alcohol withdrawal treated with CIWA protocol and Ativan as needed.  Patient was able to be downgraded out of the ICU.    Interval Problem Update  Vitals stable.  No acute events overnight, pending SNF placement.  Patient has no new complaints.    Consultants/Specialty  GI  Critical Care- signed off     Code Status  Full Code    Disposition  Pending SNF     Review of Systems  Review of Systems   Constitutional: Positive for malaise/fatigue. Negative for chills and fever.   Eyes: Negative for blurred vision and double vision.   Respiratory: Negative for cough and shortness of breath.    Cardiovascular: Negative for chest pain, palpitations and leg swelling.   Gastrointestinal: Negative for abdominal pain, diarrhea, nausea and vomiting.   Genitourinary: Negative for dysuria and urgency.   Musculoskeletal: Negative for back pain.   Skin: Negative for rash.   Neurological: Positive for weakness. Negative for dizziness, loss of consciousness and headaches.        Physical  Exam  Temp:  [36.6 °C (97.8 °F)-37 °C (98.6 °F)] 36.7 °C (98.1 °F)  Pulse:  [73-87] 85  Resp:  [15-18] 15  BP: (116-133)/(69-87) 130/87  SpO2:  [94 %-97 %] 94 %    Physical Exam  Vitals reviewed.   Constitutional:       General: She is not in acute distress.     Appearance: She is well-developed.   HENT:      Head: Normocephalic and atraumatic.   Eyes:      Conjunctiva/sclera: Conjunctivae normal.   Neck:      Vascular: No JVD.   Cardiovascular:      Rate and Rhythm: Normal rate and regular rhythm.      Heart sounds: Murmur heard.     Pulmonary:      Effort: Pulmonary effort is normal. No respiratory distress.      Breath sounds: No wheezing.   Abdominal:      Palpations: Abdomen is soft.      Tenderness: There is no abdominal tenderness. There is no guarding or rebound.   Musculoskeletal:         General: No tenderness.      Right lower leg: No edema.      Left lower leg: No edema.   Skin:     General: Skin is warm and dry.      Findings: No rash.   Neurological:      Mental Status: She is alert and oriented to person, place, and time. Mental status is at baseline.   Psychiatric:         Mood and Affect: Mood normal.         Thought Content: Thought content normal.         Fluids    Intake/Output Summary (Last 24 hours) at 6/24/2021 1318  Last data filed at 6/24/2021 0414  Gross per 24 hour   Intake 270 ml   Output 650 ml   Net -380 ml       Laboratory  Recent Labs     06/22/21  0607 06/23/21  0514   WBC 8.7 8.2   RBC 2.90* 2.88*   HEMOGLOBIN 8.4* 8.2*   HEMATOCRIT 26.2* 26.0*   MCV 90.3 90.3   MCH 29.0 28.5   MCHC 32.1* 31.5*   RDW 55.8* 56.4*   PLATELETCT 294 326   MPV 10.6 10.8     Recent Labs     06/22/21  0607 06/23/21  0514 06/24/21  0357   SODIUM 137 139 142   POTASSIUM 3.1* 3.9 4.1   CHLORIDE 107 108 111   CO2 23 23 23   GLUCOSE 86 84 87   BUN 9 7* 6*   CREATININE 0.73 0.65 0.70   CALCIUM 7.6* 7.2* 7.6*                   Imaging  US-RENAL   Final Result      No hydronephrosis.      DX-CHEST-PORTABLE (1  VIEW)   Final Result      Status post right internal jugular catheter placement with the tip projecting over the superior vena cava. No pneumothorax is identified.      CT-HEAD W/O   Final Result    Examination is limited by patient motion.   1.  No acute intracranial abnormality is identified.   2.  Atrophy   3.  There are periventricular and subcortical white matter changes present.  This finding is nonspecific and could be from previous small vessel ischemia, demyelination, or gliosis.      DX-CHEST-PORTABLE (1 VIEW)   Final Result      No acute cardiopulmonary findings.           Assessment/Plan  Esophagitis  Assessment & Plan  S/p EGD.  On PPI.    ETOH abuse  Assessment & Plan  Thiamine  Folate  MVI  WA protocol  Encourage cessation    SDH (subdural hematoma) (HCC)  Assessment & Plan  Distant Hx   Head CT on this admission unremarkable    Severe anemia  Assessment & Plan  S/p EGD 6/18 with findings of 3 peptic ulcers and severe errosive esophagitis  No interventions on EGD as pt desaturated not allowing time  Cont PPI   Follow H&H; transfuse to goal >7.    Debility- (present on admission)  Assessment & Plan  Uses walker at home due to shake  She states she doesn't know if she has parkinsons and isn't on any home meds for such.  PT/OT-recommend postacute placement    Seizure (HCC)- (present on admission)  Assessment & Plan  keppra 500mg BID  Seizure precautions  Occurred after a Subdural hematoma in 2004  aspirationr precautions.  Head CT benign    Essential hypertension  Assessment & Plan  On lisinopril/HCTZ as outpt  Follow pressures and resume as appropriate       VTE prophylaxis: SCDs    I have performed a physical exam and reviewed and updated ROS and Plan today (6/24/2021). In review of yesterday's note (6/23/2021), there are no changes except as documented above.

## 2021-06-24 NOTE — PROGRESS NOTES
Assessment/description of ears? Pink & intact, blanching.  Which preventative measures are in place for the ears? N/A     Assessment/description of elbows? Redness & intact, blanching.   Which preventative measures are in place for the elbows? Encouraging frequent changes in position while in bed. Assisting with turns. Mepilex in place.      Assessment/description of sacrum? Pink/red & intact. Blanching.   Which preventative measures are in place for the sacrum? Q2 turns. Checking frequently for incontinence.      Assessment/description of heels? Pink/red & intact - blanching.   Which preventative measures are in place for the heels? Mepilex. Encouraging heels to be floated while in bed.      Which devices are in place? Purwick. PIV.   Description of skin under devices: Intact.   Which preventative measures are in place under devices? N/A  Other: Bruising to outer L thigh.
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
## 2021-06-25 LAB
ANION GAP SERPL CALC-SCNC: 8 MMOL/L (ref 7–16)
BUN SERPL-MCNC: 8 MG/DL (ref 8–22)
CALCIUM SERPL-MCNC: 8.1 MG/DL (ref 8.5–10.5)
CHLORIDE SERPL-SCNC: 113 MMOL/L (ref 96–112)
CO2 SERPL-SCNC: 21 MMOL/L (ref 20–33)
CREAT SERPL-MCNC: 0.76 MG/DL (ref 0.5–1.4)
GLUCOSE SERPL-MCNC: 91 MG/DL (ref 65–99)
POTASSIUM SERPL-SCNC: 4.1 MMOL/L (ref 3.6–5.5)
SODIUM SERPL-SCNC: 142 MMOL/L (ref 135–145)

## 2021-06-25 PROCEDURE — 770006 HCHG ROOM/CARE - MED/SURG/GYN SEMI*

## 2021-06-25 PROCEDURE — 700102 HCHG RX REV CODE 250 W/ 637 OVERRIDE(OP): Performed by: STUDENT IN AN ORGANIZED HEALTH CARE EDUCATION/TRAINING PROGRAM

## 2021-06-25 PROCEDURE — A9270 NON-COVERED ITEM OR SERVICE: HCPCS | Performed by: INTERNAL MEDICINE

## 2021-06-25 PROCEDURE — 80048 BASIC METABOLIC PNL TOTAL CA: CPT

## 2021-06-25 PROCEDURE — A9270 NON-COVERED ITEM OR SERVICE: HCPCS | Performed by: STUDENT IN AN ORGANIZED HEALTH CARE EDUCATION/TRAINING PROGRAM

## 2021-06-25 PROCEDURE — 99231 SBSQ HOSP IP/OBS SF/LOW 25: CPT | Performed by: INTERNAL MEDICINE

## 2021-06-25 PROCEDURE — 36415 COLL VENOUS BLD VENIPUNCTURE: CPT

## 2021-06-25 PROCEDURE — 700102 HCHG RX REV CODE 250 W/ 637 OVERRIDE(OP): Performed by: INTERNAL MEDICINE

## 2021-06-25 PROCEDURE — 92526 ORAL FUNCTION THERAPY: CPT

## 2021-06-25 RX ADMIN — OMEPRAZOLE 20 MG: 20 CAPSULE, DELAYED RELEASE ORAL at 16:54

## 2021-06-25 RX ADMIN — LEVETIRACETAM 500 MG: 500 TABLET ORAL at 04:59

## 2021-06-25 RX ADMIN — OMEPRAZOLE 20 MG: 20 CAPSULE, DELAYED RELEASE ORAL at 04:59

## 2021-06-25 RX ADMIN — SUCRALFATE 1 G: 1 SUSPENSION ORAL at 16:54

## 2021-06-25 RX ADMIN — LEVETIRACETAM 500 MG: 500 TABLET ORAL at 16:55

## 2021-06-25 RX ADMIN — SUCRALFATE 1 G: 1 SUSPENSION ORAL at 12:02

## 2021-06-25 RX ADMIN — SUCRALFATE 1 G: 1 SUSPENSION ORAL at 05:00

## 2021-06-25 ASSESSMENT — PAIN DESCRIPTION - PAIN TYPE: TYPE: ACUTE PAIN

## 2021-06-25 NOTE — CARE PLAN
The patient is Stable - Low risk of patient condition declining or worsening    Shift Goals  Clinical Goals: pt pain will be decreased to a tolerabl level  Patient Goals: Rest  Family Goals: N/A    Progress made toward(s) clinical / shift goals:  Pt reported of headache, prn analgesic given with good effects.      Problem: Pain - Standard  Goal: Alleviation of pain or a reduction in pain to the patient’s comfort goal  Outcome: Progressing

## 2021-06-25 NOTE — DISCHARGE PLANNING
Received Transport Form @ 1211  Spoke to Tiana @ [facility]. Auth: [id number]. RLC confirmed with Tiana that this mode of transport ([employer] vehicle) would provide the WC and deliver pt through door at destination.    Transport is scheduled for 6/25 @1500 going to [facility].    BS RN and SW notified of scheduled transport via Voalte @1235.    **1704  Notified by SW @8978 that transport for 1500 arrived without a WC.   Spoke to Tisha @ [facility] @4538. Auth #: [id number]. Tisha stated that dispatch would contact when transport had been arranged. As of time of this addendum, no contact has been received by this RLC.

## 2021-06-25 NOTE — CARE PLAN
The patient is Stable - Low risk of patient condition declining or worsening    Shift Goals  Clinical Goals: pt pain will be decreased to a tolerabl level  Patient Goals: Rest  Family Goals: N/A    Progress made toward(s) clinical / shift goals:  Pt headache managed by analgesic with good effects.      Problem: Pain - Standard  Goal: Alleviation of pain or a reduction in pain to the patient’s comfort goal  6/25/2021 0313 by Nayeli Zuñiga RSACHIN  Outcome: Progressing  6/24/2021 2346 by Nayeli Zuñiga R.N.  Outcome: Progressing          Skin normal color for race, warm, dry and intact. No evidence of rash.

## 2021-06-25 NOTE — PROGRESS NOTES
Pt alert and oriented x4, on room air. Analgesic given for headache. Medications administered orally with thickened liquids, tolerating well. Fall precautions and pressure ulcer preventions in place. Incontinent of bowel and bladder, purewick in place. Will continue to monitor.    1 RN Skin Assessment completed.   Patient's risk of skin breakdown: Medium     Devices in place and skin assessed under:   []? Pulse Ox  [x]? PIV []? Central Line []? SCDs [x]?Purewick  []? Hood  []?Condom Cath   []? BMS        []?  Cortrak   []?  Oxymask   []? Bipap    []? Nasal Canula   []? N/A   []? Other(specify):      Right Ear  [x]? WDL                or           []? Skin issue present (please provide assessment/description below)     Left Ear  [x]? WDL                or           []? Skin issue present (please provide assessment/description below)     Right Elbow:  [x]? WDL               or           []? Skin issue present (please provide assessment/description below)  Redness      Left Elbow:   [x]? WDL                or           []? Skin issue present (please provide assessment/description below)     Coccyx/Buttocks:  [x]? WDL                or           []? Skin issue present (please provide assessment/description below)     Right Heel/Foot/Toes:  [x]? WDL                or           []? Skin issue present (please provide assessment/description below)     Left Heel/Foot/Toes:   [x]? WDL              or           []? Skin issue present (please provide assessment/description below)        **Describe any other skin issues in areas not already listed from above list:   [x]? N/A     Interventions In Place: Heel Mepilex, Pillows, Elbow Mepilex, Q2 Turns, Heels Loaded W/Pillows and Pressure Redistribution Mattress , waffle mattress     **If any new or digressing skin issues are found, fill out the selection below.     Others: L upper thigh discoloration, 3 spots color getting lighter.

## 2021-06-25 NOTE — THERAPY
Speech Language Pathology  Daily Treatment     Patient Name: Roro Leon  Age:  65 y.o., Sex:  female  Medical Record #: [id number]  Today's Date: 6/25/2021     Precautions: Fall Risk, Swallow Precautions (See Comments)  Comments: aspiration and reflux precautions    Assessment    Pt was seen today for f/u dysphagia tx and therapeutic feeding session with lunch tray of minced/moist solids and mildly thick textures: mashed sweet potatoes, pulled pork, minced zucchini, soda and refresia (pre/un-thickened to assess tolerance). Pt was agreeable to have HOB upright to 90 degrees and consume meal. Pt demonstrated appropriate feeding rate and bolus size independently. Pt with reduced dentition (top denture plate only) but reported her lowers are ill-fitting and she prefers to eat with them out anyway. Pt demonstrated no coughing, choking or other clinical s/sx of aspiration/penetration with any consistencies/textures tested today. For thin liquids, Pt consumed cup sips and straw sips with no coughing, choking, change in vocal quality or other swallowing concerns. Pt denied globus sensation. At end of session, Pt was offered fruit cocktail or pudding but declined due to feeling full.    At this time, recommend PO diet of minced/moist solids with thin liquids and meds per tolerance. Please monitor with meals and hold PO if any difficulties/concerns or change in status. Thank you.    Plan    Continue current treatment plan.    Discharge Recommendations: Recommend post-acute placement for additional speech therapy services prior to discharge home     Objective     06/25/21 1345   Cognitive-Linguistic   Level of Consciousness Alert   Dysphagia    Positioning / Behavior Modification Self Monitoring   Other Treatments Lunch tray of MM5/MT2, trials thins   Diet / Liquid Recommendation Minced & Moist (5) - (Dysphagia II);Thin (0)   Nutritional Liquid Intake Rating Scale Non thickened beverages   Nutritional Food Intake Rating  Scale Total oral diet with multiple consistencies but requiring special preparation or compensations   Nursing Communication Swallow Precaution Sign Posted at Head of Bed   Skilled Intervention Compensatory Strategies   Recommended Route of Medication Administration   Medication Administration  Whole with Liquid Wash;Float Whole with Puree  (per tolerance)   Short Term Goals   Short Term Goal # 1 Pt will consume MM5/MT2 diet with no overt S/Sx of aspiration given 1:1 assistance for feeding and cues to slow down.   Goal Outcome # 1 Goal met, new goal added   Short Term Goal # 1 B  NEW 6/25: Pt will consume PO diet of MM5/TN0 with no clinical s/sx of aspiration/penetration.   Education Group   Education Provided Dysphagia   Dysphagia Patient Response Patient;Acceptance;Explanation;Demonstration;Verbal Demonstration;Action Demonstration;Reinforcement Needed

## 2021-06-25 NOTE — PROGRESS NOTES
Pt A&O 4. Room air. Medication given per MAR. csll bell within reach. Bed low and locked.      Assessment/description of ears? Pink & intact, blanching.  Which preventative measures are in place for the ears? N/A     Assessment/description of elbows? Redness & intact, blanching.   Which preventative measures are in place for the elbows? Encouraging frequent changes in position while in bed. Assisting with turns. Mepilex in place.      Assessment/description of sacrum? Pink/red & intact. Blanching.   Which preventative measures are in place for the sacrum? Q2 turns. Checking frequently for incontinence.      Assessment/description of heels? Pink/red & intact - blanching.   Which preventative measures are in place for the heels? Mepilex. Encouraging heels to be floated while in bed.      Which devices are in place? Purwick. PIV.   Description of skin under devices: Intact.   Which preventative measures are in place under devices? N/A  Other: Bruising to outer L thigh.

## 2021-06-25 NOTE — DISCHARGE PLANNING
Agency/Facility Name: [facility]  Spoke To: [place]  Outcome: Has a bed available today.  [place] scheduled transport for 1300 via [facility] van.

## 2021-06-25 NOTE — CARE PLAN
Problem: Fall Risk  Goal: Patient will remain free from falls  Outcome: Progressing  Note: Pt remains free from falls     Problem: Fall Risk  Goal: Patient will remain free from falls  Outcome: Progressing  Note: Pt remains free from falls     Problem: Pain - Standard  Goal: Alleviation of pain or a reduction in pain to the patient’s comfort goal  Outcome: Progressing  Note: Pt pain is being controlled by prn medication         The patient is Stable - Low risk of patient condition declining or worsening    Shift Goals  Clinical Goals: pt will allow q2 turns this shift  Patient Goals: Rest  Family Goals: N/A    Progress made toward(s) clinical / shift goals:  Pt will remain comfortable     Patient is not progressing towards the following goals:NA

## 2021-06-25 NOTE — CARE PLAN
Problem: Fall Risk  Goal: Patient will remain free from falls  Outcome: Progressing  Note: Pt remains free from falls     Problem: Pain - Standard  Goal: Alleviation of pain or a reduction in pain to the patient’s comfort goal  Outcome: Progressing  Note: Pt remains free from pain         The patient is Stable - Low risk of patient condition declining or worsening    Shift Goals  Clinical Goals: pt pain will be decreased to a tolerabl level  Patient Goals: Rest  Family Goals: N/A    Progress made toward(s) clinical / shift goals:  Pt will remain comfortable this shift    Patient is not progressing towards the following goals:

## 2021-06-25 NOTE — DISCHARGE INSTRUCTIONS
Discharge Instructions    Discharged to group home by taxi with escort. Discharged via wheelchair, hospital escort: Yes.  Special equipment needed: Not Applicable    Be sure to schedule a follow-up appointment with your primary care doctor or any specialists as instructed.     Discharge Plan:        I understand that a diet low in cholesterol, fat, and sodium is recommended for good health. Unless I have been given specific instructions below for another diet, I accept this instruction as my diet prescription.   Other diet: Healthy    Special Instructions: None    · Is patient discharged on Warfarin / Coumadin?   No     Depression / Suicide Risk    As you are discharged from this [facility] facility, it is important to learn how to keep safe from harming yourself.    Recognize the warning signs:  · Abrupt changes in personality, positive or negative- including increase in energy   · Giving away possessions  · Change in eating patterns- significant weight changes-  positive or negative  · Change in sleeping patterns- unable to sleep or sleeping all the time   · Unwillingness or inability to communicate  · Depression  · Unusual sadness, discouragement and loneliness  · Talk of wanting to die  · Neglect of personal appearance   · Rebelliousness- reckless behavior  · Withdrawal from people/activities they love  · Confusion- inability to concentrate     If you or a loved one observes any of these behaviors or has concerns about self-harm, here's what you can do:  · Talk about it- your feelings and reasons for harming yourself  · Remove any means that you might use to hurt yourself (examples: pills, rope, extension cords, firearm)  · Get professional help from the community (Mental Health, Substance Abuse, psychological counseling)  · Do not be alone:Call your Safe Contact- someone whom you trust who will be there for you.  · Call your local CRISIS HOTLINE 255-3312 or 616-552-8889  · Call your local Children's Mobile  Crisis Response Team [place] (112) 412-7344 or www.[place].[employer]Shop  · Call the toll free National Suicide Prevention Hotlines   · National Suicide Prevention Lifeline 128-709-FHBA (9745)  · National Hope Line Network 800-SUICIDE (568-1723)

## 2021-06-25 NOTE — DISCHARGE PLANNING
Medical Social Work  Patient's nurse handed his phone to [place] [facility].  [place] told that they are here to  patient, do not have a wheelchair.  YOSELIN requested ride be canceled.    PC to Mary at [facility], YOSELIN explained the situation.  Mary will call [facility] and contact the patients nurse if she has transport set prior to 5:00    PC to [facility] [phone]; YO[place]IN spoke to [place], patient can no later than 5:00.  Asked if transport is not sent can they take her Saturday, YOSELIN told yes    Volt to Mary at [facility], if transport can't be arranged for 5:00 then will need to come over on Saturday.     Patient's nurse will call [facility] if patient is be transported     YOSELIN left above information on hand off report

## 2021-06-26 VITALS
HEART RATE: 76 BPM | SYSTOLIC BLOOD PRESSURE: 139 MMHG | HEIGHT: 66 IN | OXYGEN SATURATION: 97 % | RESPIRATION RATE: 16 BRPM | TEMPERATURE: 97.3 F | BODY MASS INDEX: 22.85 KG/M2 | WEIGHT: 142.2 LBS | DIASTOLIC BLOOD PRESSURE: 90 MMHG

## 2021-06-26 LAB
ANION GAP SERPL CALC-SCNC: 8 MMOL/L (ref 7–16)
BUN SERPL-MCNC: 7 MG/DL (ref 8–22)
CALCIUM SERPL-MCNC: 8.2 MG/DL (ref 8.5–10.5)
CHLORIDE SERPL-SCNC: 111 MMOL/L (ref 96–112)
CO2 SERPL-SCNC: 22 MMOL/L (ref 20–33)
CREAT SERPL-MCNC: 0.85 MG/DL (ref 0.5–1.4)
FLUAV RNA SPEC QL NAA+PROBE: NEGATIVE
FLUBV RNA SPEC QL NAA+PROBE: NEGATIVE
GLUCOSE SERPL-MCNC: 107 MG/DL (ref 65–99)
POTASSIUM SERPL-SCNC: 4.2 MMOL/L (ref 3.6–5.5)
RSV RNA SPEC QL NAA+PROBE: NEGATIVE
SARS-COV-2 RNA RESP QL NAA+PROBE: NOTDETECTED
SODIUM SERPL-SCNC: 141 MMOL/L (ref 135–145)
SPECIMEN SOURCE: NORMAL

## 2021-06-26 PROCEDURE — 36415 COLL VENOUS BLD VENIPUNCTURE: CPT

## 2021-06-26 PROCEDURE — 80048 BASIC METABOLIC PNL TOTAL CA: CPT

## 2021-06-26 PROCEDURE — A9270 NON-COVERED ITEM OR SERVICE: HCPCS | Performed by: STUDENT IN AN ORGANIZED HEALTH CARE EDUCATION/TRAINING PROGRAM

## 2021-06-26 PROCEDURE — C9803 HOPD COVID-19 SPEC COLLECT: HCPCS | Performed by: INTERNAL MEDICINE

## 2021-06-26 PROCEDURE — 99239 HOSP IP/OBS DSCHRG MGMT >30: CPT | Performed by: INTERNAL MEDICINE

## 2021-06-26 PROCEDURE — 700102 HCHG RX REV CODE 250 W/ 637 OVERRIDE(OP): Performed by: INTERNAL MEDICINE

## 2021-06-26 PROCEDURE — 0241U HCHG SARS-COV-2 COVID-19 NFCT DS RESP RNA 4 TRGT MIC: CPT

## 2021-06-26 PROCEDURE — 700102 HCHG RX REV CODE 250 W/ 637 OVERRIDE(OP): Performed by: STUDENT IN AN ORGANIZED HEALTH CARE EDUCATION/TRAINING PROGRAM

## 2021-06-26 PROCEDURE — A9270 NON-COVERED ITEM OR SERVICE: HCPCS | Performed by: INTERNAL MEDICINE

## 2021-06-26 RX ADMIN — LEVETIRACETAM 500 MG: 500 TABLET ORAL at 04:29

## 2021-06-26 RX ADMIN — SUCRALFATE 1 G: 1 SUSPENSION ORAL at 00:18

## 2021-06-26 RX ADMIN — OMEPRAZOLE 20 MG: 20 CAPSULE, DELAYED RELEASE ORAL at 04:29

## 2021-06-26 RX ADMIN — SUCRALFATE 1 G: 1 SUSPENSION ORAL at 04:29

## 2021-06-26 ASSESSMENT — ENCOUNTER SYMPTOMS
DIZZINESS: 0
DIARRHEA: 0
HEADACHES: 0
BLURRED VISION: 0
VOMITING: 0
COUGH: 0
FEVER: 0
NAUSEA: 0
WEAKNESS: 1
PALPITATIONS: 0
CHILLS: 0
DOUBLE VISION: 0
LOSS OF CONSCIOUSNESS: 0
SHORTNESS OF BREATH: 0
BACK PAIN: 0
ABDOMINAL PAIN: 0

## 2021-06-26 NOTE — CARE PLAN
Problem: Knowledge Deficit - Standard  Goal: Patient and family/care givers will demonstrate understanding of plan of care, disease process/condition, diagnostic tests and medications  Outcome: Progressing  Note: Pt understands plan of care     Problem: Fall Risk  Goal: Patient will remain free from falls  Outcome: Progressing  Note: Pt remains free from falls         The patient is Stable - Low risk of patient condition declining or worsening    Shift Goals  Clinical Goals: pt skin integrity will remain intact  Patient Goals: Rest  Family Goals: N/A    Progress made toward(s) clinical / shift goals:  Pt will remain comfortable this shift    Patient is not progressing towards the following goals:NA

## 2021-06-26 NOTE — DISCHARGE PLANNING
Agency/Facility Name: [facility]  Spoke To: [place]  Outcome: Confirmed patient does not need COVID test since patient is vaccinated.     CANDELARIO Ross notified.

## 2021-06-26 NOTE — DISCHARGE PLANNING
Agency/Facility Name: [facility]  Spoke To: [place]  Outcome: Bed available today. Facility wheelchair van able to  patient at 1330.     RNSILVIA Ross notified.

## 2021-06-26 NOTE — PROGRESS NOTES
Pt alert and oriented x4, on room air. Pt lying on bed, resting, no distress noted. Heat packs offered for neck soreness. Continued on thin liquids, no swallowing difficulty noted. Safety precautions in place. Reminded to allow staff to turn every 2 hours to prevent pressure sores. All needs attended.    1 RN Skin Assessment completed.   Patient's risk of skin breakdown: Medium     Devices in place and skin assessed under:   []?? Pulse Ox  [x]?? PIV []?? Central Line []?? SCDs [x]??Purewick  []?? Hood  []??Condom Cath   []?? BMS        []??  Cortrak   []??  Oxymask   []?? Bipap    []?? Nasal Canula   []?? N/A   []?? Other(specify):      Right Ear  [x]?? WDL                or           []?? Skin issue present (please provide assessment/description below)     Left Ear  [x]?? WDL                or           []?? Skin issue present (please provide assessment/description below)     Right Elbow:  [x]?? WDL               or           []?? Skin issue present (please provide assessment/description below)  Redness      Left Elbow:   [x]?? WDL                or           []?? Skin issue present (please provide assessment/description below)     Coccyx/Buttocks:  [x]?? WDL                or           []?? Skin issue present (please provide assessment/description below)     Right Heel/Foot/Toes:  [x]?? WDL                or           []?? Skin issue present (please provide assessment/description below)     Left Heel/Foot/Toes:   [x]?? WDL              or           []?? Skin issue present (please provide assessment/description below)        **Describe any other skin issues in areas not already listed from above list:   [x]?? N/A     Interventions In Place: Heel Mepilex, Pillows, Elbow Mepilex, Q2 Turns, Heels Loaded W/Pillows and Pressure Redistribution Mattress , waffle mattress     **If any new or digressing skin issues are found, fill out the selection below.     Others: L upper thigh discoloration lither in color.

## 2021-06-26 NOTE — DISCHARGE PLANNING
Patient to discharge to [facility] today at 1330 via their personal transportation. Updated D/C Summary placed in [facility].     PLAN: Patient to discharge to [facility] at 1:30.

## 2021-06-26 NOTE — CARE PLAN
The patient is Stable - Low risk of patient condition declining or worsening    Shift Goals  Clinical Goals: pt skin integrity will remain intact  Patient Goals: Rest  Family Goals: N/A    Progress made toward(s) clinical / shift goals:  Pt encouraged to turn frequently on bed. Skin pressure ulcer prevention in place.    Problem: Skin Integrity  Goal: Skin integrity is maintained or improved  Outcome: Progressing

## 2021-06-26 NOTE — PROGRESS NOTES
Hospital Medicine Daily Progress Note    Date of Service  6/26/2021    Chief Complaint  65 y.o. female admitted 6/17/2021 with weakness    Hospital Course  66yo PMHx Sz following SDH 10 ya, ETOH, HTN with weakness x several weeks.  EMS found pt with SBP 50 on scene and had a witnessed tonic clonic seizure in the ER. On admission Hgb 2.9 and WBC 15.  Patient treated with IV fluids and blood transfusion as well as started on antibiotics for possible sepsis from UTI.  Antibiotics stopped as likely vitals and WBC count due to her severe anemia and not sepsis.  GI was consulted and patient started on PPI therapy as well as octreotide.  EGD on 6/18 with findings of 3 peptic ulcers, severe ulcerative esophagitis.  She was transitioned to oral omeprazole and sucralfate.  Patient has a known history of seizures that started after a subdural hematoma in 2004 she was restarted on Keppra and head CT showed no acute findings.  While admitted patient also had alcohol withdrawal treated with CIWA protocol and Ativan as needed.  Patient was able to be downgraded out of the ICU.    Interval Problem Update  Patient had fever this morning, though when rechecked temperature is normal likely this was an error as patient has no complaints and other vital signs are stable.  Patient pending transfer to SNF.    Consultants/Specialty  GI  Critical Care- signed off     Code Status  Full Code    Disposition  Pending SNF     Review of Systems  Review of Systems   Constitutional: Positive for malaise/fatigue. Negative for chills and fever.   Eyes: Negative for blurred vision and double vision.   Respiratory: Negative for cough and shortness of breath.    Cardiovascular: Negative for chest pain, palpitations and leg swelling.   Gastrointestinal: Negative for abdominal pain, diarrhea, nausea and vomiting.   Genitourinary: Negative for dysuria and urgency.   Musculoskeletal: Negative for back pain.   Skin: Negative for rash.   Neurological:  Positive for weakness. Negative for dizziness, loss of consciousness and headaches.        Physical Exam  Temp:  [36.5 °C (97.7 °F)] 36.5 °C (97.7 °F)  Pulse:  [83-89] 89  Resp:  [16-18] 18  BP: (133-148)/(83-84) 148/84  SpO2:  [95 %-97 %] 95 %    Physical Exam  Vitals reviewed.   Constitutional:       General: She is not in acute distress.     Appearance: She is well-developed.   HENT:      Head: Normocephalic and atraumatic.   Eyes:      Conjunctiva/sclera: Conjunctivae normal.   Neck:      Vascular: No JVD.   Cardiovascular:      Rate and Rhythm: Normal rate and regular rhythm.      Heart sounds: Murmur heard.     Pulmonary:      Effort: Pulmonary effort is normal. No respiratory distress.      Breath sounds: No wheezing.   Abdominal:      Palpations: Abdomen is soft.      Tenderness: There is no abdominal tenderness. There is no guarding or rebound.   Musculoskeletal:         General: No tenderness.      Right lower leg: No edema.      Left lower leg: No edema.   Skin:     General: Skin is warm and dry.      Findings: No rash.   Neurological:      Mental Status: She is alert and oriented to person, place, and time. Mental status is at baseline.   Psychiatric:         Mood and Affect: Mood normal.         Thought Content: Thought content normal.         Fluids    Intake/Output Summary (Last 24 hours) at 6/26/2021 0825  Last data filed at 6/26/2021 0350  Gross per 24 hour   Intake 508 ml   Output 3300 ml   Net -2792 ml       Laboratory      Recent Labs     06/24/21  0357 06/25/21  0333 06/26/21  0037   SODIUM 142 142 141   POTASSIUM 4.1 4.1 4.2   CHLORIDE 111 113* 111   CO2 23 21 22   GLUCOSE 87 91 107*   BUN 6* 8 7*   CREATININE 0.70 0.76 0.85   CALCIUM 7.6* 8.1* 8.2*                   Imaging  US-RENAL   Final Result      No hydronephrosis.      DX-CHEST-PORTABLE (1 VIEW)   Final Result      Status post right internal jugular catheter placement with the tip projecting over the superior vena cava. No pneumothorax  is identified.      CT-HEAD W/O   Final Result    Examination is limited by patient motion.   1.  No acute intracranial abnormality is identified.   2.  Atrophy   3.  There are periventricular and subcortical white matter changes present.  This finding is nonspecific and could be from previous small vessel ischemia, demyelination, or gliosis.      DX-CHEST-PORTABLE (1 VIEW)   Final Result      No acute cardiopulmonary findings.           Assessment/Plan  Esophagitis  Assessment & Plan  S/p EGD.  On PPI.    ETOH abuse  Assessment & Plan  Thiamine  Folate  MVI  CIWA protocol  Encourage cessation    SDH (subdural hematoma) (HCC)  Assessment & Plan  Distant Hx   Head CT on this admission unremarkable    Severe anemia  Assessment & Plan  S/p EGD 6/18 with findings of 3 peptic ulcers and severe errosive esophagitis  No interventions on EGD as pt desaturated not allowing time  Cont PPI   Follow H&H; transfuse to goal >7.    Debility- (present on admission)  Assessment & Plan  Uses walker at home due to shake  She states she doesn't know if she has parkinsons and isn't on any home meds for such.  PT/OT-recommend postacute placement    Seizure (HCC)- (present on admission)  Assessment & Plan  keppra 500mg BID  Seizure precautions  Occurred after a Subdural hematoma in 2004  aspirationr precautions.  Head CT benign    Essential hypertension  Assessment & Plan  On lisinopril/HCTZ as outpt  Follow pressures and resume as appropriate       VTE prophylaxis: SCDs    I have performed a physical exam and reviewed and updated ROS and Plan today (6/26/2021). In review of yesterday's note (6/25/2021), there are no changes except as documented above.

## 2021-06-30 NOTE — DOCUMENTATION QUERY
[facility]                                                                       Query Response Note      PATIENT:               KIM LEYVA  ACCT #:                  [id number]  MRN:                     [id number]  :                      1955  ADMIT DATE:       2021 9:33 AM  DISCH DATE:        2021 2:25 PM  RESPONDING  PROVIDER #:        [id number]           QUERY TEXT:    Confusion is documented in the Medical Record.  Can a more specific diagnosis be provided?    NOTE:  If the appropriate response is not listed below, please respond with a new note.    The patient's Clinical Indicators include:  Clinical indicators-  Per your  &  PN: ROS limited as pt is slightly confused. GIB. Severe anemia. Hypotension & hemorrhagic shock. ETOH abuse with WD.  Per previous query reply lactic acidosis ruled in.    Treatments-  Blood transfusions; IVF; CIWA protocol; Seizure precautions    Risks-  Confused; Hemorrhagic shock; Severe anemia; Lactic acidosis; ETOH wd; Hx of seizures; YEISON    Thank you,  Shivani Haskins, KAITLYN RN  Connect via Voalte  Options provided:   -- Acute metabolic encephalopathy   -- Acute toxic encephalopathy   -- Acute alcoholic encephalopathy   -- Acute encephalopathy due to other medical condition, (please specify other medical condition)   -- Acute encephalopathy of unknown etiology   -- Delirium due to general medical condition   -- Delirium due to multiple etiologies   -- Delirium due to alcohol withdrawal   -- Delirium due to other known physiological condition, (please specify the known physiological condition)   -- Delirium of unknown etiology   -- Unable to determine      Query created by: Shivani Haskins on 2021 8:04 PM    RESPONSE TEXT:    Acute metabolic encephalopathy          Electronically signed by:  JAMAR RAINEY DO 2021 8:03 AM

## 2021-09-10 ENCOUNTER — HOSPITAL ENCOUNTER (EMERGENCY)
Facility: MEDICAL CENTER | Age: 66
End: 2021-09-10
Attending: EMERGENCY MEDICINE
Payer: MEDICARE

## 2021-09-10 ENCOUNTER — APPOINTMENT (OUTPATIENT)
Dept: RADIOLOGY | Facility: MEDICAL CENTER | Age: 66
End: 2021-09-10
Attending: EMERGENCY MEDICINE
Payer: MEDICARE

## 2021-09-10 VITALS
BODY MASS INDEX: 21.68 KG/M2 | DIASTOLIC BLOOD PRESSURE: 68 MMHG | OXYGEN SATURATION: 95 % | SYSTOLIC BLOOD PRESSURE: 123 MMHG | WEIGHT: 134.92 LBS | HEART RATE: 94 BPM | RESPIRATION RATE: 18 BRPM | TEMPERATURE: 97.7 F | HEIGHT: 66 IN

## 2021-09-10 DIAGNOSIS — M79.672 LEFT FOOT PAIN: ICD-10-CM

## 2021-09-10 PROCEDURE — 99283 EMERGENCY DEPT VISIT LOW MDM: CPT

## 2021-09-10 PROCEDURE — 93971 EXTREMITY STUDY: CPT | Mod: LT

## 2021-09-10 RX ORDER — TRAMADOL HYDROCHLORIDE 50 MG/1
50 TABLET ORAL EVERY 6 HOURS PRN
Qty: 12 TABLET | Refills: 0 | Status: SHIPPED | OUTPATIENT
Start: 2021-09-10 | End: 2021-09-10 | Stop reason: SDUPTHER

## 2021-09-10 RX ORDER — TRAMADOL HYDROCHLORIDE 50 MG/1
50 TABLET ORAL EVERY 6 HOURS PRN
Qty: 12 TABLET | Refills: 0 | Status: SHIPPED | OUTPATIENT
Start: 2021-09-10 | End: 2021-09-13

## 2021-09-10 ASSESSMENT — FIBROSIS 4 INDEX: FIB4 SCORE: 1.76

## 2021-09-10 NOTE — ED NOTES
Physical Therapy Daily Treatment/Discharge Note    Visit Count: 6    Plan of Care: 10/22/2019 Through: 1/14/2020  Insurance Information: Payor: [employer] - Medicare Complete  Authorization Needed: No  Maximum Visit Limit: Based on medical necessity  CoPay: $40.00  Notes: Follow Medicare guidelines 12/31/2019  Call Ref #: Online  “Evaluation requires MD, NP, PA, or DO co-signature  Referred by: Cathy Marcos NP; Next provider visit (if known/scheduled): 12/2/19  Medical Diagnosis (from order):       Diagnosis Information             Diagnosis      V45.89 (ICD-9-CM) - Z90.2 (ICD-10-CM) - S/P lobectomy of lung                  Treatment Diagnosis: deconditioned state/imbalance symptoms with impaired strength, impaired gait, impaired mobility, impaired activity tolerance, impaired balance     Date of onset/injury: Sept 15, 19--right lung lobectomy  Diagnosis Precautions: lung cancer, CAD, COPD  Chart reviewed at time of initial evaluation (relevant co-morbidities, allergies, tests and medications listed):   History - past medical        Past Medical History:   Diagnosis Date   • Adenocarcinoma of right lung (CMS/HCC) 8/8/2019   • Allergy     • Anxiety     • Arthritis       back   • Bronchitis     • CAD (coronary artery disease)     • Carotid artery stenosis       R ICA 50-69%   • Chronic pain       back   • COPD (chronic obstructive pulmonary disease) (CMS/HCC)     • Diaphragmatic hernia without mention of obstruction or gangrene 6/22/11     4cm   • Diverticulosis of colon (without mention of hemorrhage) 6/22/11     sigmoid colon   • Environmental allergies     • Erectile dysfunction     • Fracture       jaw, toes   • GERD (gastroesophageal reflux disease) 6/22/11   • Heart murmur     • HTN (hypertension)     • Obesity     • S/P robotic right lower lobectomy of lung 9/16/2019   • Sinusitis, chronic     • Stricture and stenosis of esophagus 6/22/11     wide open Schatzki's ring at 40cm   • Unspecified hemorrhoids without  Pt arrived to room by wheelchair from triage. Pt resting on gurney, call light in reach. Visitor at bedside.    mention of complication 6/22/11     moderate size internal         Medications          Current Outpatient Medications   Medication Sig Dispense Refill   • ibuprofen (ADVIL) 200 MG capsule Take 200 mg by mouth every 6 hours as needed for Pain.       • LORazepam (ATIVAN) 1 MG tablet Take 1 mg by mouth every 6 hours as needed for Anxiety.       • losartan (COZAAR) 50 MG tablet Take 1 tablet by mouth daily. 90 tablet 0   • furosemide (LASIX) 20 MG tablet Take 1 tablet by mouth daily. (Patient taking differently: Take 2 tablets by mouth daily. As of 9/26- Patient increased to 2 tabs daily) 90 tablet 0   • atorvastatin (LIPITOR) 40 MG tablet TAKE 1 TABLET BY MOUTH DAILY 90 tablet 0   • clopidogrel (PLAVIX) 75 MG tablet TAKE 1 TABLET BY MOUTH DAILY 90 tablet 0   • doxazosin (CARDURA) 4 MG tablet TAKE 1 TABLET BY MOUTH DAILY 90 tablet 0   • MAGNESIUM OXIDE PO Take 1 tablet by mouth at bedtime.        • guaiFENesin (MUCINEX) 600 MG 12 hr tablet Take 1,200 mg by mouth 2 times daily.       • Pseudoeph-Doxylamine-DM-APAP (NYQUIL PO) Take by mouth as needed. 30 cc of OTC NYQUIL       • pantoprazole (PROTONIX) 40 MG tablet Take 1 tablet by mouth daily. 90 tablet 3   • albuterol-ipratropium 2.5 mg/0.5 mg (DUONEB) 0.5-2.5 (3) MG/3ML nebulizer solution USE 3 ML VIA NEBULIZER EVERY 6 HOURS AS NEEDED FOR WHEEZING (Patient taking differently: Patient uses twice daily, once in morning, once at night and PRN inbetween.) 1080 mL 3   • atenolol (TENORMIN) 50 MG tablet Take 1 tablet by mouth daily. 90 tablet 3   • SYMBICORT 160-4.5 MCG/ACT inhaler INHALE 2 PUFFS INTO THE LUNGS TWICE DAILY 30.6 g 1   • cetirizine (ZYRTEC) 10 MG tablet Take 1 tablet by mouth nightly. 90 tablet 3   • aspirin 81 MG tablet Take 1 tablet by mouth daily. 100 tablet 3   • Multiple Vitamins-Minerals (MULTIVITAMIN PO) Take 1 tablet by mouth daily.          No current facility-administered medications for this encounter.         SUBJECTIVE   Feeling pretty good and  happy he is able to walk with cane.  Feels he can continue with HEP on own and no further PT is indicated at this time.  Feels he is still deconditioned but will continue to work on this on own.  Reports compliance with HEP - feels as though breathing exercises are getting a little bit easier. Noticing he is taking longer breaths in and out with diaphragmatic breathing exercise  Current Pain (0-10 scale): 0   Functional Change: has weaned off of using cane in his home    OBJECTIVE   BP right arm: 148/75, HR 89, pulse ox 88-96%  To PT with cane     Breath assessment: (11-6-19)  Diaphragmatic breathing: minimal  Upper chest breathing: significant amount  Lateral chest wall motion: minimal  Anterior elevation of shoulders: minimal  Circumferential measurement   Xyphoid .3 cm resting breath, .4 cm deep breathing   Diagphragm (half way between xyphoid process and umbilicus) .5 cm resting breath, 1 cm deep breathing      Treatment     Therapeutic Exercise:   Nustep level 1 UE and LE 10 minutes for strengthening and aerobic conditioning .   Standing ex in parallel bars:  Heel raises 20 reps  Standing hip abduction 10 reps ea  Partial squats 20 reps  High march 15reps with cues  Hip extension 10 reps ea with cues  Gait training on unevens, over thick and airex floor mat, over hurdles, tall and small, up and over 6 in step and airex mat, and gait around obstacles in gym 4 laps total with seated rest between  Review of Conservation Techniques assist in improving tolerance to daily activities.     Verbally reviewed  Diaphragmatic breathing in hooklying focusing on increasing duration of breath   Hooklying shoulder AAROM with cane - inhalation with shoulder flexion and exhalation with returning to neutral      After : /67  O2 sats 97%  HR 88 bpm      Skilled input: verbal instruction/cues, tactile instruction/cues    Home Program:   Access Code: [email or web address]             URL: [email or web address]            Date:  10/22/2019   Prepared by: Larisa Zepeda      Exercises  Seated Diaphragmatic Breathing - 10 reps - 3-5x daily - 7x weekly                                        Sit to Stand - 10 reps - 3x daily - 7x weekly    Standing strengthening program    Writer verbally educated the patient and received verbal consent from the patient on hand placement, positioning of patient, and techniques to be performed today including hand placement and palpation for techniques as described above and how they are pertinent to the patient's plan of care.      Suggestions for next session as indicated: discharge with HEP    ASSESSMENT   Patient has made progress in all areas and wanting to be discharged at this time.  Is steady ambulating with cane on levels and unevens. Achieved goals  Pain after treatment (patient reported, 0-10 scale): 0  Result of above outlined education: Verbalizes understanding and Demonstrates understanding    THERAPY DAILY BILLING   Insurance: [employer] MEDICARE COMPLETE 2. N/A  Procedures and total treatment time documented in Time Entry flowsheet.  Evaluation Procedures:  No evaluation codes were used on this date of service    Timed Procedures:  Therapeutic Exercise, 40 minutes    Untimed Procedures:  No untimed codes were used on this date of service    Total Treatment Time: 40 minutes

## 2021-09-10 NOTE — ED NOTES
"Pt discharged home via ambulatory to [facility] with walker, AOx4. Pt educated on narcotics and signed consent form. Pt in possession of belongings. Pt provided discharge education and information pertaining to medications and follow up appointments. Pt received copy of discharge instructions and verbalized understanding.     /68   Pulse 94   Temp 36.5 °C (97.7 °F)   Resp 18   Ht 1.676 m (5' 6\")   Wt 61.2 kg (134 lb 14.7 oz)   SpO2 95%   Breastfeeding No   BMI 21.78 kg/m²   "

## 2021-09-10 NOTE — ED TRIAGE NOTES
Chief Complaint   Patient presents with   • Sent from Urgent Care     home health RN sent patient to ED - concerned fro possible blood clot in L foot.   • Foot Pain     denies fall or trauma, denies n/t, pedal pulse 2+, pain 8/10, edema noted.      Patient to triage via ambulaiton, with a personal walker, patient A&O x4.  Appropriate precautions in place.     Explained wait time and triage process to pt. Pt placed back in lobby, told to notify ED tech or triage RN of any changes, verbalized understanding.

## 2021-09-10 NOTE — ED PROVIDER NOTES
ED Provider Note    CHIEF COMPLAINT  Chief Complaint   Patient presents with   • Sent from Urgent Care     home health RN sent patient to ED - concerned fro possible blood clot in L foot.   • Foot Pain     denies fall or trauma, denies n/t, pedal pulse 2+, pain 8/10, edema noted.        ANDREW Leon is a 66 y.o. female who presents from home where home health evaluated her and is concerned about a DVT so she was sent in to rule out DVT of the left leg.  The patient recently got home from an acute rehab facility following hospitalization, she is been working with a podiatrist and the physical therapist on her left foot as she has a hammertoe and an abnormal gait causing callusing.  However over the past week or so she has had some increasing swelling and pain of that foot without obvious trauma.  She has follow-up with her podiatrist and her other outpatient providers but her home nurse encouraged her to go the emergency department to exclude DVT.  No history of DVT or PE, she offers no other significant complaints at this time.  She has a history of subdural hematoma, hypertension and a seizure disorder.    REVIEW OF SYSTEMS  Negative for fever, rash, chest pain, dyspnea, abdominal pain, back pain. All other systems are negative.     PAST MEDICAL HISTORY   has a past medical history of YEISON (acute kidney injury) (HCC), ETOH abuse (6/19/2021), Hypertension, Seizure (HCC), and Subdural hematoma (HCC).    SOCIAL HISTORY  Social History     Tobacco Use   • Smoking status: Never Smoker   • Smokeless tobacco: Never Used   Vaping Use   • Vaping Use: Never used   Substance and Sexual Activity   • Alcohol use: Yes     Alcohol/week: 3.6 oz     Types: 6 Glasses of wine per week     Comment: weekly   • Drug use: No   • Sexual activity: Not on file       SURGICAL HISTORY   has a past surgical history that includes upper gi endoscopy,diagnosis (6/18/2021).    CURRENT MEDICATIONS  I personally reviewed the medication list  "in the charting documentation.     ALLERGIES  No Known Allergies    PHYSICAL EXAM  VITAL SIGNS: /73   Pulse 95   Temp 36.5 °C (97.7 °F) (Temporal)   Resp 18   Ht 1.676 m (5' 6\")   Wt 61.2 kg (134 lb 14.7 oz)   SpO2 98%   Breastfeeding No   BMI 21.78 kg/m²   Constitutional: Well appearing patient in no acute distress.  Awake and alert, not toxic nor ill in appearance.  HENT: Normocephalic, no obvious evidence of acute trauma.   Neck: Comfortable movement without any obvious restriction in the range of motion.  Eyes: Conjunctiva normal, Non-icteric.   Chest: Normal nonlabored respirations.  Skin: The exposed portions of skin reveal no obvious rash or other abnormalities.  Musculoskeletal: Inspection of the lower extremities reveals no obvious asymmetric calf edema or erythema.  She has minimal edema of the left foot versus the right, she has a chronic deformity of the left second toe.  No erythema.   Neurologic: Alert, No obvious focal deficits noted.   Psychiatric: Affect normal for clinical presentation    DIAGNOSTIC STUDIES / PROCEDURES    RADIOLOGY     US-EXTREMITY VENOUS LOWER UNILAT LEFT   Final Result      Negative for DVT      COURSE & MEDICAL DECISION MAKING  Pertinent Labs & Imaging studies reviewed. (See chart for details)    Encounter Summary: This is a very pleasant 66 y.o. female who unfortunately required evaluation in the emergency department today with chronic issues with her left foot, sent to exclude a DVT of this leg, this is in fact negative here in emergency department.  She already has follow-up with her podiatrist and other outpatient providers including physical therapy with ongoing issues this foot, no other emergent or urgent work-up is needed here in the emergency department at this time.  Return instructions provided.      DISPOSITION: Discharge Home      FINAL IMPRESSION  1. Left foot pain        This dictation was created using voice recognition software. The accuracy of " the dictation is limited to the abilities of the software. I expect there may be some errors of grammar and possibly content. The nursing notes were reviewed and certain aspects of this information were incorporated into this note.    Electronically signed by: Rigo Tracy M.D., 9/10/2021 2:13 PM

## 2021-12-10 ENCOUNTER — APPOINTMENT (OUTPATIENT)
Dept: RADIOLOGY | Facility: MEDICAL CENTER | Age: 66
DRG: 897 | End: 2021-12-10
Attending: EMERGENCY MEDICINE
Payer: MEDICARE

## 2021-12-10 ENCOUNTER — APPOINTMENT (OUTPATIENT)
Dept: RADIOLOGY | Facility: MEDICAL CENTER | Age: 66
DRG: 897 | End: 2021-12-10
Payer: MEDICARE

## 2021-12-10 ENCOUNTER — HOSPITAL ENCOUNTER (INPATIENT)
Facility: MEDICAL CENTER | Age: 66
LOS: 31 days | DRG: 897 | End: 2022-01-11
Attending: EMERGENCY MEDICINE | Admitting: STUDENT IN AN ORGANIZED HEALTH CARE EDUCATION/TRAINING PROGRAM
Payer: MEDICARE

## 2021-12-10 DIAGNOSIS — R53.1 WEAKNESS: ICD-10-CM

## 2021-12-10 DIAGNOSIS — F10.931 ALCOHOL WITHDRAWAL DELIRIUM, ACUTE, HYPERACTIVE (HCC): ICD-10-CM

## 2021-12-10 DIAGNOSIS — R25.9 MIXED ACTION AND RESTING TREMOR: ICD-10-CM

## 2021-12-10 DIAGNOSIS — R56.9 SEIZURE (HCC): ICD-10-CM

## 2021-12-10 DIAGNOSIS — I10 ESSENTIAL HYPERTENSION: ICD-10-CM

## 2021-12-10 DIAGNOSIS — R00.0 TACHYCARDIA: ICD-10-CM

## 2021-12-10 DIAGNOSIS — E86.0 DEHYDRATION: ICD-10-CM

## 2021-12-10 DIAGNOSIS — R53.81 DEBILITY: ICD-10-CM

## 2021-12-10 DIAGNOSIS — E78.5 DYSLIPIDEMIA: ICD-10-CM

## 2021-12-10 DIAGNOSIS — F10.939 ALCOHOL WITHDRAWAL SYNDROME WITH COMPLICATION (HCC): ICD-10-CM

## 2021-12-10 LAB
ALBUMIN SERPL BCP-MCNC: 4.2 G/DL (ref 3.2–4.9)
ALBUMIN/GLOB SERPL: 1.2 G/DL
ALP SERPL-CCNC: 122 U/L (ref 30–99)
ALT SERPL-CCNC: 36 U/L (ref 2–50)
ANION GAP SERPL CALC-SCNC: 17 MMOL/L (ref 7–16)
ANISOCYTOSIS BLD QL SMEAR: ABNORMAL
APPEARANCE UR: CLEAR
AST SERPL-CCNC: 31 U/L (ref 12–45)
BACTERIA #/AREA URNS HPF: NEGATIVE /HPF
BASOPHILS # BLD AUTO: 3.4 % (ref 0–1.8)
BASOPHILS # BLD: 0.18 K/UL (ref 0–0.12)
BILIRUB SERPL-MCNC: 0.4 MG/DL (ref 0.1–1.5)
BILIRUB UR QL STRIP.AUTO: NEGATIVE
BUN SERPL-MCNC: 12 MG/DL (ref 8–22)
CALCIUM SERPL-MCNC: 8.9 MG/DL (ref 8.5–10.5)
CHLORIDE SERPL-SCNC: 110 MMOL/L (ref 96–112)
CO2 SERPL-SCNC: 17 MMOL/L (ref 20–33)
COLOR UR: YELLOW
COMMENT 1642: NORMAL
CREAT SERPL-MCNC: 0.8 MG/DL (ref 0.5–1.4)
EKG IMPRESSION: NORMAL
EOSINOPHIL # BLD AUTO: 0.07 K/UL (ref 0–0.51)
EOSINOPHIL NFR BLD: 1.3 % (ref 0–6.9)
EPI CELLS #/AREA URNS HPF: NEGATIVE /HPF
ERYTHROCYTE [DISTWIDTH] IN BLOOD BY AUTOMATED COUNT: 81.4 FL (ref 35.9–50)
ETHANOL BLD-MCNC: 224.8 MG/DL (ref 0–10)
GLOBULIN SER CALC-MCNC: 3.4 G/DL (ref 1.9–3.5)
GLUCOSE SERPL-MCNC: 92 MG/DL (ref 65–99)
GLUCOSE UR STRIP.AUTO-MCNC: NEGATIVE MG/DL
HCT VFR BLD AUTO: 31.1 % (ref 37–47)
HGB BLD-MCNC: 9.8 G/DL (ref 12–16)
HYALINE CASTS #/AREA URNS LPF: NORMAL /LPF
IMM GRANULOCYTES # BLD AUTO: 0.02 K/UL (ref 0–0.11)
IMM GRANULOCYTES NFR BLD AUTO: 0.4 % (ref 0–0.9)
KETONES UR STRIP.AUTO-MCNC: NEGATIVE MG/DL
LACTATE BLD-SCNC: 2.7 MMOL/L (ref 0.5–2)
LACTATE BLD-SCNC: 3.3 MMOL/L (ref 0.5–2)
LACTATE BLD-SCNC: 3.8 MMOL/L (ref 0.5–2)
LEUKOCYTE ESTERASE UR QL STRIP.AUTO: NEGATIVE
LYMPHOCYTES # BLD AUTO: 1.72 K/UL (ref 1–4.8)
LYMPHOCYTES NFR BLD: 32.5 % (ref 22–41)
MACROCYTES BLD QL SMEAR: ABNORMAL
MAGNESIUM SERPL-MCNC: 1.5 MG/DL (ref 1.5–2.5)
MCH RBC QN AUTO: 28.7 PG (ref 27–33)
MCHC RBC AUTO-ENTMCNC: 31.5 G/DL (ref 33.6–35)
MCV RBC AUTO: 90.9 FL (ref 81.4–97.8)
MICRO URNS: ABNORMAL
MONOCYTES # BLD AUTO: 0.38 K/UL (ref 0–0.85)
MONOCYTES NFR BLD AUTO: 7.2 % (ref 0–13.4)
MORPHOLOGY BLD-IMP: NORMAL
NEUTROPHILS # BLD AUTO: 2.93 K/UL (ref 2–7.15)
NEUTROPHILS NFR BLD: 55.2 % (ref 44–72)
NITRITE UR QL STRIP.AUTO: NEGATIVE
NRBC # BLD AUTO: 0 K/UL
NRBC BLD-RTO: 0 /100 WBC
OVALOCYTES BLD QL SMEAR: NORMAL
PH UR STRIP.AUTO: 5 [PH] (ref 5–8)
PLATELET # BLD AUTO: 297 K/UL (ref 164–446)
PLATELET BLD QL SMEAR: NORMAL
PMV BLD AUTO: 10 FL (ref 9–12.9)
POIKILOCYTOSIS BLD QL SMEAR: NORMAL
POTASSIUM SERPL-SCNC: 4.5 MMOL/L (ref 3.6–5.5)
PROT SERPL-MCNC: 7.6 G/DL (ref 6–8.2)
PROT UR QL STRIP: 30 MG/DL
RBC # BLD AUTO: 3.42 M/UL (ref 4.2–5.4)
RBC # URNS HPF: NORMAL /HPF
RBC BLD AUTO: PRESENT
RBC UR QL AUTO: NEGATIVE
SODIUM SERPL-SCNC: 144 MMOL/L (ref 135–145)
SP GR UR STRIP.AUTO: 1.01
TARGETS BLD QL SMEAR: NORMAL
TSH SERPL DL<=0.005 MIU/L-ACNC: 0.84 UIU/ML (ref 0.38–5.33)
UROBILINOGEN UR STRIP.AUTO-MCNC: 0.2 MG/DL
WBC # BLD AUTO: 5.3 K/UL (ref 4.8–10.8)
WBC #/AREA URNS HPF: NORMAL /HPF

## 2021-12-10 PROCEDURE — 700102 HCHG RX REV CODE 250 W/ 637 OVERRIDE(OP): Performed by: INTERNAL MEDICINE

## 2021-12-10 PROCEDURE — 83605 ASSAY OF LACTIC ACID: CPT

## 2021-12-10 PROCEDURE — 99220 PR INITIAL OBSERVATION CARE,LEVL III: CPT | Performed by: INTERNAL MEDICINE

## 2021-12-10 PROCEDURE — 700111 HCHG RX REV CODE 636 W/ 250 OVERRIDE (IP): Performed by: EMERGENCY MEDICINE

## 2021-12-10 PROCEDURE — 85025 COMPLETE CBC W/AUTO DIFF WBC: CPT

## 2021-12-10 PROCEDURE — 87086 URINE CULTURE/COLONY COUNT: CPT

## 2021-12-10 PROCEDURE — 82746 ASSAY OF FOLIC ACID SERUM: CPT

## 2021-12-10 PROCEDURE — 700105 HCHG RX REV CODE 258: Performed by: EMERGENCY MEDICINE

## 2021-12-10 PROCEDURE — 84443 ASSAY THYROID STIM HORMONE: CPT | Mod: 91

## 2021-12-10 PROCEDURE — 96374 THER/PROPH/DIAG INJ IV PUSH: CPT

## 2021-12-10 PROCEDURE — 94760 N-INVAS EAR/PLS OXIMETRY 1: CPT

## 2021-12-10 PROCEDURE — 81001 URINALYSIS AUTO W/SCOPE: CPT

## 2021-12-10 PROCEDURE — A9270 NON-COVERED ITEM OR SERVICE: HCPCS | Performed by: INTERNAL MEDICINE

## 2021-12-10 PROCEDURE — G0378 HOSPITAL OBSERVATION PER HR: HCPCS

## 2021-12-10 PROCEDURE — 82077 ASSAY SPEC XCP UR&BREATH IA: CPT

## 2021-12-10 PROCEDURE — 96375 TX/PRO/DX INJ NEW DRUG ADDON: CPT

## 2021-12-10 PROCEDURE — 99285 EMERGENCY DEPT VISIT HI MDM: CPT

## 2021-12-10 PROCEDURE — 93005 ELECTROCARDIOGRAM TRACING: CPT | Performed by: EMERGENCY MEDICINE

## 2021-12-10 PROCEDURE — 83735 ASSAY OF MAGNESIUM: CPT

## 2021-12-10 PROCEDURE — 80053 COMPREHEN METABOLIC PANEL: CPT

## 2021-12-10 PROCEDURE — 87040 BLOOD CULTURE FOR BACTERIA: CPT

## 2021-12-10 PROCEDURE — 82607 VITAMIN B-12: CPT

## 2021-12-10 PROCEDURE — 700105 HCHG RX REV CODE 258: Performed by: INTERNAL MEDICINE

## 2021-12-10 PROCEDURE — 71045 X-RAY EXAM CHEST 1 VIEW: CPT

## 2021-12-10 PROCEDURE — 36415 COLL VENOUS BLD VENIPUNCTURE: CPT

## 2021-12-10 RX ORDER — ONDANSETRON 2 MG/ML
4 INJECTION INTRAMUSCULAR; INTRAVENOUS EVERY 4 HOURS PRN
Status: DISCONTINUED | OUTPATIENT
Start: 2021-12-10 | End: 2022-01-11 | Stop reason: HOSPADM

## 2021-12-10 RX ORDER — ONDANSETRON 4 MG/1
4 TABLET, ORALLY DISINTEGRATING ORAL EVERY 4 HOURS PRN
Status: DISCONTINUED | OUTPATIENT
Start: 2021-12-10 | End: 2022-01-11 | Stop reason: HOSPADM

## 2021-12-10 RX ORDER — IBUPROFEN 200 MG
400 TABLET ORAL DAILY
COMMUNITY

## 2021-12-10 RX ORDER — SODIUM CHLORIDE 9 MG/ML
INJECTION, SOLUTION INTRAVENOUS CONTINUOUS
Status: DISCONTINUED | OUTPATIENT
Start: 2021-12-10 | End: 2021-12-14

## 2021-12-10 RX ORDER — PROPRANOLOL HYDROCHLORIDE 10 MG/1
10 TABLET ORAL EVERY 8 HOURS
Status: DISCONTINUED | OUTPATIENT
Start: 2021-12-10 | End: 2022-01-07

## 2021-12-10 RX ORDER — SODIUM CHLORIDE 9 MG/ML
1000 INJECTION, SOLUTION INTRAVENOUS ONCE
Status: COMPLETED | OUTPATIENT
Start: 2021-12-10 | End: 2021-12-10

## 2021-12-10 RX ORDER — OMEPRAZOLE 20 MG/1
20 CAPSULE, DELAYED RELEASE ORAL 2 TIMES DAILY
Status: DISCONTINUED | OUTPATIENT
Start: 2021-12-10 | End: 2021-12-23

## 2021-12-10 RX ORDER — LORAZEPAM 2 MG/ML
1 INJECTION INTRAMUSCULAR ONCE
Status: COMPLETED | OUTPATIENT
Start: 2021-12-10 | End: 2021-12-10

## 2021-12-10 RX ORDER — ACETAMINOPHEN 325 MG/1
650 TABLET ORAL EVERY 6 HOURS PRN
Status: DISCONTINUED | OUTPATIENT
Start: 2021-12-10 | End: 2022-01-11 | Stop reason: HOSPADM

## 2021-12-10 RX ORDER — BISACODYL 10 MG
10 SUPPOSITORY, RECTAL RECTAL
Status: DISCONTINUED | OUTPATIENT
Start: 2021-12-10 | End: 2022-01-11 | Stop reason: HOSPADM

## 2021-12-10 RX ORDER — POLYETHYLENE GLYCOL 3350 17 G/17G
1 POWDER, FOR SOLUTION ORAL
Status: DISCONTINUED | OUTPATIENT
Start: 2021-12-10 | End: 2022-01-11 | Stop reason: HOSPADM

## 2021-12-10 RX ORDER — SUCRALFATE ORAL 1 G/10ML
1 SUSPENSION ORAL EVERY 6 HOURS
Status: DISCONTINUED | OUTPATIENT
Start: 2021-12-11 | End: 2021-12-10

## 2021-12-10 RX ORDER — AMOXICILLIN 250 MG
2 CAPSULE ORAL 2 TIMES DAILY
Status: DISCONTINUED | OUTPATIENT
Start: 2021-12-10 | End: 2022-01-11 | Stop reason: HOSPADM

## 2021-12-10 RX ORDER — LISINOPRIL AND HYDROCHLOROTHIAZIDE 20; 12.5 MG/1; MG/1
1 TABLET ORAL DAILY
Status: ON HOLD | COMMUNITY
Start: 2021-03-23 | End: 2022-01-03

## 2021-12-10 RX ORDER — LEVETIRACETAM 500 MG/1
500 TABLET ORAL DAILY
Status: DISCONTINUED | OUTPATIENT
Start: 2021-12-11 | End: 2021-12-13

## 2021-12-10 RX ADMIN — PROPRANOLOL HYDROCHLORIDE 10 MG: 10 TABLET ORAL at 23:46

## 2021-12-10 RX ADMIN — LORAZEPAM 1 MG: 2 INJECTION INTRAMUSCULAR; INTRAVENOUS at 21:50

## 2021-12-10 RX ADMIN — OMEPRAZOLE 20 MG: 20 CAPSULE, DELAYED RELEASE ORAL at 23:46

## 2021-12-10 RX ADMIN — SODIUM CHLORIDE 1000 ML: 9 INJECTION, SOLUTION INTRAVENOUS at 18:45

## 2021-12-10 RX ADMIN — SODIUM CHLORIDE: 9 INJECTION, SOLUTION INTRAVENOUS at 23:46

## 2021-12-10 ASSESSMENT — LIFESTYLE VARIABLES
AGITATION: SOMEWHAT MORE THAN NORMAL ACTIVITY
PAROXYSMAL SWEATS: NO SWEAT VISIBLE
NAUSEA AND VOMITING: MILD NAUSEA WITH NO VOMITING
TOTAL SCORE: 12
VISUAL DISTURBANCES: NOT PRESENT
TREMOR: SEVERE TREMOR, EVEN WITH ARMS NOT EXTENDED
ANXIETY: MILDLY ANXIOUS
HEADACHE, FULLNESS IN HEAD: NOT PRESENT
AUDITORY DISTURBANCES: NOT PRESENT
ORIENTATION AND CLOUDING OF SENSORIUM: DATE DISORIENTATION BY NO MORE THAN TWO CALENDAR DAYS

## 2021-12-10 ASSESSMENT — ENCOUNTER SYMPTOMS
FALLS: 1
WEAKNESS: 1
MYALGIAS: 1
TREMORS: 1

## 2021-12-10 ASSESSMENT — FIBROSIS 4 INDEX: FIB4 SCORE: 1.76

## 2021-12-10 NOTE — ED TRIAGE NOTES
"Chief Complaint   Patient presents with   • Chills     for 1 day. hx of urosepsis, recently completed course of antbx for UTI   • Fatigue     weakness and malaise for 3 days     Pt presents in wheelchair to triage. She stated \"I dont feel well\" and that it feels similar to when she was hospitalized for sepsis in June. She reports about 3 days of chills, weakness, and lack of appetite. Also reports frequency an nocturia but that this is typical for her. She is vaccinated against covid.   "

## 2021-12-11 DIAGNOSIS — R25.1 TREMOR: ICD-10-CM

## 2021-12-11 PROBLEM — D63.8 ANEMIA OF CHRONIC DISEASE: Status: ACTIVE | Noted: 2021-06-17

## 2021-12-11 PROBLEM — F10.931 ALCOHOL WITHDRAWAL DELIRIUM, ACUTE, HYPERACTIVE (HCC): Status: ACTIVE | Noted: 2021-12-11

## 2021-12-11 LAB
ANION GAP SERPL CALC-SCNC: 13 MMOL/L (ref 7–16)
BUN SERPL-MCNC: 12 MG/DL (ref 8–22)
CALCIUM SERPL-MCNC: 8.2 MG/DL (ref 8.5–10.5)
CHLORIDE SERPL-SCNC: 111 MMOL/L (ref 96–112)
CO2 SERPL-SCNC: 19 MMOL/L (ref 20–33)
CREAT SERPL-MCNC: 0.74 MG/DL (ref 0.5–1.4)
ERYTHROCYTE [DISTWIDTH] IN BLOOD BY AUTOMATED COUNT: 80.5 FL (ref 35.9–50)
FOLATE SERPL-MCNC: 13.9 NG/ML
GLUCOSE SERPL-MCNC: 77 MG/DL (ref 65–99)
HCT VFR BLD AUTO: 26.8 % (ref 37–47)
HEMOCCULT STL QL: NEGATIVE
HGB BLD-MCNC: 8.5 G/DL (ref 12–16)
IRON SATN MFR SERPL: 71 % (ref 15–55)
IRON SERPL-MCNC: 253 UG/DL (ref 40–170)
LACTATE BLD-SCNC: 1.4 MMOL/L (ref 0.5–2)
LACTATE BLD-SCNC: 2 MMOL/L (ref 0.5–2)
MCH RBC QN AUTO: 28.9 PG (ref 27–33)
MCHC RBC AUTO-ENTMCNC: 31.7 G/DL (ref 33.6–35)
MCV RBC AUTO: 91.2 FL (ref 81.4–97.8)
PHOSPHATE SERPL-MCNC: 3.1 MG/DL (ref 2.5–4.5)
PLATELET # BLD AUTO: 242 K/UL (ref 164–446)
PMV BLD AUTO: 10.4 FL (ref 9–12.9)
POTASSIUM SERPL-SCNC: 4 MMOL/L (ref 3.6–5.5)
RBC # BLD AUTO: 2.94 M/UL (ref 4.2–5.4)
SODIUM SERPL-SCNC: 143 MMOL/L (ref 135–145)
TIBC SERPL-MCNC: 355 UG/DL (ref 250–450)
TSH SERPL DL<=0.005 MIU/L-ACNC: 0.88 UIU/ML (ref 0.38–5.33)
UIBC SERPL-MCNC: 102 UG/DL (ref 110–370)
VIT B12 SERPL-MCNC: 1253 PG/ML (ref 211–911)
WBC # BLD AUTO: 6.2 K/UL (ref 4.8–10.8)

## 2021-12-11 PROCEDURE — 96376 TX/PRO/DX INJ SAME DRUG ADON: CPT

## 2021-12-11 PROCEDURE — 96366 THER/PROPH/DIAG IV INF ADDON: CPT

## 2021-12-11 PROCEDURE — A9270 NON-COVERED ITEM OR SERVICE: HCPCS | Performed by: STUDENT IN AN ORGANIZED HEALTH CARE EDUCATION/TRAINING PROGRAM

## 2021-12-11 PROCEDURE — 83605 ASSAY OF LACTIC ACID: CPT | Mod: 91

## 2021-12-11 PROCEDURE — 770020 HCHG ROOM/CARE - TELE (206)

## 2021-12-11 PROCEDURE — 700102 HCHG RX REV CODE 250 W/ 637 OVERRIDE(OP): Performed by: STUDENT IN AN ORGANIZED HEALTH CARE EDUCATION/TRAINING PROGRAM

## 2021-12-11 PROCEDURE — 700111 HCHG RX REV CODE 636 W/ 250 OVERRIDE (IP): Performed by: STUDENT IN AN ORGANIZED HEALTH CARE EDUCATION/TRAINING PROGRAM

## 2021-12-11 PROCEDURE — 80048 BASIC METABOLIC PNL TOTAL CA: CPT

## 2021-12-11 PROCEDURE — HZ2ZZZZ DETOXIFICATION SERVICES FOR SUBSTANCE ABUSE TREATMENT: ICD-10-PCS | Performed by: STUDENT IN AN ORGANIZED HEALTH CARE EDUCATION/TRAINING PROGRAM

## 2021-12-11 PROCEDURE — 700102 HCHG RX REV CODE 250 W/ 637 OVERRIDE(OP): Performed by: INTERNAL MEDICINE

## 2021-12-11 PROCEDURE — 99232 SBSQ HOSP IP/OBS MODERATE 35: CPT | Performed by: NURSE PRACTITIONER

## 2021-12-11 PROCEDURE — 83540 ASSAY OF IRON: CPT

## 2021-12-11 PROCEDURE — 36415 COLL VENOUS BLD VENIPUNCTURE: CPT

## 2021-12-11 PROCEDURE — 85027 COMPLETE CBC AUTOMATED: CPT

## 2021-12-11 PROCEDURE — 83550 IRON BINDING TEST: CPT

## 2021-12-11 PROCEDURE — 96365 THER/PROPH/DIAG IV INF INIT: CPT

## 2021-12-11 PROCEDURE — A9270 NON-COVERED ITEM OR SERVICE: HCPCS | Performed by: INTERNAL MEDICINE

## 2021-12-11 PROCEDURE — 84100 ASSAY OF PHOSPHORUS: CPT

## 2021-12-11 PROCEDURE — 700105 HCHG RX REV CODE 258: Performed by: INTERNAL MEDICINE

## 2021-12-11 PROCEDURE — 82272 OCCULT BLD FECES 1-3 TESTS: CPT

## 2021-12-11 RX ORDER — LORAZEPAM 2 MG/1
4 TABLET ORAL
Status: DISCONTINUED | OUTPATIENT
Start: 2021-12-11 | End: 2021-12-16

## 2021-12-11 RX ORDER — LORAZEPAM 0.5 MG/1
0.5 TABLET ORAL EVERY 4 HOURS PRN
Status: DISCONTINUED | OUTPATIENT
Start: 2021-12-11 | End: 2021-12-16

## 2021-12-11 RX ORDER — LORAZEPAM 2 MG/ML
1 INJECTION INTRAMUSCULAR
Status: DISCONTINUED | OUTPATIENT
Start: 2021-12-11 | End: 2021-12-16

## 2021-12-11 RX ORDER — LORAZEPAM 2 MG/ML
1.5 INJECTION INTRAMUSCULAR
Status: DISCONTINUED | OUTPATIENT
Start: 2021-12-11 | End: 2021-12-16

## 2021-12-11 RX ORDER — LORAZEPAM 1 MG/1
1 TABLET ORAL EVERY 4 HOURS PRN
Status: DISCONTINUED | OUTPATIENT
Start: 2021-12-11 | End: 2021-12-16

## 2021-12-11 RX ORDER — SODIUM CHLORIDE, SODIUM LACTATE, POTASSIUM CHLORIDE, AND CALCIUM CHLORIDE .6; .31; .03; .02 G/100ML; G/100ML; G/100ML; G/100ML
1000 INJECTION, SOLUTION INTRAVENOUS ONCE
Status: COMPLETED | OUTPATIENT
Start: 2021-12-11 | End: 2021-12-11

## 2021-12-11 RX ORDER — LORAZEPAM 2 MG/ML
2 INJECTION INTRAMUSCULAR
Status: DISCONTINUED | OUTPATIENT
Start: 2021-12-11 | End: 2021-12-16

## 2021-12-11 RX ORDER — LORAZEPAM 2 MG/1
2 TABLET ORAL
Status: DISCONTINUED | OUTPATIENT
Start: 2021-12-11 | End: 2021-12-16

## 2021-12-11 RX ORDER — LORAZEPAM 2 MG/ML
0.5 INJECTION INTRAMUSCULAR EVERY 4 HOURS PRN
Status: DISCONTINUED | OUTPATIENT
Start: 2021-12-11 | End: 2021-12-16

## 2021-12-11 RX ORDER — MAGNESIUM SULFATE HEPTAHYDRATE 40 MG/ML
4 INJECTION, SOLUTION INTRAVENOUS ONCE
Status: COMPLETED | OUTPATIENT
Start: 2021-12-11 | End: 2021-12-11

## 2021-12-11 RX ADMIN — LORAZEPAM 0.5 MG: 2 INJECTION INTRAMUSCULAR; INTRAVENOUS at 20:50

## 2021-12-11 RX ADMIN — ACETAMINOPHEN 650 MG: 325 TABLET, FILM COATED ORAL at 08:49

## 2021-12-11 RX ADMIN — LEVETIRACETAM 500 MG: 500 TABLET, FILM COATED ORAL at 08:41

## 2021-12-11 RX ADMIN — SODIUM CHLORIDE, POTASSIUM CHLORIDE, SODIUM LACTATE AND CALCIUM CHLORIDE 1000 ML: 600; 310; 30; 20 INJECTION, SOLUTION INTRAVENOUS at 08:41

## 2021-12-11 RX ADMIN — LORAZEPAM 0.5 MG: 0.5 TABLET ORAL at 14:19

## 2021-12-11 RX ADMIN — LORAZEPAM 0.5 MG: 0.5 TABLET ORAL at 01:15

## 2021-12-11 RX ADMIN — OMEPRAZOLE 20 MG: 20 CAPSULE, DELAYED RELEASE ORAL at 08:42

## 2021-12-11 RX ADMIN — OMEPRAZOLE 20 MG: 20 CAPSULE, DELAYED RELEASE ORAL at 17:27

## 2021-12-11 RX ADMIN — PROPRANOLOL HYDROCHLORIDE 10 MG: 10 TABLET ORAL at 22:00

## 2021-12-11 RX ADMIN — LORAZEPAM 1 MG: 2 INJECTION INTRAMUSCULAR; INTRAVENOUS at 23:17

## 2021-12-11 RX ADMIN — SODIUM CHLORIDE: 9 INJECTION, SOLUTION INTRAVENOUS at 16:42

## 2021-12-11 RX ADMIN — MAGNESIUM SULFATE HEPTAHYDRATE 4 G: 40 INJECTION, SOLUTION INTRAVENOUS at 01:15

## 2021-12-11 RX ADMIN — PROPRANOLOL HYDROCHLORIDE 10 MG: 10 TABLET ORAL at 16:42

## 2021-12-11 RX ADMIN — LORAZEPAM 0.5 MG: 0.5 TABLET ORAL at 08:50

## 2021-12-11 RX ADMIN — PROPRANOLOL HYDROCHLORIDE 10 MG: 10 TABLET ORAL at 08:41

## 2021-12-11 ASSESSMENT — LIFESTYLE VARIABLES
ANXIETY: MILDLY ANXIOUS
AUDITORY DISTURBANCES: NOT PRESENT
ORIENTATION AND CLOUDING OF SENSORIUM: CANNOT DO SERIAL ADDITIONS OR IS UNCERTAIN ABOUT DATE
TOTAL SCORE: 6
ANXIETY: *
TREMOR: *
VISUAL DISTURBANCES: NOT PRESENT
AGITATION: NORMAL ACTIVITY
ORIENTATION AND CLOUDING OF SENSORIUM: ORIENTED AND CAN DO SERIAL ADDITIONS
TOTAL SCORE: 6
AGITATION: NORMAL ACTIVITY
ANXIETY: MILDLY ANXIOUS
ORIENTATION AND CLOUDING OF SENSORIUM: ORIENTED AND CAN DO SERIAL ADDITIONS
AGITATION: NORMAL ACTIVITY
TREMOR: MODERATE TREMOR WITH ARMS EXTENDED
PAROXYSMAL SWEATS: NO SWEAT VISIBLE
TREMOR: *
ORIENTATION AND CLOUDING OF SENSORIUM: ORIENTED AND CAN DO SERIAL ADDITIONS
TOTAL SCORE: 11
NAUSEA AND VOMITING: MILD NAUSEA WITH NO VOMITING
TREMOR: *
VISUAL DISTURBANCES: NOT PRESENT
AUDITORY DISTURBANCES: NOT PRESENT
PAROXYSMAL SWEATS: NO SWEAT VISIBLE
HOW MANY TIMES IN THE PAST YEAR HAVE YOU HAD 5 OR MORE DRINKS IN A DAY: 5
TOTAL SCORE: 4
TOTAL SCORE: 4
HEADACHE, FULLNESS IN HEAD: VERY MILD
HEADACHE, FULLNESS IN HEAD: NOT PRESENT
ORIENTATION AND CLOUDING OF SENSORIUM: ORIENTED AND CAN DO SERIAL ADDITIONS
AUDITORY DISTURBANCES: VERY MILD HARSHNESS OR ABILITY TO FRIGHTEN
HAVE PEOPLE ANNOYED YOU BY CRITICIZING YOUR DRINKING: YES
AUDITORY DISTURBANCES: NOT PRESENT
TOTAL SCORE: 5
DOES PATIENT WANT TO STOP DRINKING: CANNOT ASSESS
VISUAL DISTURBANCES: NOT PRESENT
PAROXYSMAL SWEATS: BARELY PERCEPTIBLE SWEATING. PALMS MOIST
HEADACHE, FULLNESS IN HEAD: NOT PRESENT
NAUSEA AND VOMITING: NO NAUSEA AND NO VOMITING
AGITATION: NORMAL ACTIVITY
ALCOHOL_USE: YES
NAUSEA AND VOMITING: MILD NAUSEA WITH NO VOMITING
NAUSEA AND VOMITING: MILD NAUSEA WITH NO VOMITING
ORIENTATION AND CLOUDING OF SENSORIUM: DATE DISORIENTATION BY NO MORE THAN TWO CALENDAR DAYS
HEADACHE, FULLNESS IN HEAD: NOT PRESENT
HEADACHE, FULLNESS IN HEAD: NOT PRESENT
NAUSEA AND VOMITING: NO NAUSEA AND NO VOMITING
NAUSEA AND VOMITING: MILD NAUSEA WITH NO VOMITING
HAVE YOU EVER FELT YOU SHOULD CUT DOWN ON YOUR DRINKING: YES
AVERAGE NUMBER OF DAYS PER WEEK YOU HAVE A DRINK CONTAINING ALCOHOL: 28
VISUAL DISTURBANCES: VERY MILD SENSITIVITY
HEADACHE, FULLNESS IN HEAD: MILD
ORIENTATION AND CLOUDING OF SENSORIUM: ORIENTED AND CAN DO SERIAL ADDITIONS
AGITATION: NORMAL ACTIVITY
EVER FELT BAD OR GUILTY ABOUT YOUR DRINKING: YES
ANXIETY: MILDLY ANXIOUS
TOTAL SCORE: 4
TREMOR: TREMOR NOT VISIBLE BUT CAN BE FELT, FINGERTIP TO FINGERTIP
TOTAL SCORE: 1
PAROXYSMAL SWEATS: NO SWEAT VISIBLE
VISUAL DISTURBANCES: MILD SENSITIVITY
EVER HAD A DRINK FIRST THING IN THE MORNING TO STEADY YOUR NERVES TO GET RID OF A HANGOVER: YES
ANXIETY: *
AUDITORY DISTURBANCES: NOT PRESENT
SUBSTANCE_ABUSE: 1
TREMOR: *
ANXIETY: MILDLY ANXIOUS
PAROXYSMAL SWEATS: NO SWEAT VISIBLE
NAUSEA AND VOMITING: NO NAUSEA AND NO VOMITING
ON A TYPICAL DAY WHEN YOU DRINK ALCOHOL HOW MANY DRINKS DO YOU HAVE: 4
ORIENTATION AND CLOUDING OF SENSORIUM: CANNOT DO SERIAL ADDITIONS OR IS UNCERTAIN ABOUT DATE
HEADACHE, FULLNESS IN HEAD: NOT PRESENT
HEADACHE, FULLNESS IN HEAD: NOT PRESENT
ANXIETY: NO ANXIETY (AT EASE)
VISUAL DISTURBANCES: NOT PRESENT
VISUAL DISTURBANCES: NOT PRESENT
TOTAL SCORE: 10
AUDITORY DISTURBANCES: NOT PRESENT
TREMOR: MODERATE TREMOR WITH ARMS EXTENDED
TREMOR: MODERATE TREMOR WITH ARMS EXTENDED
AGITATION: NORMAL ACTIVITY
NAUSEA AND VOMITING: NO NAUSEA AND NO VOMITING
CONSUMPTION TOTAL: POSITIVE
PAROXYSMAL SWEATS: BARELY PERCEPTIBLE SWEATING. PALMS MOIST
PAROXYSMAL SWEATS: NO SWEAT VISIBLE
PAROXYSMAL SWEATS: NO SWEAT VISIBLE
AUDITORY DISTURBANCES: NOT PRESENT
TOTAL SCORE: 7
AGITATION: NORMAL ACTIVITY
TOTAL SCORE: 5
ANXIETY: MILDLY ANXIOUS
VISUAL DISTURBANCES: NOT PRESENT
AGITATION: NORMAL ACTIVITY
AUDITORY DISTURBANCES: MILD HARSHNESS OR ABILITY TO FRIGHTEN

## 2021-12-11 ASSESSMENT — PATIENT HEALTH QUESTIONNAIRE - PHQ9
SUM OF ALL RESPONSES TO PHQ9 QUESTIONS 1 AND 2: 0
1. LITTLE INTEREST OR PLEASURE IN DOING THINGS: NOT AT ALL
1. LITTLE INTEREST OR PLEASURE IN DOING THINGS: NOT AT ALL
2. FEELING DOWN, DEPRESSED, IRRITABLE, OR HOPELESS: NOT AT ALL
2. FEELING DOWN, DEPRESSED, IRRITABLE, OR HOPELESS: NOT AT ALL
SUM OF ALL RESPONSES TO PHQ9 QUESTIONS 1 AND 2: 0

## 2021-12-11 ASSESSMENT — ENCOUNTER SYMPTOMS
FEVER: 0
NECK PAIN: 0
PALPITATIONS: 0
SHORTNESS OF BREATH: 0
DIZZINESS: 0
ABDOMINAL PAIN: 0
DOUBLE VISION: 0
COUGH: 0
SEIZURES: 1
NAUSEA: 0
VOMITING: 0
TREMORS: 1
HEADACHES: 0
WEAKNESS: 1
DEPRESSION: 0
BRUISES/BLEEDS EASILY: 0

## 2021-12-11 ASSESSMENT — PAIN DESCRIPTION - PAIN TYPE
TYPE: ACUTE PAIN
TYPE: ACUTE PAIN

## 2021-12-11 ASSESSMENT — FIBROSIS 4 INDEX: FIB4 SCORE: 1.15

## 2021-12-11 NOTE — ASSESSMENT & PLAN NOTE
H/H 8.5/25.8 this admission  Per chart review, chronically low  Iron studies support anemia of chronic disease  B12 level high, folate level normal, iron level high  -Monitor

## 2021-12-11 NOTE — PROGRESS NOTES
Margaret comes in for complete physical. Will not do pap per [facility] protocol  Patient was diagnosed recently with anxiety and started on fluoxetine 10 mg daily. Patient find it helpful some but not fully. Patient was referred to Dr. behavior health but did not make an appointment. No suicidal homicidal thoughts.    Patient Active Problem List   Diagnosis   • Anemia   • Hx of aplastic anemia   • Exercise-induced asthma       No past surgical history on file.    Current Outpatient Prescriptions   Medication Sig Dispense Refill   • fluoxetine (PROZAC) 10 MG tablet Take 1 tablet by mouth daily. 30 tablet 1   • ibuprofen (MOTRIN) 200 MG tablet Take 600 mg by mouth every 6 hours as needed for Pain.     • albuterol (VENTOLIN HFA) 108 (90 BASE) MCG/ACT inhaler Inhale 2 puffs into the lungs every 4 hours as needed for Shortness of Breath or Wheezing. 2 Inhaler 0   • valACYclovir (VALTREX) 500 MG tablet Take 1 tablet by mouth daily. 30 tablet 6     No current facility-administered medications for this visit.        ALLERGIES:   Allergen Reactions   • Nickel RASH       Social History     Social History   • Marital status: Single     Spouse name: N/A   • Number of children: N/A   • Years of education: N/A     Occupational History   • Not on file.     Social History Main Topics   • Smoking status: Never Smoker   • Smokeless tobacco: Never Used   • Alcohol use Yes      Comment: special    • Drug use: No   • Sexual activity: Not on file     Other Topics Concern   • Not on file     Social History Narrative   • No narrative on file       Family History   Problem Relation Age of Onset   • Arthritis Mother    • Asthma Brother    • Stroke Maternal Grandmother      TIA   • High blood pressure Maternal Grandmother    • High blood pressure Maternal Grandfather    • Heart disease Maternal Grandfather 47     heart attack   • Asthma Brother        REVIEW OF SYSTEMS  CONSTITUTIONAL:  Pt denies fever or chills, night sweats, appetite problems,  Blood drawn, labeled, and sent to lab   fatigue, any other difficulties  EYES: Pt denies recent changes in vision   EARS: Pt. deniesany ear or hearing problems, ear pain, ringing in the ears, discharge  NOSE & THROAT: Pt denies any nose, sinus or throat problems, hay fever, chronc sinususitis, nose bleeds, throat irritation, hoarseness  LUNGS: Pt denies any lung problems, cough, shortness of breath, wheezing, asthma, COPD or emphysema  HEART: Pt denies Any cardiac problems, chest pain or tightness, heart murmurs, hypertension, palpitations, irregular heartbeat, rapid heartbeat, orthopnea, paroxysmal nocturnal dyspnea, claudication, leg or ankle swelling, coronary artery disease  GI: Pt denies heartburn or indigestion, nausea, vomiting, abdominal pain, diarrhea, constipation, blood in stool, black tarry stools  : Pt denies urinary tract problems, dysuria, urgency, blood in urine, nocturia, sexual concerns  MUSCULOSKELETAL: Pt denies  any musculoskeletal complaints, painful or swollen joints, back pain  NEUROLOGICAL: Pt denies any neurological problems, headaches, dizziness, seizures, weakness on either side, numbness, tingling, trouble with balance, diplopia, blind spots, other neurological symptoms  ENDOCRINE: Pt has no history of thyroid problems, diabetes, other known endocrine problems  HEMATOLOGY: Pt denies any hematologic problems, anemia, lymphadenopathy, bleeding problems, easy bruising      HEALTH CARE MAINTENANCE:    Safe sex - Discussed    Pregnancy Prevention - Discussed    Diet/Exercise - Discussed    Tobacco/Alcohol use - Discussed    Calcium intake - Discussed, appears to have adequate intake  Sunscreen - Discussed    Immunizations: Discussed    EXAM:  General - Healthy, alert, in no distress  Visit Vitals  /84   Pulse 95   Temp 98.2 °F (36.8 °C) (Oral)   Resp 14   Ht 5' 8\" (1.727 m)   Wt 91.3 kg   LMP 12/29/2017   SpO2 99%   BMI 30.59 kg/m²      Ears - TM's and canals normal  PERRLA, EOMI, conjunctivae normal.   Mouth -No lesions  and Teeth in good condition   Neck - supple, no lymphadenopathy and no thyromegaly  Lungs - clear to auscultation, easy respirations  Heart - S1 and S2, Regular rate and rhythm and No murmur, rub, gallop  Breasts/Axillae - Breast are symmetrical, no skin changes, No masses, no dimpling, no nipple discharge and No axillary or supraclavicular nodes bilaterally  Abdomen - Soft, flat, non-tender and No masses, organomegaly  Extremities - Normal, No cyanosis, clubbing and No edema  Peripheral pulses 2 plus and equal posterior tibial, dorsalis pedis and radial.   Deep tendon reflexes legs 2 plus and equal bilaterally.   Skin - Skin color, texture, turgor are normal. There are no bruises, rashes or lesions.  Pelvic--External Genitalia -  Normal appearing labia, clitoris, perineum, no lesions and Melba's, bulbourethral glands normal  Vagina -  Normal mucosa and No lesions  Cervix - Normal in appearance and no lesions or masses  Uterus - small, nontender, symmetrical  Adnexa - unremarkable, non-tender, no fullness noted and no masses noted  Rectal - not performed     (Z00.00) Annual physical exam  (primary encounter diagnosis)  Plan: CBC & AUTO DIFFERENTIAL, COMPREHENSIVE         METABOLIC PANEL, LIPID PANEL WITH REFLEX    (Z11.3) Screen for STD (sexually transmitted disease)  Plan: CHLAMYDIA/GC BY NUCLEIC ACID AMPLIFICATION,         CHLAMYDIA/GC BY NUCLEIC ACID AMPLIFICATION    (F41.1) Generalized anxiety disorder  Plan: Patient is to make an appointment to behavior health

## 2021-12-11 NOTE — CARE PLAN
The patient is Stable - Low risk of patient condition declining or worsening         Progress made toward(s) clinical / shift goals:  patient has had a slight reduction in tremors.     Patient is not progressing towards the following goals:

## 2021-12-11 NOTE — ED NOTES
Pt transported to the floor via gurney by ACLS RN on cardiac monitor. Pt alert and oriented at this time. Pt belongings and paper work sent with patient.

## 2021-12-11 NOTE — ED NOTES
Med Rec partial per Pt at bedside.  Allergies reviewed.  Pt reports taking an antibiotic for UTI and finished it about 7 days ago.   Pt states she ran out of her lisinopril-hztz about a couple days ago but had intention to refill.     Home pharmacy is currently closed.  Zachary/Renée Abbott

## 2021-12-11 NOTE — H&P
Hospital Medicine History & Physical Note    Date of Service  12/10/2021    Primary Care Physician  Ellis Art M.D.    Consultants  neurology    Specialist Names: call in am    Code Status  Full Code    Chief Complaint  Chief Complaint   Patient presents with   • Chills     for 1 day. hx of urosepsis, recently completed course of antbx for UTI   • Fatigue     weakness and malaise for 3 days       History of Presenting Illness  Roro Leon is a 66 y.o. female who presented 12/10/2021 with weakness and generalized malaise that has been worsening in the past 3 days. She states that she was admitted for sepsis due to UTI in June and was discharged to rehab center. She was discharged from rehab in August, however since then, she has not been able to ambulate well and has been feeling weak. She noticed bilateral hand tremors on and off and was seen by her PCP who referred her to neurologist, whom she has not yet seen. She has history of alcohol abuse however denies that she has been recently drinking heavily. She last drank yesterday. In ED, patient was found to be dehydrated and with worsening resting and action tremors.    I discussed the plan of care with patient.    Review of Systems  Review of Systems   Constitutional: Positive for malaise/fatigue.   Musculoskeletal: Positive for falls and myalgias.   Neurological: Positive for tremors and weakness.   All other systems reviewed and are negative.      Past Medical History   has a past medical history of YEISON (acute kidney injury) ([facility]), ETOH abuse (6/19/2021), Hypertension, Seizure ([facility]), and Subdural hematoma ([facility]).    Surgical History   has a past surgical history that includes pr upper gi endoscopy,diagnosis (6/18/2021).     Family History  family history includes Cancer (age of onset: 77) in her mother; Dementia in her maternal aunt; Heart Attack (age of onset: 67) in her father; Heart Disease in her brother; Stroke in her maternal grandfather.   Family  history reviewed with patient. There is no family history that is pertinent to the chief complaint.     Social History   reports that she has never smoked. She has never used smokeless tobacco. She reports current alcohol use of about 3.6 oz of alcohol per week. She reports that she does not use drugs.    Allergies  No Known Allergies    Medications  Prior to Admission Medications   Prescriptions Last Dose Informant Patient Reported? Taking?   acetaminophen (TYLENOL) 325 MG Tab   No No   Sig: Take 2 Tablets by mouth every 6 hours as needed.   alendronate (FOSAMAX) 70 MG Tab  Patient's Home Pharmacy Yes No   Sig: Take 70 mg by mouth every 7 days. Take 70 mg every Wednesday   aspirin 81 MG EC tablet  Patient Yes No   Sig: Take 162 mg by mouth 2 times a day as needed (Pain). 2 tablets = 162 mg.   levetiracetam (KEPPRA) 500 MG Tab  Patient's Home Pharmacy Yes No   Sig: Take 500 mg by mouth 2 times a day.   omeprazole (PRILOSEC) 20 MG delayed-release capsule   No No   Sig: Take 1 capsule by mouth 2 times a day.   sucralfate (CARAFATE) 1 GM/10ML Suspension   No No   Sig: Take 10 mL by mouth every 6 hours.      Facility-Administered Medications: None       Physical Exam  Temp:  [36.4 °C (97.5 °F)-36.4 °C (97.6 °F)] 36.4 °C (97.5 °F)  Pulse:  [102-118] 105  Resp:  [18-22] 22  BP: (130-142)/(80-96) 142/80  SpO2:  [87 %-95 %] 93 %  Blood Pressure : 142/80   Temperature: 36.4 °C (97.5 °F)   Pulse: (!) 105   Respiration: (!) 22   Pulse Oximetry: 93 %       Physical Exam  Vitals and nursing note reviewed.   Constitutional:       General: She is not in acute distress.     Appearance: Normal appearance.   HENT:      Head: Normocephalic and atraumatic.      Mouth/Throat:      Mouth: Mucous membranes are moist.      Pharynx: Oropharynx is clear.   Eyes:      General: No scleral icterus.     Extraocular Movements: Extraocular movements intact.      Conjunctiva/sclera: Conjunctivae normal.      Pupils: Pupils are equal, round, and  reactive to light.   Cardiovascular:      Rate and Rhythm: Regular rhythm. Tachycardia present.      Pulses: Normal pulses.      Heart sounds: Normal heart sounds.   Pulmonary:      Effort: Pulmonary effort is normal. No respiratory distress.      Breath sounds: Normal breath sounds.   Abdominal:      General: Abdomen is flat. Bowel sounds are normal. There is no distension.      Palpations: Abdomen is soft.      Tenderness: There is no abdominal tenderness. There is no guarding.   Musculoskeletal:         General: No swelling or tenderness.      Cervical back: Normal range of motion and neck supple. No rigidity.      Comments: Chronic arthritic changes of her fingers, denies history    Skin:     General: Skin is warm and dry.   Neurological:      General: No focal deficit present.      Mental Status: She is alert and oriented to person, place, and time. Mental status is at baseline.      Comments: Tremulous on action and resting in bilateral hands         Laboratory:  Recent Labs     12/10/21  1557   WBC 5.3   RBC 3.42*   HEMOGLOBIN 9.8*   HEMATOCRIT 31.1*   MCV 90.9   MCH 28.7   MCHC 31.5*   RDW 81.4*   PLATELETCT 297   MPV 10.0     Recent Labs     12/10/21  1557   SODIUM 144   POTASSIUM 4.5   CHLORIDE 110   CO2 17*   GLUCOSE 92   BUN 12   CREATININE 0.80   CALCIUM 8.9     Recent Labs     12/10/21  1557   ALTSGPT 36   ASTSGOT 31   ALKPHOSPHAT 122*   TBILIRUBIN 0.4   GLUCOSE 92         No results for input(s): NTPROBNP in the last 72 hours.      No results for input(s): TROPONINT in the last 72 hours.    Imaging:  DX-CHEST-PORTABLE (1 VIEW)   Final Result      No acute cardiopulmonary abnormality.             X-Ray:  I have personally reviewed the images and compared with prior images.    Assessment/Plan:  I anticipate this patient is appropriate for observation status at this time.    * Dehydration- (present on admission)  Assessment & Plan  -will continue IVF, monitor lactic acid level     Mixed action and  resting tremor- (present on admission)  Assessment & Plan  -Unclear etiology  -Will obtain neuro consult   -will start propranolol to see if she has any improvement  -tsh and t4 pending    ETOH abuse- (present on admission)  Assessment & Plan  -states that she has cut down and has been drinking 1 glass of wine occasionally  -will continue to monitor and place CIWA protocol if appropriate  -Last drink was yesterday    Seizure (HCC)- (present on admission)  Assessment & Plan  -continue keppra    Essential hypertension- (present on admission)  Assessment & Plan  -monitor and start oral antihypertensives if persistent      VTE prophylaxis: SCDs/TEDs

## 2021-12-11 NOTE — ED NOTES
Patient was unable to ambulate. Very tremulous and stated was abnormal for her. Too weak to take any steps once standing.

## 2021-12-11 NOTE — CARE PLAN
The patient is Stable - Low risk of patient condition declining or worsening    Shift Goals  Clinical Goals: PT/OT BASILIO weaver  Patient Goals: pain control    Progress made toward(s) clinical / shift goals:    Problem: Optimal Care for Alcohol Withdrawal  Goal: Optimal Care for the alcohol withdrawal patient  Outcome: Progressing     Problem: Knowledge Deficit - Standard  Goal: Patient and family/care givers will demonstrate understanding of plan of care, disease process/condition, diagnostic tests and medications  Outcome: Progressing       Patient is not progressing towards the following goals:

## 2021-12-11 NOTE — ED PROVIDER NOTES
"ED Provider Note    CHIEF COMPLAINT  Chief Complaint   Patient presents with   • Chills     for 1 day. hx of urosepsis, recently completed course of antbx for UTI   • Fatigue     weakness and malaise for 3 days     HPI  Patient 66-year-old female with past medical history of chronic tremor, prior alcohol abuse, kidney injury and seizure disorder who presents emergency room for \"not feeling like herself.\"  She states that she has had some weakness and malaise has been slowly increasing over the last several days.  For the last day she has had good sensations chills.  She is denying any cough, shortness of breath, chest pain and states that until today she was really able to get around using her walker but this not feel like she is strong enough to do so currently.  She denies any recent sick contacts, she is fully vaccinated, she does have some chronic issues with urinary incontinence and urinary frequency and is recently been on antibiotics for a recurrent UTI.    Patient is noted to be tachycardic, labs and IV access has been started by triage nurse.    PPE Note: I personally donned full PPE for all patient encounters during this visit, including being clean-shaven with an N95 respirator mask, gloves, and goggles.     REVIEW OF SYSTEMS  See HPI for further details. All other systems are negative.     PAST MEDICAL HISTORY   has a past medical history of YEISON (acute kidney injury) ([facility]), ETOH abuse (6/19/2021), Hypertension, Seizure (HCC), and Subdural hematoma (HCC).    SOCIAL HISTORY  Social History     Tobacco Use   • Smoking status: Never Smoker   • Smokeless tobacco: Never Used   Vaping Use   • Vaping Use: Never used   Substance and Sexual Activity   • Alcohol use: Yes     Alcohol/week: 3.6 oz     Types: 6 Glasses of wine per week     Comment: weekly   • Drug use: No   • Sexual activity: Not on file       SURGICAL HISTORY   has a past surgical history that includes upper gi endoscopy,diagnosis " "(6/18/2021).    CURRENT MEDICATIONS  Home Medications     Reviewed by Tamy Martino R.N. (Registered Nurse) on 12/10/21 at 1539  Med List Status: Not Addressed   Medication Last Dose Status   acetaminophen (TYLENOL) 325 MG Tab  Active   alendronate (FOSAMAX) 70 MG Tab  Active   aspirin 81 MG EC tablet  Active   levetiracetam (KEPPRA) 500 MG Tab  Active   omeprazole (PRILOSEC) 20 MG delayed-release capsule  Active   sucralfate (CARAFATE) 1 GM/10ML Suspension  Active              ALLERGIES  No Known Allergies    PHYSICAL EXAM  VITAL SIGNS: /80   Pulse (!) 105   Temp 36.4 °C (97.5 °F) (Oral)   Resp (!) 22   Ht 1.676 m (5' 6\")   Wt 63.5 kg (140 lb)   SpO2 93%   BMI 22.60 kg/m²   Pulse ox interpretation: I interpret this pulse ox as normal.  Genl: F sitting in chair comfortably, speaking clearly, appears in no acute distress   Head: NC/AT   ENT: Mucous membranes dry, posterior pharynx clear, uvula midline, nares patent bilaterally   Eyes: Normal sclera, pupils equal round reactive to light  Neck: Supple, FROM, no LAD appreciated   Pulmonary: Lungs are clear to auscultation bilaterally  Chest: No TTP  CV:  tachycardia, no murmur appreciated, pulses 2+ in both upper and lower extremities,  Abdomen: soft, NT/ND; no rebound/guarding, no masses palpated, no HSM  : no CVA or suprapubic tenderness   Musculoskeletal: Pain free ROM of the neck. Moving upper and lower extremities and spontaneous in coordinated fashion  Neuro: A&Ox4 (person, place, time, situation), speech fluent, gait not initially assessed, no focal deficits appreciated, No cerebellar signs. Sensation is grossly intact in the distal upper and lower extremities.  5/5 strength in  and dorsiflexion/plantar flexion of the ankles.  Resting chronic tremor in left hand  Psych: Patient has an appropriate affect and behavior  Skin: No rash or lesions.  No pallor or jaundice.  No cyanosis.  Warm and dry.     DIAGNOSTIC STUDIES / " PROCEDURES    EKG  Results for orders placed or performed during the hospital encounter of 12/10/21   EKG   Result Value Ref Range    Report       [facility] Emergency Dept.    Test Date:  2021-12-10  Pt Name:    KIM LEYVA              Department: ER  MRN:        [id number]                      Room:       [facility]  Gender:     Female                       Technician: [id number]  :        [id number]                   Requested By:BRUCE FITZPATRICK  Order #:    [id number]                    Reading MD: Rafa Romo MD    Measurements  Intervals                                Axis  Rate:       104                          P:          28  KY:         145                          QRS:        -11  QRSD:       90                           T:          33  QT:         348  QTc:        458    Interpretive Statements  SINUS TACHYCARDIA  CONSIDER ANTERIOR INFARCT  No previous ECG available for comparison  Electronically Signed On 12- 19:01:18 PST by Rafa Romo MD       LABS  Labs Reviewed   LACTIC ACID - Abnormal; Notable for the following components:       Result Value    Lactic Acid 3.8 (*)     All other components within normal limits    Narrative:     Collected By:   LACTIC ACID - Abnormal; Notable for the following components:    Lactic Acid 2.7 (*)     All other components within normal limits    Narrative:     Collected By:  Repeat if initial lactic acid result is greater than 2   CBC WITH DIFFERENTIAL - Abnormal; Notable for the following components:    RBC 3.42 (*)     Hemoglobin 9.8 (*)     Hematocrit 31.1 (*)     MCHC 31.5 (*)     RDW 81.4 (*)     Basophils 3.40 (*)     Baso (Absolute) 0.18 (*)     All other components within normal limits    Narrative:     Collected By:   COMP METABOLIC PANEL - Abnormal; Notable for the following components:    Co2 17 (*)     Anion Gap 17.0 (*)     Alkaline Phosphatase 122 (*)     All other components within normal limits    Narrative:     Collected By:  "  URINALYSIS - Abnormal; Notable for the following components:    Protein 30 (*)     All other components within normal limits    Narrative:     Collected By:  Indication for culture:->Evaluation for sepsis without a  clear source of infection   URINE CULTURE(NEW)    Narrative:     Collected By:  Indication for culture:->Evaluation for sepsis without a  clear source of infection   BLOOD CULTURE    Narrative:     Collected By:  Per Hospital Policy: Only change Specimen Src: to \"Line\" if  specified by physician order.   BLOOD CULTURE    Narrative:     Collected By:  Per Hospital Policy: Only change Specimen Src: to \"Line\" if  specified by physician order.   PERIPHERAL SMEAR REVIEW    Narrative:     Collected By:   PLATELET ESTIMATE    Narrative:     Collected By:   MORPHOLOGY    Narrative:     Collected By:   DIFFERENTIAL COMMENT    Narrative:     Collected By:   ESTIMATED GFR    Narrative:     Collected By:   URINE MICROSCOPIC (W/UA)    Narrative:     Collected By:  Indication for culture:->Evaluation for sepsis without a  clear source of infection   LACTIC ACID   DIAGNOSTIC ALCOHOL     RADIOLOGY  DX-CHEST-PORTABLE (1 VIEW)   Final Result      No acute cardiopulmonary abnormality.           COURSE & MEDICAL DECISION MAKING  Pertinent Labs & Imaging studies reviewed. (See chart for details)    DDX:  UTI, hyponatremia, hypokalemia, dehydration, adverse drug reaction, metabolic disturbance, anemia, hyperglycemia    MDM    Initial evaluation at 1811:  Patient presented to the emergency room for symptoms as described above.  The patient was somewhat tremulous, was complaining of not feeling well but was afebrile with stable blood pressures but was tachycardic and slightly tachypneic.  The patient's chart indicates a history of alcohol abuse which she vehemently denies.  Alcohol level is elevated at this time at 224.  The patient's tremulousness was concerning and lab work was obtained for the broad differential as noted " above.  Sugars reassuring, she has an anion gap slightly increased just above normal with a bicarb of 17, elevated lactate and she clinically appears to be somewhat dehydrated.  There is no evidence of acute infectious etiology of the urine and no acute respiratory infections.  She remains afebrile and her tachycardia is somewhat improved with fluid resuscitation, I am concerned about some concomittant alcohol withdrawal symptomology.  He is treated with single dose of Ativan with some interval improvement.  She is ambulated and is very unsteady and at this time I have consulted the hospitalist that she appears very concerning the symptomatic with her dehydration, concerns for worsening alcohol withdrawal as she is showing signs of the CIWA greater than 13 with an alcohol level of 224 which likely means that she is a more substantial drinker than she is leading onto.  I am concerned about her overall safety and I believe she would be better served by being admitted to the hospital for observation status.  Discussed with the hospitalist will admit her in guarded condition.    HYDRATION: Based on the patient's presentation of Tachycardia the patient was given IV fluids. IV Hydration was used because oral hydration was not adequate alone. Upon recheck following hydration, the patient was improved.    FINAL IMPRESSION  Visit Diagnoses     ICD-10-CM   1. Weakness  R53.1   2. Dehydration  E86.0   3. Alcohol withdrawal syndrome with complication (HCC)  F10.239   4. Tachycardia  R00.0     Electronically signed by: Demetrius Craig M.D., 12/10/2021 6:12 PM

## 2021-12-11 NOTE — PROGRESS NOTES
Hospital Medicine Daily Progress Note    Date of Service  12/11/2021    Chief Complaint  Roro Leon is a 66 y.o. female admitted 12/10/2021 with weakness    Hospital Course  Roro Leon is a 66-year-old female with a past medical history of YEISON, EtOH abuse, hypertension, seizures on Keppra (last seizure June 2021), and subdural hematoma who presented to the ED on 12/10/2021 with complaints of weakness and generalized malaise that have been worsening over the last 3 days.  She had been previously admitted for sepsis secondary to UTI in June and was discharged to [facility] for rehab.  She was discharged from the rehab in August however she reports since then she has not been able to ambulate well and has been feeling weak.  The months ago, she started noticing continuous bilateral hand tremors, right worse than left, with accompanying left leg intermittent tremors.  She did see her PCP who referred her to a neurologist, however she has not yet been seen for that appointment.  She endorses a significant history of alcohol abuse.    In the ED, patient was found to be dehydrated with resting and action tremors of bilateral hands.  Alcohol level was significantly elevated, TSH low normal, lactic acid 3.8, no leukocytosis, chest x-ray negative, and UA negative.  Patient was admitted to the observation unit for further work-up and management.    Neurology was consulted 12/11/2021 and recommend referral to outpatient neurology clinic.  Patient remains on normal saline infusion and lactic acid is being down.  She is being monitored for alcohol withdrawal.  Patient is not noted to be chronically anemic, iron studies were ordered which demonstrate anemia of chronic disease and a high vitamin B12 level.  Stool was negative for occult blood.    Interval Problem Update  12/11/2021: Patient seen and examined.  Continues to complain of significant weakness and inability to perform ADLs.  Denies chest pain, shortness of  breath, nausea, vomiting, or abdominal pain.  Her IV had infiltrated in her right upper arm which resulted in significant swelling.  Anticipate impending alcohol withdrawal. She is requesting a pure wick for home.  PT and OT eval pending.    Disposition: Anticipate discharge after lactic acid normalizes and PT and OT have evaluated the patient.    I have personally seen and examined the patient at bedside. I discussed the plan of care with patient, bedside RN and  Dr. Dorado.    Consultants/Specialty  neurology    Code Status  Full Code    Disposition  Patient is not medically cleared.   Anticipate discharge to to skilled nursing facility.  I have placed the appropriate orders for post-discharge needs.    Review of Systems  Review of Systems   Constitutional: Positive for malaise/fatigue. Negative for fever.   HENT: Negative for hearing loss.    Eyes: Negative for double vision.   Respiratory: Negative for cough and shortness of breath.    Cardiovascular: Negative for chest pain, palpitations and leg swelling.   Gastrointestinal: Negative for abdominal pain, nausea and vomiting.   Genitourinary: Negative for dysuria and urgency.   Musculoskeletal: Negative for neck pain.   Skin: Negative for rash.   Neurological: Positive for tremors, seizures and weakness. Negative for dizziness and headaches.   Endo/Heme/Allergies: Does not bruise/bleed easily.   Psychiatric/Behavioral: Positive for substance abuse. Negative for depression.   All other systems reviewed and are negative.       Physical Exam  Temp:  [36.4 °C (97.5 °F)-37.3 °C (99.1 °F)] 37.2 °C (98.9 °F)  Pulse:  [] 67  Resp:  [16-32] 16  BP: (130-159)/(72-88) 137/87  SpO2:  [87 %-96 %] 96 %    Physical Exam  Vitals and nursing note reviewed.   Constitutional:       Appearance: She is ill-appearing.   HENT:      Head: Normocephalic.      Mouth/Throat:      Mouth: Mucous membranes are moist.      Pharynx: Oropharynx is clear.   Cardiovascular:      Rate and  Rhythm: Normal rate and regular rhythm.      Pulses: Normal pulses.      Heart sounds: Normal heart sounds.   Pulmonary:      Effort: Pulmonary effort is normal.      Breath sounds: Normal breath sounds.   Abdominal:      General: Abdomen is flat. Bowel sounds are normal.      Palpations: Abdomen is soft.   Musculoskeletal:         General: Normal range of motion.   Skin:     General: Skin is warm and dry.      Capillary Refill: Capillary refill takes less than 2 seconds.   Neurological:      Mental Status: She is alert and oriented to person, place, and time.      Sensory: Sensation is intact.      Motor: Weakness and tremor present. No pronator drift.         Fluids    Intake/Output Summary (Last 24 hours) at 12/11/2021 1525  Last data filed at 12/10/2021 2029  Gross per 24 hour   Intake 1000 ml   Output --   Net 1000 ml       Laboratory  Recent Labs     12/10/21  1557 12/11/21  0107   WBC 5.3 6.2   RBC 3.42* 2.94*   HEMOGLOBIN 9.8* 8.5*   HEMATOCRIT 31.1* 26.8*   MCV 90.9 91.2   MCH 28.7 28.9   MCHC 31.5* 31.7*   RDW 81.4* 80.5*   PLATELETCT 297 242   MPV 10.0 10.4     Recent Labs     12/10/21  1557 12/11/21  0107   SODIUM 144 143   POTASSIUM 4.5 4.0   CHLORIDE 110 111   CO2 17* 19*   GLUCOSE 92 77   BUN 12 12   CREATININE 0.80 0.74   CALCIUM 8.9 8.2*                   Imaging  DX-CHEST-PORTABLE (1 VIEW)   Final Result      No acute cardiopulmonary abnormality.              Assessment/Plan  * Dehydration- (present on admission)  Assessment & Plan  Lactic acid level trending down  -Continue IV fluids     Mixed action and resting tremor- (present on admission)  Assessment & Plan  Unclear etiology, difficult to determine if secondary to alcohol abuse versus neurologic abnormality  Neurology Dr. Khan consulted, appreciate recommendations  TSH low normal  -Neurology placed referral for outpatient neurology clinic  -Propanolol started in the ED  -Monitor    ETOH abuse- (present on admission)  Assessment &  Plan  Reports that she drinks 3 to 4 glasses of wine a night  Difficult to ascertain if tremor is due to alcohol abuse versus neurological abnormality  -[facility] protocol available  -Monitor closely for impending withdrawal    Anemia of chronic disease  Assessment & Plan  H/H 8.5/25.8 this admission  Per chart review, chronically low  Iron studies support anemia of chronic disease  B12 level high, folate level normal, iron level high  -Monitor    Seizure (HCC)- (present on admission)  Assessment & Plan  History of  -continue keppra    Essential hypertension- (present on admission)  Assessment & Plan  Normotensive  -monitor and start oral antihypertensives if persistent       VTE prophylaxis: SCDs/TEDs    I have performed a physical exam and reviewed and updated ROS and Plan today (12/11/2021). In review of yesterday's note (12/10/2021), there are no changes except as documented above.    ROOSEVELT MéndezYASMEEN  [facility]

## 2021-12-11 NOTE — ASSESSMENT & PLAN NOTE
Unclear etiology, difficult to determine if secondary to alcohol abuse versus neurologic abnormality  Neurology Dr. Khan consulted, appreciate recommendations  TSH low normal  -Neurology placed referral for outpatient neurology clinic  -Propanolol BID  -Monitor

## 2021-12-11 NOTE — HOSPITAL COURSE
This is a 66 year old female with PMHx of hypertension, seizures on Keppra (last seizure June 2021), alcohol use disorder and subdural hematoma who was admitted on 12/10/2021 with acute alcohol intoxication. CIWA protocol in place.    She was also found to have a right distal radial hairline fracture and splint has been placed. Velcro splint in place x 6 weeks. Nonweightbearing able to use platform front wheel walker able to bear weight on left elbow.    Patient also noted worsening tremors over several months for which Neurology was consulted and recommended outpatient follow up.       She is now medically cleared pending SNF acceptance.

## 2021-12-11 NOTE — PROGRESS NOTES
4 EYES RN Skin Assessment Completed by Kaitlin RN and Swathi RN.     Head: WDL  Ears: WDL  Nose: WDL  Mouth: WDL  Neck: WDL  Breasts/Chest: WDL  Shoulder Blades: WDL  Spine: WDL  (R) Arm/Elbow/hand: WDL  (L) Arm/Elbow/hand: WDL  Abdomen:WDL  Groin: WDL  Sacrum/Coccyx/Buttocks: blanching  (R) Leg: WDL  (L) Leg: WDL  (R) Heel/Foot/Toe: blanching  (L) Heel/Foot/Toe: blanching              Devices in place: BP Cuff and Pulse Ox     Interventions in place: Pillows     Possible skin injury found: No     Pictures uploaded into Epic: N/A  Wound Consult Placed: N/A

## 2021-12-12 PROBLEM — F10.939 ALCOHOL WITHDRAWAL (HCC): Status: ACTIVE | Noted: 2021-06-19

## 2021-12-12 LAB
ALBUMIN SERPL BCP-MCNC: 3.7 G/DL (ref 3.2–4.9)
ALBUMIN/GLOB SERPL: 1.7 G/DL
ALP SERPL-CCNC: 99 U/L (ref 30–99)
ALT SERPL-CCNC: 24 U/L (ref 2–50)
ANION GAP SERPL CALC-SCNC: 14 MMOL/L (ref 7–16)
AST SERPL-CCNC: 29 U/L (ref 12–45)
BASOPHILS # BLD AUTO: 0.5 % (ref 0–1.8)
BASOPHILS # BLD: 0.04 K/UL (ref 0–0.12)
BILIRUB SERPL-MCNC: 1.1 MG/DL (ref 0.1–1.5)
BUN SERPL-MCNC: 9 MG/DL (ref 8–22)
CALCIUM SERPL-MCNC: 8.4 MG/DL (ref 8.5–10.5)
CHLORIDE SERPL-SCNC: 105 MMOL/L (ref 96–112)
CO2 SERPL-SCNC: 18 MMOL/L (ref 20–33)
COMMENT 1642: NORMAL
CREAT SERPL-MCNC: 0.53 MG/DL (ref 0.5–1.4)
EOSINOPHIL # BLD AUTO: 0.19 K/UL (ref 0–0.51)
EOSINOPHIL NFR BLD: 2.6 % (ref 0–6.9)
ERYTHROCYTE [DISTWIDTH] IN BLOOD BY AUTOMATED COUNT: 72.8 FL (ref 35.9–50)
GLOBULIN SER CALC-MCNC: 2.2 G/DL (ref 1.9–3.5)
GLUCOSE SERPL-MCNC: 95 MG/DL (ref 65–99)
HCT VFR BLD AUTO: 24.5 % (ref 37–47)
HGB BLD-MCNC: 8.2 G/DL (ref 12–16)
IMM GRANULOCYTES # BLD AUTO: 0.03 K/UL (ref 0–0.11)
IMM GRANULOCYTES NFR BLD AUTO: 0.4 % (ref 0–0.9)
LACTATE BLD-SCNC: 1.3 MMOL/L (ref 0.5–2)
LYMPHOCYTES # BLD AUTO: 1.32 K/UL (ref 1–4.8)
LYMPHOCYTES NFR BLD: 17.9 % (ref 22–41)
MAGNESIUM SERPL-MCNC: 1.5 MG/DL (ref 1.5–2.5)
MCH RBC QN AUTO: 29.6 PG (ref 27–33)
MCHC RBC AUTO-ENTMCNC: 33.5 G/DL (ref 33.6–35)
MCV RBC AUTO: 88.4 FL (ref 81.4–97.8)
MONOCYTES # BLD AUTO: 0.72 K/UL (ref 0–0.85)
MONOCYTES NFR BLD AUTO: 9.7 % (ref 0–13.4)
MORPHOLOGY BLD-IMP: NORMAL
NEUTROPHILS # BLD AUTO: 5.09 K/UL (ref 2–7.15)
NEUTROPHILS NFR BLD: 68.9 % (ref 44–72)
NRBC # BLD AUTO: 0 K/UL
NRBC BLD-RTO: 0 /100 WBC
PHOSPHATE SERPL-MCNC: 1.9 MG/DL (ref 2.5–4.5)
PLATELET # BLD AUTO: 195 K/UL (ref 164–446)
PMV BLD AUTO: 10.2 FL (ref 9–12.9)
POTASSIUM SERPL-SCNC: 3.9 MMOL/L (ref 3.6–5.5)
PROT SERPL-MCNC: 5.9 G/DL (ref 6–8.2)
RBC # BLD AUTO: 2.77 M/UL (ref 4.2–5.4)
SODIUM SERPL-SCNC: 137 MMOL/L (ref 135–145)
WBC # BLD AUTO: 7.4 K/UL (ref 4.8–10.8)

## 2021-12-12 PROCEDURE — A9270 NON-COVERED ITEM OR SERVICE: HCPCS | Performed by: INTERNAL MEDICINE

## 2021-12-12 PROCEDURE — 700111 HCHG RX REV CODE 636 W/ 250 OVERRIDE (IP): Performed by: STUDENT IN AN ORGANIZED HEALTH CARE EDUCATION/TRAINING PROGRAM

## 2021-12-12 PROCEDURE — 36415 COLL VENOUS BLD VENIPUNCTURE: CPT

## 2021-12-12 PROCEDURE — 99233 SBSQ HOSP IP/OBS HIGH 50: CPT | Performed by: STUDENT IN AN ORGANIZED HEALTH CARE EDUCATION/TRAINING PROGRAM

## 2021-12-12 PROCEDURE — A9270 NON-COVERED ITEM OR SERVICE: HCPCS | Performed by: STUDENT IN AN ORGANIZED HEALTH CARE EDUCATION/TRAINING PROGRAM

## 2021-12-12 PROCEDURE — 83605 ASSAY OF LACTIC ACID: CPT

## 2021-12-12 PROCEDURE — 770020 HCHG ROOM/CARE - TELE (206)

## 2021-12-12 PROCEDURE — 700102 HCHG RX REV CODE 250 W/ 637 OVERRIDE(OP): Performed by: INTERNAL MEDICINE

## 2021-12-12 PROCEDURE — 83735 ASSAY OF MAGNESIUM: CPT

## 2021-12-12 PROCEDURE — 84100 ASSAY OF PHOSPHORUS: CPT

## 2021-12-12 PROCEDURE — 700105 HCHG RX REV CODE 258: Performed by: INTERNAL MEDICINE

## 2021-12-12 PROCEDURE — 700102 HCHG RX REV CODE 250 W/ 637 OVERRIDE(OP): Performed by: STUDENT IN AN ORGANIZED HEALTH CARE EDUCATION/TRAINING PROGRAM

## 2021-12-12 PROCEDURE — 85025 COMPLETE CBC W/AUTO DIFF WBC: CPT

## 2021-12-12 PROCEDURE — 80053 COMPREHEN METABOLIC PANEL: CPT

## 2021-12-12 RX ORDER — CHLORDIAZEPOXIDE HYDROCHLORIDE 25 MG/1
50 CAPSULE, GELATIN COATED ORAL EVERY 8 HOURS
Status: DISCONTINUED | OUTPATIENT
Start: 2021-12-12 | End: 2021-12-14

## 2021-12-12 RX ADMIN — LORAZEPAM 1 MG: 2 INJECTION INTRAMUSCULAR; INTRAVENOUS at 21:15

## 2021-12-12 RX ADMIN — CHLORDIAZEPOXIDE HYDROCHLORIDE 50 MG: 25 CAPSULE ORAL at 23:00

## 2021-12-12 RX ADMIN — LORAZEPAM 1.5 MG: 2 INJECTION INTRAMUSCULAR; INTRAVENOUS at 08:52

## 2021-12-12 RX ADMIN — DOCUSATE SODIUM 50 MG AND SENNOSIDES 8.6 MG 2 TABLET: 8.6; 5 TABLET, FILM COATED ORAL at 05:24

## 2021-12-12 RX ADMIN — LORAZEPAM 1.5 MG: 2 INJECTION INTRAMUSCULAR; INTRAVENOUS at 13:47

## 2021-12-12 RX ADMIN — OMEPRAZOLE 20 MG: 20 CAPSULE, DELAYED RELEASE ORAL at 16:52

## 2021-12-12 RX ADMIN — LORAZEPAM 1 MG: 1 TABLET ORAL at 03:13

## 2021-12-12 RX ADMIN — CHLORDIAZEPOXIDE HYDROCHLORIDE 50 MG: 25 CAPSULE ORAL at 13:47

## 2021-12-12 RX ADMIN — LORAZEPAM 1.5 MG: 2 INJECTION INTRAMUSCULAR; INTRAVENOUS at 18:08

## 2021-12-12 RX ADMIN — LORAZEPAM 1 MG: 1 TABLET ORAL at 06:42

## 2021-12-12 RX ADMIN — PROPRANOLOL HYDROCHLORIDE 10 MG: 10 TABLET ORAL at 05:23

## 2021-12-12 RX ADMIN — PROPRANOLOL HYDROCHLORIDE 10 MG: 10 TABLET ORAL at 23:00

## 2021-12-12 RX ADMIN — ENOXAPARIN SODIUM 40 MG: 40 INJECTION SUBCUTANEOUS at 10:55

## 2021-12-12 RX ADMIN — OMEPRAZOLE 20 MG: 20 CAPSULE, DELAYED RELEASE ORAL at 05:23

## 2021-12-12 RX ADMIN — LEVETIRACETAM 500 MG: 500 TABLET, FILM COATED ORAL at 05:23

## 2021-12-12 RX ADMIN — PROPRANOLOL HYDROCHLORIDE 10 MG: 10 TABLET ORAL at 13:47

## 2021-12-12 RX ADMIN — SODIUM CHLORIDE: 9 INJECTION, SOLUTION INTRAVENOUS at 13:52

## 2021-12-12 RX ADMIN — SODIUM CHLORIDE: 9 INJECTION, SOLUTION INTRAVENOUS at 00:41

## 2021-12-12 RX ADMIN — LORAZEPAM 1.5 MG: 2 INJECTION INTRAMUSCULAR; INTRAVENOUS at 09:57

## 2021-12-12 RX ADMIN — LORAZEPAM 2 MG: 2 INJECTION INTRAMUSCULAR; INTRAVENOUS at 10:28

## 2021-12-12 RX ADMIN — LORAZEPAM 1.5 MG: 2 INJECTION INTRAMUSCULAR; INTRAVENOUS at 20:08

## 2021-12-12 RX ADMIN — LORAZEPAM 1 MG: 2 INJECTION INTRAMUSCULAR; INTRAVENOUS at 23:07

## 2021-12-12 ASSESSMENT — COGNITIVE AND FUNCTIONAL STATUS - GENERAL
TURNING FROM BACK TO SIDE WHILE IN FLAT BAD: A LITTLE
SUGGESTED CMS G CODE MODIFIER DAILY ACTIVITY: CK
DRESSING REGULAR UPPER BODY CLOTHING: A LOT
MOVING TO AND FROM BED TO CHAIR: A LOT
MOVING FROM LYING ON BACK TO SITTING ON SIDE OF FLAT BED: A LOT
MOBILITY SCORE: 13
EATING MEALS: A LITTLE
HELP NEEDED FOR BATHING: A LOT
DRESSING REGULAR LOWER BODY CLOTHING: A LOT
DAILY ACTIVITIY SCORE: 14
PERSONAL GROOMING: A LOT
WALKING IN HOSPITAL ROOM: A LOT
CLIMB 3 TO 5 STEPS WITH RAILING: A LOT
STANDING UP FROM CHAIR USING ARMS: A LOT
TOILETING: A LITTLE
SUGGESTED CMS G CODE MODIFIER MOBILITY: CL

## 2021-12-12 ASSESSMENT — LIFESTYLE VARIABLES
TOTAL SCORE: 19
ORIENTATION AND CLOUDING OF SENSORIUM: DISORIENTED FOR PLACE AND / OR PERSON
NAUSEA AND VOMITING: NO NAUSEA AND NO VOMITING
TREMOR: *
AGITATION: *
TOTAL SCORE: 10
AGITATION: MODERATELY FIDGETY AND RESTLESS
AGITATION: MODERATELY FIDGETY AND RESTLESS
AUDITORY DISTURBANCES: VERY MILD HARSHNESS OR ABILITY TO FRIGHTEN
ORIENTATION AND CLOUDING OF SENSORIUM: DISORIENTED FOR PLACE AND / OR PERSON
PAROXYSMAL SWEATS: BARELY PERCEPTIBLE SWEATING. PALMS MOIST
AUDITORY DISTURBANCES: MILD HARSHNESS OR ABILITY TO FRIGHTEN
ORIENTATION AND CLOUDING OF SENSORIUM: DISORIENTED FOR PLACE AND / OR PERSON
HEADACHE, FULLNESS IN HEAD: NOT PRESENT
TOTAL SCORE: 16
HEADACHE, FULLNESS IN HEAD: NOT PRESENT
VISUAL DISTURBANCES: MILD SENSITIVITY
ANXIETY: MILDLY ANXIOUS
AUDITORY DISTURBANCES: MODERATE HARSHNESS OR ABILITY TO FRIGHTEN
AGITATION: *
TREMOR: *
AGITATION: MODERATELY FIDGETY AND RESTLESS
TOTAL SCORE: 20
AUDITORY DISTURBANCES: MODERATE HARSHNESS OR ABILITY TO FRIGHTEN
ORIENTATION AND CLOUDING OF SENSORIUM: DISORIENTED FOR PLACE AND / OR PERSON
HEADACHE, FULLNESS IN HEAD: NOT PRESENT
PAROXYSMAL SWEATS: BARELY PERCEPTIBLE SWEATING. PALMS MOIST
ORIENTATION AND CLOUDING OF SENSORIUM: DISORIENTED FOR PLACE AND / OR PERSON
NAUSEA AND VOMITING: NO NAUSEA AND NO VOMITING
TREMOR: *
AGITATION: NORMAL ACTIVITY
SUBSTANCE_ABUSE: 1
VISUAL DISTURBANCES: NOT PRESENT
TOTAL SCORE: 10
AUDITORY DISTURBANCES: MILD HARSHNESS OR ABILITY TO FRIGHTEN
HEADACHE, FULLNESS IN HEAD: NOT PRESENT
TREMOR: *
ANXIETY: *
VISUAL DISTURBANCES: NOT PRESENT
HEADACHE, FULLNESS IN HEAD: NOT PRESENT
VISUAL DISTURBANCES: VERY MILD SENSITIVITY
TOTAL SCORE: 10
PAROXYSMAL SWEATS: BARELY PERCEPTIBLE SWEATING. PALMS MOIST
VISUAL DISTURBANCES: NOT PRESENT
PAROXYSMAL SWEATS: BARELY PERCEPTIBLE SWEATING. PALMS MOIST
PAROXYSMAL SWEATS: BARELY PERCEPTIBLE SWEATING. PALMS MOIST
TREMOR: *
VISUAL DISTURBANCES: MODERATE SENSITIVITY
TREMOR: *
AGITATION: NORMAL ACTIVITY
NAUSEA AND VOMITING: NO NAUSEA AND NO VOMITING
VISUAL DISTURBANCES: MODERATE SENSITIVITY
AUDITORY DISTURBANCES: NOT PRESENT
AGITATION: *
AGITATION: *
ORIENTATION AND CLOUDING OF SENSORIUM: DISORIENTED FOR PLACE AND / OR PERSON
NAUSEA AND VOMITING: NO NAUSEA AND NO VOMITING
TREMOR: *
VISUAL DISTURBANCES: MILD SENSITIVITY
NAUSEA AND VOMITING: NO NAUSEA AND NO VOMITING
HEADACHE, FULLNESS IN HEAD: NOT PRESENT
PAROXYSMAL SWEATS: *
AGITATION: SOMEWHAT MORE THAN NORMAL ACTIVITY
PAROXYSMAL SWEATS: NO SWEAT VISIBLE
AUDITORY DISTURBANCES: VERY MILD HARSHNESS OR ABILITY TO FRIGHTEN
NAUSEA AND VOMITING: NO NAUSEA AND NO VOMITING
ORIENTATION AND CLOUDING OF SENSORIUM: DISORIENTED FOR PLACE AND / OR PERSON
TREMOR: *
AGITATION: NORMAL ACTIVITY
AUDITORY DISTURBANCES: NOT PRESENT
TREMOR: *
TOTAL SCORE: 13
HEADACHE, FULLNESS IN HEAD: NOT PRESENT
ANXIETY: *
NAUSEA AND VOMITING: NO NAUSEA AND NO VOMITING
VISUAL DISTURBANCES: MODERATE SENSITIVITY
AUDITORY DISTURBANCES: MILD HARSHNESS OR ABILITY TO FRIGHTEN
NAUSEA AND VOMITING: NO NAUSEA AND NO VOMITING
PAROXYSMAL SWEATS: *
TREMOR: MODERATE TREMOR WITH ARMS EXTENDED
ORIENTATION AND CLOUDING OF SENSORIUM: DISORIENTED FOR PLACE AND / OR PERSON
HEADACHE, FULLNESS IN HEAD: NOT PRESENT
NAUSEA AND VOMITING: NO NAUSEA AND NO VOMITING
NAUSEA AND VOMITING: NO NAUSEA AND NO VOMITING
ORIENTATION AND CLOUDING OF SENSORIUM: CANNOT DO SERIAL ADDITIONS OR IS UNCERTAIN ABOUT DATE
TREMOR: *
ANXIETY: *
ANXIETY: NO ANXIETY (AT EASE)
NAUSEA AND VOMITING: MILD NAUSEA WITH NO VOMITING
TOTAL SCORE: 15
ANXIETY: NO ANXIETY (AT EASE)
VISUAL DISTURBANCES: MODERATE SENSITIVITY
NAUSEA AND VOMITING: NO NAUSEA AND NO VOMITING
TOTAL SCORE: 17
ORIENTATION AND CLOUDING OF SENSORIUM: DISORIENTED FOR PLACE AND / OR PERSON
PAROXYSMAL SWEATS: BARELY PERCEPTIBLE SWEATING. PALMS MOIST
ANXIETY: MILDLY ANXIOUS
VISUAL DISTURBANCES: VERY MILD SENSITIVITY
TREMOR: *
AUDITORY DISTURBANCES: MILD HARSHNESS OR ABILITY TO FRIGHTEN
TOTAL SCORE: 13
AUDITORY DISTURBANCES: NOT PRESENT
ORIENTATION AND CLOUDING OF SENSORIUM: ORIENTED AND CAN DO SERIAL ADDITIONS
ANXIETY: MODERATELY ANXIOUS OR GUARDED, SO ANXIETY IS INFERRED
ORIENTATION AND CLOUDING OF SENSORIUM: DISORIENTED FOR PLACE AND / OR PERSON
ANXIETY: MODERATELY ANXIOUS OR GUARDED, SO ANXIETY IS INFERRED
TREMOR: *
PAROXYSMAL SWEATS: NO SWEAT VISIBLE
NAUSEA AND VOMITING: NO NAUSEA AND NO VOMITING
HEADACHE, FULLNESS IN HEAD: NOT PRESENT
AUDITORY DISTURBANCES: MILD HARSHNESS OR ABILITY TO FRIGHTEN
AUDITORY DISTURBANCES: MILD HARSHNESS OR ABILITY TO FRIGHTEN
PAROXYSMAL SWEATS: BARELY PERCEPTIBLE SWEATING. PALMS MOIST
ORIENTATION AND CLOUDING OF SENSORIUM: DISORIENTED FOR PLACE AND / OR PERSON
VISUAL DISTURBANCES: SEVERE HALLUCINATIONS
ANXIETY: MODERATELY ANXIOUS OR GUARDED, SO ANXIETY IS INFERRED
TOTAL SCORE: 13
HEADACHE, FULLNESS IN HEAD: NOT PRESENT
AGITATION: MODERATELY FIDGETY AND RESTLESS
AUDITORY DISTURBANCES: MILD HARSHNESS OR ABILITY TO FRIGHTEN
NAUSEA AND VOMITING: NO NAUSEA AND NO VOMITING
AGITATION: SOMEWHAT MORE THAN NORMAL ACTIVITY
VISUAL DISTURBANCES: MODERATELY SEVERE HALLUCINATIONS
TOTAL SCORE: 15
HEADACHE, FULLNESS IN HEAD: NOT PRESENT
ORIENTATION AND CLOUDING OF SENSORIUM: CANNOT DO SERIAL ADDITIONS OR IS UNCERTAIN ABOUT DATE
ANXIETY: *
PAROXYSMAL SWEATS: *
HEADACHE, FULLNESS IN HEAD: NOT PRESENT
PAROXYSMAL SWEATS: BARELY PERCEPTIBLE SWEATING. PALMS MOIST
TOTAL SCORE: 26
HEADACHE, FULLNESS IN HEAD: NOT PRESENT
VISUAL DISTURBANCES: MODERATELY SEVERE HALLUCINATIONS
PAROXYSMAL SWEATS: NO SWEAT VISIBLE
ANXIETY: NO ANXIETY (AT EASE)
TREMOR: *
HEADACHE, FULLNESS IN HEAD: NOT PRESENT
ANXIETY: *
ANXIETY: *
AGITATION: *
TOTAL SCORE: 21

## 2021-12-12 ASSESSMENT — ENCOUNTER SYMPTOMS
TREMORS: 1
HEADACHES: 0
ABDOMINAL PAIN: 0
SHORTNESS OF BREATH: 0
COUGH: 0
NAUSEA: 0
VOMITING: 0
PALPITATIONS: 0
BRUISES/BLEEDS EASILY: 0
DOUBLE VISION: 0
DIZZINESS: 0
WEAKNESS: 1
SEIZURES: 1
NECK PAIN: 0
DEPRESSION: 0
FEVER: 0

## 2021-12-12 ASSESSMENT — PAIN DESCRIPTION - PAIN TYPE
TYPE: ACUTE PAIN

## 2021-12-12 NOTE — PROGRESS NOTES
Paged MD on call regarding patient. MD Renzo notified of patient's impulsivity and removal of medical equipment. MD Renzo placed 24 hour restraint order at this time.

## 2021-12-12 NOTE — THERAPY
Missed Therapy     Patient Name: Roro Leon  Age:  66 y.o., Sex:  female  Medical Record #: [id number]  Today's Date: 12/12/2021    Discussed missed therapy with RN       12/12/21 0897   Interdisciplinary Plan of Care Collaboration   Collaboration Comments PT consult received, per nursing pt is very confused/hallucinating and restrained; will hold PT eval at this time and follow up as appropriate.

## 2021-12-12 NOTE — PROGRESS NOTES
Patient pulled out IV and trying to climb out of bed. Patient is becoming increasingly more impulsive. Patient placed in soft bilateral wrist restraints at this time. Will continue to monitor.

## 2021-12-12 NOTE — CARE PLAN
"The patient is Stable - Low risk of patient condition declining or worsening    Shift Goals  Clinical Goals: CIWA; Maintain patient safety  Patient Goals: \"Ineed some food\"  Family Goals: ROSIO    Progress made toward(s) clinical / shift goals:       Problem: Optimal Care for Alcohol Withdrawal  Goal: Optimal Care for the alcohol withdrawal patient  Outcome: Progressing     Problem: Seizure Precautions  Goal: Implementation of seizure precautions  Outcome: Progressing     Problem: Safety - Medical Restraint  Goal: Remains free of injury from restraints (Restraint for Interference with Medical Device)  Outcome: Progressing       Patient is not progressing towards the following goals:      Problem: Psychosocial  Goal: Patient's level of anxiety will decrease  Outcome: Not Progressing     "

## 2021-12-12 NOTE — PROGRESS NOTES
Report received from night shift RN, assumed care of pt. Pt A&Ox1, oriented to self only, having hallucinations. Plan of care discussed with pt, labs and chart reviewed. All needs met at this time. Tele box on. Pt on RA. Call light within reach, bed locked and in lowest position. All fall precautions and hourly rounding in place. Pt in bilateral soft wrist restraints for safety. Will continue to monitor.

## 2021-12-12 NOTE — PROGRESS NOTES
Hospital Medicine Daily Progress Note    Date of Service  12/12/2021    Chief Complaint  Roro Leon is a 66 y.o. female admitted 12/10/2021 with weakness    Hospital Course  Roro Leon is a 66-year-old female with a past medical history of YEISON, EtOH abuse, hypertension, seizures on Keppra (last seizure June 2021), and subdural hematoma who presented to the ED on 12/10/2021 with complaints of weakness and generalized malaise that have been worsening over the last 3 days.  She had been previously admitted for sepsis secondary to UTI in June and was discharged to [facility] for rehab.  She was discharged from the rehab in August however she reports since then she has not been able to ambulate well and has been feeling weak.  The months ago, she started noticing continuous bilateral hand tremors, right worse than left, with accompanying left leg intermittent tremors.  She did see her PCP who referred her to a neurologist, however she has not yet been seen for that appointment.  She endorses a significant history of alcohol abuse.    In the ED, patient was found to be dehydrated with resting and action tremors of bilateral hands.  Alcohol level was significantly elevated, TSH low normal, lactic acid 3.8, no leukocytosis, chest x-ray negative, and UA negative.  Patient was admitted to the observation unit for further work-up and management.    Neurology was consulted 12/11/2021 and recommend referral to outpatient neurology clinic.  Patient remains on normal saline infusion and lactic acid is being down.  She is being monitored for alcohol withdrawal.  Patient is not noted to be chronically anemic, iron studies were ordered which demonstrate anemia of chronic disease and a high vitamin B12 level.  Stool was negative for occult blood.    Interval Problem Update  Overnight patient agitated requiring restraints.  She is confused at bedside, not oriented, hallucinating.  CIWA scores rising up to 20.  Start scheduled  librium in addition to CIWA protocol.  Monitor for need for higher level of care.  PT/OT evaluation when more stable from withdrawal.      Consultants/Specialty  neurology    Code Status  Full Code    Disposition  Patient is not medically cleared.   Anticipate discharge to to skilled nursing facility vs home.  I have placed the appropriate orders for post-discharge needs.    Review of Systems  Review of Systems   Constitutional: Positive for malaise/fatigue. Negative for fever.   HENT: Negative for hearing loss.    Eyes: Negative for double vision.   Respiratory: Negative for cough and shortness of breath.    Cardiovascular: Negative for chest pain, palpitations and leg swelling.   Gastrointestinal: Negative for abdominal pain, nausea and vomiting.   Genitourinary: Negative for dysuria and urgency.   Musculoskeletal: Negative for neck pain.   Skin: Negative for rash.   Neurological: Positive for tremors, seizures and weakness. Negative for dizziness and headaches.   Endo/Heme/Allergies: Does not bruise/bleed easily.   Psychiatric/Behavioral: Positive for substance abuse. Negative for depression.   All other systems reviewed and are negative.       Physical Exam  Temp:  [36.6 °C (97.8 °F)-37.7 °C (99.8 °F)] 37.1 °C (98.7 °F)  Pulse:  [66-82] 74  Resp:  [16-18] 16  BP: (121-137)/(71-87) 122/71  SpO2:  [91 %-98 %] 98 %    Physical Exam  Vitals and nursing note reviewed.   Constitutional:       Appearance: She is ill-appearing.   HENT:      Head: Normocephalic.      Mouth/Throat:      Mouth: Mucous membranes are moist.      Pharynx: Oropharynx is clear.   Cardiovascular:      Rate and Rhythm: Normal rate and regular rhythm.      Pulses: Normal pulses.      Heart sounds: Normal heart sounds.   Pulmonary:      Effort: Pulmonary effort is normal.      Breath sounds: Normal breath sounds.   Abdominal:      General: Abdomen is flat. Bowel sounds are normal.      Palpations: Abdomen is soft.   Musculoskeletal:          General: Normal range of motion.   Skin:     General: Skin is warm and dry.      Capillary Refill: Capillary refill takes less than 2 seconds.   Neurological:      Mental Status: She is alert and oriented to person, place, and time.      Sensory: Sensation is intact.      Motor: Weakness and tremor present. No pronator drift.         Fluids    Intake/Output Summary (Last 24 hours) at 12/12/2021 1123  Last data filed at 12/11/2021 1850  Gross per 24 hour   Intake --   Output 500 ml   Net -500 ml       Laboratory  Recent Labs     12/10/21  1557 12/11/21  0107 12/12/21  0321   WBC 5.3 6.2 7.4   RBC 3.42* 2.94* 2.77*   HEMOGLOBIN 9.8* 8.5* 8.2*   HEMATOCRIT 31.1* 26.8* 24.5*   MCV 90.9 91.2 88.4   MCH 28.7 28.9 29.6   MCHC 31.5* 31.7* 33.5*   RDW 81.4* 80.5* 72.8*   PLATELETCT 297 242 195   MPV 10.0 10.4 10.2     Recent Labs     12/10/21  1557 12/11/21 0107 12/12/21  0321   SODIUM 144 143 137   POTASSIUM 4.5 4.0 3.9   CHLORIDE 110 111 105   CO2 17* 19* 18*   GLUCOSE 92 77 95   BUN 12 12 9   CREATININE 0.80 0.74 0.53   CALCIUM 8.9 8.2* 8.4*                   Imaging  DX-CHEST-PORTABLE (1 VIEW)   Final Result      No acute cardiopulmonary abnormality.              Assessment/Plan  * Dehydration- (present on admission)  Assessment & Plan  Lactic acid level trending down  -Continue IV fluids     Mixed action and resting tremor- (present on admission)  Assessment & Plan  Unclear etiology, difficult to determine if secondary to alcohol abuse versus neurologic abnormality  Neurology Dr. Khan consulted, appreciate recommendations  TSH low normal  -Neurology placed referral for outpatient neurology clinic  -Propanolol started in the ED  -Monitor    Alcohol withdrawal (HCC)- (present on admission)  Assessment & Plan  Reports that she drinks 3 to 4 glasses of wine a night  Difficult to ascertain if tremor is due to alcohol abuse versus neurological abnormality  -CIWA scores high up to 20.  Start scheduled librium  Continue  [facility] monitoring  Monitor for need for higher level of care    Anemia of chronic disease  Assessment & Plan  H/H 8.5/25.8 this admission  Per chart review, chronically low  Iron studies support anemia of chronic disease  B12 level high, folate level normal, iron level high  -Monitor    Seizure (HCC)- (present on admission)  Assessment & Plan  History of  -continue keppra    Essential hypertension- (present on admission)  Assessment & Plan  Normotensive  -monitor and start oral antihypertensives if persistent       VTE prophylaxis: lovenox

## 2021-12-12 NOTE — PROGRESS NOTES
Patient transported to unit from CDU. Monitors notified, patient on telemetry monitoring. Patient A&Ox2; disoriented to time and situation, patient complains of no pain at this time, vital signs are stable. CIWA protocol in place at this time. Will continue plan of care.

## 2021-12-12 NOTE — PROGRESS NOTES
4 Eyes Skin Assessment Completed by MIRIAN Santizo and MIRIAN Acosta.    Head WDL  Ears WDL  Nose WDL  Mouth WDL  Neck WDL  Breast/Chest WDL  Shoulder Blades WDL  Spine WDL  (R) Arm/Elbow/Hand WDL  (L) Arm/Elbow/Hand WDL  Abdomen WDL  Groin WDL  Scrotum/Coccyx/Buttocks Redness and Blanching  (R) Leg WDL  (L) Leg WDL  (R) Heel/Foot/Toe Boggy  (L) Heel/Foot/Toe WDL          Devices In Places Tele Box, Pulse Ox and Nasal Cannula      Interventions In Place Pillows, Heels Loaded W/Pillows and Pressure Redistribution Mattress    Possible Skin Injury No    Pictures Uploaded Into Epic N/A  Wound Consult Placed N/A  RN Wound Prevention Protocol Ordered No

## 2021-12-13 LAB
BACTERIA UR CULT: NORMAL
SIGNIFICANT IND 70042: NORMAL
SITE SITE: NORMAL
SOURCE SOURCE: NORMAL

## 2021-12-13 PROCEDURE — 700102 HCHG RX REV CODE 250 W/ 637 OVERRIDE(OP): Performed by: STUDENT IN AN ORGANIZED HEALTH CARE EDUCATION/TRAINING PROGRAM

## 2021-12-13 PROCEDURE — A9270 NON-COVERED ITEM OR SERVICE: HCPCS | Performed by: STUDENT IN AN ORGANIZED HEALTH CARE EDUCATION/TRAINING PROGRAM

## 2021-12-13 PROCEDURE — 99232 SBSQ HOSP IP/OBS MODERATE 35: CPT | Performed by: STUDENT IN AN ORGANIZED HEALTH CARE EDUCATION/TRAINING PROGRAM

## 2021-12-13 PROCEDURE — A9270 NON-COVERED ITEM OR SERVICE: HCPCS | Performed by: INTERNAL MEDICINE

## 2021-12-13 PROCEDURE — 770020 HCHG ROOM/CARE - TELE (206)

## 2021-12-13 PROCEDURE — 700102 HCHG RX REV CODE 250 W/ 637 OVERRIDE(OP): Performed by: INTERNAL MEDICINE

## 2021-12-13 PROCEDURE — 700111 HCHG RX REV CODE 636 W/ 250 OVERRIDE (IP): Performed by: STUDENT IN AN ORGANIZED HEALTH CARE EDUCATION/TRAINING PROGRAM

## 2021-12-13 PROCEDURE — 700105 HCHG RX REV CODE 258: Performed by: INTERNAL MEDICINE

## 2021-12-13 RX ORDER — HYDROCHLOROTHIAZIDE 25 MG/1
12.5 TABLET ORAL
Status: DISCONTINUED | OUTPATIENT
Start: 2021-12-13 | End: 2021-12-15

## 2021-12-13 RX ORDER — LISINOPRIL 20 MG/1
20 TABLET ORAL
Status: DISCONTINUED | OUTPATIENT
Start: 2021-12-13 | End: 2022-01-11 | Stop reason: HOSPADM

## 2021-12-13 RX ORDER — LEVETIRACETAM 500 MG/1
500 TABLET ORAL 2 TIMES DAILY
Status: DISCONTINUED | OUTPATIENT
Start: 2021-12-13 | End: 2022-01-11 | Stop reason: HOSPADM

## 2021-12-13 RX ADMIN — LISINOPRIL 20 MG: 20 TABLET ORAL at 13:31

## 2021-12-13 RX ADMIN — PROPRANOLOL HYDROCHLORIDE 10 MG: 10 TABLET ORAL at 23:04

## 2021-12-13 RX ADMIN — PROPRANOLOL HYDROCHLORIDE 10 MG: 10 TABLET ORAL at 14:09

## 2021-12-13 RX ADMIN — LORAZEPAM 0.5 MG: 0.5 TABLET ORAL at 20:21

## 2021-12-13 RX ADMIN — ENOXAPARIN SODIUM 40 MG: 40 INJECTION SUBCUTANEOUS at 05:43

## 2021-12-13 RX ADMIN — PROPRANOLOL HYDROCHLORIDE 10 MG: 10 TABLET ORAL at 05:43

## 2021-12-13 RX ADMIN — OMEPRAZOLE 20 MG: 20 CAPSULE, DELAYED RELEASE ORAL at 17:21

## 2021-12-13 RX ADMIN — LORAZEPAM 1 MG: 2 INJECTION INTRAMUSCULAR; INTRAVENOUS at 03:21

## 2021-12-13 RX ADMIN — SODIUM CHLORIDE: 9 INJECTION, SOLUTION INTRAVENOUS at 15:49

## 2021-12-13 RX ADMIN — CHLORDIAZEPOXIDE HYDROCHLORIDE 50 MG: 25 CAPSULE ORAL at 14:10

## 2021-12-13 RX ADMIN — CHLORDIAZEPOXIDE HYDROCHLORIDE 50 MG: 25 CAPSULE ORAL at 05:42

## 2021-12-13 RX ADMIN — ACETAMINOPHEN 650 MG: 325 TABLET, FILM COATED ORAL at 14:10

## 2021-12-13 RX ADMIN — LEVETIRACETAM 500 MG: 500 TABLET, FILM COATED ORAL at 17:21

## 2021-12-13 RX ADMIN — OMEPRAZOLE 20 MG: 20 CAPSULE, DELAYED RELEASE ORAL at 05:42

## 2021-12-13 RX ADMIN — HYDROCHLOROTHIAZIDE 12.5 MG: 25 TABLET ORAL at 13:31

## 2021-12-13 RX ADMIN — LORAZEPAM 2 MG: 2 TABLET ORAL at 11:58

## 2021-12-13 RX ADMIN — LEVETIRACETAM 500 MG: 500 TABLET, FILM COATED ORAL at 05:43

## 2021-12-13 RX ADMIN — LORAZEPAM 1.5 MG: 2 INJECTION INTRAMUSCULAR; INTRAVENOUS at 06:41

## 2021-12-13 RX ADMIN — LORAZEPAM 1 MG: 2 INJECTION INTRAMUSCULAR; INTRAVENOUS at 01:07

## 2021-12-13 RX ADMIN — CHLORDIAZEPOXIDE HYDROCHLORIDE 50 MG: 25 CAPSULE ORAL at 23:04

## 2021-12-13 RX ADMIN — LORAZEPAM 1.5 MG: 2 INJECTION INTRAMUSCULAR; INTRAVENOUS at 05:42

## 2021-12-13 RX ADMIN — SODIUM CHLORIDE: 9 INJECTION, SOLUTION INTRAVENOUS at 03:19

## 2021-12-13 ASSESSMENT — PAIN DESCRIPTION - PAIN TYPE
TYPE: ACUTE PAIN

## 2021-12-13 ASSESSMENT — LIFESTYLE VARIABLES
TREMOR: MODERATE TREMOR WITH ARMS EXTENDED
VISUAL DISTURBANCES: MILD SENSITIVITY
TREMOR: *
AUDITORY DISTURBANCES: NOT PRESENT
AGITATION: *
TOTAL SCORE: 14
ANXIETY: MODERATELY ANXIOUS OR GUARDED, SO ANXIETY IS INFERRED
PAROXYSMAL SWEATS: NO SWEAT VISIBLE
AGITATION: NORMAL ACTIVITY
ANXIETY: MILDLY ANXIOUS
ORIENTATION AND CLOUDING OF SENSORIUM: CANNOT DO SERIAL ADDITIONS OR IS UNCERTAIN ABOUT DATE
HEADACHE, FULLNESS IN HEAD: NOT PRESENT
AGITATION: NORMAL ACTIVITY
ANXIETY: NO ANXIETY (AT EASE)
AUDITORY DISTURBANCES: NOT PRESENT
VISUAL DISTURBANCES: MODERATE SENSITIVITY
VISUAL DISTURBANCES: MODERATE SENSITIVITY
VISUAL DISTURBANCES: NOT PRESENT
AGITATION: *
VISUAL DISTURBANCES: NOT PRESENT
AGITATION: *
NAUSEA AND VOMITING: NO NAUSEA AND NO VOMITING
AUDITORY DISTURBANCES: NOT PRESENT
NAUSEA AND VOMITING: NO NAUSEA AND NO VOMITING
TREMOR: *
TOTAL SCORE: 19
ORIENTATION AND CLOUDING OF SENSORIUM: DISORIENTED FOR PLACE AND / OR PERSON
HEADACHE, FULLNESS IN HEAD: NOT PRESENT
HEADACHE, FULLNESS IN HEAD: NOT PRESENT
VISUAL DISTURBANCES: MILD SENSITIVITY
HEADACHE, FULLNESS IN HEAD: NOT PRESENT
TOTAL SCORE: 11
HEADACHE, FULLNESS IN HEAD: NOT PRESENT
ANXIETY: *
TOTAL SCORE: 17
ANXIETY: MILDLY ANXIOUS
AUDITORY DISTURBANCES: NOT PRESENT
NAUSEA AND VOMITING: MILD NAUSEA WITH NO VOMITING
HEADACHE, FULLNESS IN HEAD: NOT PRESENT
TREMOR: *
HEADACHE, FULLNESS IN HEAD: NOT PRESENT
ORIENTATION AND CLOUDING OF SENSORIUM: DISORIENTED FOR PLACE AND / OR PERSON
VISUAL DISTURBANCES: VERY MILD SENSITIVITY
AUDITORY DISTURBANCES: NOT PRESENT
PAROXYSMAL SWEATS: NO SWEAT VISIBLE
AGITATION: *
TREMOR: MODERATE TREMOR WITH ARMS EXTENDED
NAUSEA AND VOMITING: NO NAUSEA AND NO VOMITING
AUDITORY DISTURBANCES: VERY MILD HARSHNESS OR ABILITY TO FRIGHTEN
HEADACHE, FULLNESS IN HEAD: NOT PRESENT
NAUSEA AND VOMITING: MILD NAUSEA WITH NO VOMITING
ANXIETY: *
TREMOR: *
ORIENTATION AND CLOUDING OF SENSORIUM: CANNOT DO SERIAL ADDITIONS OR IS UNCERTAIN ABOUT DATE
ORIENTATION AND CLOUDING OF SENSORIUM: CANNOT DO SERIAL ADDITIONS OR IS UNCERTAIN ABOUT DATE
TOTAL SCORE: 9
AUDITORY DISTURBANCES: NOT PRESENT
PAROXYSMAL SWEATS: NO SWEAT VISIBLE
PAROXYSMAL SWEATS: NO SWEAT VISIBLE
SUBSTANCE_ABUSE: 1
AUDITORY DISTURBANCES: NOT PRESENT
TOTAL SCORE: 8
ANXIETY: *
TREMOR: *
NAUSEA AND VOMITING: NO NAUSEA AND NO VOMITING
TOTAL SCORE: 6
PAROXYSMAL SWEATS: NO SWEAT VISIBLE
TREMOR: *
PAROXYSMAL SWEATS: NO SWEAT VISIBLE
TOTAL SCORE: 13
NAUSEA AND VOMITING: NO NAUSEA AND NO VOMITING
TREMOR: *
AGITATION: *
AGITATION: NORMAL ACTIVITY
PAROXYSMAL SWEATS: NO SWEAT VISIBLE
ANXIETY: NO ANXIETY (AT EASE)
HEADACHE, FULLNESS IN HEAD: NOT PRESENT
AUDITORY DISTURBANCES: NOT PRESENT
ORIENTATION AND CLOUDING OF SENSORIUM: DISORIENTED FOR PLACE AND / OR PERSON
NAUSEA AND VOMITING: NO NAUSEA AND NO VOMITING
TOTAL SCORE: 7
NAUSEA AND VOMITING: NO NAUSEA AND NO VOMITING
ANXIETY: *
ORIENTATION AND CLOUDING OF SENSORIUM: CANNOT DO SERIAL ADDITIONS OR IS UNCERTAIN ABOUT DATE
VISUAL DISTURBANCES: MODERATELY SEVERE HALLUCINATIONS
PAROXYSMAL SWEATS: NO SWEAT VISIBLE
PAROXYSMAL SWEATS: NO SWEAT VISIBLE
AGITATION: MODERATELY FIDGETY AND RESTLESS
ORIENTATION AND CLOUDING OF SENSORIUM: DISORIENTED FOR PLACE AND / OR PERSON
ORIENTATION AND CLOUDING OF SENSORIUM: DISORIENTED FOR PLACE AND / OR PERSON
VISUAL DISTURBANCES: NOT PRESENT

## 2021-12-13 ASSESSMENT — ENCOUNTER SYMPTOMS
SEIZURES: 1
WEAKNESS: 1
ABDOMINAL PAIN: 0
DIZZINESS: 0
NECK PAIN: 0
HEADACHES: 0
COUGH: 0
DOUBLE VISION: 0
TREMORS: 1
FEVER: 0
NAUSEA: 0
DEPRESSION: 0
VOMITING: 0
BRUISES/BLEEDS EASILY: 0
SHORTNESS OF BREATH: 0
PALPITATIONS: 0

## 2021-12-13 NOTE — CARE PLAN
The patient is Unstable - High likelihood or risk of patient condition declining or worsening    Shift Goals  Clinical Goals: CIWA protocol, maintain pt safety, reorient pt  Patient Goals: ROSIO, pt not oriented to situation  Family Goals: NA    Progress made toward(s) clinical / shift goals:    Problem: Optimal Care for Alcohol Withdrawal  Goal: Optimal Care for the alcohol withdrawal patient  Outcome: Progressing   CIWA protocol in place for ETOH withdrawal. Pt medicated with Ativan prn, responding well, symptoms improving.   Problem: Safety - Medical Restraint  Goal: Remains free of injury from restraints (Restraint for Interference with Medical Device)  Outcome: Progressing  Q2 restraint assessment in place.     Patient is not progressing towards the following goals:  Problem: Knowledge Deficit - Standard  Goal: Patient and family/care givers will demonstrate understanding of plan of care, disease process/condition, diagnostic tests and medications  Outcome: Not Progressing  Problem: Safety - Medical Restraint  Goal: Free from restraint(s) (Restraint for Interference with Medical Device)  Outcome: Not Progressing    Pt is disoriented and remains confused, attempts to get up without assistance, even with bilateral wrist restraints in place. CIWA scores ranging from 10-26 this shift. Pt requires frequent assessment and medication for relief from symptoms. Pt shows no evidence of learning at this time when educated on plan of care.

## 2021-12-13 NOTE — PROGRESS NOTES
Hospital Medicine Daily Progress Note    Date of Service  12/13/2021    Chief Complaint  Roro Leon is a 66 y.o. female admitted 12/10/2021 with weakness    Hospital Course  Roro Leon is a 66-year-old female with a past medical history of YEISON, EtOH abuse, hypertension, seizures on Keppra (last seizure June 2021), and subdural hematoma who presented to the ED on 12/10/2021 with complaints of weakness and generalized malaise that have been worsening over the last 3 days.  She had been previously admitted for sepsis secondary to UTI in June and was discharged to [facility] for rehab.  She was discharged from the rehab in August however she reports since then she has not been able to ambulate well and has been feeling weak.  The months ago, she started noticing continuous bilateral hand tremors, right worse than left, with accompanying left leg intermittent tremors.  She did see her PCP who referred her to a neurologist, however she has not yet been seen for that appointment.  She endorses a significant history of alcohol abuse.    In the ED, patient was found to be dehydrated with resting and action tremors of bilateral hands.  Alcohol level was significantly elevated, TSH low normal, lactic acid 3.8, no leukocytosis, chest x-ray negative, and UA negative.  Patient was admitted to the observation unit for further work-up and management.    Neurology was consulted 12/11/2021 and recommend referral to outpatient neurology clinic.  Patient remains on normal saline infusion and lactic acid is being down.  She is being monitored for alcohol withdrawal.  Patient is not noted to be chronically anemic, iron studies were ordered which demonstrate anemia of chronic disease and a high vitamin B12 level.  Stool was negative for occult blood.    Interval Problem Update  No acute events overnight.  Patient in soft wrist restraints due to agitation.  CIWA scores moderate 14-19. Continue CIWA protocol and scheduled librium.  On gabapentin as well.  Monitor for need for higher level of care.  Continue home keppra for history of seizures.  PT/OT evaluation when more stable from withdrawal.      Consultants/Specialty  neurology    Code Status  Full Code    Disposition  Patient is not medically cleared.   Anticipate discharge to to skilled nursing facility vs home.  I have placed the appropriate orders for post-discharge needs.    Review of Systems  Review of Systems   Constitutional: Positive for malaise/fatigue. Negative for fever.   HENT: Negative for hearing loss.    Eyes: Negative for double vision.   Respiratory: Negative for cough and shortness of breath.    Cardiovascular: Negative for chest pain, palpitations and leg swelling.   Gastrointestinal: Negative for abdominal pain, nausea and vomiting.   Genitourinary: Negative for dysuria and urgency.   Musculoskeletal: Negative for neck pain.   Skin: Negative for rash.   Neurological: Positive for tremors, seizures and weakness. Negative for dizziness and headaches.   Endo/Heme/Allergies: Does not bruise/bleed easily.   Psychiatric/Behavioral: Positive for substance abuse. Negative for depression.   All other systems reviewed and are negative.       Physical Exam  Temp:  [36.9 °C (98.4 °F)-37.2 °C (98.9 °F)] 36.9 °C (98.4 °F)  Pulse:  [80-97] 82  Resp:  [15-20] 20  BP: (134-153)/(67-91) 151/76  SpO2:  [96 %-98 %] 96 %    Physical Exam  Vitals and nursing note reviewed.   Constitutional:       Appearance: She is ill-appearing.   HENT:      Head: Normocephalic.      Mouth/Throat:      Mouth: Mucous membranes are moist.      Pharynx: Oropharynx is clear.   Cardiovascular:      Rate and Rhythm: Normal rate and regular rhythm.      Pulses: Normal pulses.      Heart sounds: Normal heart sounds.   Pulmonary:      Effort: Pulmonary effort is normal.      Breath sounds: Normal breath sounds.   Abdominal:      General: Abdomen is flat. Bowel sounds are normal.      Palpations: Abdomen is soft.    Musculoskeletal:         General: Normal range of motion.   Skin:     General: Skin is warm and dry.      Capillary Refill: Capillary refill takes less than 2 seconds.   Neurological:      Mental Status: She is alert and oriented to person, place, and time.      Sensory: Sensation is intact.      Motor: Weakness and tremor present. No pronator drift.         Fluids    Intake/Output Summary (Last 24 hours) at 12/13/2021 1025  Last data filed at 12/13/2021 0706  Gross per 24 hour   Intake --   Output 650 ml   Net -650 ml       Laboratory  Recent Labs     12/10/21  1557 12/11/21  0107 12/12/21  0321   WBC 5.3 6.2 7.4   RBC 3.42* 2.94* 2.77*   HEMOGLOBIN 9.8* 8.5* 8.2*   HEMATOCRIT 31.1* 26.8* 24.5*   MCV 90.9 91.2 88.4   MCH 28.7 28.9 29.6   MCHC 31.5* 31.7* 33.5*   RDW 81.4* 80.5* 72.8*   PLATELETCT 297 242 195   MPV 10.0 10.4 10.2     Recent Labs     12/10/21  1557 12/11/21 0107 12/12/21  0321   SODIUM 144 143 137   POTASSIUM 4.5 4.0 3.9   CHLORIDE 110 111 105   CO2 17* 19* 18*   GLUCOSE 92 77 95   BUN 12 12 9   CREATININE 0.80 0.74 0.53   CALCIUM 8.9 8.2* 8.4*                   Imaging  DX-CHEST-PORTABLE (1 VIEW)   Final Result      No acute cardiopulmonary abnormality.              Assessment/Plan  * Dehydration- (present on admission)  Assessment & Plan  Lactic acid level trending down  -Continue IV fluids     Mixed action and resting tremor- (present on admission)  Assessment & Plan  Unclear etiology, difficult to determine if secondary to alcohol abuse versus neurologic abnormality  Neurology Dr. Khan consulted, appreciate recommendations  TSH low normal  -Neurology placed referral for outpatient neurology clinic  -Propanolol started in the ED  -Monitor    Alcohol withdrawal (HCC)- (present on admission)  Assessment & Plan  Reports that she drinks 3 to 4 glasses of wine a night  Difficult to ascertain if tremor is due to alcohol abuse versus neurological abnormality  -CIWA scores high up to 20.  Start  scheduled librium  Continue CIWA monitoring  Monitor for need for higher level of care    Anemia of chronic disease  Assessment & Plan  H/H 8.5/25.8 this admission  Per chart review, chronically low  Iron studies support anemia of chronic disease  B12 level high, folate level normal, iron level high  -Monitor    Seizure (HCC)- (present on admission)  Assessment & Plan  History of  -continue keppra    Essential hypertension- (present on admission)  Assessment & Plan  Normotensive  -monitor and start oral antihypertensives if persistent       VTE prophylaxis: lovenox

## 2021-12-13 NOTE — PROGRESS NOTES
"Bedside report received and patient care assumed. Pt is restless in bed, A&OX2 disoriented to place and situation. Pt is confused stating \"toilet paper blown up on my birthday\". Per CIWA protocol 1.5 mg Ativan given. Pt has no complaints of pain and is on RA. Bilateral soft wrist restraints in place. Order verified. Tele box on. High fall risk precautions are in place, belongings at bedside table.  Pt was updated on POC and educated on use of call light for assistance. Will continue to monitor CIWA scoring and safety.       "

## 2021-12-13 NOTE — CARE PLAN
The patient is {Patient Stability:[id number]}    Shift Goals  Clinical Goals: Decrease CIWA score, VSS, safety  Patient Goals: ROSIO  Family Goals: ROSIO    Progress made toward(s) clinical / shift goals:  ***    Patient is not progressing towards the following goals:      Problem: Knowledge Deficit - Standard  Goal: Patient and family/care givers will demonstrate understanding of plan of care, disease process/condition, diagnostic tests and medications  Outcome: Not Progressing

## 2021-12-13 NOTE — THERAPY
Physical Therapy Contact Note    Patient Name: Roro Leon  Age:  66 y.o., Sex:  female  Medical Record #: [id number]  Today's Date: 12/13/2021 12/13/21 1053   Interdisciplinary Plan of Care Collaboration   Collaboration Comments Per RN patient agitated & restrained, not appropriate for PT eval at this time.  Will hold and initiate PT eval as able/appropriate.     Jud Ugarte, PT, DPT

## 2021-12-13 NOTE — THERAPY
Missed Therapy     Patient Name: Roro Leon  Age:  66 y.o., Sex:  female  Medical Record #: [id number]  Today's Date: 12/13/2021    Discussed missed therapy with [place]       12/13/21 1321   Interdisciplinary Plan of Care Collaboration   Collaboration Comments OT eval attempted, pt remains inappropriate for OT eval as she is agitated & in restraints.  Will hold OT eval & re-assess another day when pt is more appropriate.

## 2021-12-13 NOTE — PROGRESS NOTES
Report received from night shift RN, assumed care of pt. Pt A&Ox2. Plan of care discussed with pt, labs and chart reviewed. All needs met at this time. Tele box on. Pt on RA. Call light within reach, bed locked and in lowest position. All fall precautions and hourly rounding in place. Pt in bilateral wrist restraints for safety. CIWA protocol in place with close monitoring. Will continue to monitor.

## 2021-12-13 NOTE — CARE PLAN
The patient is Watcher - Medium risk of patient condition declining or worsening    Shift Goals  Clinical Goals: Decrease CIWA score, VSS, safety  Patient Goals: ROSIO  Family Goals: ROSIO    Progress made toward(s) clinical / shift goals:  Pt A&Ox2. Requires continuous reorienting and does not show learning evidence throughout shift. Not able to discontinue restraints at this time. Pt is scored according to CIWA scale and receives prn Ativan. Seizure and high fall risk precautions in place for safety. Pt remains free from aspiration. Q2 hr restraint monitoring in place to prevent injury.      Problem: Knowledge Deficit - Standard  Goal: Patient and family/care givers will demonstrate understanding of plan of care, disease process/condition, diagnostic tests and medications  Outcome: Not Progressing     Problem: Safety - Medical Restraint  Goal: Free from restraint(s) (Restraint for Interference with Medical Device)  Outcome: Not Progressing    Problem: Optimal Care for Alcohol Withdrawal  Goal: Optimal Care for the alcohol withdrawal patient  Outcome: Progressing     Problem: Seizure Precautions  Goal: Implementation of seizure precautions  Outcome: Progressing     Problem: Risk for Aspiration  Goal: Patient's risk for aspiration will be absent or decrease  Outcome: Progressing     Problem: Safety - Medical Restraint  Goal: Remains free of injury from restraints (Restraint for Interference with Medical Device)  Outcome: Progressing

## 2021-12-14 LAB
ALBUMIN SERPL BCP-MCNC: 3.3 G/DL (ref 3.2–4.9)
ALBUMIN/GLOB SERPL: 1.2 G/DL
ALP SERPL-CCNC: 88 U/L (ref 30–99)
ALT SERPL-CCNC: 16 U/L (ref 2–50)
ANION GAP SERPL CALC-SCNC: 18 MMOL/L (ref 7–16)
AST SERPL-CCNC: 23 U/L (ref 12–45)
BASOPHILS # BLD AUTO: 0.4 % (ref 0–1.8)
BASOPHILS # BLD: 0.04 K/UL (ref 0–0.12)
BILIRUB SERPL-MCNC: 0.9 MG/DL (ref 0.1–1.5)
BUN SERPL-MCNC: 5 MG/DL (ref 8–22)
CALCIUM SERPL-MCNC: 8 MG/DL (ref 8.5–10.5)
CHLORIDE SERPL-SCNC: 105 MMOL/L (ref 96–112)
CO2 SERPL-SCNC: 14 MMOL/L (ref 20–33)
CREAT SERPL-MCNC: 0.55 MG/DL (ref 0.5–1.4)
EOSINOPHIL # BLD AUTO: 0.11 K/UL (ref 0–0.51)
EOSINOPHIL NFR BLD: 1.1 % (ref 0–6.9)
ERYTHROCYTE [DISTWIDTH] IN BLOOD BY AUTOMATED COUNT: 74.4 FL (ref 35.9–50)
GLOBULIN SER CALC-MCNC: 2.8 G/DL (ref 1.9–3.5)
GLUCOSE SERPL-MCNC: 74 MG/DL (ref 65–99)
HCT VFR BLD AUTO: 25.9 % (ref 37–47)
HGB BLD-MCNC: 8.2 G/DL (ref 12–16)
IMM GRANULOCYTES # BLD AUTO: 0.07 K/UL (ref 0–0.11)
IMM GRANULOCYTES NFR BLD AUTO: 0.7 % (ref 0–0.9)
LYMPHOCYTES # BLD AUTO: 1.09 K/UL (ref 1–4.8)
LYMPHOCYTES NFR BLD: 11.1 % (ref 22–41)
MCH RBC QN AUTO: 29.5 PG (ref 27–33)
MCHC RBC AUTO-ENTMCNC: 31.7 G/DL (ref 33.6–35)
MCV RBC AUTO: 93.2 FL (ref 81.4–97.8)
MONOCYTES # BLD AUTO: 0.97 K/UL (ref 0–0.85)
MONOCYTES NFR BLD AUTO: 9.9 % (ref 0–13.4)
NEUTROPHILS # BLD AUTO: 7.5 K/UL (ref 2–7.15)
NEUTROPHILS NFR BLD: 76.8 % (ref 44–72)
NRBC # BLD AUTO: 0 K/UL
NRBC BLD-RTO: 0 /100 WBC
PLATELET # BLD AUTO: 165 K/UL (ref 164–446)
PMV BLD AUTO: 11 FL (ref 9–12.9)
POTASSIUM SERPL-SCNC: 3.2 MMOL/L (ref 3.6–5.5)
PROT SERPL-MCNC: 6.1 G/DL (ref 6–8.2)
RBC # BLD AUTO: 2.78 M/UL (ref 4.2–5.4)
SODIUM SERPL-SCNC: 137 MMOL/L (ref 135–145)
WBC # BLD AUTO: 9.8 K/UL (ref 4.8–10.8)

## 2021-12-14 PROCEDURE — 80053 COMPREHEN METABOLIC PANEL: CPT

## 2021-12-14 PROCEDURE — 700111 HCHG RX REV CODE 636 W/ 250 OVERRIDE (IP): Performed by: STUDENT IN AN ORGANIZED HEALTH CARE EDUCATION/TRAINING PROGRAM

## 2021-12-14 PROCEDURE — A9270 NON-COVERED ITEM OR SERVICE: HCPCS | Performed by: HOSPITALIST

## 2021-12-14 PROCEDURE — 99232 SBSQ HOSP IP/OBS MODERATE 35: CPT | Performed by: HOSPITALIST

## 2021-12-14 PROCEDURE — 700102 HCHG RX REV CODE 250 W/ 637 OVERRIDE(OP): Performed by: STUDENT IN AN ORGANIZED HEALTH CARE EDUCATION/TRAINING PROGRAM

## 2021-12-14 PROCEDURE — A9270 NON-COVERED ITEM OR SERVICE: HCPCS | Performed by: INTERNAL MEDICINE

## 2021-12-14 PROCEDURE — 97162 PT EVAL MOD COMPLEX 30 MIN: CPT

## 2021-12-14 PROCEDURE — 85025 COMPLETE CBC W/AUTO DIFF WBC: CPT

## 2021-12-14 PROCEDURE — A9270 NON-COVERED ITEM OR SERVICE: HCPCS | Performed by: STUDENT IN AN ORGANIZED HEALTH CARE EDUCATION/TRAINING PROGRAM

## 2021-12-14 PROCEDURE — 700102 HCHG RX REV CODE 250 W/ 637 OVERRIDE(OP): Performed by: HOSPITALIST

## 2021-12-14 PROCEDURE — 700105 HCHG RX REV CODE 258: Performed by: INTERNAL MEDICINE

## 2021-12-14 PROCEDURE — 770020 HCHG ROOM/CARE - TELE (206)

## 2021-12-14 PROCEDURE — 36415 COLL VENOUS BLD VENIPUNCTURE: CPT

## 2021-12-14 PROCEDURE — 700102 HCHG RX REV CODE 250 W/ 637 OVERRIDE(OP): Performed by: INTERNAL MEDICINE

## 2021-12-14 RX ORDER — QUETIAPINE FUMARATE 25 MG/1
25 TABLET, FILM COATED ORAL NIGHTLY
Status: DISCONTINUED | OUTPATIENT
Start: 2021-12-14 | End: 2021-12-16

## 2021-12-14 RX ADMIN — CHLORDIAZEPOXIDE HYDROCHLORIDE 50 MG: 25 CAPSULE ORAL at 15:06

## 2021-12-14 RX ADMIN — LORAZEPAM 0.5 MG: 2 INJECTION INTRAMUSCULAR; INTRAVENOUS at 23:01

## 2021-12-14 RX ADMIN — LISINOPRIL 20 MG: 20 TABLET ORAL at 06:03

## 2021-12-14 RX ADMIN — SODIUM CHLORIDE: 9 INJECTION, SOLUTION INTRAVENOUS at 15:48

## 2021-12-14 RX ADMIN — LEVETIRACETAM 500 MG: 500 TABLET, FILM COATED ORAL at 17:24

## 2021-12-14 RX ADMIN — OMEPRAZOLE 20 MG: 20 CAPSULE, DELAYED RELEASE ORAL at 17:24

## 2021-12-14 RX ADMIN — PROPRANOLOL HYDROCHLORIDE 10 MG: 10 TABLET ORAL at 06:03

## 2021-12-14 RX ADMIN — OMEPRAZOLE 20 MG: 20 CAPSULE, DELAYED RELEASE ORAL at 06:02

## 2021-12-14 RX ADMIN — LORAZEPAM 0.5 MG: 0.5 TABLET ORAL at 21:24

## 2021-12-14 RX ADMIN — QUETIAPINE FUMARATE 25 MG: 25 TABLET ORAL at 21:24

## 2021-12-14 RX ADMIN — PROPRANOLOL HYDROCHLORIDE 10 MG: 10 TABLET ORAL at 23:02

## 2021-12-14 RX ADMIN — LORAZEPAM 0.5 MG: 0.5 TABLET ORAL at 04:26

## 2021-12-14 RX ADMIN — ENOXAPARIN SODIUM 40 MG: 40 INJECTION SUBCUTANEOUS at 06:00

## 2021-12-14 RX ADMIN — LEVETIRACETAM 500 MG: 500 TABLET, FILM COATED ORAL at 06:02

## 2021-12-14 RX ADMIN — HYDROCHLOROTHIAZIDE 12.5 MG: 25 TABLET ORAL at 06:03

## 2021-12-14 RX ADMIN — PROPRANOLOL HYDROCHLORIDE 10 MG: 10 TABLET ORAL at 15:06

## 2021-12-14 ASSESSMENT — ENCOUNTER SYMPTOMS
ABDOMINAL PAIN: 0
VOMITING: 0
COUGH: 0
BRUISES/BLEEDS EASILY: 0
FEVER: 0
TREMORS: 1
PALPITATIONS: 0
WEAKNESS: 1
NECK PAIN: 0
DIZZINESS: 0
NAUSEA: 0
DOUBLE VISION: 0
SHORTNESS OF BREATH: 0
HEADACHES: 0
DEPRESSION: 0
SEIZURES: 1

## 2021-12-14 ASSESSMENT — LIFESTYLE VARIABLES
TOTAL SCORE: 3
HEADACHE, FULLNESS IN HEAD: NOT PRESENT
NAUSEA AND VOMITING: MILD NAUSEA WITH NO VOMITING
TREMOR: TREMOR NOT VISIBLE BUT CAN BE FELT, FINGERTIP TO FINGERTIP
AGITATION: NORMAL ACTIVITY
TOTAL SCORE: 5
AUDITORY DISTURBANCES: NOT PRESENT
VISUAL DISTURBANCES: NOT PRESENT
ANXIETY: NO ANXIETY (AT EASE)
HEADACHE, FULLNESS IN HEAD: NOT PRESENT
ANXIETY: NO ANXIETY (AT EASE)
NAUSEA AND VOMITING: NO NAUSEA AND NO VOMITING
ANXIETY: NO ANXIETY (AT EASE)
AUDITORY DISTURBANCES: NOT PRESENT
AGITATION: NORMAL ACTIVITY
TREMOR: *
AUDITORY DISTURBANCES: NOT PRESENT
ANXIETY: NO ANXIETY (AT EASE)
AGITATION: NORMAL ACTIVITY
TOTAL SCORE: 9
TOTAL SCORE: 2
TREMOR: *
TOTAL SCORE: 3
SUBSTANCE_ABUSE: 1
TREMOR: *
AUDITORY DISTURBANCES: NOT PRESENT
AUDITORY DISTURBANCES: NOT PRESENT
VISUAL DISTURBANCES: NOT PRESENT
ANXIETY: MILDLY ANXIOUS
VISUAL DISTURBANCES: NOT PRESENT
ORIENTATION AND CLOUDING OF SENSORIUM: ORIENTED AND CAN DO SERIAL ADDITIONS
AGITATION: NORMAL ACTIVITY
PAROXYSMAL SWEATS: NO SWEAT VISIBLE
NAUSEA AND VOMITING: NO NAUSEA AND NO VOMITING
PAROXYSMAL SWEATS: NO SWEAT VISIBLE
ORIENTATION AND CLOUDING OF SENSORIUM: ORIENTED AND CAN DO SERIAL ADDITIONS
PAROXYSMAL SWEATS: NO SWEAT VISIBLE
ORIENTATION AND CLOUDING OF SENSORIUM: DISORIENTED FOR PLACE AND / OR PERSON
TREMOR: *
VISUAL DISTURBANCES: NOT PRESENT
ORIENTATION AND CLOUDING OF SENSORIUM: ORIENTED AND CAN DO SERIAL ADDITIONS
PAROXYSMAL SWEATS: NO SWEAT VISIBLE
NAUSEA AND VOMITING: NO NAUSEA AND NO VOMITING
ANXIETY: NO ANXIETY (AT EASE)
ORIENTATION AND CLOUDING OF SENSORIUM: DISORIENTED FOR PLACE AND / OR PERSON
HEADACHE, FULLNESS IN HEAD: NOT PRESENT
HEADACHE, FULLNESS IN HEAD: NOT PRESENT
HEADACHE, FULLNESS IN HEAD: VERY MILD
AGITATION: NORMAL ACTIVITY
VISUAL DISTURBANCES: MODERATELY SEVERE HALLUCINATIONS
PAROXYSMAL SWEATS: NO SWEAT VISIBLE
PAROXYSMAL SWEATS: NO SWEAT VISIBLE
TREMOR: *
HEADACHE, FULLNESS IN HEAD: NOT PRESENT
TOTAL SCORE: 3
VISUAL DISTURBANCES: NOT PRESENT
AGITATION: NORMAL ACTIVITY
PAROXYSMAL SWEATS: NO SWEAT VISIBLE
ORIENTATION AND CLOUDING OF SENSORIUM: ORIENTED AND CAN DO SERIAL ADDITIONS
ANXIETY: NO ANXIETY (AT EASE)
NAUSEA AND VOMITING: MILD NAUSEA WITH NO VOMITING
NAUSEA AND VOMITING: NO NAUSEA AND NO VOMITING
AGITATION: NORMAL ACTIVITY
AUDITORY DISTURBANCES: NOT PRESENT
TREMOR: NO TREMOR
NAUSEA AND VOMITING: NO NAUSEA AND NO VOMITING
TOTAL SCORE: 6
AUDITORY DISTURBANCES: NOT PRESENT
VISUAL DISTURBANCES: NOT PRESENT
HEADACHE, FULLNESS IN HEAD: NOT PRESENT
ORIENTATION AND CLOUDING OF SENSORIUM: ORIENTED AND CAN DO SERIAL ADDITIONS

## 2021-12-14 ASSESSMENT — GAIT ASSESSMENTS: GAIT LEVEL OF ASSIST: UNABLE TO PARTICIPATE

## 2021-12-14 ASSESSMENT — PAIN DESCRIPTION - PAIN TYPE: TYPE: ACUTE PAIN

## 2021-12-14 ASSESSMENT — COGNITIVE AND FUNCTIONAL STATUS - GENERAL
MOVING TO AND FROM BED TO CHAIR: UNABLE
STANDING UP FROM CHAIR USING ARMS: A LOT
MOBILITY SCORE: 8
WALKING IN HOSPITAL ROOM: TOTAL
CLIMB 3 TO 5 STEPS WITH RAILING: TOTAL
SUGGESTED CMS G CODE MODIFIER MOBILITY: CM
MOVING FROM LYING ON BACK TO SITTING ON SIDE OF FLAT BED: UNABLE
TURNING FROM BACK TO SIDE WHILE IN FLAT BAD: A LOT

## 2021-12-14 NOTE — THERAPY
Physical Therapy   Initial Evaluation     Patient Name: Roro Leon  Age:  66 y.o., Sex:  female  Medical Record #: [id number]  Today's Date: 12/14/2021     Precautions  Precautions: Fall Risk    Assessment  Patient is 66 y.o. female who presented acutely c/o generalized malaise and weakness worsening for 3 days.  Patient was previously admitted in June and was DC'd to SNF however reported difficulty ambulating and weakness after returning home from SNF.  Patient admitted for dehydration, tremors, ETOH abuse, seizures, and essential HTN.  Today patient presented with decreased initiation and gross weakness, required max A for supine <> sit.  Attempted STS x 3 with FWW and max A however patient unable to achieve standing.  Recommend continued acute PT and post acute placement to address impairments in strength, balance, coordination, initiation, sequencing, and safety with functional mobility.      Plan    Recommend Physical Therapy 3 times per week until therapy goals are met for the following treatments:  Bed Mobility, Equipment, Gait Training, Neuro Re-Education / Balance, Self Care/Home Evaluation, Stair Training, Therapeutic Activities and Therapeutic Exercises    DC Equipment Recommendations: Unable to determine at this time  Discharge Recommendations: Recommend post-acute placement for additional physical therapy services prior to discharge home       Objective     12/14/21 1525   Prior Living Situation   Prior Services Skilled Home Health Services   Housing / Facility 1 Story Apartment / Condo   Steps Into Home 0   Equipment Owned Front-Wheel Walker   Lives with - Patient's Self Care Capacity Alone and Able to Care For Self   Comments Pt reports friends assist with laundry, uses [employer] for groceries.  Does not drive due to seizures   Prior Level of Functional Mobility   Bed Mobility Independent   Transfer Status Independent   Ambulation Independent   Assistive Devices Used Front-Wheel Walker   Stairs  Independent   Cognition    Cognition / Consciousness X   Level of Consciousness Alert   Ability To Follow Commands 1 Step   Sequencing Impaired   Initiation Impaired   Comments Hypophonic, pleasant & cooperative   Active ROM Lower Body    Active ROM Lower Body  X   Comments limited B terminal knee extension   Strength Lower Body   Lower Body Strength  X   Comments B LE grossly 2/5   Sensation Lower Body   Lower Extremity Sensation   X   Comments Reported L lower leg tingling   Balance Assessment   Sitting Balance (Static) Poor +   Sitting Balance (Dynamic) Poor   Standing Balance (Static) Trace +   Standing Balance (Dynamic) Trace   Weight Shift Sitting Poor   Weight Shift Standing Poor   Comments w/ FWW   Gait Analysis   Gait Level Of Assist Unable to Participate   Bed Mobility    Supine to Sit Maximal Assist   Sit to Supine Maximal Assist   Scooting Maximal Assist   Rolling Maximum Assist to Lt.   Comments HOB elevated   Functional Mobility   Sit to Stand Maximal Assist   Bed, Chair, Wheelchair Transfer Unable to Participate   Mobility EOB, STS attempt x 3   Comments Unable to achieve full standing x 3 attempts   Activity Tolerance   Sitting in Chair NT   Sitting Edge of Bed 8 min   Standing Unable   Comments limited by weakness, lethargy   Short Term Goals    Short Term Goal # 1 Pt will perform supine <> sit with min A within 6 visits in order to progress functional mobility   Short Term Goal # 2 Pt will perform STS with FWW and min A within 6 visits in order to progress OOB activity   Short Term Goal # 3 Pt will perform functional transfers with FWW and mod A within 6 visits in order to progress OOB activity   Session Information   Date / Session Number  12/14 - 1 (1/3, 12/20)

## 2021-12-14 NOTE — PROGRESS NOTES
Bedside report received from night nurse and care of patient assumed. Patient without signs of distress at this time. Patient asleep with bilateral wrist restraints in place and call light within reach. Side rails up x2 and bed in low, locked position. Will monitor.

## 2021-12-14 NOTE — CARE PLAN
The patient is Watcher - Medium risk of patient condition declining or worsening    Shift Goals  Clinical Goals: Decreased withdrawal sx, safety  Patient Goals: ROSIO  Family Goals: ROSIO    Progress made toward(s) clinical / shift goals:    Problem: Safety - Medical Restraint  Goal: Remains free of injury from restraints (Restraint for Interference with Medical Device)  Outcome: Progressing       Patient is not progressing towards the following goals:  Problem: Knowledge Deficit - Standard  Goal: Patient and family/care givers will demonstrate understanding of plan of care, disease process/condition, diagnostic tests and medications  Outcome: Not Progressing  Pt A&Ox2, lethargic. Not responsive to education, no learning evidenced.

## 2021-12-14 NOTE — CARE PLAN
The patient is Watcher - Medium risk of patient condition declining or worsening    Shift Goals  Clinical Goals: Decrease CIWA, VSS, improved mentation  Patient Goals: ROSIO  Family Goals: ROSIO    Progress made toward(s) clinical / shift goals: Pt A&Ox2. Education reinforcement needed. CIWA scoring protocol in place. VSS. Bilateral soft wrist restraints in place. Seizure precautions in place. High fall risk precautions in place. Pt encouraged to turn to prevent skin breakdown. Pt free from injury during stay.       Problem: Knowledge Deficit - Standard  Goal: Patient and family/care givers will demonstrate understanding of plan of care, disease process/condition, diagnostic tests and medications  Outcome: Not Progressing       Problem: Safety - Medical Restraint  Goal: Remains free of injury from restraints (Restraint for Interference with Medical Device)  Outcome: Progressing  Goal: Free from restraint(s) (Restraint for Interference with Medical Device)  Outcome: Not Progressing     Problem: Safety - Medical Restraint  Goal: Free from restraint(s) (Restraint for Interference with Medical Device)  Outcome: Not Progressing     Problem: Optimal Care for Alcohol Withdrawal  Goal: Optimal Care for the alcohol withdrawal patient  Outcome: Progressing     Problem: Seizure Precautions  Goal: Implementation of seizure precautions  Outcome: Progressing     Problem: Psychosocial  Goal: Patient's level of anxiety will decrease  Outcome: Progressing     Problem: Fall Risk  Goal: Patient will remain free from falls  Outcome: Progressing

## 2021-12-14 NOTE — DISCHARGE PLANNING
Anticipated Discharge Disposition: SNF    Action: Patient discussed during IDT rounds.  Patient will need SNF placement.  Patient is currently confused and in restraints.  SNF referrals will be sent to all [facility] SNFs.  Clinical review choice form faxed to PRAVIN MATHIAS.    Barriers to Discharge: Placement     Plan: Case coordination to continue to follow up with medical team to discuss discharge barriers.

## 2021-12-14 NOTE — DISCHARGE PLANNING
Received Choice form at 1219  Agency/Facility Name: [facility]   Referral sent per Choice form @ [phone]

## 2021-12-14 NOTE — PROGRESS NOTES
Bedside report received and patient care assumed. Pt is resting in bed, A&Ox2 disoriented to place and situation, with no complaints of pain, and is on RA. No evidence of distress.Tele box on. CIWA scoring protocol in place. Bilateral soft wrist restraints in place. High fall risk precautions are in place. Pt was updated on POC, no questions or concerns. Pt educated on use of call light for assistance. Will continue to monitor CIWA and safety.

## 2021-12-14 NOTE — CARE PLAN
The patient is Watcher - Medium risk of patient condition declining or worsening    Shift Goals  Clinical Goals: Monitor CIWA, labs, VS  Patient Goals: Rest, comfort  Family Goals: n/a    Progress made toward(s) clinical / shift goals:      Problem: Knowledge Deficit - Standard  Goal: Patient and family/care givers will demonstrate understanding of plan of care, disease process/condition, diagnostic tests and medications  Outcome: Progressing     Problem: Optimal Care for Alcohol Withdrawal  Goal: Optimal Care for the alcohol withdrawal patient  Outcome: Progressing     Problem: Pain - Standard  Goal: Alleviation of pain or a reduction in pain to the patient’s comfort goal  Outcome: Progressing     Problem: Fall Risk  Goal: Patient will remain free from falls  Outcome: Progressing     Problem: Safety - Medical Restraint  Goal: Remains free of injury from restraints (Restraint for Interference with Medical Device)  Outcome: Progressing  Goal: Free from restraint(s) (Restraint for Interference with Medical Device)  Outcome: Progressing       Patient is not progressing towards the following goals:

## 2021-12-15 ENCOUNTER — APPOINTMENT (OUTPATIENT)
Dept: RADIOLOGY | Facility: MEDICAL CENTER | Age: 66
DRG: 897 | End: 2021-12-15
Attending: HOSPITALIST
Payer: MEDICARE

## 2021-12-15 PROBLEM — E87.6 HYPOKALEMIA: Status: ACTIVE | Noted: 2021-12-15

## 2021-12-15 PROBLEM — R47.81 SLURRED SPEECH: Status: ACTIVE | Noted: 2021-12-15

## 2021-12-15 PROBLEM — M25.532 WRIST PAIN, ACUTE, LEFT: Status: ACTIVE | Noted: 2021-12-15

## 2021-12-15 LAB
AMMONIA PLAS-SCNC: 28 UMOL/L (ref 11–45)
ANION GAP SERPL CALC-SCNC: 17 MMOL/L (ref 7–16)
BACTERIA BLD CULT: NORMAL
BACTERIA BLD CULT: NORMAL
BASOPHILS # BLD AUTO: 0.2 % (ref 0–1.8)
BASOPHILS # BLD: 0.02 K/UL (ref 0–0.12)
BUN SERPL-MCNC: 6 MG/DL (ref 8–22)
CALCIUM SERPL-MCNC: 8 MG/DL (ref 8.5–10.5)
CHLORIDE SERPL-SCNC: 107 MMOL/L (ref 96–112)
CO2 SERPL-SCNC: 15 MMOL/L (ref 20–33)
CREAT SERPL-MCNC: 0.64 MG/DL (ref 0.5–1.4)
EOSINOPHIL # BLD AUTO: 0.02 K/UL (ref 0–0.51)
EOSINOPHIL NFR BLD: 0.2 % (ref 0–6.9)
ERYTHROCYTE [DISTWIDTH] IN BLOOD BY AUTOMATED COUNT: 74.8 FL (ref 35.9–50)
ERYTHROCYTE [SEDIMENTATION RATE] IN BLOOD BY WESTERGREN METHOD: 18 MM/HOUR (ref 0–25)
GLUCOSE SERPL-MCNC: 103 MG/DL (ref 65–99)
HCT VFR BLD AUTO: 25.9 % (ref 37–47)
HGB BLD-MCNC: 8.1 G/DL (ref 12–16)
IMM GRANULOCYTES # BLD AUTO: 0.08 K/UL (ref 0–0.11)
IMM GRANULOCYTES NFR BLD AUTO: 0.8 % (ref 0–0.9)
LYMPHOCYTES # BLD AUTO: 1.34 K/UL (ref 1–4.8)
LYMPHOCYTES NFR BLD: 12.7 % (ref 22–41)
MCH RBC QN AUTO: 29.9 PG (ref 27–33)
MCHC RBC AUTO-ENTMCNC: 31.3 G/DL (ref 33.6–35)
MCV RBC AUTO: 95.6 FL (ref 81.4–97.8)
MONOCYTES # BLD AUTO: 1.48 K/UL (ref 0–0.85)
MONOCYTES NFR BLD AUTO: 14 % (ref 0–13.4)
MORPHOLOGY BLD-IMP: NORMAL
NEUTROPHILS # BLD AUTO: 7.61 K/UL (ref 2–7.15)
NEUTROPHILS NFR BLD: 72.1 % (ref 44–72)
NRBC # BLD AUTO: 0 K/UL
NRBC BLD-RTO: 0 /100 WBC
PLATELET # BLD AUTO: 161 K/UL (ref 164–446)
PMV BLD AUTO: 11.1 FL (ref 9–12.9)
POTASSIUM SERPL-SCNC: 3.1 MMOL/L (ref 3.6–5.5)
RBC # BLD AUTO: 2.71 M/UL (ref 4.2–5.4)
SIGNIFICANT IND 70042: NORMAL
SIGNIFICANT IND 70042: NORMAL
SITE SITE: NORMAL
SITE SITE: NORMAL
SODIUM SERPL-SCNC: 139 MMOL/L (ref 135–145)
SOURCE SOURCE: NORMAL
SOURCE SOURCE: NORMAL
URATE SERPL-MCNC: 8.7 MG/DL (ref 1.9–8.2)
WBC # BLD AUTO: 10.6 K/UL (ref 4.8–10.8)

## 2021-12-15 PROCEDURE — 700102 HCHG RX REV CODE 250 W/ 637 OVERRIDE(OP): Performed by: STUDENT IN AN ORGANIZED HEALTH CARE EDUCATION/TRAINING PROGRAM

## 2021-12-15 PROCEDURE — 82140 ASSAY OF AMMONIA: CPT

## 2021-12-15 PROCEDURE — 99232 SBSQ HOSP IP/OBS MODERATE 35: CPT | Performed by: HOSPITALIST

## 2021-12-15 PROCEDURE — 73110 X-RAY EXAM OF WRIST: CPT | Mod: LT

## 2021-12-15 PROCEDURE — 84425 ASSAY OF VITAMIN B-1: CPT

## 2021-12-15 PROCEDURE — 70450 CT HEAD/BRAIN W/O DYE: CPT | Mod: ME

## 2021-12-15 PROCEDURE — 700102 HCHG RX REV CODE 250 W/ 637 OVERRIDE(OP): Performed by: HOSPITALIST

## 2021-12-15 PROCEDURE — 700102 HCHG RX REV CODE 250 W/ 637 OVERRIDE(OP): Performed by: INTERNAL MEDICINE

## 2021-12-15 PROCEDURE — 85025 COMPLETE CBC W/AUTO DIFF WBC: CPT

## 2021-12-15 PROCEDURE — A9270 NON-COVERED ITEM OR SERVICE: HCPCS | Performed by: STUDENT IN AN ORGANIZED HEALTH CARE EDUCATION/TRAINING PROGRAM

## 2021-12-15 PROCEDURE — 36415 COLL VENOUS BLD VENIPUNCTURE: CPT

## 2021-12-15 PROCEDURE — A9270 NON-COVERED ITEM OR SERVICE: HCPCS | Performed by: HOSPITALIST

## 2021-12-15 PROCEDURE — 97530 THERAPEUTIC ACTIVITIES: CPT

## 2021-12-15 PROCEDURE — 97166 OT EVAL MOD COMPLEX 45 MIN: CPT

## 2021-12-15 PROCEDURE — 770020 HCHG ROOM/CARE - TELE (206)

## 2021-12-15 PROCEDURE — 85652 RBC SED RATE AUTOMATED: CPT

## 2021-12-15 PROCEDURE — A9270 NON-COVERED ITEM OR SERVICE: HCPCS | Performed by: INTERNAL MEDICINE

## 2021-12-15 PROCEDURE — 700111 HCHG RX REV CODE 636 W/ 250 OVERRIDE (IP): Performed by: STUDENT IN AN ORGANIZED HEALTH CARE EDUCATION/TRAINING PROGRAM

## 2021-12-15 PROCEDURE — 84550 ASSAY OF BLOOD/URIC ACID: CPT

## 2021-12-15 PROCEDURE — 80048 BASIC METABOLIC PNL TOTAL CA: CPT

## 2021-12-15 RX ORDER — POTASSIUM CHLORIDE 20 MEQ/1
40 TABLET, EXTENDED RELEASE ORAL DAILY
Status: DISCONTINUED | OUTPATIENT
Start: 2021-12-15 | End: 2021-12-18

## 2021-12-15 RX ORDER — POTASSIUM CHLORIDE 7.45 MG/ML
10 INJECTION INTRAVENOUS
Status: COMPLETED | OUTPATIENT
Start: 2021-12-15 | End: 2021-12-15

## 2021-12-15 RX ORDER — COLCHICINE 0.6 MG/1
0.6 TABLET ORAL 3 TIMES DAILY
Status: DISCONTINUED | OUTPATIENT
Start: 2021-12-15 | End: 2021-12-16

## 2021-12-15 RX ORDER — POTASSIUM CHLORIDE 20 MEQ/1
40 TABLET, EXTENDED RELEASE ORAL ONCE
Status: COMPLETED | OUTPATIENT
Start: 2021-12-15 | End: 2021-12-15

## 2021-12-15 RX ADMIN — LISINOPRIL 20 MG: 20 TABLET ORAL at 06:23

## 2021-12-15 RX ADMIN — LEVETIRACETAM 500 MG: 500 TABLET, FILM COATED ORAL at 16:29

## 2021-12-15 RX ADMIN — COLCHICINE 0.6 MG: 0.6 TABLET, FILM COATED ORAL at 15:34

## 2021-12-15 RX ADMIN — OMEPRAZOLE 20 MG: 20 CAPSULE, DELAYED RELEASE ORAL at 06:22

## 2021-12-15 RX ADMIN — POTASSIUM CHLORIDE 40 MEQ: 20 TABLET, EXTENDED RELEASE ORAL at 12:00

## 2021-12-15 RX ADMIN — PROPRANOLOL HYDROCHLORIDE 10 MG: 10 TABLET ORAL at 15:34

## 2021-12-15 RX ADMIN — PROPRANOLOL HYDROCHLORIDE 10 MG: 10 TABLET ORAL at 06:24

## 2021-12-15 RX ADMIN — QUETIAPINE FUMARATE 25 MG: 25 TABLET ORAL at 20:29

## 2021-12-15 RX ADMIN — ACETAMINOPHEN 650 MG: 325 TABLET, FILM COATED ORAL at 16:30

## 2021-12-15 RX ADMIN — HYDROCHLOROTHIAZIDE 12.5 MG: 25 TABLET ORAL at 06:23

## 2021-12-15 RX ADMIN — PROPRANOLOL HYDROCHLORIDE 10 MG: 10 TABLET ORAL at 22:03

## 2021-12-15 RX ADMIN — POTASSIUM CHLORIDE 10 MEQ: 7.46 INJECTION, SOLUTION INTRAVENOUS at 07:36

## 2021-12-15 RX ADMIN — COLCHICINE 0.6 MG: 0.6 TABLET, FILM COATED ORAL at 20:29

## 2021-12-15 RX ADMIN — POTASSIUM CHLORIDE 40 MEQ: 1500 TABLET, EXTENDED RELEASE ORAL at 06:22

## 2021-12-15 RX ADMIN — OMEPRAZOLE 20 MG: 20 CAPSULE, DELAYED RELEASE ORAL at 16:28

## 2021-12-15 RX ADMIN — LEVETIRACETAM 500 MG: 500 TABLET, FILM COATED ORAL at 06:21

## 2021-12-15 RX ADMIN — POTASSIUM CHLORIDE 10 MEQ: 7.46 INJECTION, SOLUTION INTRAVENOUS at 05:40

## 2021-12-15 RX ADMIN — ENOXAPARIN SODIUM 40 MG: 40 INJECTION SUBCUTANEOUS at 06:21

## 2021-12-15 RX ADMIN — LORAZEPAM 1 MG: 1 TABLET ORAL at 16:29

## 2021-12-15 ASSESSMENT — PAIN DESCRIPTION - PAIN TYPE
TYPE: ACUTE PAIN

## 2021-12-15 ASSESSMENT — LIFESTYLE VARIABLES
ORIENTATION AND CLOUDING OF SENSORIUM: DATE DISORIENTATION BY MORE THAN TWO CALENDAR DAYS
NAUSEA AND VOMITING: MILD NAUSEA WITH NO VOMITING
ORIENTATION AND CLOUDING OF SENSORIUM: DATE DISORIENTATION BY MORE THAN TWO CALENDAR DAYS
CONSUMPTION TOTAL: POSITIVE
TREMOR: *
ON A TYPICAL DAY WHEN YOU DRINK ALCOHOL HOW MANY DRINKS DO YOU HAVE: 3
HEADACHE, FULLNESS IN HEAD: NOT PRESENT
AUDITORY DISTURBANCES: NOT PRESENT
ORIENTATION AND CLOUDING OF SENSORIUM: DATE DISORIENTATION BY MORE THAN TWO CALENDAR DAYS
PAROXYSMAL SWEATS: NO SWEAT VISIBLE
HOW MANY TIMES IN THE PAST YEAR HAVE YOU HAD 5 OR MORE DRINKS IN A DAY: 10
AUDITORY DISTURBANCES: NOT PRESENT
TOTAL SCORE: 4
TOTAL SCORE: 4
HAVE YOU EVER FELT YOU SHOULD CUT DOWN ON YOUR DRINKING: YES
ANXIETY: NO ANXIETY (AT EASE)
ANXIETY: MILDLY ANXIOUS
PAROXYSMAL SWEATS: NO SWEAT VISIBLE
NAUSEA AND VOMITING: NO NAUSEA AND NO VOMITING
TREMOR: *
PAROXYSMAL SWEATS: NO SWEAT VISIBLE
PAROXYSMAL SWEATS: NO SWEAT VISIBLE
AUDITORY DISTURBANCES: NOT PRESENT
TOTAL SCORE: 4
SUBSTANCE_ABUSE: 1
TREMOR: NO TREMOR
TOTAL SCORE: 5
VISUAL DISTURBANCES: NOT PRESENT
TOTAL SCORE: 4
AVERAGE NUMBER OF DAYS PER WEEK YOU HAVE A DRINK CONTAINING ALCOHOL: 7
ANXIETY: NO ANXIETY (AT EASE)
AGITATION: NORMAL ACTIVITY
ORIENTATION AND CLOUDING OF SENSORIUM: DISORIENTED FOR PLACE AND / OR PERSON
VISUAL DISTURBANCES: NOT PRESENT
TREMOR: *
DOES PATIENT WANT TO STOP DRINKING: NO
EVER FELT BAD OR GUILTY ABOUT YOUR DRINKING: YES
AGITATION: NORMAL ACTIVITY
TOTAL SCORE: 9
EVER HAD A DRINK FIRST THING IN THE MORNING TO STEADY YOUR NERVES TO GET RID OF A HANGOVER: YES
ANXIETY: NO ANXIETY (AT EASE)
HEADACHE, FULLNESS IN HEAD: NOT PRESENT
NAUSEA AND VOMITING: MILD NAUSEA WITH NO VOMITING
TOTAL SCORE: 10
HEADACHE, FULLNESS IN HEAD: NOT PRESENT
DO YOU DRINK ALCOHOL: YES
HAVE PEOPLE ANNOYED YOU BY CRITICIZING YOUR DRINKING: YES
VISUAL DISTURBANCES: NOT PRESENT
VISUAL DISTURBANCES: NOT PRESENT
AGITATION: NORMAL ACTIVITY
AGITATION: NORMAL ACTIVITY
NAUSEA AND VOMITING: NO NAUSEA AND NO VOMITING
HEADACHE, FULLNESS IN HEAD: NOT PRESENT
AUDITORY DISTURBANCES: NOT PRESENT

## 2021-12-15 ASSESSMENT — ENCOUNTER SYMPTOMS
BRUISES/BLEEDS EASILY: 0
HEADACHES: 0
ABDOMINAL PAIN: 0
PALPITATIONS: 0
NECK PAIN: 0
SEIZURES: 1
FEVER: 0
DEPRESSION: 0
TREMORS: 1
VOMITING: 0
COUGH: 0
SHORTNESS OF BREATH: 0
WEAKNESS: 1
DIZZINESS: 0
DOUBLE VISION: 0
NAUSEA: 0

## 2021-12-15 ASSESSMENT — COGNITIVE AND FUNCTIONAL STATUS - GENERAL
HELP NEEDED FOR BATHING: A LOT
DAILY ACTIVITIY SCORE: 12
DRESSING REGULAR UPPER BODY CLOTHING: A LOT
STANDING UP FROM CHAIR USING ARMS: A LOT
SUGGESTED CMS G CODE MODIFIER DAILY ACTIVITY: CL
WALKING IN HOSPITAL ROOM: TOTAL
CLIMB 3 TO 5 STEPS WITH RAILING: TOTAL
PERSONAL GROOMING: A LOT
EATING MEALS: A LOT
SUGGESTED CMS G CODE MODIFIER MOBILITY: CM
TOILETING: A LOT
MOVING TO AND FROM BED TO CHAIR: UNABLE
DRESSING REGULAR LOWER BODY CLOTHING: A LOT
MOVING FROM LYING ON BACK TO SITTING ON SIDE OF FLAT BED: UNABLE
TURNING FROM BACK TO SIDE WHILE IN FLAT BAD: UNABLE
MOBILITY SCORE: 7

## 2021-12-15 ASSESSMENT — GAIT ASSESSMENTS: GAIT LEVEL OF ASSIST: UNABLE TO PARTICIPATE

## 2021-12-15 NOTE — PROGRESS NOTES
Bedside report received and patient care assumed. Pt is resting in bed, A&OX2 disoriented to place and time, with no complaints of pain, and is on RA. CIWA scoring protocol in place.Tele box on. All high fall risk precautions are in place, belongings at bedside table.  Pt was updated on POC and educated on use of call light for assistance. Will continue to monitor CIWA and safety.

## 2021-12-15 NOTE — PROGRESS NOTES
Hospital Medicine Daily Progress Note    Date of Service  12/14/2021    Chief Complaint  Roro Leon is a 66 y.o. female admitted 12/10/2021 with weakness    Hospital Course  Roro Leon is a 66-year-old female with a past medical history of YEISON, EtOH abuse, hypertension, seizures on Keppra (last seizure June 2021), and subdural hematoma who presented to the ED on 12/10/2021 with complaints of weakness and generalized malaise that have been worsening over the last 3 days.  She had been previously admitted for sepsis secondary to UTI in June and was discharged to [facility] for rehab.  She was discharged from the rehab in August however she reports since then she has not been able to ambulate well and has been feeling weak.  The months ago, she started noticing continuous bilateral hand tremors, right worse than left, with accompanying left leg intermittent tremors.  She did see her PCP who referred her to a neurologist, however she has not yet been seen for that appointment.  She endorses a significant history of alcohol abuse.    In the ED, patient was found to be dehydrated with resting and action tremors of bilateral hands.  Alcohol level was significantly elevated, TSH low normal, lactic acid 3.8, no leukocytosis, chest x-ray negative, and UA negative.  Patient was admitted to the observation unit for further work-up and management.    Neurology was consulted 12/11/2021 and recommend referral to outpatient neurology clinic.  Patient remains on normal saline infusion and lactic acid is being down.  She is being monitored for alcohol withdrawal.  Patient is not noted to be chronically anemic, iron studies were ordered which demonstrate anemia of chronic disease and a high vitamin B12 level.  Stool was negative for occult blood.    Interval Problem Update  12/14:  Patient in soft wrist restraints qhs only due to agitation.  CIWA scores 3. Sleeping soundly on exam.  Dc scheduled librium. On gabapentin as  well.  Monitor for need for higher level of care.  Continue home keppra for history of seizures.  PT/OT evaluation pending.      Consultants/Specialty  neurology    Code Status  Full Code    Disposition  Patient is not medically cleared.   Anticipate discharge to to skilled nursing facility vs home.  I have placed the appropriate orders for post-discharge needs.    Review of Systems  Review of Systems   Constitutional: Positive for malaise/fatigue. Negative for fever.   HENT: Negative for hearing loss.    Eyes: Negative for double vision.   Respiratory: Negative for cough and shortness of breath.    Cardiovascular: Negative for chest pain, palpitations and leg swelling.   Gastrointestinal: Negative for abdominal pain, nausea and vomiting.   Genitourinary: Negative for dysuria and urgency.   Musculoskeletal: Negative for neck pain.   Skin: Negative for rash.   Neurological: Positive for tremors, seizures and weakness. Negative for dizziness and headaches.   Endo/Heme/Allergies: Does not bruise/bleed easily.   Psychiatric/Behavioral: Positive for substance abuse. Negative for depression.   All other systems reviewed and are negative.       Physical Exam  Temp:  [36.5 °C (97.7 °F)-37.1 °C (98.7 °F)] 36.9 °C (98.4 °F)  Pulse:  [74-90] 86  Resp:  [15-16] 16  BP: (112-152)/(60-97) 152/94  SpO2:  [98 %-99 %] 98 %    Physical Exam  Vitals and nursing note reviewed.   Constitutional:       Appearance: She is ill-appearing.   HENT:      Head: Normocephalic.      Mouth/Throat:      Mouth: Mucous membranes are moist.      Pharynx: Oropharynx is clear.   Cardiovascular:      Rate and Rhythm: Normal rate and regular rhythm.      Pulses: Normal pulses.      Heart sounds: Normal heart sounds.   Pulmonary:      Effort: Pulmonary effort is normal.      Breath sounds: Normal breath sounds.   Abdominal:      General: Abdomen is flat. Bowel sounds are normal.      Palpations: Abdomen is soft.   Musculoskeletal:         General: Normal  range of motion.   Skin:     General: Skin is warm and dry.      Capillary Refill: Capillary refill takes less than 2 seconds.   Neurological:      Mental Status: She is alert and oriented to person, place, and time.      Sensory: Sensation is intact.      Motor: Weakness and tremor present. No pronator drift.         Fluids    Intake/Output Summary (Last 24 hours) at 12/14/2021 2012  Last data filed at 12/14/2021 1533  Gross per 24 hour   Intake 6983.2 ml   Output 875 ml   Net 6108.2 ml       Laboratory  Recent Labs     12/12/21  0321 12/14/21  1223   WBC 7.4 9.8   RBC 2.77* 2.78*   HEMOGLOBIN 8.2* 8.2*   HEMATOCRIT 24.5* 25.9*   MCV 88.4 93.2   MCH 29.6 29.5   MCHC 33.5* 31.7*   RDW 72.8* 74.4*   PLATELETCT 195 165   MPV 10.2 11.0     Recent Labs     12/12/21  0321 12/14/21  1223   SODIUM 137 137   POTASSIUM 3.9 3.2*   CHLORIDE 105 105   CO2 18* 14*   GLUCOSE 95 74   BUN 9 5*   CREATININE 0.53 0.55   CALCIUM 8.4* 8.0*                   Imaging  DX-CHEST-PORTABLE (1 VIEW)   Final Result      No acute cardiopulmonary abnormality.              Assessment/Plan  * Dehydration- (present on admission)  Assessment & Plan  Lactic acid level trending down  -Continue IV fluids     Alcohol withdrawal delirium, acute, hyperactive (HCC)- (present on admission)  Assessment & Plan  Asleep on exam 12/14, cut back sedatives.    Mixed action and resting tremor- (present on admission)  Assessment & Plan  Unclear etiology, difficult to determine if secondary to alcohol abuse versus neurologic abnormality  Neurology Dr. Khan consulted, appreciate recommendations  TSH low normal  -Neurology placed referral for outpatient neurology clinic  -Propanolol started in the ED  -Monitor    Alcohol withdrawal (HCC)- (present on admission)  Assessment & Plan  Reports that she drinks 3 to 4 glasses of wine a night  Difficult to ascertain if tremor is due to alcohol abuse versus neurological abnormality  -CIWA scores 3.  dc scheduled librium and  limit sedatives given her sleeping on exam 12/14.  Will likely dc CIWA protocol in a.m.    Anemia of chronic disease- (present on admission)  Assessment & Plan  H/H 8.5/25.8 this admission  Per chart review, chronically low  Iron studies support anemia of chronic disease  B12 level high, folate level normal, iron level high  -Monitor    Seizure (HCC)- (present on admission)  Assessment & Plan  History of  -continue keppra    Essential hypertension- (present on admission)  Assessment & Plan  Normotensive  -monitor and start oral antihypertensives if persistent       VTE prophylaxis: lovenox

## 2021-12-15 NOTE — DISCHARGE PLANNING
Agency/Facility Name: [facility]   Spoke To: Lucero   Outcome: Per Lucero pt is still on CIWA protocol and needs an extra day off from restrains along with PT and OT notes

## 2021-12-15 NOTE — PROGRESS NOTES
Telemetry Shift Summary     RHYTHM: SR   HR RANGE: 62-85  ECTOPY: N/A  MEASUREMENTS: 0.13/0.07/0.38     Normal Measurements  Rhythm SR  HR Range:   Measurements: 0.12-0.20/0.06-0.10/0.30-0.52

## 2021-12-15 NOTE — CARE PLAN
The patient is Watcher - Medium risk of patient condition declining or worsening    Shift Goals  Clinical Goals: Trend CIWA, VSS, Safety  Patient Goals: Rest  Family Goals: ROSIO    Progress made toward(s) clinical / shift goals:        Problem: Knowledge Deficit - Standard  Goal: Patient and family/care givers will demonstrate understanding of plan of care, disease process/condition, diagnostic tests and medications  Outcome: Not Progressing   A&Ox4. Education given. No evidence of learning. Reinforcement needed. CIWA protocol in place.      Problem: Hemodynamics  Goal: Patient's hemodynamics, fluid balance and neurologic status will be stable or improve  Outcome: Progressing     Problem: Optimal Care for Alcohol Withdrawal  Goal: Optimal Care for the alcohol withdrawal patient  Outcome: Progressing     Problem: Seizure Precautions  Goal: Implementation of seizure precautions  Outcome: Progressing     Problem: Psychosocial  Goal: Patient's level of anxiety will decrease  Outcome: Progressing     Problem: Risk for Aspiration  Goal: Patient's risk for aspiration will be absent or decrease  Outcome: Progressing     Problem: Skin Integrity  Goal: Skin integrity is maintained or improved  Outcome: Progressing     Problem: Fall Risk  Goal: Patient will remain free from falls  Outcome: Progressing

## 2021-12-15 NOTE — THERAPY
Physical Therapy   Daily Treatment     Patient Name: Roro Leon  Age:  66 y.o., Sex:  female  Medical Record #: [id number]  Today's Date: 12/15/2021     Precautions  Precautions: Fall Risk    Assessment    Rec'd pt alert, in bed, agreeable to work w/ PT.  She presents w/ bilateral knee flexion contractures/tightness, unable to achieve full knee extension, lacking about 10 degrees.  Also bilateral plantar flexion tightness/contractures, unable to achieve foot flat w/ standing.  It does not appear that pt has been upright/ambulating for some time, in light of said contractures.  She needs max assist to move to/from the eob.  She is able to maintain sitting balance w/o assist.  Attempted standing several times, w/ pt needing max assist each time.  She is unable to straighten her knees as noted above or her trunk.  Unable to take any steps.    Plan    Continue current treatment plan.    DC Equipment Recommendations: Unable to determine at this time  Discharge Recommendations: Recommend post-acute placement for additional physical therapy services prior to discharge home        Objective       12/15/21 1129   Balance   Sitting Balance (Static) Fair -   Sitting Balance (Dynamic) Fair -   Standing Balance (Static) Trace +   Standing Balance (Dynamic) Trace +   Weight Shift Sitting Poor   Weight Shift Standing Absent   Gait Analysis   Gait Level Of Assist Unable to Participate   Bed Mobility    Supine to Sit Maximal Assist   Sit to Supine Maximal Assist   Scooting Total Assist   Skilled Intervention Verbal Cuing;Tactile Cuing;Sequencing;Facilitation   Functional Mobility   Sit to Stand Maximal Assist   Bed, Chair, Wheelchair Transfer Unable to Participate   Skilled Intervention Verbal Cuing;Tactile Cuing;Sequencing;Facilitation   Short Term Goals    Short Term Goal # 1 Pt will perform supine <> sit with min A within 6 visits in order to progress functional mobility   Goal Outcome # 1 goal not met   Short Term Goal # 2 Pt  will perform STS with FWW and min A within 6 visits in order to progress OOB activity   Goal Outcome # 2 Goal not met   Short Term Goal # 3 Pt will perform functional transfers with FWW and mod A within 6 visits in order to progress OOB activity   Goal Outcome # 3 Goal not met   Anticipated Discharge Equipment and Recommendations   DC Equipment Recommendations Unable to determine at this time   Discharge Recommendations Recommend post-acute placement for additional physical therapy services prior to discharge home

## 2021-12-16 PROBLEM — S52.502D CLOSED FRACTURE OF DISTAL END OF LEFT RADIUS WITH ROUTINE HEALING: Status: ACTIVE | Noted: 2021-12-15

## 2021-12-16 PROBLEM — M24.562 CONTRACTURES INVOLVING BOTH KNEES: Status: ACTIVE | Noted: 2021-12-16

## 2021-12-16 PROBLEM — M24.561 CONTRACTURES INVOLVING BOTH KNEES: Status: ACTIVE | Noted: 2021-12-16

## 2021-12-16 LAB
ANION GAP SERPL CALC-SCNC: 16 MMOL/L (ref 7–16)
ANISOCYTOSIS BLD QL SMEAR: ABNORMAL
BASOPHILS # BLD AUTO: 0.3 % (ref 0–1.8)
BASOPHILS # BLD: 0.03 K/UL (ref 0–0.12)
BUN SERPL-MCNC: 6 MG/DL (ref 8–22)
CALCIUM SERPL-MCNC: 9.1 MG/DL (ref 8.5–10.5)
CHLORIDE SERPL-SCNC: 103 MMOL/L (ref 96–112)
CO2 SERPL-SCNC: 18 MMOL/L (ref 20–33)
COMMENT 1642: NORMAL
CREAT SERPL-MCNC: 0.51 MG/DL (ref 0.5–1.4)
EOSINOPHIL # BLD AUTO: 0.08 K/UL (ref 0–0.51)
EOSINOPHIL NFR BLD: 0.8 % (ref 0–6.9)
ERYTHROCYTE [DISTWIDTH] IN BLOOD BY AUTOMATED COUNT: 71.3 FL (ref 35.9–50)
GLUCOSE SERPL-MCNC: 95 MG/DL (ref 65–99)
HCT VFR BLD AUTO: 26.1 % (ref 37–47)
HGB BLD-MCNC: 8.4 G/DL (ref 12–16)
IMM GRANULOCYTES # BLD AUTO: 0.05 K/UL (ref 0–0.11)
IMM GRANULOCYTES NFR BLD AUTO: 0.5 % (ref 0–0.9)
LYMPHOCYTES # BLD AUTO: 1.72 K/UL (ref 1–4.8)
LYMPHOCYTES NFR BLD: 16.7 % (ref 22–41)
MACROCYTES BLD QL SMEAR: ABNORMAL
MCH RBC QN AUTO: 29.8 PG (ref 27–33)
MCHC RBC AUTO-ENTMCNC: 32.2 G/DL (ref 33.6–35)
MCV RBC AUTO: 92.6 FL (ref 81.4–97.8)
MICROCYTES BLD QL SMEAR: ABNORMAL
MONOCYTES # BLD AUTO: 1.51 K/UL (ref 0–0.85)
MONOCYTES NFR BLD AUTO: 14.6 % (ref 0–13.4)
MORPHOLOGY BLD-IMP: NORMAL
NEUTROPHILS # BLD AUTO: 6.94 K/UL (ref 2–7.15)
NEUTROPHILS NFR BLD: 67.1 % (ref 44–72)
NRBC # BLD AUTO: 0 K/UL
NRBC BLD-RTO: 0 /100 WBC
PLATELET # BLD AUTO: 170 K/UL (ref 164–446)
PLATELET BLD QL SMEAR: NORMAL
PMV BLD AUTO: 11.9 FL (ref 9–12.9)
POLYCHROMASIA BLD QL SMEAR: NORMAL
POTASSIUM SERPL-SCNC: 3.6 MMOL/L (ref 3.6–5.5)
RBC # BLD AUTO: 2.82 M/UL (ref 4.2–5.4)
RBC BLD AUTO: PRESENT
SARS-COV-2 RNA RESP QL NAA+PROBE: NOTDETECTED
SODIUM SERPL-SCNC: 137 MMOL/L (ref 135–145)
SPECIMEN SOURCE: NORMAL
WBC # BLD AUTO: 10.3 K/UL (ref 4.8–10.8)

## 2021-12-16 PROCEDURE — A9270 NON-COVERED ITEM OR SERVICE: HCPCS | Performed by: INTERNAL MEDICINE

## 2021-12-16 PROCEDURE — 700102 HCHG RX REV CODE 250 W/ 637 OVERRIDE(OP): Performed by: STUDENT IN AN ORGANIZED HEALTH CARE EDUCATION/TRAINING PROGRAM

## 2021-12-16 PROCEDURE — 700102 HCHG RX REV CODE 250 W/ 637 OVERRIDE(OP): Performed by: HOSPITALIST

## 2021-12-16 PROCEDURE — U0003 INFECTIOUS AGENT DETECTION BY NUCLEIC ACID (DNA OR RNA); SEVERE ACUTE RESPIRATORY SYNDROME CORONAVIRUS 2 (SARS-COV-2) (CORONAVIRUS DISEASE [COVID-19]), AMPLIFIED PROBE TECHNIQUE, MAKING USE OF HIGH THROUGHPUT TECHNOLOGIES AS DESCRIBED BY CMS-2020-01-R: HCPCS

## 2021-12-16 PROCEDURE — 700111 HCHG RX REV CODE 636 W/ 250 OVERRIDE (IP): Performed by: STUDENT IN AN ORGANIZED HEALTH CARE EDUCATION/TRAINING PROGRAM

## 2021-12-16 PROCEDURE — 99233 SBSQ HOSP IP/OBS HIGH 50: CPT | Performed by: HOSPITALIST

## 2021-12-16 PROCEDURE — A9270 NON-COVERED ITEM OR SERVICE: HCPCS | Performed by: HOSPITALIST

## 2021-12-16 PROCEDURE — 92610 EVALUATE SWALLOWING FUNCTION: CPT

## 2021-12-16 PROCEDURE — U0005 INFEC AGEN DETEC AMPLI PROBE: HCPCS

## 2021-12-16 PROCEDURE — 51798 US URINE CAPACITY MEASURE: CPT

## 2021-12-16 PROCEDURE — 85025 COMPLETE CBC W/AUTO DIFF WBC: CPT

## 2021-12-16 PROCEDURE — 700102 HCHG RX REV CODE 250 W/ 637 OVERRIDE(OP): Performed by: INTERNAL MEDICINE

## 2021-12-16 PROCEDURE — 80048 BASIC METABOLIC PNL TOTAL CA: CPT

## 2021-12-16 PROCEDURE — 770001 HCHG ROOM/CARE - MED/SURG/GYN PRIV*

## 2021-12-16 PROCEDURE — 36415 COLL VENOUS BLD VENIPUNCTURE: CPT

## 2021-12-16 PROCEDURE — A9270 NON-COVERED ITEM OR SERVICE: HCPCS | Performed by: STUDENT IN AN ORGANIZED HEALTH CARE EDUCATION/TRAINING PROGRAM

## 2021-12-16 RX ORDER — GAUZE BANDAGE 2" X 2"
100 BANDAGE TOPICAL DAILY
Status: DISCONTINUED | OUTPATIENT
Start: 2021-12-16 | End: 2022-01-11 | Stop reason: HOSPADM

## 2021-12-16 RX ADMIN — PROPRANOLOL HYDROCHLORIDE 10 MG: 10 TABLET ORAL at 21:07

## 2021-12-16 RX ADMIN — LEVETIRACETAM 500 MG: 500 TABLET, FILM COATED ORAL at 05:33

## 2021-12-16 RX ADMIN — ENOXAPARIN SODIUM 40 MG: 40 INJECTION SUBCUTANEOUS at 05:33

## 2021-12-16 RX ADMIN — OMEPRAZOLE 20 MG: 20 CAPSULE, DELAYED RELEASE ORAL at 05:33

## 2021-12-16 RX ADMIN — ACETAMINOPHEN 650 MG: 325 TABLET, FILM COATED ORAL at 23:04

## 2021-12-16 RX ADMIN — POTASSIUM CHLORIDE 40 MEQ: 20 TABLET, EXTENDED RELEASE ORAL at 05:33

## 2021-12-16 RX ADMIN — Medication 100 MG: at 09:00

## 2021-12-16 RX ADMIN — LEVETIRACETAM 500 MG: 500 TABLET, FILM COATED ORAL at 17:42

## 2021-12-16 RX ADMIN — PROPRANOLOL HYDROCHLORIDE 10 MG: 10 TABLET ORAL at 05:33

## 2021-12-16 RX ADMIN — DOCUSATE SODIUM 50 MG AND SENNOSIDES 8.6 MG 2 TABLET: 8.6; 5 TABLET, FILM COATED ORAL at 17:42

## 2021-12-16 RX ADMIN — OMEPRAZOLE 20 MG: 20 CAPSULE, DELAYED RELEASE ORAL at 17:42

## 2021-12-16 RX ADMIN — LISINOPRIL 20 MG: 20 TABLET ORAL at 05:33

## 2021-12-16 RX ADMIN — PROPRANOLOL HYDROCHLORIDE 10 MG: 10 TABLET ORAL at 13:38

## 2021-12-16 ASSESSMENT — LIFESTYLE VARIABLES
ANXIETY: NO ANXIETY (AT EASE)
AGITATION: NORMAL ACTIVITY
VISUAL DISTURBANCES: NOT PRESENT
HEADACHE, FULLNESS IN HEAD: NOT PRESENT
TOTAL SCORE: 4
ORIENTATION AND CLOUDING OF SENSORIUM: CANNOT DO SERIAL ADDITIONS OR IS UNCERTAIN ABOUT DATE
PAROXYSMAL SWEATS: NO SWEAT VISIBLE
TREMOR: *
AUDITORY DISTURBANCES: NOT PRESENT
SUBSTANCE_ABUSE: 1
NAUSEA AND VOMITING: NO NAUSEA AND NO VOMITING

## 2021-12-16 ASSESSMENT — ENCOUNTER SYMPTOMS
COUGH: 0
PALPITATIONS: 0
FEVER: 0
VOMITING: 0
NAUSEA: 0
SEIZURES: 1
DIZZINESS: 0
WEAKNESS: 1
HEADACHES: 0
DEPRESSION: 0
DOUBLE VISION: 0
BRUISES/BLEEDS EASILY: 0
NECK PAIN: 0
TREMORS: 1
ABDOMINAL PAIN: 0
SHORTNESS OF BREATH: 0

## 2021-12-16 ASSESSMENT — PAIN DESCRIPTION - PAIN TYPE
TYPE: ACUTE PAIN
TYPE: ACUTE PAIN

## 2021-12-16 ASSESSMENT — FIBROSIS 4 INDEX: FIB4 SCORE: 2.23

## 2021-12-16 NOTE — DISCHARGE PLANNING
Agency/Facility Name: [facility]  Outcome: Left voice message regarding patient referral. Requested a call back.

## 2021-12-16 NOTE — THERAPY
Speech Language Pathology   Clinical Swallow Evaluation     Patient Name: Roro Leon  AGE:  66 y.o., SEX:  female  Medical Record #: [id number]  Today's Date: 12/16/2021     Precautions  Precautions: Fall Risk,Swallow Precautions ( See Comments)  Comments: painful left wrist    Assessment    Patient is a 67 y/o F admitted 12/10 with weakness, general malaise. PMHx includes YEISON, EtOH abuse, HTN, seizures on Keppra (last seizure June 2021) and SDH. CXR 12/10: No acute cardiopulmonary abnormality. CTH 12/15: No acute intracranial abnormality is identified, there are nonspecific white matter changes, commonly associated with small vessel ischemic disease.  Associated mild cerebral atrophy is noted. Pt was last seen by SLP in this facility June 2021 and recommended for MM5/TN0 diet.    Clinical swallow evaluation was completed at bedside. Pt was orientedx4, lethargic. Positioning was poor due to forward neck flexion with pt contracted into an almost fetal position. Pt denies a hx of dysphagia. Oral [facility] exam was notable for generalized lingual weakness, upper dentures only. Oral care was provided. Pt was presented with ice chips, thins via tsp/cup/straw, liquidized, pureed, soft/bite sized, and regular solids. Pt was not able to self-feed as she was unable to grasp the cup with her R hand and L hand was swollen/weak.    Clinical signs of oropharyngeal dysphagia include prolonged/inefficient mastication of chewable solids with pt chewing for ~30-45 seconds for soft solids and expectorating regular solids (pineapple). Otherwise, her positioning negatively impacted her ability to accept liquids from a spoon/cup with gravity dependent anterior spillage occurring; adequate oral containment and bolus acceptance of thins via straw. Pharyngeal swallow response appeared timely and no s/sx of aspiration occurred with PO intake. Pt appears at high risk of malnutrition/dehydration due to inefficient oral phase, generalized  weakness with reliance on caregivers for feeding and positioning barriers. Temporary diet modification is indicated. Dysphagia is likely acute, related to EtOH abuse, generalized weakness and deconditioning as pt reports she was able to feed herself and had no difficulty at home.     Recommendations:  1. Initiate an oral diet of Pureed Solids (PU4) and Thin Liquids (TN0)    -Reposition as upright as possible, provide thins via straw, small bites/sips, slow rate   -Crush pills in puree  2. Oral care after meals and mobilize w/ staff per PT/OT recs to mitigate risk of aspiration PNA.      Plan    Recommend Speech Therapy 3 times per week until therapy goals are met for the following treatments:  Dysphagia Training and Patient / Family / Caregiver Education.    Discharge Recommendations: Recommend post-acute placement for additional speech therapy services prior to discharge home       Objective       12/16/21 0847   Labial Function   Labial Structure At Rest Within Functional Limits   Labial Vowel Production / I /, / U / Minimal  (generally reduced ROM bilaterally)   Labial Sequence / I /, / U / Minimal   Frown, Pucker Minimal   Lingual Function   Lingual Structure At Rest Within Functional Limits   Lingual Protrude Minimal   Lingual Retract Within Functional Limits   Elevate In Mouth Minimal   Elevate Outside Mouth Minimal   Lateralization Moderate Right;Moderate Left  (generalized lingual weakness b/l)   Lick Lips (Circular) Moderate   / Pa / 5X's Minimal   / Ta / 5X's Minimal   / Ka / 5X's Minimal   / [place] / 5X's Within Functional Limits   Jaw   Jaw Structure At Rest Within Functional Limits   Bite ([place]) Minimal   Chew (Rotary) Minimal   Velar Function   Velar Structure At Rest Not Tested  (unable to view d/t positiong, restricted ROM)   Laryngeal Function   Voice Quality Minimal  (hypophonic)   Volutional Cough Minimal  (weak)   Excursion Upon Swallow   (unable to palpated d/t neck positioning)   Oral Food  "Presentation   Ice Chips Within Functional Limits   Single Swallow Thin (0) Minimal  (anterior bolus loss w/ tsp and cup sips d/t positioning)   Serial Swallow Thin (0) Within Functional Limits  (with straw sips)   Liquidised (3) Within Functional Limits   Pureed (4) Within Functional Limits   Soft & Bite-Sized (6) - (Dysphagia III) Moderate  (slow, inefficient mastication w/ increased WOB)   Regular (7) Severe  (pt expectorated pineapple, stating too difficult to chew)   Self Feeding Needs Total Assistance   Dysphagia Strategies / Recommendations   Compensatory Strategies Strict 1:1 Feeding;Single Sips / Bites;Other (Comment);Liquids Via Straw  (sit as upright as possible given contractures)   Diet / Liquid Recommendation Puree (4);Thin (0)   Medication Administration  Crush all Medications in Puree   Therapy Interventions Dysphagia Therapy By Speech Language Pathologist;Oral Motor Exercises;Pharyngeal / Laryngeal Exercises   Patient / Family Goals   Patient / Family Goal #1 \"Can you give me the cup\"   Short Term Goals   Short Term Goal # 1 Patient will consume meals of pureed solids and thin liquids with no s/sx of aspiration given 1:1 feeding.   Short Term Goal # 2 Patient will complete lingual strengthening exercises x15 reps per session given mod cues.         "

## 2021-12-16 NOTE — THERAPY
"Occupational Therapy   Initial Evaluation     Patient Name: Roro Leon  Age:  66 y.o., Sex:  female  Medical Record #: [id number]  Today's Date: 12/15/2021        Precautions: Fall Risk  Comments: painful left wrist    Assessment  Patient is 66 y.o. female who presented acutely c/o generalized malaise and weakness worsening for 3 days.  Patient was previously admitted in June and was DC'd to SNF however reported difficulty ambulating and weakness after returning home from SNF.  Patient admitted for dehydration, tremors, ETOH abuse, seizures, and essential HTN.  Pt observed holding her left wrist in flex at rest.  Pt c/o significant pain with any ROM.  MD & Nsg notified. Today pt was groggy, only oriented to self & required an excessive amount of time to engage in ADL's & EOB activity.  Pt was not a reliable historian, unclear how pt was functioning at home as she has BLE contractures.  Pt also stated she was homeless & had been living in a shelter??  Pt will need Post Acute services once medically cleared.    Plan    Recommend Occupational Therapy 3 times per week until therapy goals are met for the following treatments:  Adaptive Equipment, Cognitive Skill Development, Neuro Re-Education / Balance, Self Care/Activities of Daily Living, Therapeutic Activities and Therapeutic Exercises.    DC Equipment Recommendations: Unable to determine at this time  Discharge Recommendations: Recommend post-acute placement for additional occupational therapy services prior to discharge home     Subjective    \"Where are my black shoes?\"     Objective       12/15/21 1158   Prior Living Situation   Prior Services Unable To Determine At This Time   Housing / Facility 1 Story Apartment / Condo   Comments pt is not a relaible historian & info obtained from chart.  pt stated she was homeless, but is currenty confused.  Chart indicates pt was living in an apt?   Prior Level of ADL Function   Self Feeding Unable To Determine At This Time "   Comments unclear how pt was fucntioning PTA   Cognition    Cognition / Consciousness X   Speech/ Communication Delayed Responses   Orientation Level Not Oriented to Age;Not Oriented to Address;Not Oriented to Year;Not Oriented to Month;Not Oriented to Day;Not Oriented to Time;Not Oriented to Reason;Not Oriented to Place   Level of Consciousness Alert   Ability To Follow Commands 1 Step   New Learning Impaired   Attention Impaired   Sequencing Impaired   Initiation Impaired   Comments Pt is a very poor historain, unclear how pt was functioning PTA   Passive ROM Upper Body   Comments painful left wrist with any movement   Active ROM Upper Body   Comments WFL except pt guarding let wrist due to pain   Strength Upper Body   Gross Strength Generalized Weakness, Equal Bilaterally.    Comments pt does not follow commands well to accurately assess   Neurological Concerns   Sitting Posture During ADL's Posterior Lean   Standing Posture During ADL's Posterior Lean   Coordination Upper Body   Fine Motor Coordination impaired BUE   Gross Motor Coordination impaired BUE   Balance Assessment   Sitting Balance (Static) Fair -   Sitting Balance (Dynamic) Fair -   Standing Balance (Static) Trace +   Standing Balance (Dynamic) Trace   Weight Shift Sitting Poor   Weight Shift Standing Absent   Comments pt appears to have BLE knee & ankle contractures, not asuncion how pt was amb PTA?   Bed Mobility    Supine to Sit Maximal Assist   Sit to Supine Maximal Assist   Scooting Maximal Assist   Rolling Maximal Assist to Rt.;Maximum Assist to Lt.   ADL Assessment   Eating Moderate Assist   Grooming Maximal Assist   Bathing Maximal Assist   Upper Body Dressing Maximal Assist   Lower Body Dressing Maximal Assist   Toileting Maximal Assist   Functional Mobility   Sit to Stand Maximal Assist  (Max A x 2, pt unalbe toext knee & ankles to stand upright)   Bed, Chair, Wheelchair Transfer Unable to Participate   Patient / Family Goals   Patient / Family  Goal #1 pt unable to state   Short Term Goals   Short Term Goal # 1 Pt will self feed with Min A & extra time   Short Term Goal # 2 Pt will groom seated EOB with Min A   Short Term Goal # 3 Pt will be Min A for ADL transfers

## 2021-12-16 NOTE — CARE PLAN
Problem: Knowledge Deficit - Standard  Goal: Patient and family/care givers will demonstrate understanding of plan of care, disease process/condition, diagnostic tests and medications  Outcome: Not Progressing     Problem: Optimal Care for Alcohol Withdrawal  Goal: Optimal Care for the alcohol withdrawal patient  Outcome: Progressing     Problem: Seizure Precautions  Goal: Implementation of seizure precautions  Outcome: Progressing     Problem: Pain - Standard  Goal: Alleviation of pain or a reduction in pain to the patient’s comfort goal  Outcome: Progressing     Problem: Fall Risk  Goal: Patient will remain free from falls  Outcome: Progressing   The patient is Stable - Low risk of patient condition declining or worsening        Problem: Knowledge Deficit - Standard  Goal: Patient and family/care givers will demonstrate understanding of plan of care, disease process/condition, diagnostic tests and medications  Outcome: Not Progressing

## 2021-12-16 NOTE — PROGRESS NOTES
Assumed care of patient at 1915, received bedside report from day shift RN. Bed is locked and in lowest position with call light within reach. Treaded socks in place. Patient updated on plan of care. White board updated. Pt A&Ox3, disoriented to situation. Patient's breathing pattern is unlabored. Tele monitor in place and cardiac rhythm being monitored.

## 2021-12-16 NOTE — ASSESSMENT & PLAN NOTE
12/15:  Noted by PT/OT.  Patient states she was ambulatory with FWW prior to admission.  12/17:  Able to extend right leg full extension, left leg near complete extension.  Up to chair tid with meals, PT/OT daily.   SNF pending for further strengthening.

## 2021-12-16 NOTE — PROGRESS NOTES
Recent diagnostic showing patient has left nondisplaced intra-articular fracture of the distal radius. Pt states that she has had pain in this wrist prior to admission. Admits to a fall about 2 weeks ago but says her wrist pain started prior to this most recent fall- unable to place date for it. Splint has been applied.

## 2021-12-16 NOTE — PROGRESS NOTES
Hospital Medicine Daily Progress Note    Date of Service  12/16/2021    Chief Complaint  Roro Leon is a 66 y.o. female admitted 12/10/2021 with weakness    Hospital Course  Roro Leon is a 66-year-old female with a past medical history of YEISON, EtOH abuse, hypertension, seizures on Keppra (last seizure June 2021), and subdural hematoma who presented to the ED on 12/10/2021 with complaints of weakness and generalized malaise that have been worsening over the last 3 days.  She had been previously admitted for sepsis secondary to UTI in June and was discharged to [facility] for rehab.  She was discharged from the rehab in August however she reports since then she has not been able to ambulate well and has been feeling weak.  The months ago, she started noticing continuous bilateral hand tremors, right worse than left, with accompanying left leg intermittent tremors.  She did see her PCP who referred her to a neurologist, however she has not yet been seen for that appointment.  She endorses a significant history of alcohol abuse.    In the ED, patient was found to be dehydrated with resting and action tremors of bilateral hands.  Alcohol level was significantly elevated, TSH low normal, lactic acid 3.8, no leukocytosis, chest x-ray negative, and UA negative.  Patient was admitted to the observation unit for further work-up and management.    Neurology was consulted 12/11/2021 and recommend referral to outpatient neurology clinic.  Patient remains on normal saline infusion and lactic acid is being down.  She is being monitored for alcohol withdrawal.  Patient is not noted to be chronically anemic, iron studies were ordered which demonstrate anemia of chronic disease and a high vitamin B12 level.  Stool was negative for occult blood.    Interval Problem Update  12/14:  Patient in soft wrist restraints qhs only due to agitation.  CIWA scores 3. Sleeping soundly on exam.  Dc scheduled librium. On gabapentin as  well.  Monitor for need for higher level of care.  Continue home keppra for history of seizures.  PT/OT evaluation pending.  12/15: Patient less drowsy today off Librium.  Still requiring Ativan x2 last evening with CIWA scores 9 4 and 10.  Patient was difficult to understand with somewhat mumbling speech she is unable to tell me if she came from home.  She was recently admitted to a skilled nursing facility after GI bleed admission in September 2021.  According to old records she has had progressive generalized weakness as well as tremor.  Ammonia level normal at 28 thiamine level pending hemoglobin stable at 8.1 patient is complaining of left wrist pain with some erythema edema noted consistent with gout.  I have ordered a sed rate uric acid level and x-ray.  She is also known to have some tenderness in bilateral ankles.  Noted to have some foot contracture by PT.  Ordered swallow evaluation since mumbling difficult to understand speech.  Potassium replaced orally.  12/16: Mentation clear today.  No restraints required.  Unable to understand her words.  CT head negative.  Right wrist does show a hairline distal radius fracture.  Patient states she fell 2 weeks ago.  I have ordered a Velcro wrist splint per orthopedic traction.  No need for Ortho consult.  Will need to wear for 6 weeks.  Patient has intention tremor and that is why her CIWA scores are elevated.  Her mentation is clear she is no longer withdrawing DC'd CIWA telemetry in place transfer orders for medical unit.  I have ordered Tylenol and ibuprofen for wrist pain.  Patient remains with significant lower leg contractures and will likely need skilled nursing facility for continued rehab.  No Seroquel      Consultants/Specialty  neurology    Code Status  Full Code    Disposition  Patient is not medically cleared.   Anticipate discharge to to skilled nursing facility.  I have placed the appropriate orders for post-discharge needs.    Review of  Systems  Review of Systems   Constitutional: Positive for malaise/fatigue. Negative for fever.   HENT: Negative for hearing loss.    Eyes: Negative for double vision.   Respiratory: Negative for cough and shortness of breath.    Cardiovascular: Negative for chest pain, palpitations and leg swelling.   Gastrointestinal: Negative for abdominal pain, nausea and vomiting.   Genitourinary: Negative for dysuria and urgency.   Musculoskeletal: Negative for neck pain.   Skin: Negative for rash.   Neurological: Positive for tremors, seizures and weakness. Negative for dizziness and headaches.   Endo/Heme/Allergies: Does not bruise/bleed easily.   Psychiatric/Behavioral: Positive for substance abuse. Negative for depression.   All other systems reviewed and are negative.       Physical Exam  Temp:  [36.9 °C (98.5 °F)-38.1 °C (100.6 °F)] 37.4 °C (99.4 °F)  Pulse:  [] 95  Resp:  [16-20] 16  BP: (119-164)/(67-97) 155/97  SpO2:  [94 %-97 %] 95 %    Physical Exam  Vitals and nursing note reviewed.   Constitutional:       Appearance: She is ill-appearing.   HENT:      Head: Normocephalic.      Mouth/Throat:      Mouth: Mucous membranes are moist.      Pharynx: Oropharynx is clear.   Cardiovascular:      Rate and Rhythm: Normal rate and regular rhythm.      Pulses: Normal pulses.      Heart sounds: Normal heart sounds.   Pulmonary:      Effort: Pulmonary effort is normal.      Breath sounds: Normal breath sounds.   Abdominal:      General: Abdomen is flat. Bowel sounds are normal.      Palpations: Abdomen is soft.   Musculoskeletal:         General: Normal range of motion.   Skin:     General: Skin is warm and dry.      Capillary Refill: Capillary refill takes less than 2 seconds.   Neurological:      Mental Status: She is alert and oriented to person, place, and time.      Sensory: Sensation is intact.      Motor: Weakness and tremor present. No pronator drift.         Fluids    Intake/Output Summary (Last 24 hours) at  12/16/2021 1349  Last data filed at 12/16/2021 1030  Gross per 24 hour   Intake 120 ml   Output 900 ml   Net -780 ml       Laboratory  Recent Labs     12/14/21  1223 12/15/21  0156 12/16/21  0259   WBC 9.8 10.6 10.3   RBC 2.78* 2.71* 2.82*   HEMOGLOBIN 8.2* 8.1* 8.4*   HEMATOCRIT 25.9* 25.9* 26.1*   MCV 93.2 95.6 92.6   MCH 29.5 29.9 29.8   MCHC 31.7* 31.3* 32.2*   RDW 74.4* 74.8* 71.3*   PLATELETCT 165 161* 170   MPV 11.0 11.1 11.9     Recent Labs     12/14/21  1223 12/15/21  0156 12/16/21  0259   SODIUM 137 139 137   POTASSIUM 3.2* 3.1* 3.6   CHLORIDE 105 107 103   CO2 14* 15* 18*   GLUCOSE 74 103* 95   BUN 5* 6* 6*   CREATININE 0.55 0.64 0.51   CALCIUM 8.0* 8.0* 9.1                   Imaging  CT-HEAD W/O   Final Result         1.  No acute intracranial abnormality is identified, there are nonspecific white matter changes, commonly associated with small vessel ischemic disease.  Associated mild cerebral atrophy is noted.   2.  Atherosclerosis.         DX-WRIST-COMPLETE 3+ LEFT   Final Result         1. There is a possible nondisplaced intra-articular fracture of the distal radius seen on the oblique view, correlate for history of trauma and point tenderness.   2. Diffuse, decreased bone mineral density.      DX-CHEST-PORTABLE (1 VIEW)   Final Result      No acute cardiopulmonary abnormality.              Assessment/Plan  * Dehydration- (present on admission)  Assessment & Plan  Lactic acid level trending down  -Continue IV fluids     Contractures involving both knees  Assessment & Plan  12/15:  Noted by PT/OT.  Patient states she was ambulatory with FWW prior to admission.  Will need extensive rehab.    Hypokalemia- (present on admission)  Assessment & Plan  Po potassium replacement ordered.    Slurred speech  Assessment & Plan  Difficult to understand on 12/15.  CT head negative  SLP swallow eval.  12/16:  Improved speech and mentation off librium.  dc'd BASILIO.    Closed fracture of distal end of left radius with  routine healing  Assessment & Plan  Xray wrist with tiny fracture distal radius seen in process of healing, likely 2 weeks old.  Sed rate normal  urice acid elevated  velcro wrist splint placed, to wear x 6 weeks.    Alcohol withdrawal delirium, acute, hyperactive (HCC)- (present on admission)  Assessment & Plan  Asleep on exam 12/14, cut back sedatives.  12/16: improved mentation off librium, dc'd CIWA.    Mixed action and resting tremor- (present on admission)  Assessment & Plan  Unclear etiology, difficult to determine if secondary to alcohol abuse versus neurologic abnormality  Neurology Dr. Khan consulted, appreciate recommendations  TSH low normal  -Neurology placed referral for outpatient neurology clinic  -Propanolol started in the ED  -Monitor    Alcohol withdrawal (HCC)- (present on admission)  Assessment & Plan  Reports that she drinks 3 to 4 glasses of wine a night  Difficult to ascertain if tremor is due to alcohol abuse versus neurological abnormality  -CIWA scores 3.  dc scheduled librium and limit sedatives given her sleeping on exam 12/14.  12/16:  dc CIWA protocol since only has intention tremors causing high score, mentation back to baseline.    Anemia of chronic disease- (present on admission)  Assessment & Plan  H/H 8.5/25.8 this admission  Per chart review, chronically low  Iron studies support anemia of chronic disease  B12 level high, folate level normal, iron level high  -Monitor    Seizure (HCC)- (present on admission)  Assessment & Plan  History of  -continue keppra    Essential hypertension- (present on admission)  Assessment & Plan  Normotensive  -monitor and start oral antihypertensives if persistent       VTE prophylaxis: lovenox

## 2021-12-16 NOTE — PROGRESS NOTES
Bedside report received and patient care assumed. Pt is resting in bed, no signs and symptoms of pain, and is on RA. Tele box on. All fall precautions are in place, belongings at bedside table.  Pt call light within reach. Will continue to monitor.

## 2021-12-16 NOTE — CARE PLAN
The patient is Watcher - Medium risk of patient condition declining or worsening    Shift Goals  Clinical Goals: pain management   Patient Goals: rest  Family Goals: ROSIO    Progress made toward(s) clinical / shift goals:        Problem: Optimal Care for Alcohol Withdrawal  Goal: Optimal Care for the alcohol withdrawal patient  Outcome: Progressing     Problem: Seizure Precautions  Goal: Implementation of seizure precautions  Outcome: Progressing     Problem: Fall Risk  Goal: Patient will remain free from falls  Outcome: Progressing     Problem: Safety - Medical Restraint  Goal: Free from restraint(s) (Restraint for Interference with Medical Device)  Outcome: Progressing       Patient is not progressing towards the following goals: n/a

## 2021-12-16 NOTE — PROGRESS NOTES
Hospital Medicine Daily Progress Note    Date of Service  12/15/2021    Chief Complaint  Roro Leon is a 66 y.o. female admitted 12/10/2021 with weakness    Hospital Course  Roro Leon is a 66-year-old female with a past medical history of YEISON, EtOH abuse, hypertension, seizures on Keppra (last seizure June 2021), and subdural hematoma who presented to the ED on 12/10/2021 with complaints of weakness and generalized malaise that have been worsening over the last 3 days.  She had been previously admitted for sepsis secondary to UTI in June and was discharged to [facility] for rehab.  She was discharged from the rehab in August however she reports since then she has not been able to ambulate well and has been feeling weak.  The months ago, she started noticing continuous bilateral hand tremors, right worse than left, with accompanying left leg intermittent tremors.  She did see her PCP who referred her to a neurologist, however she has not yet been seen for that appointment.  She endorses a significant history of alcohol abuse.    In the ED, patient was found to be dehydrated with resting and action tremors of bilateral hands.  Alcohol level was significantly elevated, TSH low normal, lactic acid 3.8, no leukocytosis, chest x-ray negative, and UA negative.  Patient was admitted to the observation unit for further work-up and management.    Neurology was consulted 12/11/2021 and recommend referral to outpatient neurology clinic.  Patient remains on normal saline infusion and lactic acid is being down.  She is being monitored for alcohol withdrawal.  Patient is not noted to be chronically anemic, iron studies were ordered which demonstrate anemia of chronic disease and a high vitamin B12 level.  Stool was negative for occult blood.    Interval Problem Update  12/14:  Patient in soft wrist restraints qhs only due to agitation.  CIWA scores 3. Sleeping soundly on exam.  Dc scheduled librium. On gabapentin as  well.  Monitor for need for higher level of care.  Continue home keppra for history of seizures.  PT/OT evaluation pending.  12/15: Patient less drowsy today off Librium.  Still requiring Ativan x2 last evening with CIWA scores 9 4 and 10.  Patient was difficult to understand with somewhat mumbling speech she is unable to tell me if she came from home.  She was recently admitted to a skilled nursing facility after GI bleed admission in September 2021.  According to old records she has had progressive generalized weakness as well as tremor.  Ammonia level normal at 28 thiamine level pending hemoglobin stable at 8.1 patient is complaining of left wrist pain with some erythema edema noted consistent with gout.  I have ordered a sed rate uric acid level and x-ray.  She is also known to have some tenderness in bilateral ankles.  Noted to have some foot contracture by PT.  Ordered swallow evaluation since mumbling difficult to understand speech.  Potassium replaced orally.      Consultants/Specialty  neurology    Code Status  Full Code    Disposition  Patient is not medically cleared.   Anticipate discharge to to skilled nursing facility.  I have placed the appropriate orders for post-discharge needs.    Review of Systems  Review of Systems   Constitutional: Positive for malaise/fatigue. Negative for fever.   HENT: Negative for hearing loss.    Eyes: Negative for double vision.   Respiratory: Negative for cough and shortness of breath.    Cardiovascular: Negative for chest pain, palpitations and leg swelling.   Gastrointestinal: Negative for abdominal pain, nausea and vomiting.   Genitourinary: Negative for dysuria and urgency.   Musculoskeletal: Negative for neck pain.   Skin: Negative for rash.   Neurological: Positive for tremors, seizures and weakness. Negative for dizziness and headaches.   Endo/Heme/Allergies: Does not bruise/bleed easily.   Psychiatric/Behavioral: Positive for substance abuse. Negative for  depression.   All other systems reviewed and are negative.       Physical Exam  Temp:  [36.9 °C (98.4 °F)-38.1 °C (100.6 °F)] 38.1 °C (100.6 °F)  Pulse:  [] 92  Resp:  [16-24] 20  BP: (123-164)/(63-94) 164/93  SpO2:  [97 %-98 %] 97 %    Physical Exam  Vitals and nursing note reviewed.   Constitutional:       Appearance: She is ill-appearing.   HENT:      Head: Normocephalic.      Mouth/Throat:      Mouth: Mucous membranes are moist.      Pharynx: Oropharynx is clear.   Cardiovascular:      Rate and Rhythm: Normal rate and regular rhythm.      Pulses: Normal pulses.      Heart sounds: Normal heart sounds.   Pulmonary:      Effort: Pulmonary effort is normal.      Breath sounds: Normal breath sounds.   Abdominal:      General: Abdomen is flat. Bowel sounds are normal.      Palpations: Abdomen is soft.   Musculoskeletal:         General: Normal range of motion.   Skin:     General: Skin is warm and dry.      Capillary Refill: Capillary refill takes less than 2 seconds.   Neurological:      Mental Status: She is alert and oriented to person, place, and time.      Sensory: Sensation is intact.      Motor: Weakness and tremor present. No pronator drift.         Fluids    Intake/Output Summary (Last 24 hours) at 12/15/2021 1805  Last data filed at 12/15/2021 1200  Gross per 24 hour   Intake --   Output 850 ml   Net -850 ml       Laboratory  Recent Labs     12/14/21  1223 12/15/21  0156   WBC 9.8 10.6   RBC 2.78* 2.71*   HEMOGLOBIN 8.2* 8.1*   HEMATOCRIT 25.9* 25.9*   MCV 93.2 95.6   MCH 29.5 29.9   MCHC 31.7* 31.3*   RDW 74.4* 74.8*   PLATELETCT 165 161*   MPV 11.0 11.1     Recent Labs     12/14/21  1223 12/15/21  0156   SODIUM 137 139   POTASSIUM 3.2* 3.1*   CHLORIDE 105 107   CO2 14* 15*   GLUCOSE 74 103*   BUN 5* 6*   CREATININE 0.55 0.64   CALCIUM 8.0* 8.0*                   Imaging  DX-CHEST-PORTABLE (1 VIEW)   Final Result      No acute cardiopulmonary abnormality.         DX-WRIST-COMPLETE 3+ LEFT    (Results  Pending)   CT-HEAD W/O    (Results Pending)        Assessment/Plan  * Dehydration- (present on admission)  Assessment & Plan  Lactic acid level trending down  -Continue IV fluids     Hypokalemia  Assessment & Plan  Po potassium replacement ordered.    Slurred speech  Assessment & Plan  Difficult to understand on 12/15.  CT head ordered.  SLP swallow eval.    Wrist pain, acute, left  Assessment & Plan  Xray wrist  Sed rate  urice acid  Appearance of gout on exam with erythema/edema/painful to touch at wrist dorsal joint.  Started colchicine 0.6mg tid x 3 days.    Alcohol withdrawal delirium, acute, hyperactive (HCC)- (present on admission)  Assessment & Plan  Asleep on exam 12/14, cut back sedatives.    Mixed action and resting tremor- (present on admission)  Assessment & Plan  Unclear etiology, difficult to determine if secondary to alcohol abuse versus neurologic abnormality  Neurology Dr. Khan consulted, appreciate recommendations  TSH low normal  -Neurology placed referral for outpatient neurology clinic  -Propanolol started in the ED  -Monitor    Alcohol withdrawal (HCC)- (present on admission)  Assessment & Plan  Reports that she drinks 3 to 4 glasses of wine a night  Difficult to ascertain if tremor is due to alcohol abuse versus neurological abnormality  -CIWA scores 3.  dc scheduled librium and limit sedatives given her sleeping on exam 12/14.  Will likely dc CIWA protocol in a.m.    Anemia of chronic disease- (present on admission)  Assessment & Plan  H/H 8.5/25.8 this admission  Per chart review, chronically low  Iron studies support anemia of chronic disease  B12 level high, folate level normal, iron level high  -Monitor    Seizure (HCC)- (present on admission)  Assessment & Plan  History of  -continue keppra    Essential hypertension- (present on admission)  Assessment & Plan  Normotensive  -monitor and start oral antihypertensives if persistent       VTE prophylaxis: lovenox

## 2021-12-16 NOTE — ASSESSMENT & PLAN NOTE
Xray wrist with tiny minimal fracture distal radius seen in process of healing, likely 2 weeks old.  Sed rate normal  urice acid elevated  velcro wrist splint placed, to wear x 6 weeks.  Able to weight bear left elbow for platform walker, non weight bearing left hand.

## 2021-12-17 LAB
ANION GAP SERPL CALC-SCNC: 12 MMOL/L (ref 7–16)
BASOPHILS # BLD AUTO: 0.4 % (ref 0–1.8)
BASOPHILS # BLD: 0.04 K/UL (ref 0–0.12)
BUN SERPL-MCNC: 9 MG/DL (ref 8–22)
CALCIUM SERPL-MCNC: 8.9 MG/DL (ref 8.5–10.5)
CHLORIDE SERPL-SCNC: 107 MMOL/L (ref 96–112)
CO2 SERPL-SCNC: 21 MMOL/L (ref 20–33)
CREAT SERPL-MCNC: 0.66 MG/DL (ref 0.5–1.4)
EOSINOPHIL # BLD AUTO: 0.14 K/UL (ref 0–0.51)
EOSINOPHIL NFR BLD: 1.5 % (ref 0–6.9)
ERYTHROCYTE [DISTWIDTH] IN BLOOD BY AUTOMATED COUNT: 69.4 FL (ref 35.9–50)
GLUCOSE SERPL-MCNC: 148 MG/DL (ref 65–99)
HCT VFR BLD AUTO: 25.4 % (ref 37–47)
HGB BLD-MCNC: 8.3 G/DL (ref 12–16)
IMM GRANULOCYTES # BLD AUTO: 0.08 K/UL (ref 0–0.11)
IMM GRANULOCYTES NFR BLD AUTO: 0.8 % (ref 0–0.9)
LYMPHOCYTES # BLD AUTO: 1.8 K/UL (ref 1–4.8)
LYMPHOCYTES NFR BLD: 18.8 % (ref 22–41)
MCH RBC QN AUTO: 30.2 PG (ref 27–33)
MCHC RBC AUTO-ENTMCNC: 32.7 G/DL (ref 33.6–35)
MCV RBC AUTO: 92.4 FL (ref 81.4–97.8)
MONOCYTES # BLD AUTO: 1.75 K/UL (ref 0–0.85)
MONOCYTES NFR BLD AUTO: 18.2 % (ref 0–13.4)
MORPHOLOGY BLD-IMP: NORMAL
NEUTROPHILS # BLD AUTO: 5.79 K/UL (ref 2–7.15)
NEUTROPHILS NFR BLD: 60.3 % (ref 44–72)
NRBC # BLD AUTO: 0 K/UL
NRBC BLD-RTO: 0 /100 WBC
PLATELET # BLD AUTO: 182 K/UL (ref 164–446)
PMV BLD AUTO: 12.1 FL (ref 9–12.9)
POTASSIUM SERPL-SCNC: 3.4 MMOL/L (ref 3.6–5.5)
RBC # BLD AUTO: 2.75 M/UL (ref 4.2–5.4)
SODIUM SERPL-SCNC: 140 MMOL/L (ref 135–145)
WBC # BLD AUTO: 9.6 K/UL (ref 4.8–10.8)

## 2021-12-17 PROCEDURE — 99232 SBSQ HOSP IP/OBS MODERATE 35: CPT | Performed by: HOSPITALIST

## 2021-12-17 PROCEDURE — 97530 THERAPEUTIC ACTIVITIES: CPT

## 2021-12-17 PROCEDURE — 700111 HCHG RX REV CODE 636 W/ 250 OVERRIDE (IP): Performed by: STUDENT IN AN ORGANIZED HEALTH CARE EDUCATION/TRAINING PROGRAM

## 2021-12-17 PROCEDURE — A9270 NON-COVERED ITEM OR SERVICE: HCPCS | Performed by: INTERNAL MEDICINE

## 2021-12-17 PROCEDURE — 80048 BASIC METABOLIC PNL TOTAL CA: CPT

## 2021-12-17 PROCEDURE — 36415 COLL VENOUS BLD VENIPUNCTURE: CPT

## 2021-12-17 PROCEDURE — A9270 NON-COVERED ITEM OR SERVICE: HCPCS | Performed by: STUDENT IN AN ORGANIZED HEALTH CARE EDUCATION/TRAINING PROGRAM

## 2021-12-17 PROCEDURE — 700102 HCHG RX REV CODE 250 W/ 637 OVERRIDE(OP): Performed by: HOSPITALIST

## 2021-12-17 PROCEDURE — 770001 HCHG ROOM/CARE - MED/SURG/GYN PRIV*

## 2021-12-17 PROCEDURE — 700102 HCHG RX REV CODE 250 W/ 637 OVERRIDE(OP): Performed by: STUDENT IN AN ORGANIZED HEALTH CARE EDUCATION/TRAINING PROGRAM

## 2021-12-17 PROCEDURE — A9270 NON-COVERED ITEM OR SERVICE: HCPCS | Performed by: HOSPITALIST

## 2021-12-17 PROCEDURE — 85025 COMPLETE CBC W/AUTO DIFF WBC: CPT

## 2021-12-17 PROCEDURE — 700102 HCHG RX REV CODE 250 W/ 637 OVERRIDE(OP): Performed by: INTERNAL MEDICINE

## 2021-12-17 RX ADMIN — LEVETIRACETAM 500 MG: 500 TABLET, FILM COATED ORAL at 06:35

## 2021-12-17 RX ADMIN — PROPRANOLOL HYDROCHLORIDE 10 MG: 10 TABLET ORAL at 14:30

## 2021-12-17 RX ADMIN — OMEPRAZOLE 20 MG: 20 CAPSULE, DELAYED RELEASE ORAL at 17:59

## 2021-12-17 RX ADMIN — PROPRANOLOL HYDROCHLORIDE 10 MG: 10 TABLET ORAL at 23:23

## 2021-12-17 RX ADMIN — ACETAMINOPHEN 650 MG: 325 TABLET, FILM COATED ORAL at 14:30

## 2021-12-17 RX ADMIN — Medication 100 MG: at 06:50

## 2021-12-17 RX ADMIN — POTASSIUM CHLORIDE 40 MEQ: 20 TABLET, EXTENDED RELEASE ORAL at 06:51

## 2021-12-17 RX ADMIN — LISINOPRIL 20 MG: 20 TABLET ORAL at 06:00

## 2021-12-17 RX ADMIN — PROPRANOLOL HYDROCHLORIDE 10 MG: 10 TABLET ORAL at 06:35

## 2021-12-17 RX ADMIN — LEVETIRACETAM 500 MG: 500 TABLET, FILM COATED ORAL at 17:59

## 2021-12-17 RX ADMIN — OMEPRAZOLE 20 MG: 20 CAPSULE, DELAYED RELEASE ORAL at 06:49

## 2021-12-17 RX ADMIN — ENOXAPARIN SODIUM 40 MG: 40 INJECTION SUBCUTANEOUS at 06:35

## 2021-12-17 ASSESSMENT — COGNITIVE AND FUNCTIONAL STATUS - GENERAL
DRESSING REGULAR UPPER BODY CLOTHING: A LOT
STANDING UP FROM CHAIR USING ARMS: A LOT
WALKING IN HOSPITAL ROOM: TOTAL
MOVING FROM LYING ON BACK TO SITTING ON SIDE OF FLAT BED: UNABLE
MOVING TO AND FROM BED TO CHAIR: UNABLE
EATING MEALS: A LOT
HELP NEEDED FOR BATHING: TOTAL
CLIMB 3 TO 5 STEPS WITH RAILING: TOTAL
DRESSING REGULAR LOWER BODY CLOTHING: TOTAL
DAILY ACTIVITIY SCORE: 10
PERSONAL GROOMING: A LOT
SUGGESTED CMS G CODE MODIFIER DAILY ACTIVITY: CL
TOILETING: A LOT
MOBILITY SCORE: 7
SUGGESTED CMS G CODE MODIFIER MOBILITY: CM
TURNING FROM BACK TO SIDE WHILE IN FLAT BAD: UNABLE

## 2021-12-17 ASSESSMENT — ENCOUNTER SYMPTOMS
DOUBLE VISION: 0
BRUISES/BLEEDS EASILY: 0
NECK PAIN: 0
DIZZINESS: 0
VOMITING: 0
HEADACHES: 0
COUGH: 0
SHORTNESS OF BREATH: 0
TREMORS: 1
SEIZURES: 1
FEVER: 0
DEPRESSION: 0
WEAKNESS: 1
NAUSEA: 0
PALPITATIONS: 0
ABDOMINAL PAIN: 0

## 2021-12-17 ASSESSMENT — GAIT ASSESSMENTS: GAIT LEVEL OF ASSIST: UNABLE TO PARTICIPATE

## 2021-12-17 ASSESSMENT — PAIN DESCRIPTION - PAIN TYPE
TYPE: CHRONIC PAIN
TYPE: ACUTE PAIN
TYPE: ACUTE PAIN

## 2021-12-17 ASSESSMENT — LIFESTYLE VARIABLES: SUBSTANCE_ABUSE: 1

## 2021-12-17 NOTE — PROGRESS NOTES
Received Bedside report. Assumed care at 1900. This pt is AOx4, voiding via purewick, 0/10 pain. Patient and RN discussed plan of care: questions answered. Labs noted, assessment complete, patient tolerating level 4--pureed, cardiac diet. Pt is on room air. Call light in place, fall precautions in place, patient educated on importance of calling for assistance. No additional needs at this time. VSS

## 2021-12-17 NOTE — THERAPY
Occupational Therapy  Daily Treatment     Patient Name: Roro Leon  Age:  66 y.o., Sex:  female  Medical Record #: [id number]  Today's Date: 12/17/2021     Precautions  Precautions: Fall Risk,Swallow Precautions ( See Comments),Platform Weight Bearing Left Upper Extremity  Comments: MD clarified WB during session    Assessment    Pt seen for OT tx with focus on activity tolerance, sitting balance, transfer training with partial sit to stands only. Pt confused with limited ability to follow directions at this time and very weak. Pt can continue to benefit from acute OT to increase independence in ADLs prior to dc. Social support unknown at this time.     Plan    Continue current treatment plan.    DC Equipment Recommendations: Unable to determine at this time  Discharge Recommendations: Recommend post-acute placement for additional occupational therapy services prior to discharge home       12/17/21 1105   Precautions   Precautions Fall Risk;Swallow Precautions ( See Comments);Platform Weight Bearing Left Upper Extremity   Comments MD clarified WB during session   Cognition    Cognition / Consciousness X   Speech/ Communication Delayed Responses;Slurred   Orientation Level Not Oriented to Reason;Not Oriented to Place;Not Oriented to Time;Not Oriented to Day;Not Oriented to Month;Not Oriented to Year;Not Oriented to Address   Level of Consciousness Alert   Ability To Follow Commands 1 Step   Safety Awareness Impaired   New Learning Impaired   Attention Impaired   Sequencing Impaired   Initiation Impaired   Comments cooperative; confused   Strength Upper Body   Upper Body Strength  X   Gross Strength Generalized Weakness, Equal Bilaterally.    Comments L in splint   Balance   Sitting Balance (Static) Poor   Sitting Balance (Dynamic) Poor   Standing Balance (Static) Trace   Standing Balance (Dynamic) Trace   Weight Shift Sitting Poor   Weight Shift Standing Absent   Skilled Intervention Verbal Cuing;Tactile  Cuing;Compensatory Strategies;Facilitation   Comments 2 person assist to attempt to stand   Bed Mobility    Supine to Sit Maximal Assist   Sit to Supine Maximal Assist   Scooting Maximal Assist   Rolling Maximal Assist to Rt.   Skilled Intervention Verbal Cuing;Tactile Cuing;Sequencing;Postural Facilitation;Compensatory Strategies   Comments HOB up; using rail and moving pad under pt to assist.    Activities of Daily Living   Eating Minimal Assist   Grooming Maximal Assist   Upper Body Dressing Maximal Assist   Lower Body Dressing Maximal Assist   Toileting Maximal Assist   Skilled Intervention Verbal Cuing;Tactile Cuing;Sequencing;Postural Facilitation   Functional Mobility   Sit to Stand Maximal Assist  (partial stand only)   Bed, Chair, Wheelchair Transfer Unable to Participate   Skilled Intervention Verbal Cuing;Tactile Cuing;Sequencing;Postural Facilitation;Compensatory Strategies   Comments attempt but unable; partial stands only x 2   Activity Tolerance   Sitting Edge of Bed 10 min  (with support behind her back)   Standing partial stand 3 seconds   Patient / Family Goals   Patient / Family Goal #1 pt unable to state   Short Term Goals   Short Term Goal # 1 Pt will self feed with Min A & extra time   Goal Outcome # 1 Progressing as expected   Short Term Goal # 2 Pt will groom seated EOB with Min A   Goal Outcome # 2 Progressing slower than expected   Short Term Goal # 3 Pt will be Min A for ADL transfers   Goal Outcome # 3 Progressing slower than expected   Anticipated Discharge Equipment and Recommendations   DC Equipment Recommendations Unable to determine at this time   Discharge Recommendations Recommend post-acute placement for additional occupational therapy services prior to discharge home

## 2021-12-17 NOTE — PROGRESS NOTES
[facility] Medicine Daily Progress Note    Date of Service  12/17/2021    Chief Complaint  Roro Leon is a 66 y.o. female admitted 12/10/2021 with weakness    Hospital Course  Roro Leon is a 66-year-old female with a past medical history of YEISON, EtOH abuse, hypertension, seizures on Keppra (last seizure June 2021), and subdural hematoma who presented to the ED on 12/10/2021 with complaints of weakness and generalized malaise that have been worsening over the last 3 days.  She had been previously admitted for sepsis secondary to UTI in June and was discharged to [facility] for rehab.  She was discharged from the rehab in August however she reports since then she has not been able to ambulate well and has been feeling weak.  The months ago, she started noticing continuous bilateral hand tremors, right worse than left, with accompanying left leg intermittent tremors.  She did see her PCP who referred her to a neurologist, however she has not yet been seen for that appointment.  She endorses a significant history of alcohol abuse.    In the ED, patient was found to be dehydrated with resting and action tremors of bilateral hands.  Alcohol level was significantly elevated, TSH low normal, lactic acid 3.8, no leukocytosis, chest x-ray negative, and UA negative.  Patient was admitted to the observation unit for further work-up and management.    Neurology was consulted 12/11/2021 and recommend referral to outpatient neurology clinic.  Patient remains on normal saline infusion and lactic acid is being down.  She is being monitored for alcohol withdrawal.  Patient is not noted to be chronically anemic, iron studies were ordered which demonstrate anemia of chronic disease and a high vitamin B12 level.  Stool was negative for occult blood.    Interval Problem Update  12/14:  Patient in soft wrist restraints qhs only due to agitation.  CIWA scores 3. Sleeping soundly on exam.  Dc scheduled librium. On gabapentin as  well.  Monitor for need for higher level of care.  Continue home keppra for history of seizures.  PT/OT evaluation pending.  12/15: Patient less drowsy today off Librium.  Still requiring Ativan x2 last evening with CIWA scores 9 4 and 10.  Patient was difficult to understand with somewhat mumbling speech she is unable to tell me if she came from home.  She was recently admitted to a skilled nursing facility after GI bleed admission in September 2021.  According to old records she has had progressive generalized weakness as well as tremor.  Ammonia level normal at 28 thiamine level pending hemoglobin stable at 8.1 patient is complaining of left wrist pain with some erythema edema noted consistent with gout.  I have ordered a sed rate uric acid level and x-ray.  She is also known to have some tenderness in bilateral ankles.  Noted to have some foot contracture by PT.  Ordered swallow evaluation since mumbling difficult to understand speech.  Potassium replaced orally.  12/16: Mentation clear today.  No restraints required.  Unable to understand her words.  CT head negative.  Right wrist does show a hairline distal radius fracture.  Patient states she fell 2 weeks ago.  I have ordered a Velcro wrist splint per orthopedic traction.  No need for Ortho consult.  Will need to wear for 6 weeks.  Patient has intention tremor and that is why her CIWA scores are elevated.  Her mentation is clear she is no longer withdrawing DC'd CIWA telemetry in place transfer orders for medical unit.  I have ordered Tylenol and ibuprofen for wrist pain.  Patient remains with significant lower leg contractures and will likely need skilled nursing facility for continued rehab.  No Seroquel.  12/17: Mentation clear.  Sitting up at side of bed with physical therapy requiring assistance to sit up straight.  She was able to straighten her her right leg completely she was having more difficulty straightening her left leg.  Plan to mobilize up to  chair 3 times a day with meals continue PT OT.  Recommending skilled nursing facility.  Patient is able to left elbow weight-bear for platform walker placed order.  Nonweightbearing to left hand due to wrist fracture splint in place.      Consultants/Specialty  neurology    Code Status  Full Code    Disposition  Patient is not medically cleared.   Anticipate discharge to to skilled nursing facility.  I have placed the appropriate orders for post-discharge needs.    Review of Systems  Review of Systems   Constitutional: Positive for malaise/fatigue. Negative for fever.   HENT: Negative for hearing loss.    Eyes: Negative for double vision.   Respiratory: Negative for cough and shortness of breath.    Cardiovascular: Negative for chest pain, palpitations and leg swelling.   Gastrointestinal: Negative for abdominal pain, nausea and vomiting.   Genitourinary: Negative for dysuria and urgency.   Musculoskeletal: Negative for neck pain.   Skin: Negative for rash.   Neurological: Positive for tremors, seizures and weakness. Negative for dizziness and headaches.   Endo/Heme/Allergies: Does not bruise/bleed easily.   Psychiatric/Behavioral: Positive for substance abuse. Negative for depression.   All other systems reviewed and are negative.       Physical Exam  Temp:  [37.1 °C (98.8 °F)-37.9 °C (100.2 °F)] 37.4 °C (99.4 °F)  Pulse:  [90-96] 93  Resp:  [17-20] 18  BP: (121-155)/(71-91) 131/77  SpO2:  [93 %-96 %] 94 %    Physical Exam  Vitals and nursing note reviewed.   Constitutional:       Appearance: She is ill-appearing.   HENT:      Head: Normocephalic.      Mouth/Throat:      Mouth: Mucous membranes are moist.      Pharynx: Oropharynx is clear.   Cardiovascular:      Rate and Rhythm: Normal rate and regular rhythm.      Pulses: Normal pulses.      Heart sounds: Normal heart sounds.   Pulmonary:      Effort: Pulmonary effort is normal.      Breath sounds: Normal breath sounds.   Abdominal:      General: Abdomen is flat.  Bowel sounds are normal.      Palpations: Abdomen is soft.   Musculoskeletal:         General: Normal range of motion.   Skin:     General: Skin is warm and dry.      Capillary Refill: Capillary refill takes less than 2 seconds.   Neurological:      Mental Status: She is alert and oriented to person, place, and time.      Sensory: Sensation is intact.      Motor: Weakness and tremor present. No pronator drift.         Fluids    Intake/Output Summary (Last 24 hours) at 12/17/2021 1423  Last data filed at 12/16/2021 2155  Gross per 24 hour   Intake 240 ml   Output 250 ml   Net -10 ml       Laboratory  Recent Labs     12/15/21  0156 12/16/21  0259 12/17/21  0032   WBC 10.6 10.3 9.6   RBC 2.71* 2.82* 2.75*   HEMOGLOBIN 8.1* 8.4* 8.3*   HEMATOCRIT 25.9* 26.1* 25.4*   MCV 95.6 92.6 92.4   MCH 29.9 29.8 30.2   MCHC 31.3* 32.2* 32.7*   RDW 74.8* 71.3* 69.4*   PLATELETCT 161* 170 182   MPV 11.1 11.9 12.1     Recent Labs     12/15/21  0156 12/16/21  0259 12/17/21  0032   SODIUM 139 137 140   POTASSIUM 3.1* 3.6 3.4*   CHLORIDE 107 103 107   CO2 15* 18* 21   GLUCOSE 103* 95 148*   BUN 6* 6* 9   CREATININE 0.64 0.51 0.66   CALCIUM 8.0* 9.1 8.9                   Imaging  CT-HEAD W/O   Final Result         1.  No acute intracranial abnormality is identified, there are nonspecific white matter changes, commonly associated with small vessel ischemic disease.  Associated mild cerebral atrophy is noted.   2.  Atherosclerosis.         DX-WRIST-COMPLETE 3+ LEFT   Final Result         1. There is a possible nondisplaced intra-articular fracture of the distal radius seen on the oblique view, correlate for history of trauma and point tenderness.   2. Diffuse, decreased bone mineral density.      DX-CHEST-PORTABLE (1 VIEW)   Final Result      No acute cardiopulmonary abnormality.              Assessment/Plan  * Dehydration- (present on admission)  Assessment & Plan  Lactic acid level trending down  -Continue IV fluids     Contractures  involving both knees  Assessment & Plan  12/15:  Noted by PT/OT.  Patient states she was ambulatory with FWW prior to admission.  12/17:  Able to extend right leg full extension, left leg near complete extension.  Up to chair tid with meals, PT/OT daily.   SNF pending for further strengthening.    Hypokalemia- (present on admission)  Assessment & Plan  Po potassium replacement ordered.    Slurred speech- (present on admission)  Assessment & Plan  Difficult to understand on 12/15.  CT head negative  SLP swallow eval.  12/16:  Improved speech and mentation off librium.  dc'd CIWA.  dc'd seroquel.    Closed fracture of distal end of left radius with routine healing- (present on admission)  Assessment & Plan  Xray wrist with tiny minimal fracture distal radius seen in process of healing, likely 2 weeks old.  Sed rate normal  urice acid elevated  velcro wrist splint placed, to wear x 6 weeks.  Able to weight bear left elbow for platform walker, non weight bearing left hand.    Alcohol withdrawal delirium, acute, hyperactive (HCC)- (present on admission)  Assessment & Plan  Asleep on exam 12/14, cut back sedatives.  12/16: improved mentation off librium, dc'd CIWA.    Mixed action and resting tremor- (present on admission)  Assessment & Plan  Unclear etiology, difficult to determine if secondary to alcohol abuse versus neurologic abnormality  Neurology Dr. Khan consulted, appreciate recommendations  TSH low normal  -Neurology placed referral for outpatient neurology clinic  -Propanolol started in the ED  -Monitor    Alcohol withdrawal (HCC)- (present on admission)  Assessment & Plan  Reports that she drinks 3 to 4 glasses of wine a night  Difficult to ascertain if tremor is due to alcohol abuse versus neurological abnormality  -CIWA scores 3.  dc scheduled librium and limit sedatives given her sleeping on exam 12/14.  12/16:  dc CIWA protocol since only has intention tremors causing high score, mentation back to  baseline.    Anemia of chronic disease- (present on admission)  Assessment & Plan  H/H 8.5/25.8 this admission  Per chart review, chronically low  Iron studies support anemia of chronic disease  B12 level high, folate level normal, iron level high  -Monitor    Seizure (HCC)- (present on admission)  Assessment & Plan  History of  -continue keppra    Essential hypertension- (present on admission)  Assessment & Plan  Normotensive  -monitor and start oral antihypertensives if persistent       VTE prophylaxis: lovenox

## 2021-12-17 NOTE — CARE PLAN
Problem: Knowledge Deficit - Standard  Goal: Patient and family/care givers will demonstrate understanding of plan of care, disease process/condition, diagnostic tests and medications  Outcome: Progressing  Note: Patient verbally demonstrated full understanding of plan of care.  Will continue to educate patient and reinforce plan of care.       Problem: Fall Risk  Goal: Patient will remain free from falls  Outcome: Progressing  Note: Patient has remained free from falls/injury.  Fall precautions in place.    The patient is Stable - Low risk of patient condition declining or worsening    Shift Goals  Clinical Goals: Rest, monitor neurological status  Patient Goals: Rest and comfort  Family Goals: No family present    Progress made toward(s) clinical / shift goals:  Patient has remained free from falls/injury.  Fall precautions in place. Patient verbally demonstrated full understanding of plan of care.  Will continue to educate patient and reinforce plan of care.  Patient has been sleeping comfortably throughout night.    Patient is not progressing towards the following goals: N/A

## 2021-12-17 NOTE — THERAPY
Physical Therapy   Daily Treatment     Patient Name: Roro Leon  Age:  66 y.o., Sex:  female  Medical Record #: [id number]  Today's Date: 12/17/2021     Precautions  Precautions: Fall Risk;Swallow Precautions ( See Comments);Platform Weight Bearing Left Upper Extremity  Comments: MD clarified WB during session    Assessment    Patient continues to require max A for supine <> sit.  Patient with decreased seated balance today, initially requiring max A to maintain midline however progressed to sit without support by end of session.  Patient was able to perform STS x 2 with max A via HHA x 2 people.  Patient with posterior lean in standing with B LEs leaning against bed & R foot slipping anteriorly.  Educated patient on importance of increased mobility, would benefit from reinforcement.  PT to continue to follow.    Plan    Continue current treatment plan.    DC Equipment Recommendations: Unable to determine at this time  Discharge Recommendations: Recommend post-acute placement for additional physical therapy services prior to discharge home     Objective     12/17/21 1057   Cognition    Cognition / Consciousness X   Speech/ Communication Delayed Responses   Level of Consciousness Alert   Ability To Follow Commands 1 Step   New Learning Impaired   Attention Impaired   Sequencing Impaired   Initiation Impaired   Comments Hypophonic, pleasant & cooperative   Active ROM Lower Body    Comments L DF < R DF   Other Treatments   Other Treatments Provided Educated patient on importance of mobility to maintain and build strength and endurance   Neurological Concerns   Sitting Posture During ADL's Posterior Lean   Standing Posture During ADL's Posterior Lean   Balance   Sitting Balance (Static) Poor +   Sitting Balance (Dynamic) Poor   Standing Balance (Static) Trace +   Standing Balance (Dynamic) Trace   Weight Shift Sitting Poor   Weight Shift Standing Absent   Skilled Intervention Verbal Cuing;Sequencing;Postural  Facilitation   Comments w/ HHA x 2 people, pt leaning against bed with LEs   Gait Analysis   Gait Level Of Assist Unable to Participate   Bed Mobility    Supine to Sit Maximal Assist   Sit to Supine Maximal Assist   Scooting Maximal Assist   Rolling Maximum Assist to Lt.   Skilled Intervention Verbal Cuing;Sequencing;Postural Facilitation;Tactile Cuing   Functional Mobility   Sit to Stand Maximal Assist (x 2 people)   Bed, Chair, Wheelchair Transfer Unable to Participate   Mobility bed mobility, STS x 2   Skilled Intervention Verbal Cuing;Tactile Cuing;Postural Facilitation   Comments Posterior lean against bed, R foot slipping anteriorly.  Physical ciera to weight shift to sit closer to EOB   Activity Tolerance   Sitting in Chair NT   Sitting Edge of Bed 9 min   Standing <2 min total   Comments limited by weakness   Short Term Goals    Short Term Goal # 1 Pt will perform supine <> sit with min A within 6 visits in order to progress functional mobility   Goal Outcome # 1 goal not met   Short Term Goal # 2 Pt will perform STS with FWW and min A within 6 visits in order to progress OOB activity   Goal Outcome # 2 Goal not met   Short Term Goal # 3 Pt will perform functional transfers with FWW and mod A within 6 visits in order to progress OOB activity   Goal Outcome # 3 Goal not met   Session Information   Date / Session Number  12/17 - 3 (3/3, 12/20)

## 2021-12-18 LAB
ANION GAP SERPL CALC-SCNC: 11 MMOL/L (ref 7–16)
BASOPHILS # BLD AUTO: 0.4 % (ref 0–1.8)
BASOPHILS # BLD: 0.03 K/UL (ref 0–0.12)
BUN SERPL-MCNC: 14 MG/DL (ref 8–22)
CALCIUM SERPL-MCNC: 9.1 MG/DL (ref 8.5–10.5)
CHLORIDE SERPL-SCNC: 111 MMOL/L (ref 96–112)
CO2 SERPL-SCNC: 22 MMOL/L (ref 20–33)
CREAT SERPL-MCNC: 0.66 MG/DL (ref 0.5–1.4)
EOSINOPHIL # BLD AUTO: 0.21 K/UL (ref 0–0.51)
EOSINOPHIL NFR BLD: 3.1 % (ref 0–6.9)
ERYTHROCYTE [DISTWIDTH] IN BLOOD BY AUTOMATED COUNT: 70.4 FL (ref 35.9–50)
GLUCOSE SERPL-MCNC: 108 MG/DL (ref 65–99)
HCT VFR BLD AUTO: 25.7 % (ref 37–47)
HGB BLD-MCNC: 8.2 G/DL (ref 12–16)
IMM GRANULOCYTES # BLD AUTO: 0.04 K/UL (ref 0–0.11)
IMM GRANULOCYTES NFR BLD AUTO: 0.6 % (ref 0–0.9)
LYMPHOCYTES # BLD AUTO: 2.13 K/UL (ref 1–4.8)
LYMPHOCYTES NFR BLD: 31.7 % (ref 22–41)
MCH RBC QN AUTO: 29.7 PG (ref 27–33)
MCHC RBC AUTO-ENTMCNC: 31.9 G/DL (ref 33.6–35)
MCV RBC AUTO: 93.1 FL (ref 81.4–97.8)
MONOCYTES # BLD AUTO: 1.21 K/UL (ref 0–0.85)
MONOCYTES NFR BLD AUTO: 18 % (ref 0–13.4)
NEUTROPHILS # BLD AUTO: 3.1 K/UL (ref 2–7.15)
NEUTROPHILS NFR BLD: 46.2 % (ref 44–72)
NRBC # BLD AUTO: 0 K/UL
NRBC BLD-RTO: 0 /100 WBC
PLATELET # BLD AUTO: 217 K/UL (ref 164–446)
PMV BLD AUTO: 11.3 FL (ref 9–12.9)
POTASSIUM SERPL-SCNC: 4 MMOL/L (ref 3.6–5.5)
RBC # BLD AUTO: 2.76 M/UL (ref 4.2–5.4)
SODIUM SERPL-SCNC: 144 MMOL/L (ref 135–145)
WBC # BLD AUTO: 6.7 K/UL (ref 4.8–10.8)

## 2021-12-18 PROCEDURE — 700102 HCHG RX REV CODE 250 W/ 637 OVERRIDE(OP): Performed by: HOSPITALIST

## 2021-12-18 PROCEDURE — 700102 HCHG RX REV CODE 250 W/ 637 OVERRIDE(OP): Performed by: INTERNAL MEDICINE

## 2021-12-18 PROCEDURE — 770001 HCHG ROOM/CARE - MED/SURG/GYN PRIV*

## 2021-12-18 PROCEDURE — 700111 HCHG RX REV CODE 636 W/ 250 OVERRIDE (IP): Performed by: STUDENT IN AN ORGANIZED HEALTH CARE EDUCATION/TRAINING PROGRAM

## 2021-12-18 PROCEDURE — A9270 NON-COVERED ITEM OR SERVICE: HCPCS | Performed by: HOSPITALIST

## 2021-12-18 PROCEDURE — 80048 BASIC METABOLIC PNL TOTAL CA: CPT

## 2021-12-18 PROCEDURE — A9270 NON-COVERED ITEM OR SERVICE: HCPCS | Performed by: STUDENT IN AN ORGANIZED HEALTH CARE EDUCATION/TRAINING PROGRAM

## 2021-12-18 PROCEDURE — 85025 COMPLETE CBC W/AUTO DIFF WBC: CPT

## 2021-12-18 PROCEDURE — 99231 SBSQ HOSP IP/OBS SF/LOW 25: CPT | Performed by: HOSPITALIST

## 2021-12-18 PROCEDURE — 700102 HCHG RX REV CODE 250 W/ 637 OVERRIDE(OP): Performed by: STUDENT IN AN ORGANIZED HEALTH CARE EDUCATION/TRAINING PROGRAM

## 2021-12-18 PROCEDURE — 94760 N-INVAS EAR/PLS OXIMETRY 1: CPT

## 2021-12-18 PROCEDURE — A9270 NON-COVERED ITEM OR SERVICE: HCPCS | Performed by: INTERNAL MEDICINE

## 2021-12-18 PROCEDURE — 36415 COLL VENOUS BLD VENIPUNCTURE: CPT

## 2021-12-18 RX ADMIN — LISINOPRIL 20 MG: 20 TABLET ORAL at 06:35

## 2021-12-18 RX ADMIN — ENOXAPARIN SODIUM 40 MG: 40 INJECTION SUBCUTANEOUS at 06:34

## 2021-12-18 RX ADMIN — OMEPRAZOLE 20 MG: 20 CAPSULE, DELAYED RELEASE ORAL at 18:11

## 2021-12-18 RX ADMIN — Medication 100 MG: at 06:37

## 2021-12-18 RX ADMIN — PROPRANOLOL HYDROCHLORIDE 10 MG: 10 TABLET ORAL at 15:19

## 2021-12-18 RX ADMIN — PROPRANOLOL HYDROCHLORIDE 10 MG: 10 TABLET ORAL at 21:31

## 2021-12-18 RX ADMIN — OMEPRAZOLE 20 MG: 20 CAPSULE, DELAYED RELEASE ORAL at 06:36

## 2021-12-18 RX ADMIN — LEVETIRACETAM 500 MG: 500 TABLET, FILM COATED ORAL at 06:36

## 2021-12-18 RX ADMIN — POTASSIUM CHLORIDE 40 MEQ: 20 TABLET, EXTENDED RELEASE ORAL at 06:34

## 2021-12-18 RX ADMIN — LEVETIRACETAM 500 MG: 500 TABLET, FILM COATED ORAL at 18:11

## 2021-12-18 RX ADMIN — PROPRANOLOL HYDROCHLORIDE 10 MG: 10 TABLET ORAL at 06:35

## 2021-12-18 ASSESSMENT — ENCOUNTER SYMPTOMS
PALPITATIONS: 0
DOUBLE VISION: 0
NECK PAIN: 0
ABDOMINAL PAIN: 0
TREMORS: 1
DEPRESSION: 0
FEVER: 0
VOMITING: 0
SEIZURES: 1
COUGH: 0
BRUISES/BLEEDS EASILY: 0
DIZZINESS: 0
SHORTNESS OF BREATH: 0
WEAKNESS: 1
NAUSEA: 0
HEADACHES: 0

## 2021-12-18 ASSESSMENT — PAIN DESCRIPTION - PAIN TYPE
TYPE: ACUTE PAIN

## 2021-12-18 ASSESSMENT — LIFESTYLE VARIABLES: SUBSTANCE_ABUSE: 1

## 2021-12-18 NOTE — PROGRESS NOTES
Assumed care of PT A&O 3. Pt resting in bed with no signs of labored breathing. On RA. Call light within reach, bed in lowest position, upper bed rails up. Pt was updated on plan of care for the day.

## 2021-12-18 NOTE — CARE PLAN
Problem: Knowledge Deficit - Standard  Goal: Patient and family/care givers will demonstrate understanding of plan of care, disease process/condition, diagnostic tests and medications  Outcome: Progressing     Problem: Optimal Care for Alcohol Withdrawal  Goal: Optimal Care for the alcohol withdrawal patient  Outcome: Progressing     Problem: Lifestyle Changes  Goal: Patient's ability to identify lifestyle changes and available resources to help reduce recurrence of condition will improve  Outcome: Progressing     Problem: Psychosocial  Goal: Patient's level of anxiety will decrease  Outcome: Progressing   The patient is Stable - Low risk of patient condition declining or worsening    Shift Goals  Clinical Goals: Rest, monitor neurological status  Patient Goals: Rest and comfort  Family Goals: No family present

## 2021-12-18 NOTE — PROGRESS NOTES
Bedside report received from night RN; assumed pt care. Pt assessment complete. Pt A&Ox4 Reviewed plan of care with pt. Pt is medical. Chart and labs reviewed. Bed in lowest position, and 2 side rails up. Pt educated on call light; call light with in reach.

## 2021-12-18 NOTE — CARE PLAN
The patient is Stable - Low risk of patient condition declining or worsening    Shift Goals  Clinical Goals: Rest, monitor neurological status  Patient Goals: Rest and comfort  Family Goals: No family present    Progress made toward(s) clinical / shift goals:    Problem: Knowledge Deficit - Standard  Goal: Patient and family/care givers will demonstrate understanding of plan of care, disease process/condition, diagnostic tests and medications  Outcome: Progressing     Problem: Seizure Precautions  Goal: Implementation of seizure precautions  Outcome: Progressing     Problem: Fall Risk  Goal: Patient will remain free from falls  Outcome: Progressing       Patient is not progressing towards the following goals:

## 2021-12-18 NOTE — PROGRESS NOTES
Hospital Medicine Daily Progress Note    Date of Service  12/18/2021    Chief Complaint  Roro Leon is a 66 y.o. female admitted 12/10/2021 with weakness    Hospital Course  Roro Leon is a 66-year-old female with a past medical history of YEISON, EtOH abuse, hypertension, seizures on Keppra (last seizure June 2021), and subdural hematoma who presented to the ED on 12/10/2021 with complaints of weakness and generalized malaise that have been worsening over the last 3 days.  She had been previously admitted for sepsis secondary to UTI in June and was discharged to [facility] for rehab.  She was discharged from the rehab in August however she reports since then she has not been able to ambulate well and has been feeling weak.  The months ago, she started noticing continuous bilateral hand tremors, right worse than left, with accompanying left leg intermittent tremors.  She did see her PCP who referred her to a neurologist, however she has not yet been seen for that appointment.  She endorses a significant history of alcohol abuse.    In the ED, patient was found to be dehydrated with resting and action tremors of bilateral hands.  Alcohol level was significantly elevated, TSH low normal, lactic acid 3.8, no leukocytosis, chest x-ray negative, and UA negative.  Patient was admitted to the observation unit for further work-up and management.    Neurology was consulted 12/11/2021 and recommend referral to outpatient neurology clinic.  Patient remains on normal saline infusion and lactic acid is being down.  She is being monitored for alcohol withdrawal.  Patient is not noted to be chronically anemic, iron studies were ordered which demonstrate anemia of chronic disease and a high vitamin B12 level.  Stool was negative for occult blood.    Interval Problem Update  12/14:  Patient in soft wrist restraints qhs only due to agitation.  CIWA scores 3. Sleeping soundly on exam.  Dc scheduled librium. On gabapentin as  well.  Monitor for need for higher level of care.  Continue home keppra for history of seizures.  PT/OT evaluation pending.  12/15: Patient less drowsy today off Librium.  Still requiring Ativan x2 last evening with CIWA scores 9 4 and 10.  Patient was difficult to understand with somewhat mumbling speech she is unable to tell me if she came from home.  She was recently admitted to a skilled nursing facility after GI bleed admission in September 2021.  According to old records she has had progressive generalized weakness as well as tremor.  Ammonia level normal at 28 thiamine level pending hemoglobin stable at 8.1 patient is complaining of left wrist pain with some erythema edema noted consistent with gout.  I have ordered a sed rate uric acid level and x-ray.  She is also known to have some tenderness in bilateral ankles.  Noted to have some foot contracture by PT.  Ordered swallow evaluation since mumbling difficult to understand speech.  Potassium replaced orally.  12/16: Mentation clear today.  No restraints required.  Unable to understand her words.  CT head negative.  Right wrist does show a hairline distal radius fracture.  Patient states she fell 2 weeks ago.  I have ordered a Velcro wrist splint per orthopedic traction.  No need for Ortho consult.  Will need to wear for 6 weeks.  Patient has intention tremor and that is why her CIWA scores are elevated.  Her mentation is clear she is no longer withdrawing DC'd CIWA telemetry in place transfer orders for medical unit.  I have ordered Tylenol and ibuprofen for wrist pain.  Patient remains with significant lower leg contractures and will likely need skilled nursing facility for continued rehab.  No Seroquel.  12/17: Mentation clear.  Sitting up at side of bed with physical therapy requiring assistance to sit up straight.  She was able to straighten her her right leg completely she was having more difficulty straightening her left leg.  Plan to mobilize up to  chair 3 times a day with meals continue PT OT.  Recommending skilled nursing facility.  Patient is able to left elbow weight-bear for platform walker placed order.  Nonweightbearing to left hand due to wrist fracture splint in place.  12/18: Mentation remains clear.  Order for dangle at bedside daily.  Patient needs strengthening of her core muscles before she will be able to stand and ambulate.  She is able to straighten both of her legs almost to full extension on the left.      Consultants/Specialty  neurology    Code Status  Full Code    Disposition  Patient is medically cleared.   Anticipate discharge to to skilled nursing facility.  I have placed the appropriate orders for post-discharge needs.    Review of Systems  Review of Systems   Constitutional: Positive for malaise/fatigue. Negative for fever.   HENT: Negative for hearing loss.    Eyes: Negative for double vision.   Respiratory: Negative for cough and shortness of breath.    Cardiovascular: Negative for chest pain, palpitations and leg swelling.   Gastrointestinal: Negative for abdominal pain, nausea and vomiting.   Genitourinary: Negative for dysuria and urgency.   Musculoskeletal: Negative for neck pain.   Skin: Negative for rash.   Neurological: Positive for tremors, seizures and weakness. Negative for dizziness and headaches.   Endo/Heme/Allergies: Does not bruise/bleed easily.   Psychiatric/Behavioral: Positive for substance abuse. Negative for depression.   All other systems reviewed and are negative.       Physical Exam  Temp:  [36.8 °C (98.3 °F)-37.3 °C (99.2 °F)] 37.2 °C (99 °F)  Pulse:  [81-96] 87  Resp:  [18] 18  BP: (111-130)/(70-85) 121/79  SpO2:  [94 %-98 %] 97 %    Physical Exam  Vitals and nursing note reviewed.   Constitutional:       Appearance: She is ill-appearing.   HENT:      Head: Normocephalic.      Mouth/Throat:      Mouth: Mucous membranes are moist.      Pharynx: Oropharynx is clear.   Cardiovascular:      Rate and Rhythm:  Normal rate and regular rhythm.      Pulses: Normal pulses.      Heart sounds: Normal heart sounds.   Pulmonary:      Effort: Pulmonary effort is normal.      Breath sounds: Normal breath sounds.   Abdominal:      General: Abdomen is flat. Bowel sounds are normal.      Palpations: Abdomen is soft.   Musculoskeletal:         General: Normal range of motion.   Skin:     General: Skin is warm and dry.      Capillary Refill: Capillary refill takes less than 2 seconds.   Neurological:      Mental Status: She is alert and oriented to person, place, and time.      Sensory: Sensation is intact.      Motor: Weakness and tremor present. No pronator drift.         Fluids    Intake/Output Summary (Last 24 hours) at 12/18/2021 1458  Last data filed at 12/18/2021 0635  Gross per 24 hour   Intake 240 ml   Output --   Net 240 ml       Laboratory  Recent Labs     12/16/21  0259 12/17/21  0032 12/18/21  0145   WBC 10.3 9.6 6.7   RBC 2.82* 2.75* 2.76*   HEMOGLOBIN 8.4* 8.3* 8.2*   HEMATOCRIT 26.1* 25.4* 25.7*   MCV 92.6 92.4 93.1   MCH 29.8 30.2 29.7   MCHC 32.2* 32.7* 31.9*   RDW 71.3* 69.4* 70.4*   PLATELETCT 170 182 217   MPV 11.9 12.1 11.3     Recent Labs     12/16/21  0259 12/17/21  0032 12/18/21  0145   SODIUM 137 140 144   POTASSIUM 3.6 3.4* 4.0   CHLORIDE 103 107 111   CO2 18* 21 22   GLUCOSE 95 148* 108*   BUN 6* 9 14   CREATININE 0.51 0.66 0.66   CALCIUM 9.1 8.9 9.1                   Imaging  CT-HEAD W/O   Final Result         1.  No acute intracranial abnormality is identified, there are nonspecific white matter changes, commonly associated with small vessel ischemic disease.  Associated mild cerebral atrophy is noted.   2.  Atherosclerosis.         DX-WRIST-COMPLETE 3+ LEFT   Final Result         1. There is a possible nondisplaced intra-articular fracture of the distal radius seen on the oblique view, correlate for history of trauma and point tenderness.   2. Diffuse, decreased bone mineral density.      DX-CHEST-PORTABLE  (1 VIEW)   Final Result      No acute cardiopulmonary abnormality.              Assessment/Plan  * Dehydration- (present on admission)  Assessment & Plan  Lactic acid level trending down  -Continue IV fluids     Contractures involving both knees  Assessment & Plan  12/15:  Noted by PT/OT.  Patient states she was ambulatory with FWW prior to admission.  12/17:  Able to extend right leg full extension, left leg near complete extension.  Up to chair tid with meals, PT/OT daily.   SNF pending for further strengthening.    Hypokalemia- (present on admission)  Assessment & Plan  Po potassium replacement ordered.    Slurred speech- (present on admission)  Assessment & Plan  Difficult to understand on 12/15.  CT head negative  SLP swallow eval.  12/16:  Improved speech and mentation off librium.  dc'd CIWA.  dc'd seroquel.    Closed fracture of distal end of left radius with routine healing- (present on admission)  Assessment & Plan  Xray wrist with tiny minimal fracture distal radius seen in process of healing, likely 2 weeks old.  Sed rate normal  urice acid elevated  velcro wrist splint placed, to wear x 6 weeks.  Able to weight bear left elbow for platform walker, non weight bearing left hand.    Alcohol withdrawal delirium, acute, hyperactive (HCC)- (present on admission)  Assessment & Plan  Asleep on exam 12/14, cut back sedatives.  12/16: improved mentation off librium, dc'd CIWA.    Mixed action and resting tremor- (present on admission)  Assessment & Plan  Unclear etiology, difficult to determine if secondary to alcohol abuse versus neurologic abnormality  Neurology Dr. Khan consulted, appreciate recommendations  TSH low normal  -Neurology placed referral for outpatient neurology clinic  -Propanolol started in the ED  -Monitor    Alcohol withdrawal (HCC)- (present on admission)  Assessment & Plan  Reports that she drinks 3 to 4 glasses of wine a night  Difficult to ascertain if tremor is due to alcohol abuse  versus neurological abnormality  -CIWA scores 3.  dc scheduled librium and limit sedatives given her sleeping on exam 12/14.  12/16:  dc CIWA protocol since only has intention tremors causing high score, mentation back to baseline.    Anemia of chronic disease- (present on admission)  Assessment & Plan  H/H 8.5/25.8 this admission  Per chart review, chronically low  Iron studies support anemia of chronic disease  B12 level high, folate level normal, iron level high  -Monitor    Seizure (HCC)- (present on admission)  Assessment & Plan  History of  -continue keppra    Essential hypertension- (present on admission)  Assessment & Plan  Normotensive  -monitor and start oral antihypertensives if persistent       VTE prophylaxis: lovenox

## 2021-12-18 NOTE — PROGRESS NOTES
Received Bedside report. Assumed care at 1900. This pt is AOx4, 0/10 pain. Patient and RN discussed plan of care: questions answered. Labs noted, assessment complete, patient tolerating level 4--pureed, cardiac diet. Tele box in place. Pt is on room air. Call light in place, fall precautions in place, patient educated on importance of calling for assistance. No additional needs at this time. VSS

## 2021-12-18 NOTE — CARE PLAN
Problem: Pain - Standard  Goal: Alleviation of pain or a reduction in pain to the patient’s comfort goal  Outcome: Progressing  Flowsheets (Taken 12/18/2021 0000)  Non Verbal Scale:   Calm   Sleeping   Unlabored Breathing  Note: Patient has not complained of any pain throughout the night.  Patient has been sleeping comfortably.      Problem: Fall Risk  Goal: Patient will remain free from falls  Outcome: Progressing  Note: Patient has remained free from falls/injury.  Fall precautions in place.   The patient is Stable - Low risk of patient condition declining or worsening    Shift Goals  Clinical Goals: Rest, monitor neurological status  Patient Goals: Rest and comfort  Family Goals: No family present    Progress made toward(s) clinical / shift goals:  Patient has not complained of any pain throughout the night.  Patient has been sleeping comfortably. atient has remained free from falls/injury.  Fall precautions in place.       Patient is not progressing towards the following goals: N/A

## 2021-12-19 PROCEDURE — 99231 SBSQ HOSP IP/OBS SF/LOW 25: CPT | Performed by: HOSPITALIST

## 2021-12-19 PROCEDURE — 700111 HCHG RX REV CODE 636 W/ 250 OVERRIDE (IP): Performed by: STUDENT IN AN ORGANIZED HEALTH CARE EDUCATION/TRAINING PROGRAM

## 2021-12-19 PROCEDURE — 700102 HCHG RX REV CODE 250 W/ 637 OVERRIDE(OP): Performed by: HOSPITALIST

## 2021-12-19 PROCEDURE — A9270 NON-COVERED ITEM OR SERVICE: HCPCS | Performed by: STUDENT IN AN ORGANIZED HEALTH CARE EDUCATION/TRAINING PROGRAM

## 2021-12-19 PROCEDURE — 770001 HCHG ROOM/CARE - MED/SURG/GYN PRIV*

## 2021-12-19 PROCEDURE — 700102 HCHG RX REV CODE 250 W/ 637 OVERRIDE(OP): Performed by: INTERNAL MEDICINE

## 2021-12-19 PROCEDURE — A9270 NON-COVERED ITEM OR SERVICE: HCPCS | Performed by: INTERNAL MEDICINE

## 2021-12-19 PROCEDURE — 700102 HCHG RX REV CODE 250 W/ 637 OVERRIDE(OP): Performed by: STUDENT IN AN ORGANIZED HEALTH CARE EDUCATION/TRAINING PROGRAM

## 2021-12-19 PROCEDURE — A9270 NON-COVERED ITEM OR SERVICE: HCPCS | Performed by: HOSPITALIST

## 2021-12-19 RX ADMIN — ACETAMINOPHEN 650 MG: 325 TABLET, FILM COATED ORAL at 21:19

## 2021-12-19 RX ADMIN — PROPRANOLOL HYDROCHLORIDE 10 MG: 10 TABLET ORAL at 06:07

## 2021-12-19 RX ADMIN — OMEPRAZOLE 20 MG: 20 CAPSULE, DELAYED RELEASE ORAL at 16:05

## 2021-12-19 RX ADMIN — LISINOPRIL 20 MG: 20 TABLET ORAL at 06:07

## 2021-12-19 RX ADMIN — DOCUSATE SODIUM 50 MG AND SENNOSIDES 8.6 MG 2 TABLET: 8.6; 5 TABLET, FILM COATED ORAL at 16:05

## 2021-12-19 RX ADMIN — Medication 100 MG: at 06:07

## 2021-12-19 RX ADMIN — OMEPRAZOLE 20 MG: 20 CAPSULE, DELAYED RELEASE ORAL at 06:07

## 2021-12-19 RX ADMIN — ENOXAPARIN SODIUM 40 MG: 40 INJECTION SUBCUTANEOUS at 06:06

## 2021-12-19 RX ADMIN — PROPRANOLOL HYDROCHLORIDE 10 MG: 10 TABLET ORAL at 16:04

## 2021-12-19 RX ADMIN — DOCUSATE SODIUM 50 MG AND SENNOSIDES 8.6 MG 2 TABLET: 8.6; 5 TABLET, FILM COATED ORAL at 06:07

## 2021-12-19 RX ADMIN — LEVETIRACETAM 500 MG: 500 TABLET, FILM COATED ORAL at 06:07

## 2021-12-19 RX ADMIN — LEVETIRACETAM 500 MG: 500 TABLET, FILM COATED ORAL at 16:04

## 2021-12-19 RX ADMIN — PROPRANOLOL HYDROCHLORIDE 10 MG: 10 TABLET ORAL at 21:15

## 2021-12-19 ASSESSMENT — ENCOUNTER SYMPTOMS
DOUBLE VISION: 0
DIZZINESS: 0
TREMORS: 1
ABDOMINAL PAIN: 0
FEVER: 0
SHORTNESS OF BREATH: 0
COUGH: 0
DEPRESSION: 0
NAUSEA: 0
VOMITING: 0
WEAKNESS: 1
SEIZURES: 1
PALPITATIONS: 0
BRUISES/BLEEDS EASILY: 0
NECK PAIN: 0
HEADACHES: 0

## 2021-12-19 ASSESSMENT — PAIN DESCRIPTION - PAIN TYPE
TYPE: ACUTE PAIN

## 2021-12-19 ASSESSMENT — LIFESTYLE VARIABLES: SUBSTANCE_ABUSE: 1

## 2021-12-19 NOTE — PROGRESS NOTES
[facility] Medicine Daily Progress Note    Date of Service  12/19/2021    Chief Complaint  Roro Leon is a 66 y.o. female admitted 12/10/2021 with weakness    Hospital Course  Roro Leon is a 66-year-old female with a past medical history of YEISON, EtOH abuse, hypertension, seizures on Keppra (last seizure June 2021), and subdural hematoma who presented to the ED on 12/10/2021 with complaints of weakness and generalized malaise that have been worsening over the last 3 days.  She had been previously admitted for sepsis secondary to UTI in June and was discharged to [facility] for rehab.  She was discharged from the rehab in August however she reports since then she has not been able to ambulate well and has been feeling weak.  The months ago, she started noticing continuous bilateral hand tremors, right worse than left, with accompanying left leg intermittent tremors.  She did see her PCP who referred her to a neurologist, however she has not yet been seen for that appointment.  She endorses a significant history of alcohol abuse.    In the ED, patient was found to be dehydrated with resting and action tremors of bilateral hands.  Alcohol level was significantly elevated, TSH low normal, lactic acid 3.8, no leukocytosis, chest x-ray negative, and UA negative.  Patient was admitted to the observation unit for further work-up and management.    Neurology was consulted 12/11/2021 and recommend referral to outpatient neurology clinic.  Patient remains on normal saline infusion and lactic acid is being down.  She is being monitored for alcohol withdrawal.  Patient is not noted to be chronically anemic, iron studies were ordered which demonstrate anemia of chronic disease and a high vitamin B12 level.  Stool was negative for occult blood.    Interval Problem Update  12/14:  Patient in soft wrist restraints qhs only due to agitation.  CIWA scores 3. Sleeping soundly on exam.  Dc scheduled librium. On gabapentin as  well.  Monitor for need for higher level of care.  Continue home keppra for history of seizures.  PT/OT evaluation pending.  12/15: Patient less drowsy today off Librium.  Still requiring Ativan x2 last evening with CIWA scores 9 4 and 10.  Patient was difficult to understand with somewhat mumbling speech she is unable to tell me if she came from home.  She was recently admitted to a skilled nursing facility after GI bleed admission in September 2021.  According to old records she has had progressive generalized weakness as well as tremor.  Ammonia level normal at 28 thiamine level pending hemoglobin stable at 8.1 patient is complaining of left wrist pain with some erythema edema noted consistent with gout.  I have ordered a sed rate uric acid level and x-ray.  She is also known to have some tenderness in bilateral ankles.  Noted to have some foot contracture by PT.  Ordered swallow evaluation since mumbling difficult to understand speech.  Potassium replaced orally.  12/16: Mentation clear today.  No restraints required.  Unable to understand her words.  CT head negative.  Right wrist does show a hairline distal radius fracture.  Patient states she fell 2 weeks ago.  I have ordered a Velcro wrist splint per orthopedic traction.  No need for Ortho consult.  Will need to wear for 6 weeks.  Patient has intention tremor and that is why her CIWA scores are elevated.  Her mentation is clear she is no longer withdrawing DC'd CIWA telemetry in place transfer orders for medical unit.  I have ordered Tylenol and ibuprofen for wrist pain.  Patient remains with significant lower leg contractures and will likely need skilled nursing facility for continued rehab.  No Seroquel.  12/17: Mentation clear.  Sitting up at side of bed with physical therapy requiring assistance to sit up straight.  She was able to straighten her her right leg completely she was having more difficulty straightening her left leg.  Plan to mobilize up to  chair 3 times a day with meals continue PT OT.  Recommending skilled nursing facility.  Patient is able to left elbow weight-bear for platform walker placed order.  Nonweightbearing to left hand due to wrist fracture splint in place.  12/18: Mentation remains clear.  Order for dangle at bedside daily.  Patient needs strengthening of her core muscles before she will be able to stand and ambulate.  She is able to straighten both of her legs almost to full extension on the left.  12/19:  Eating, sitting up, answering questions appropriately.      Consultants/Specialty  neurology    Code Status  Full Code    Disposition  Patient is medically cleared.   Anticipate discharge to to skilled nursing facility.  I have placed the appropriate orders for post-discharge needs.    Review of Systems  Review of Systems   Constitutional: Positive for malaise/fatigue. Negative for fever.   HENT: Negative for hearing loss.    Eyes: Negative for double vision.   Respiratory: Negative for cough and shortness of breath.    Cardiovascular: Negative for chest pain, palpitations and leg swelling.   Gastrointestinal: Negative for abdominal pain, nausea and vomiting.   Genitourinary: Negative for dysuria and urgency.   Musculoskeletal: Negative for neck pain.   Skin: Negative for rash.   Neurological: Positive for tremors, seizures and weakness. Negative for dizziness and headaches.   Endo/Heme/Allergies: Does not bruise/bleed easily.   Psychiatric/Behavioral: Positive for substance abuse. Negative for depression.   All other systems reviewed and are negative.       Physical Exam  Temp:  [36.4 °C (97.6 °F)-37.1 °C (98.7 °F)] 37.1 °C (98.7 °F)  Pulse:  [80-92] 82  Resp:  [16-18] 16  BP: (129-146)/(86-89) 129/87  SpO2:  [97 %-99 %] 98 %    Physical Exam  Vitals and nursing note reviewed.   Constitutional:       Appearance: She is ill-appearing.   HENT:      Head: Normocephalic.      Mouth/Throat:      Mouth: Mucous membranes are moist.       Pharynx: Oropharynx is clear.   Cardiovascular:      Rate and Rhythm: Normal rate and regular rhythm.      Pulses: Normal pulses.      Heart sounds: Normal heart sounds.   Pulmonary:      Effort: Pulmonary effort is normal.      Breath sounds: Normal breath sounds.   Abdominal:      General: Abdomen is flat. Bowel sounds are normal.      Palpations: Abdomen is soft.   Musculoskeletal:         General: Normal range of motion.   Skin:     General: Skin is warm and dry.      Capillary Refill: Capillary refill takes less than 2 seconds.   Neurological:      Mental Status: She is alert and oriented to person, place, and time.      Sensory: Sensation is intact.      Motor: Weakness and tremor present. No pronator drift.         Fluids    Intake/Output Summary (Last 24 hours) at 12/19/2021 1157  Last data filed at 12/19/2021 1100  Gross per 24 hour   Intake 480 ml   Output 1000 ml   Net -520 ml       Laboratory  Recent Labs     12/17/21  0032 12/18/21  0145   WBC 9.6 6.7   RBC 2.75* 2.76*   HEMOGLOBIN 8.3* 8.2*   HEMATOCRIT 25.4* 25.7*   MCV 92.4 93.1   MCH 30.2 29.7   MCHC 32.7* 31.9*   RDW 69.4* 70.4*   PLATELETCT 182 217   MPV 12.1 11.3     Recent Labs     12/17/21  0032 12/18/21  0145   SODIUM 140 144   POTASSIUM 3.4* 4.0   CHLORIDE 107 111   CO2 21 22   GLUCOSE 148* 108*   BUN 9 14   CREATININE 0.66 0.66   CALCIUM 8.9 9.1                   Imaging  CT-HEAD W/O   Final Result         1.  No acute intracranial abnormality is identified, there are nonspecific white matter changes, commonly associated with small vessel ischemic disease.  Associated mild cerebral atrophy is noted.   2.  Atherosclerosis.         DX-WRIST-COMPLETE 3+ LEFT   Final Result         1. There is a possible nondisplaced intra-articular fracture of the distal radius seen on the oblique view, correlate for history of trauma and point tenderness.   2. Diffuse, decreased bone mineral density.      DX-CHEST-PORTABLE (1 VIEW)   Final Result      No acute  cardiopulmonary abnormality.              Assessment/Plan  * Dehydration- (present on admission)  Assessment & Plan  Lactic acid level trending down  -Continue IV fluids     Contractures involving both knees  Assessment & Plan  12/15:  Noted by PT/OT.  Patient states she was ambulatory with FWW prior to admission.  12/17:  Able to extend right leg full extension, left leg near complete extension.  Up to chair tid with meals, PT/OT daily.   SNF pending for further strengthening.    Hypokalemia- (present on admission)  Assessment & Plan  Po potassium replacement ordered.    Slurred speech- (present on admission)  Assessment & Plan  Difficult to understand on 12/15.  CT head negative  SLP swallow eval.  12/16:  Improved speech and mentation off librium.  dc'd CIWA.  dc'd seroquel.    Closed fracture of distal end of left radius with routine healing- (present on admission)  Assessment & Plan  Xray wrist with tiny minimal fracture distal radius seen in process of healing, likely 2 weeks old.  Sed rate normal  urice acid elevated  velcro wrist splint placed, to wear x 6 weeks.  Able to weight bear left elbow for platform walker, non weight bearing left hand.    Alcohol withdrawal delirium, acute, hyperactive (HCC)- (present on admission)  Assessment & Plan  Asleep on exam 12/14, cut back sedatives.  12/16: improved mentation off librium, dc'd CIWA.    Mixed action and resting tremor- (present on admission)  Assessment & Plan  Unclear etiology, difficult to determine if secondary to alcohol abuse versus neurologic abnormality  Neurology Dr. Khan consulted, appreciate recommendations  TSH low normal  -Neurology placed referral for outpatient neurology clinic  -Propanolol started in the ED  -Monitor    Alcohol withdrawal (HCC)- (present on admission)  Assessment & Plan  Reports that she drinks 3 to 4 glasses of wine a night  Difficult to ascertain if tremor is due to alcohol abuse versus neurological abnormality  -BASILIO  scores 3.  dc scheduled librium and limit sedatives given her sleeping on exam 12/14.  12/16:  dc CIWA protocol since only has intention tremors causing high score, mentation back to baseline.    Anemia of chronic disease- (present on admission)  Assessment & Plan  H/H 8.5/25.8 this admission  Per chart review, chronically low  Iron studies support anemia of chronic disease  B12 level high, folate level normal, iron level high  -Monitor    Seizure (HCC)- (present on admission)  Assessment & Plan  History of  -continue keppra    Essential hypertension- (present on admission)  Assessment & Plan  Normotensive  -monitor and start oral antihypertensives if persistent       VTE prophylaxis: lovenox

## 2021-12-19 NOTE — CARE PLAN
The patient is Stable - Low risk of patient condition declining or worsening    Shift Goals  Clinical Goals: safety, skin integrity, EOB  Patient Goals: sleep  Family Goals: NA    Progress made toward(s) clinical / shift goals:  Skin preventative measures in place: waffle cushion, Q2H turns, barrier cream, purewick for incontinence, mepilex on bony prominences.       Problem: Psychosocial  Goal: Patient's level of anxiety will decrease  Outcome: Progressing     Problem: Pain - Standard  Goal: Alleviation of pain or a reduction in pain to the patient’s comfort goal  Outcome: Progressing     Problem: Skin Integrity  Goal: Skin integrity is maintained or improved  Outcome: Progressing

## 2021-12-19 NOTE — PROGRESS NOTES
Received bedside report from RN, pt care assumed, VSS. Patient resting in bed, calm/unlabored breathing. No signs of acute distress noted at this time. Bed locked/in lowest position, call light within reach, hourly rounding initiated.

## 2021-12-19 NOTE — PROGRESS NOTES
Bedside report received and patient care assumed. Pt is resting in bed, A&Ox3,  disoriented to place. Pt with no complaints of pain, and is on RA. All fall precautions are in place, belongings at bedside table.  Pt was updated on POC, no questions or concerns. Pt educated on use of call light for assistance.

## 2021-12-19 NOTE — CARE PLAN
The patient is Stable - Low risk of patient condition declining or worsening    Shift Goals  Clinical Goals: Monitor VS and maintain hemodynamically stable, transfer to medical unit, increase mobility and dangle BLLE at EOB daily, maintain alertness   Patient Goals: Comfort, rest  Family Goals: ROSIO. No family present.     Progress made toward(s) clinical / shift goals:      Problem: Risk for Aspiration  Goal: Patient's risk for aspiration will be absent or decrease  Outcome: Progressing     Problem: Pain - Standard  Goal: Alleviation of pain or a reduction in pain to the patient’s comfort goal  Outcome: Progressing     Problem: Fall Risk  Goal: Patient will remain free from falls  Outcome: Progressing     Problem: Safety - Medical Restraint  Goal: Free from restraint(s) (Restraint for Interference with Medical Device)  Outcome: Met     Problem: Skin Integrity  Goal: Skin integrity is maintained or improved  Outcome: Progressing     Problem: Hemodynamics  Goal: Patient's hemodynamics, fluid balance and neurologic status will be stable or improve  Outcome: Progressing       Patient is not progressing towards the following goals: NA

## 2021-12-20 ENCOUNTER — PATIENT OUTREACH (OUTPATIENT)
Dept: HEALTH INFORMATION MANAGEMENT | Facility: OTHER | Age: 66
End: 2021-12-20

## 2021-12-20 LAB
ANION GAP SERPL CALC-SCNC: 13 MMOL/L (ref 7–16)
BASOPHILS # BLD AUTO: 1.6 % (ref 0–1.8)
BASOPHILS # BLD: 0.09 K/UL (ref 0–0.12)
BUN SERPL-MCNC: 13 MG/DL (ref 8–22)
CALCIUM SERPL-MCNC: 9.4 MG/DL (ref 8.5–10.5)
CHLORIDE SERPL-SCNC: 108 MMOL/L (ref 96–112)
CO2 SERPL-SCNC: 22 MMOL/L (ref 20–33)
CREAT SERPL-MCNC: 0.6 MG/DL (ref 0.5–1.4)
EOSINOPHIL # BLD AUTO: 0.18 K/UL (ref 0–0.51)
EOSINOPHIL NFR BLD: 3.1 % (ref 0–6.9)
ERYTHROCYTE [DISTWIDTH] IN BLOOD BY AUTOMATED COUNT: 70.4 FL (ref 35.9–50)
GLUCOSE SERPL-MCNC: 98 MG/DL (ref 65–99)
HCT VFR BLD AUTO: 31.5 % (ref 37–47)
HGB BLD-MCNC: 9.7 G/DL (ref 12–16)
IMM GRANULOCYTES # BLD AUTO: 0.06 K/UL (ref 0–0.11)
IMM GRANULOCYTES NFR BLD AUTO: 1 % (ref 0–0.9)
LYMPHOCYTES # BLD AUTO: 2.1 K/UL (ref 1–4.8)
LYMPHOCYTES NFR BLD: 36.3 % (ref 22–41)
MCH RBC QN AUTO: 29.5 PG (ref 27–33)
MCHC RBC AUTO-ENTMCNC: 30.8 G/DL (ref 33.6–35)
MCV RBC AUTO: 95.7 FL (ref 81.4–97.8)
MONOCYTES # BLD AUTO: 0.87 K/UL (ref 0–0.85)
MONOCYTES NFR BLD AUTO: 15 % (ref 0–13.4)
MORPHOLOGY BLD-IMP: NORMAL
NEUTROPHILS # BLD AUTO: 2.49 K/UL (ref 2–7.15)
NEUTROPHILS NFR BLD: 43 % (ref 44–72)
NRBC # BLD AUTO: 0 K/UL
NRBC BLD-RTO: 0 /100 WBC
PLATELET # BLD AUTO: 290 K/UL (ref 164–446)
PMV BLD AUTO: 11.2 FL (ref 9–12.9)
POTASSIUM SERPL-SCNC: 4 MMOL/L (ref 3.6–5.5)
RBC # BLD AUTO: 3.29 M/UL (ref 4.2–5.4)
SODIUM SERPL-SCNC: 143 MMOL/L (ref 135–145)
WBC # BLD AUTO: 5.8 K/UL (ref 4.8–10.8)

## 2021-12-20 PROCEDURE — 700102 HCHG RX REV CODE 250 W/ 637 OVERRIDE(OP): Performed by: INTERNAL MEDICINE

## 2021-12-20 PROCEDURE — 36415 COLL VENOUS BLD VENIPUNCTURE: CPT

## 2021-12-20 PROCEDURE — 85025 COMPLETE CBC W/AUTO DIFF WBC: CPT

## 2021-12-20 PROCEDURE — A9270 NON-COVERED ITEM OR SERVICE: HCPCS | Performed by: INTERNAL MEDICINE

## 2021-12-20 PROCEDURE — 700111 HCHG RX REV CODE 636 W/ 250 OVERRIDE (IP): Performed by: STUDENT IN AN ORGANIZED HEALTH CARE EDUCATION/TRAINING PROGRAM

## 2021-12-20 PROCEDURE — 80048 BASIC METABOLIC PNL TOTAL CA: CPT

## 2021-12-20 PROCEDURE — A9270 NON-COVERED ITEM OR SERVICE: HCPCS | Performed by: HOSPITALIST

## 2021-12-20 PROCEDURE — 770001 HCHG ROOM/CARE - MED/SURG/GYN PRIV*

## 2021-12-20 PROCEDURE — A9270 NON-COVERED ITEM OR SERVICE: HCPCS | Performed by: STUDENT IN AN ORGANIZED HEALTH CARE EDUCATION/TRAINING PROGRAM

## 2021-12-20 PROCEDURE — 700102 HCHG RX REV CODE 250 W/ 637 OVERRIDE(OP): Performed by: HOSPITALIST

## 2021-12-20 PROCEDURE — 700102 HCHG RX REV CODE 250 W/ 637 OVERRIDE(OP): Performed by: STUDENT IN AN ORGANIZED HEALTH CARE EDUCATION/TRAINING PROGRAM

## 2021-12-20 PROCEDURE — 99231 SBSQ HOSP IP/OBS SF/LOW 25: CPT | Performed by: HOSPITALIST

## 2021-12-20 RX ADMIN — Medication 100 MG: at 05:14

## 2021-12-20 RX ADMIN — PROPRANOLOL HYDROCHLORIDE 10 MG: 10 TABLET ORAL at 05:14

## 2021-12-20 RX ADMIN — LEVETIRACETAM 500 MG: 500 TABLET, FILM COATED ORAL at 05:14

## 2021-12-20 RX ADMIN — LISINOPRIL 20 MG: 20 TABLET ORAL at 05:14

## 2021-12-20 RX ADMIN — ACETAMINOPHEN 650 MG: 325 TABLET, FILM COATED ORAL at 18:03

## 2021-12-20 RX ADMIN — PROPRANOLOL HYDROCHLORIDE 10 MG: 10 TABLET ORAL at 21:24

## 2021-12-20 RX ADMIN — OMEPRAZOLE 20 MG: 20 CAPSULE, DELAYED RELEASE ORAL at 16:28

## 2021-12-20 RX ADMIN — LEVETIRACETAM 500 MG: 500 TABLET, FILM COATED ORAL at 16:28

## 2021-12-20 RX ADMIN — OMEPRAZOLE 20 MG: 20 CAPSULE, DELAYED RELEASE ORAL at 05:14

## 2021-12-20 RX ADMIN — ACETAMINOPHEN 650 MG: 325 TABLET, FILM COATED ORAL at 11:50

## 2021-12-20 RX ADMIN — PROPRANOLOL HYDROCHLORIDE 10 MG: 10 TABLET ORAL at 15:03

## 2021-12-20 RX ADMIN — DOCUSATE SODIUM 50 MG AND SENNOSIDES 8.6 MG 2 TABLET: 8.6; 5 TABLET, FILM COATED ORAL at 05:13

## 2021-12-20 RX ADMIN — DOCUSATE SODIUM 50 MG AND SENNOSIDES 8.6 MG 2 TABLET: 8.6; 5 TABLET, FILM COATED ORAL at 16:28

## 2021-12-20 RX ADMIN — ENOXAPARIN SODIUM 40 MG: 40 INJECTION SUBCUTANEOUS at 05:13

## 2021-12-20 ASSESSMENT — ENCOUNTER SYMPTOMS
COUGH: 0
TREMORS: 0
HEADACHES: 0
PALPITATIONS: 0
NAUSEA: 0
SEIZURES: 1
SHORTNESS OF BREATH: 0
BRUISES/BLEEDS EASILY: 0
DEPRESSION: 0
WEAKNESS: 1
FEVER: 0
VOMITING: 0
ABDOMINAL PAIN: 0
DOUBLE VISION: 0
NECK PAIN: 0
DIZZINESS: 0

## 2021-12-20 ASSESSMENT — LIFESTYLE VARIABLES: SUBSTANCE_ABUSE: 1

## 2021-12-20 ASSESSMENT — PATIENT HEALTH QUESTIONNAIRE - PHQ9
2. FEELING DOWN, DEPRESSED, IRRITABLE, OR HOPELESS: NOT AT ALL
1. LITTLE INTEREST OR PLEASURE IN DOING THINGS: NOT AT ALL
SUM OF ALL RESPONSES TO PHQ9 QUESTIONS 1 AND 2: 0

## 2021-12-20 ASSESSMENT — PAIN DESCRIPTION - PAIN TYPE
TYPE: ACUTE PAIN

## 2021-12-20 NOTE — WOUND TEAM
[facility]  Inpatient Services  Wound and Skin Care Brief Evaluation    Admission Date: 12/10/2021     Last order of IP CONSULT TO WOUND CARE was found on 12/19/2021 from Hospital Encounter on 12/10/2021     HPI, PMH, SH: Reviewed    Chief Complaint   Patient presents with   • Chills     for 1 day. hx of urosepsis, recently completed course of antbx for UTI   • Fatigue     weakness and malaise for 3 days     Diagnosis: Dehydration [E86.0]  Alcohol withdrawal delirium, acute, hyperactive (HCC) [F10.231]    Unit where seen by Wound Team: T706/01     Wound consult placed regarding Sacrum. Chart and images reviewed, image of perineum in chart. This discussed with bedside RN. This RN in to assess patient. Pt pleasant and agreeable. Pt turned to the right side. Non-selectively debrided with moist warm washcloth and no rinse foam soap. Pt sacrum intact, small partial thickness wound to posterior labial area. No pressure injuries or advanced wound care needs identified. Wound consult completed. No further follow up unless indicated and consulted.

## 2021-12-20 NOTE — PROGRESS NOTES
Hospital Medicine Daily Progress Note    Date of Service  12/20/2021    Chief Complaint  Roro Leon is a 66 y.o. female admitted 12/10/2021 with weakness    Hospital Course  Roro Leon is a 66-year-old female with a past medical history of YEISON, EtOH abuse, hypertension, seizures on Keppra (last seizure June 2021), and subdural hematoma who presented to the ED on 12/10/2021 with complaints of weakness and generalized malaise that have been worsening over the last 3 days.  She had been previously admitted for sepsis secondary to UTI in June and was discharged to [facility] for rehab.  She was discharged from the rehab in August however she reports since then she has not been able to ambulate well and has been feeling weak.  The months ago, she started noticing continuous bilateral hand tremors, right worse than left, with accompanying left leg intermittent tremors.  She did see her PCP who referred her to a neurologist, however she has not yet been seen for that appointment.  She endorses a significant history of alcohol abuse.    In the ED, patient was found to be dehydrated with resting and action tremors of bilateral hands.  Alcohol level was significantly elevated, TSH low normal, lactic acid 3.8, no leukocytosis, chest x-ray negative, and UA negative.  Patient was admitted to the observation unit for further work-up and management.    Neurology was consulted 12/11/2021 and recommend referral to outpatient neurology clinic.  Patient remains on normal saline infusion and lactic acid is being down.  She is being monitored for alcohol withdrawal.  Patient is not noted to be chronically anemic, iron studies were ordered which demonstrate anemia of chronic disease and a high vitamin B12 level.  Stool was negative for occult blood.    Interval Problem Update  12/14:  Patient in soft wrist restraints qhs only due to agitation.  CIWA scores 3. Sleeping soundly on exam.  Dc scheduled librium. On gabapentin as  well.  Monitor for need for higher level of care.  Continue home keppra for history of seizures.  PT/OT evaluation pending.  12/15: Patient less drowsy today off Librium.  Still requiring Ativan x2 last evening with CIWA scores 9 4 and 10.  Patient was difficult to understand with somewhat mumbling speech she is unable to tell me if she came from home.  She was recently admitted to a skilled nursing facility after GI bleed admission in September 2021.  According to old records she has had progressive generalized weakness as well as tremor.  Ammonia level normal at 28 thiamine level pending hemoglobin stable at 8.1 patient is complaining of left wrist pain with some erythema edema noted consistent with gout.  I have ordered a sed rate uric acid level and x-ray.  She is also known to have some tenderness in bilateral ankles.  Noted to have some foot contracture by PT.  Ordered swallow evaluation since mumbling difficult to understand speech.  Potassium replaced orally.  12/16: Mentation clear today.  No restraints required.  Unable to understand her words.  CT head negative.  Right wrist does show a hairline distal radius fracture.  Patient states she fell 2 weeks ago.  I have ordered a Velcro wrist splint per orthopedic traction.  No need for Ortho consult.  Will need to wear for 6 weeks.  Patient has intention tremor and that is why her CIWA scores are elevated.  Her mentation is clear she is no longer withdrawing DC'd CIWA telemetry in place transfer orders for medical unit.  I have ordered Tylenol and ibuprofen for wrist pain.  Patient remains with significant lower leg contractures and will likely need skilled nursing facility for continued rehab.  No Seroquel.  12/17: Mentation clear.  Sitting up at side of bed with physical therapy requiring assistance to sit up straight.  She was able to straighten her her right leg completely she was having more difficulty straightening her left leg.  Plan to mobilize up to  chair 3 times a day with meals continue PT OT.  Recommending skilled nursing facility.  Patient is able to left elbow weight-bear for platform walker placed order.  Nonweightbearing to left hand due to wrist fracture splint in place.  12/18: Mentation remains clear.  Order for dangle at bedside daily.  Patient needs strengthening of her core muscles before she will be able to stand and ambulate.  She is able to straighten both of her legs almost to full extension on the left.  12/19:  Eating, sitting up, answering questions appropriately.  12/20:  Medically cleared for SNF.  Awaiting acceptance.  Mentation clear.  Remains generally weak, continue PT/OT.  Patient wants to shower, but max assist, likely bed bath.      Consultants/Specialty  neurology    Code Status  Full Code    Disposition  Patient is medically cleared.   Anticipate discharge to to skilled nursing facility.  I have placed the appropriate orders for post-discharge needs.    Review of Systems  Review of Systems   Constitutional: Positive for malaise/fatigue. Negative for fever.   HENT: Negative for hearing loss.    Eyes: Negative for double vision.   Respiratory: Negative for cough and shortness of breath.    Cardiovascular: Negative for chest pain, palpitations and leg swelling.   Gastrointestinal: Negative for abdominal pain, nausea and vomiting.   Genitourinary: Negative for dysuria and urgency.   Musculoskeletal: Negative for neck pain.   Skin: Negative for rash.   Neurological: Positive for seizures and weakness. Negative for dizziness, tremors and headaches.   Endo/Heme/Allergies: Does not bruise/bleed easily.   Psychiatric/Behavioral: Positive for substance abuse. Negative for depression.   All other systems reviewed and are negative.       Physical Exam  Temp:  [36.2 °C (97.2 °F)-36.6 °C (97.9 °F)] 36.6 °C (97.9 °F)  Pulse:  [79-91] 88  Resp:  [15-16] 15  BP: (140-148)/(86-99) 140/87  SpO2:  [96 %-97 %] 96 %    Physical Exam  Vitals and nursing  note reviewed.   Constitutional:       Appearance: She is ill-appearing.   HENT:      Head: Normocephalic.      Mouth/Throat:      Mouth: Mucous membranes are moist.      Pharynx: Oropharynx is clear.   Cardiovascular:      Rate and Rhythm: Normal rate and regular rhythm.      Pulses: Normal pulses.      Heart sounds: Normal heart sounds.   Pulmonary:      Effort: Pulmonary effort is normal.      Breath sounds: Normal breath sounds.   Abdominal:      General: Abdomen is flat. Bowel sounds are normal.      Palpations: Abdomen is soft.   Musculoskeletal:         General: Normal range of motion.   Skin:     General: Skin is warm and dry.      Capillary Refill: Capillary refill takes less than 2 seconds.   Neurological:      Mental Status: She is alert and oriented to person, place, and time.      Sensory: Sensation is intact.      Motor: Weakness and tremor present. No pronator drift.         Fluids    Intake/Output Summary (Last 24 hours) at 12/20/2021 1357  Last data filed at 12/20/2021 1018  Gross per 24 hour   Intake 240 ml   Output --   Net 240 ml       Laboratory  Recent Labs     12/18/21  0145 12/20/21  0204   WBC 6.7 5.8   RBC 2.76* 3.29*   HEMOGLOBIN 8.2* 9.7*   HEMATOCRIT 25.7* 31.5*   MCV 93.1 95.7   MCH 29.7 29.5   MCHC 31.9* 30.8*   RDW 70.4* 70.4*   PLATELETCT 217 290   MPV 11.3 11.2     Recent Labs     12/18/21  0145 12/20/21  0204   SODIUM 144 143   POTASSIUM 4.0 4.0   CHLORIDE 111 108   CO2 22 22   GLUCOSE 108* 98   BUN 14 13   CREATININE 0.66 0.60   CALCIUM 9.1 9.4                   Imaging  CT-HEAD W/O   Final Result         1.  No acute intracranial abnormality is identified, there are nonspecific white matter changes, commonly associated with small vessel ischemic disease.  Associated mild cerebral atrophy is noted.   2.  Atherosclerosis.         DX-WRIST-COMPLETE 3+ LEFT   Final Result         1. There is a possible nondisplaced intra-articular fracture of the distal radius seen on the oblique  view, correlate for history of trauma and point tenderness.   2. Diffuse, decreased bone mineral density.      DX-CHEST-PORTABLE (1 VIEW)   Final Result      No acute cardiopulmonary abnormality.              Assessment/Plan  * Dehydration- (present on admission)  Assessment & Plan  Lactic acid level trending down  -Continue IV fluids     Contractures involving both knees  Assessment & Plan  12/15:  Noted by PT/OT.  Patient states she was ambulatory with FWW prior to admission.  12/17:  Able to extend right leg full extension, left leg near complete extension.  Up to chair tid with meals, PT/OT daily.   SNF pending for further strengthening.    Hypokalemia- (present on admission)  Assessment & Plan  Po potassium replacement ordered.    Slurred speech- (present on admission)  Assessment & Plan  Difficult to understand on 12/15.  CT head negative  SLP swallow eval.  12/16:  Improved speech and mentation off librium.  dc'd CIWA.  dc'd seroquel.    Closed fracture of distal end of left radius with routine healing- (present on admission)  Assessment & Plan  Xray wrist with tiny minimal fracture distal radius seen in process of healing, likely 2 weeks old.  Sed rate normal  urice acid elevated  velcro wrist splint placed, to wear x 6 weeks.  Able to weight bear left elbow for platform walker, non weight bearing left hand.    Alcohol withdrawal delirium, acute, hyperactive (HCC)- (present on admission)  Assessment & Plan  Asleep on exam 12/14, cut back sedatives.  12/16: improved mentation off librium, dc'd CIWA.    Mixed action and resting tremor- (present on admission)  Assessment & Plan  Unclear etiology, difficult to determine if secondary to alcohol abuse versus neurologic abnormality  Neurology Dr. Khan consulted, appreciate recommendations  TSH low normal  -Neurology placed referral for outpatient neurology clinic  -Propanolol started in the ED  -Monitor    Alcohol withdrawal (HCC)- (present on  admission)  Assessment & Plan  Reports that she drinks 3 to 4 glasses of wine a night  Difficult to ascertain if tremor is due to alcohol abuse versus neurological abnormality  -CIWA scores 3.  dc scheduled librium and limit sedatives given her sleeping on exam 12/14.  12/16:  dc CIWA protocol since only has intention tremors causing high score, mentation back to baseline.    Anemia of chronic disease- (present on admission)  Assessment & Plan  H/H 8.5/25.8 this admission  Per chart review, chronically low  Iron studies support anemia of chronic disease  B12 level high, folate level normal, iron level high  -Monitor    Seizure (HCC)- (present on admission)  Assessment & Plan  History of  -continue keppra    Essential hypertension- (present on admission)  Assessment & Plan  Normotensive  -monitor and start oral antihypertensives if persistent       VTE prophylaxis: lovenox

## 2021-12-20 NOTE — CARE PLAN
The patient is Stable - Low risk of patient condition declining or worsening    Shift Goals  Clinical Goals: reorient pt, rest  Patient Goals: rest and comfort  Family Goals: na    Progress made toward(s) clinical / shift goals:    Problem: Psychosocial  Goal: Patient's level of anxiety will decrease  Outcome: Progressing     Problem: Risk for Aspiration  Goal: Patient's risk for aspiration will be absent or decrease  Outcome: Progressing     Problem: Fall Risk  Goal: Patient will remain free from falls  Outcome: Progressing       Patient is not progressing towards the following goals:      Problem: Knowledge Deficit - Standard  Goal: Patient and family/care givers will demonstrate understanding of plan of care, disease process/condition, diagnostic tests and medications  Outcome: Not Progressing    Pt is confused and does not seem to comprehend education.

## 2021-12-20 NOTE — PROGRESS NOTES
Bedside report received from NOC RN. Assumed care of pt. Pt awake, laying in bed. A/Ox4, VSS. No concerns, complaints or distress. Pt educated to call before getting out of bed. POC reviewed and white board updated.pt is medical. Call light in reach. Bed locked in lowest position with 2 upper bed rails up. Bed alarm on.

## 2021-12-20 NOTE — DISCHARGE PLANNING
Agency/Facility Name: [facility]  Spoke To: Lucero  Outcome: No beds available today.    Agency/Facility Name: [facility]  Spoke To: Lucero  Outcome: Referral pending, waiting to hear back regarding bed availability.     Agency/Facility Name: [facility]  Outcome: Left voice message for [place] in admissions regarding bed availability, requested a call back.     [phone]  PRAVIN spoke to Sandi, requesting to know d/c plan.     RNCM notified

## 2021-12-20 NOTE — PROGRESS NOTES
Bedside report received from nurse. Assumed care of pt. Pt resting comfortably in bed. A/Ox3 confused to situation and very confused during converstaion , VSS,  All needs met. POC reviewed and white board updated. Pt is medical. Call light in reach. Bed locked in lowest position with 2 upper bed rails up. No pain or complaints

## 2021-12-20 NOTE — DIETARY
NUTR[facility] SERVICES - Alert received for newly identified wound. Wound team consult pending, will await wound staging to make recommendations if appropriate.     RD will monitor per dept policy.

## 2021-12-21 LAB — VIT B1 BLD-MCNC: 110 NMOL/L (ref 70–180)

## 2021-12-21 PROCEDURE — 700102 HCHG RX REV CODE 250 W/ 637 OVERRIDE(OP): Performed by: HOSPITALIST

## 2021-12-21 PROCEDURE — 97535 SELF CARE MNGMENT TRAINING: CPT

## 2021-12-21 PROCEDURE — 700102 HCHG RX REV CODE 250 W/ 637 OVERRIDE(OP): Performed by: STUDENT IN AN ORGANIZED HEALTH CARE EDUCATION/TRAINING PROGRAM

## 2021-12-21 PROCEDURE — 770001 HCHG ROOM/CARE - MED/SURG/GYN PRIV*

## 2021-12-21 PROCEDURE — 700111 HCHG RX REV CODE 636 W/ 250 OVERRIDE (IP): Performed by: STUDENT IN AN ORGANIZED HEALTH CARE EDUCATION/TRAINING PROGRAM

## 2021-12-21 PROCEDURE — 700102 HCHG RX REV CODE 250 W/ 637 OVERRIDE(OP): Performed by: INTERNAL MEDICINE

## 2021-12-21 PROCEDURE — 99232 SBSQ HOSP IP/OBS MODERATE 35: CPT | Performed by: HOSPITALIST

## 2021-12-21 PROCEDURE — 97530 THERAPEUTIC ACTIVITIES: CPT

## 2021-12-21 PROCEDURE — A9270 NON-COVERED ITEM OR SERVICE: HCPCS | Performed by: INTERNAL MEDICINE

## 2021-12-21 PROCEDURE — A9270 NON-COVERED ITEM OR SERVICE: HCPCS | Performed by: HOSPITALIST

## 2021-12-21 PROCEDURE — A9270 NON-COVERED ITEM OR SERVICE: HCPCS | Performed by: STUDENT IN AN ORGANIZED HEALTH CARE EDUCATION/TRAINING PROGRAM

## 2021-12-21 RX ADMIN — OMEPRAZOLE 20 MG: 20 CAPSULE, DELAYED RELEASE ORAL at 18:15

## 2021-12-21 RX ADMIN — LISINOPRIL 20 MG: 20 TABLET ORAL at 05:25

## 2021-12-21 RX ADMIN — PROPRANOLOL HYDROCHLORIDE 10 MG: 10 TABLET ORAL at 21:42

## 2021-12-21 RX ADMIN — LEVETIRACETAM 500 MG: 500 TABLET, FILM COATED ORAL at 18:15

## 2021-12-21 RX ADMIN — PROPRANOLOL HYDROCHLORIDE 10 MG: 10 TABLET ORAL at 05:24

## 2021-12-21 RX ADMIN — Medication 100 MG: at 05:24

## 2021-12-21 RX ADMIN — OMEPRAZOLE 20 MG: 20 CAPSULE, DELAYED RELEASE ORAL at 05:24

## 2021-12-21 RX ADMIN — LEVETIRACETAM 500 MG: 500 TABLET, FILM COATED ORAL at 05:24

## 2021-12-21 RX ADMIN — PROPRANOLOL HYDROCHLORIDE 10 MG: 10 TABLET ORAL at 15:42

## 2021-12-21 RX ADMIN — ENOXAPARIN SODIUM 40 MG: 40 INJECTION SUBCUTANEOUS at 05:24

## 2021-12-21 ASSESSMENT — COGNITIVE AND FUNCTIONAL STATUS - GENERAL
DAILY ACTIVITIY SCORE: 12
MOBILITY SCORE: 6
PERSONAL GROOMING: A LOT
HELP NEEDED FOR BATHING: A LOT
STANDING UP FROM CHAIR USING ARMS: TOTAL
DRESSING REGULAR UPPER BODY CLOTHING: A LOT
MOVING TO AND FROM BED TO CHAIR: UNABLE
CLIMB 3 TO 5 STEPS WITH RAILING: TOTAL
WALKING IN HOSPITAL ROOM: TOTAL
SUGGESTED CMS G CODE MODIFIER DAILY ACTIVITY: CL
DRESSING REGULAR LOWER BODY CLOTHING: A LOT
EATING MEALS: A LOT
TURNING FROM BACK TO SIDE WHILE IN FLAT BAD: UNABLE
MOVING FROM LYING ON BACK TO SITTING ON SIDE OF FLAT BED: UNABLE
SUGGESTED CMS G CODE MODIFIER MOBILITY: CN
TOILETING: A LOT

## 2021-12-21 ASSESSMENT — PAIN DESCRIPTION - PAIN TYPE
TYPE: ACUTE PAIN
TYPE: CHRONIC PAIN

## 2021-12-21 ASSESSMENT — ENCOUNTER SYMPTOMS
NAUSEA: 0
ABDOMINAL PAIN: 0
FEVER: 0
DIZZINESS: 0
PALPITATIONS: 0
CHILLS: 0
VOMITING: 0
SHORTNESS OF BREATH: 0
COUGH: 0
HEADACHES: 0

## 2021-12-21 ASSESSMENT — GAIT ASSESSMENTS: GAIT LEVEL OF ASSIST: UNABLE TO PARTICIPATE

## 2021-12-21 NOTE — THERAPY
"Occupational Therapy  Daily Treatment     Patient Name: Roro Leon  Age:  66 y.o., Sex:  female  Medical Record #: [id number]  Today's Date: 12/21/2021     Precautions  Precautions: Fall Risk,Swallow Precautions ( See Comments)  Comments: left wrist brace     Assessment    Pt seen for OT treatment. She is limited by confusion, generalized weakness, impaired balance and decreased LLE ROM. Pt incontinent of stool upon standing. She demonstrated poor initiation of ADLs but could assist with grooming with help to initiate.     Plan    Continue current treatment plan.    DC Equipment Recommendations: Unable to determine at this time  Discharge Recommendations: Recommend post-acute placement for additional occupational therapy services prior to discharge home    Subjective    \"I'm not feeling well, emotionally.\"      Objective       12/21/21 1131   Vitals   O2 Delivery Device None - Room Air   Pain 0 - 10 Group   Therapist Pain Assessment During Activity;Nurse Notified  (left leg pain and lower back pain )   Cognition    Speech/ Communication Delayed Responses   Level of Consciousness Alert   Initiation Impaired   Balance   Sitting Balance (Static) Fair   Sitting Balance (Dynamic) Fair -   Standing Balance (Static) Trace   Weight Shift Sitting Poor   Weight Shift Standing Absent   Skilled Intervention Verbal Cuing;Compensatory Strategies   Bed Mobility    Supine to Sit Maximal Assist   Sit to Supine Maximal Assist   Scooting Maximal Assist   Skilled Intervention Verbal Cuing;Tactile Cuing   Activities of Daily Living   Grooming Moderate Assist;Seated  (oral care, brush hair )   Bathing Maximal Assist  (LB sponge bath)   Lower Body Dressing Maximal Assist   Toileting Total Assist  (incontinent of stool)   Skilled Intervention Verbal Cuing;Compensatory Strategies;Sequencing   Comments pt incontinent of stool upon standing    How much help from another person does the patient currently need...   Putting on and taking off " regular lower body clothing? 2   Bathing (including washing, rinsing, and drying)? 2   Toileting, which includes using a toilet, bedpan, or urinal? 2   Putting on and taking off regular upper body clothing? 2   Taking care of personal grooming such as brushing teeth? 2   Eating meals? 2   6 Clicks Daily Activity Score 12   Functional Mobility   Sit to Stand Maximal Assist  (brief stand, limited by incontinence )   Skilled Intervention Verbal Cuing;Tactile Cuing;Facilitation   Activity Tolerance   Sitting Edge of Bed 30 min    Patient / Family Goals   Patient / Family Goal #1 pt unable to state   Short Term Goals   Short Term Goal # 1 Pt will self feed with Min A & extra time   Goal Outcome # 1 Progressing as expected   Short Term Goal # 2 Pt will groom seated EOB with Min A   Goal Outcome # 2 Progressing as expected   Short Term Goal # 3 Pt will be Min A for ADL transfers   Goal Outcome # 3 Progressing slower than expected   Education Group   Role of Occupational Therapist Patient Response Patient;Acceptance;Explanation;Reinforcement Needed

## 2021-12-21 NOTE — CARE PLAN
The patient is Stable - Low risk of patient condition declining or worsening    Shift Goals  Clinical Goals: reorient pt, rest, safety  Patient Goals: rest and comfort  Family Goals: na    Progress made toward(s) clinical / shift goals:    Problem: Psychosocial  Goal: Patient's level of anxiety will decrease  Outcome: Progressing     Problem: Risk for Aspiration  Goal: Patient's risk for aspiration will be absent or decrease  Outcome: Progressing     Problem: Fall Risk  Goal: Patient will remain free from falls  Outcome: Progressing       Patient is not progressing towards the following goals:      Problem: Knowledge Deficit - Standard  Goal: Patient and family/care givers will demonstrate understanding of plan of care, disease process/condition, diagnostic tests and medications  Outcome: Not Progressing   Pt doesn't show evidence of learning. Can answer all orientation questions but does not make sense in conversation.

## 2021-12-21 NOTE — PROGRESS NOTES
[facility] Daily Progress Note    Date of Service  12/21/2021    Chief Complaint  Roro Leon is a 66 y.o. female admitted 12/10/2021 with weakness    Hospital Course  Roro Leon is a 66-year-old female with a past medical history of EtOH abuse, hypertension, seizures on Keppra (last seizure June 2021), and subdural hematoma who presented to the ED on 12/10/2021 with complaints of weakness and generalized malaise that have been worsening over the last 3 days.  She was acutely intoxicated on presentation and placed on CIWA protocol for alcohol withdrawal.   She was also found to have a right distal radial hairline fracture and splint has been placed. She will need repeat xray imaging in 1-2 weeks.   Patient also noted worsening tremors over several months for which  Neurology was consulted 12/11/2021 and recommend referral to outpatient neurology clinic.    She is now medically cleared pending SNF acceptance.       Interval Problem Update  BHARAT overnight  Having some b/l knee pain which is chronic  Otherwise feeling weak    I have personally seen and examined the patient at bedside. I discussed the plan of care with patient, bedside RN, charge RN,  and pharmacy.    Consultants/Specialty  none    Code Status  Full Code    Disposition  Patient is medically cleared for discharge.   Anticipate discharge to to skilled nursing facility.  I have placed the appropriate orders for post-discharge needs.    Review of Systems  Review of Systems   Constitutional: Negative for chills and fever.   Respiratory: Negative for cough and shortness of breath.    Cardiovascular: Negative for chest pain and palpitations.   Gastrointestinal: Negative for abdominal pain, nausea and vomiting.   Genitourinary: Negative for dysuria and urgency.   Musculoskeletal: Positive for joint pain.   Neurological: Negative for dizziness and headaches.   All other systems reviewed and are negative.       Physical Exam  Temp:   [36.7 °C (98 °F)-37.1 °C (98.8 °F)] 36.7 °C (98 °F)  Pulse:  [79-99] 83  Resp:  [15-16] 16  BP: (129-135)/(78-89) 130/85  SpO2:  [95 %-97 %] 95 %    Physical Exam  Vitals and nursing note reviewed.   Constitutional:       Appearance: Normal appearance.   Cardiovascular:      Rate and Rhythm: Normal rate and regular rhythm.   Pulmonary:      Effort: Pulmonary effort is normal.      Breath sounds: Normal breath sounds.   Skin:     General: Skin is warm and dry.   Neurological:      General: No focal deficit present.      Mental Status: She is alert and oriented to person, place, and time.         Fluids    Intake/Output Summary (Last 24 hours) at 12/21/2021 1144  Last data filed at 12/20/2021 1400  Gross per 24 hour   Intake 120 ml   Output --   Net 120 ml       Laboratory  Recent Labs     12/20/21  0204   WBC 5.8   RBC 3.29*   HEMOGLOBIN 9.7*   HEMATOCRIT 31.5*   MCV 95.7   MCH 29.5   MCHC 30.8*   RDW 70.4*   PLATELETCT 290   MPV 11.2     Recent Labs     12/20/21  0204   SODIUM 143   POTASSIUM 4.0   CHLORIDE 108   CO2 22   GLUCOSE 98   BUN 13   CREATININE 0.60   CALCIUM 9.4                   Imaging  CT-HEAD W/O   Final Result         1.  No acute intracranial abnormality is identified, there are nonspecific white matter changes, commonly associated with small vessel ischemic disease.  Associated mild cerebral atrophy is noted.   2.  Atherosclerosis.         DX-WRIST-COMPLETE 3+ LEFT   Final Result         1. There is a possible nondisplaced intra-articular fracture of the distal radius seen on the oblique view, correlate for history of trauma and point tenderness.   2. Diffuse, decreased bone mineral density.      DX-CHEST-PORTABLE (1 VIEW)   Final Result      No acute cardiopulmonary abnormality.              Assessment/Plan  * Dehydration- (present on admission)  Assessment & Plan  resolved    Contractures involving both knees  Assessment & Plan  12/15:  Noted by PT/OT.  Patient states she was ambulatory with FWW  prior to admission.  12/17:  Able to extend right leg full extension, left leg near complete extension.  Up to chair tid with meals, PT/OT daily.   SNF pending for further strengthening.    Hypokalemia- (present on admission)  Assessment & Plan  Po potassium replacement ordered.    Slurred speech- (present on admission)  Assessment & Plan  resolved    Closed fracture of distal end of left radius with routine healing- (present on admission)  Assessment & Plan  Xray wrist with tiny minimal fracture distal radius seen in process of healing, likely 2 weeks old.  Sed rate normal  urice acid elevated  velcro wrist splint placed, to wear x 6 weeks.  Able to weight bear left elbow for platform walker, non weight bearing left hand.    Alcohol withdrawal delirium, acute, hyperactive (HCC)- (present on admission)  Assessment & Plan  resolved    Mixed action and resting tremor- (present on admission)  Assessment & Plan  Unclear etiology, difficult to determine if secondary to alcohol abuse versus neurologic abnormality  Neurology Dr. Khan consulted, appreciate recommendations  TSH low normal  -Neurology placed referral for outpatient neurology clinic  -Propanolol started in the ED  -Monitor    Alcohol withdrawal (HCC)- (present on admission)  Assessment & Plan  resolved    Anemia of chronic disease- (present on admission)  Assessment & Plan  H/H 8.5/25.8 this admission  Per chart review, chronically low  Iron studies support anemia of chronic disease  B12 level high, folate level normal, iron level high  -Monitor    Seizure (HCC)- (present on admission)  Assessment & Plan  History of  -continue keppra    Essential hypertension- (present on admission)  Assessment & Plan  Normotensive  -monitor and start oral antihypertensives if persistent       VTE prophylaxis: enoxaparin ppx    I have performed a physical exam and reviewed and updated ROS and Plan today (12/21/2021). In review of yesterday's note (12/20/2021), there are no  changes except as documented above.

## 2021-12-21 NOTE — PROGRESS NOTES
Bedside report received from nurse. Assumed care of pt. Pt resting comfortably in bed. A/Ox4 to orientation questions but confused and does not make sense in conversation, VSS,  All needs met. POC reviewed and white board updated. Pt is medical. Call light in reach. Bed locked in lowest position with 2 upper bed rails up. Pt reports some leg pain relieved by repositioning and SCD's.

## 2021-12-21 NOTE — PROGRESS NOTES
Bedside report received from nurse. Assumed care of pt. Pt resting comfortably in bed. A/Ox3 disoriented to place, VSS,  All needs met. POC reviewed and white board updated.  Call light in reach. Bed locked in lowest position with 2 upper bed rails up.

## 2021-12-21 NOTE — CARE PLAN
The patient is Stable - Low risk of patient condition declining or worsening    Shift Goals  Clinical Goals: reorient patient, increase mobility  Patient Goals: rest, comfort  Family Goals: na    Progress made toward(s) clinical / shift goals:  reorient patient, increase mobility  Patient Goals: rest, comfort  Problem: Pain - Standard  Goal: Alleviation of pain or a reduction in pain to the patient’s comfort goal  Outcome: Progressing     Problem: Skin Integrity  Goal: Skin integrity is maintained or improved  Outcome: Progressing     Problem: Hemodynamics  Goal: Patient's hemodynamics, fluid balance and neurologic status will be stable or improve  Outcome: Progressing       Patient is not progressing towards the following goals:

## 2021-12-22 PROBLEM — F10.939 ALCOHOL WITHDRAWAL (HCC): Status: RESOLVED | Noted: 2021-06-19 | Resolved: 2021-12-22

## 2021-12-22 PROBLEM — E86.0 DEHYDRATION: Status: RESOLVED | Noted: 2021-12-10 | Resolved: 2021-12-22

## 2021-12-22 PROBLEM — R47.81 SLURRED SPEECH: Status: RESOLVED | Noted: 2021-12-15 | Resolved: 2021-12-22

## 2021-12-22 PROBLEM — E87.6 HYPOKALEMIA: Status: RESOLVED | Noted: 2021-12-15 | Resolved: 2021-12-22

## 2021-12-22 PROBLEM — F10.931 ALCOHOL WITHDRAWAL DELIRIUM, ACUTE, HYPERACTIVE (HCC): Status: RESOLVED | Noted: 2021-12-11 | Resolved: 2021-12-22

## 2021-12-22 LAB — GLUCOSE BLD-MCNC: 74 MG/DL (ref 65–99)

## 2021-12-22 PROCEDURE — A9270 NON-COVERED ITEM OR SERVICE: HCPCS | Performed by: STUDENT IN AN ORGANIZED HEALTH CARE EDUCATION/TRAINING PROGRAM

## 2021-12-22 PROCEDURE — 700102 HCHG RX REV CODE 250 W/ 637 OVERRIDE(OP): Performed by: INTERNAL MEDICINE

## 2021-12-22 PROCEDURE — 700102 HCHG RX REV CODE 250 W/ 637 OVERRIDE(OP): Performed by: HOSPITALIST

## 2021-12-22 PROCEDURE — A9270 NON-COVERED ITEM OR SERVICE: HCPCS | Performed by: HOSPITALIST

## 2021-12-22 PROCEDURE — 700102 HCHG RX REV CODE 250 W/ 637 OVERRIDE(OP): Performed by: STUDENT IN AN ORGANIZED HEALTH CARE EDUCATION/TRAINING PROGRAM

## 2021-12-22 PROCEDURE — A9270 NON-COVERED ITEM OR SERVICE: HCPCS | Performed by: INTERNAL MEDICINE

## 2021-12-22 PROCEDURE — 99231 SBSQ HOSP IP/OBS SF/LOW 25: CPT | Performed by: HOSPITALIST

## 2021-12-22 PROCEDURE — 770001 HCHG ROOM/CARE - MED/SURG/GYN PRIV*

## 2021-12-22 PROCEDURE — 700111 HCHG RX REV CODE 636 W/ 250 OVERRIDE (IP): Performed by: STUDENT IN AN ORGANIZED HEALTH CARE EDUCATION/TRAINING PROGRAM

## 2021-12-22 PROCEDURE — 82962 GLUCOSE BLOOD TEST: CPT

## 2021-12-22 RX ADMIN — ENOXAPARIN SODIUM 40 MG: 40 INJECTION SUBCUTANEOUS at 05:11

## 2021-12-22 RX ADMIN — PROPRANOLOL HYDROCHLORIDE 10 MG: 10 TABLET ORAL at 16:31

## 2021-12-22 RX ADMIN — LISINOPRIL 20 MG: 20 TABLET ORAL at 05:10

## 2021-12-22 RX ADMIN — LEVETIRACETAM 500 MG: 500 TABLET, FILM COATED ORAL at 16:31

## 2021-12-22 RX ADMIN — PROPRANOLOL HYDROCHLORIDE 10 MG: 10 TABLET ORAL at 05:10

## 2021-12-22 RX ADMIN — ACETAMINOPHEN 650 MG: 325 TABLET, FILM COATED ORAL at 17:13

## 2021-12-22 RX ADMIN — OMEPRAZOLE 20 MG: 20 CAPSULE, DELAYED RELEASE ORAL at 05:10

## 2021-12-22 RX ADMIN — OMEPRAZOLE 20 MG: 20 CAPSULE, DELAYED RELEASE ORAL at 16:31

## 2021-12-22 RX ADMIN — LEVETIRACETAM 500 MG: 500 TABLET, FILM COATED ORAL at 05:10

## 2021-12-22 RX ADMIN — Medication 100 MG: at 05:10

## 2021-12-22 ASSESSMENT — ENCOUNTER SYMPTOMS
ABDOMINAL PAIN: 0
COUGH: 0
SHORTNESS OF BREATH: 0
VOMITING: 0
HEADACHES: 0
FEVER: 0
PALPITATIONS: 0
NAUSEA: 0
DIZZINESS: 0
CHILLS: 0

## 2021-12-22 ASSESSMENT — PAIN DESCRIPTION - PAIN TYPE
TYPE: ACUTE PAIN
TYPE: ACUTE PAIN

## 2021-12-22 ASSESSMENT — FIBROSIS 4 INDEX: FIB4 SCORE: 1.31

## 2021-12-22 NOTE — DISCHARGE PLANNING
Anticipated Discharge Disposition: SNF    Action: Case discussed with [facility] liaison Radha.  Patient has been off physical and chemical restraints since last week.  However, patient remains somewhat subdued and has had minimal progress with therapies.  Patient also lacks a solid discharge plan due to previously living alone with no family support.  Patient will likely need long term placement, [facility] declined due to having no long term care beds.    Barriers to Discharge: placement     Plan: Case coordination to continue to follow up with medical team to discuss discharge barriers.

## 2021-12-22 NOTE — THERAPY
Physical Therapy   Daily Treatment     Patient Name: Roro Leon  Age:  66 y.o., Sex:  female  Medical Record #: [id number]  Today's Date: 12/21/2021     Precautions  Precautions: Fall Risk;Swallow Precautions ( See Comments);Platform Weight Bearing Left Upper Extremity  Comments: L wrist brace    Assessment    Patient pleasantly confused today, continues to require max A for all mobility.  Patient with impaired initiation, required max A for supine <> sit.  Patient was able to maintain midline sitting balance with intermittent cues to weight shift to return to midline.  Patient was able to perform seated LAQ with assist for end range knee extension.  Attempted STS x 4 with HHA and max A.  B knees and ankles blocked due to weakness and feet slipping anteriorly however patient unable to weight shift anteriorly or extend knees and hips despite max cueing.  PT to continue to follow, may plan to decrease frequency if patient continues with minimal progress.    Plan    Continue current treatment plan.    DC Equipment Recommendations: Unable to determine at this time  Discharge Recommendations: Recommend post-acute placement for additional physical therapy services prior to discharge home     Objective     12/21/21 1618   Cognition    Cognition / Consciousness X   Speech/ Communication Delayed Responses   Level of Consciousness Alert   Ability To Follow Commands 1 Step   Initiation Impaired   Comments Pleasantly confused, thought she was getting ready to go to a concert   Passive ROM Lower Body   Comments B hip adduction, c/o pain with abduction   Sitting Lower Body Exercises   Sitting Lower Body Exercises Yes   Long Arc Quad 1 set of 10;Bilateral (assist for end range extension)   Neuro-Muscular Treatments   Neuro-Muscular Treatments Anterior weight shift;Compensatory Strategies;Postural Facilitation;Weight Shift Right;Weight Shift Left;Postural Changes   Balance   Sitting Balance (Static) Fair   Sitting Balance  (Dynamic) Fair -   Standing Balance (Static) Trace   Standing Balance (Dynamic) Trace   Weight Shift Sitting Poor   Weight Shift Standing Absent   Skilled Intervention Verbal Cuing;Sequencing;Postural Facilitation;Compensatory Strategies   Comments attempted to stand with HHA   Gait Analysis   Gait Level Of Assist Unable to Participate   Bed Mobility    Supine to Sit Maximal Assist   Sit to Supine Maximal Assist   Scooting Maximal Assist   Rolling Maximum Assist to Lt.   Skilled Intervention Verbal Cuing;Tactile Cuing;Sequencing   Comments HOB elevated   Functional Mobility   Sit to Stand Maximal Assist   Bed, Chair, Wheelchair Transfer Unable to Participate   Mobility supine <> EOB, STS attempt x 4   Skilled Intervention Verbal Cuing;Tactile Cuing;Postural Facilitation;Sequencing   Comments Attmempted STS x 4, minimal buttocks clearance.  B feet slipping anteriorly, B knees and feet blocked by PT.  Significant cues for anterior weight shifting, knee, and hip extension however minimal effort   Activity Tolerance   Sitting in Chair NT   Sitting Edge of Bed 20 min   Standing <2 min total   Comments limited by weakness   Short Term Goals    Short Term Goal # 1 Pt will perform supine <> sit with min A within 6 visits in order to progress functional mobility   Goal Outcome # 1 goal not met   Short Term Goal # 2 Pt will perform STS with FWW and min A within 6 visits in order to progress OOB activity   Goal Outcome # 2 Goal not met   Short Term Goal # 3 Pt will perform functional transfers with FWW and mod A within 6 visits in order to progress OOB activity   Goal Outcome # 3 Goal not met   Session Information   Date / Session Number  12/21 - 4 (1/3, 12/27)

## 2021-12-22 NOTE — PROGRESS NOTES
[facility] Medicine Daily Progress Note    Date of Service  12/22/2021    Chief Complaint  Roro Leon is a 66 y.o. female admitted 12/10/2021 with weakness    Hospital Course  Roro Leon is a 66-year-old female with a past medical history of EtOH abuse, hypertension, seizures on Keppra (last seizure June 2021), and subdural hematoma who presented to the ED on 12/10/2021 with complaints of weakness and generalized malaise that have been worsening over the last 3 days.  She was acutely intoxicated on presentation and placed on CIWA protocol for alcohol withdrawal.   She was also found to have a right distal radial hairline fracture and splint has been placed. She will need repeat xray imaging in 1-2 weeks.   Patient also noted worsening tremors over several months for which  Neurology was consulted 12/11/2021 and recommend referral to outpatient neurology clinic.    She is now medically cleared pending SNF acceptance.       Interval Problem Update  BHARAT overnight  Pending placement  She is resting comfortably with no new complaints    I have personally seen and examined the patient at bedside. I discussed the plan of care with patient, bedside RN, charge RN,  and pharmacy.    Consultants/Specialty  none    Code Status  Full Code    Disposition  Patient is medically cleared for discharge.   Anticipate discharge to to skilled nursing facility.  I have placed the appropriate orders for post-discharge needs.    Review of Systems  Review of Systems   Constitutional: Negative for chills and fever.   Respiratory: Negative for cough and shortness of breath.    Cardiovascular: Negative for chest pain and palpitations.   Gastrointestinal: Negative for abdominal pain, nausea and vomiting.   Genitourinary: Negative for dysuria and urgency.   Musculoskeletal: Positive for joint pain.   Neurological: Negative for dizziness and headaches.   All other systems reviewed and are negative.       Physical Exam  Temp:   [36.7 °C (98.1 °F)-37.3 °C (99.1 °F)] 37.2 °C (98.9 °F)  Pulse:  [80-86] 80  Resp:  [14-16] 16  BP: (120-145)/(71-84) 120/71  SpO2:  [95 %-99 %] 97 %    Physical Exam  Vitals and nursing note reviewed.   Constitutional:       Appearance: Normal appearance.   Cardiovascular:      Rate and Rhythm: Normal rate and regular rhythm.   Pulmonary:      Effort: Pulmonary effort is normal.      Breath sounds: Normal breath sounds.   Skin:     General: Skin is warm and dry.   Neurological:      General: No focal deficit present.      Mental Status: She is alert and oriented to person, place, and time.         Fluids  No intake or output data in the 24 hours ending 12/22/21 1151    Laboratory  Recent Labs     12/20/21  0204   WBC 5.8   RBC 3.29*   HEMOGLOBIN 9.7*   HEMATOCRIT 31.5*   MCV 95.7   MCH 29.5   MCHC 30.8*   RDW 70.4*   PLATELETCT 290   MPV 11.2     Recent Labs     12/20/21  0204   SODIUM 143   POTASSIUM 4.0   CHLORIDE 108   CO2 22   GLUCOSE 98   BUN 13   CREATININE 0.60   CALCIUM 9.4                   Imaging  CT-HEAD W/O   Final Result         1.  No acute intracranial abnormality is identified, there are nonspecific white matter changes, commonly associated with small vessel ischemic disease.  Associated mild cerebral atrophy is noted.   2.  Atherosclerosis.         DX-WRIST-COMPLETE 3+ LEFT   Final Result         1. There is a possible nondisplaced intra-articular fracture of the distal radius seen on the oblique view, correlate for history of trauma and point tenderness.   2. Diffuse, decreased bone mineral density.      DX-CHEST-PORTABLE (1 VIEW)   Final Result      No acute cardiopulmonary abnormality.              Assessment/Plan  Contractures involving both knees  Assessment & Plan  12/15:  Noted by PT/OT.  Patient states she was ambulatory with FWW prior to admission.  12/17:  Able to extend right leg full extension, left leg near complete extension.  Up to chair tid with meals, PT/OT daily.   SNF pending  for further strengthening.    Closed fracture of distal end of left radius with routine healing- (present on admission)  Assessment & Plan  Xray wrist with tiny minimal fracture distal radius seen in process of healing, likely 2 weeks old.  Sed rate normal  urice acid elevated  velcro wrist splint placed, to wear x 6 weeks.  Able to weight bear left elbow for platform walker, non weight bearing left hand.    Mixed action and resting tremor- (present on admission)  Assessment & Plan  Unclear etiology, difficult to determine if secondary to alcohol abuse versus neurologic abnormality  Neurology Dr. Khan consulted, appreciate recommendations  TSH low normal  -Neurology placed referral for outpatient neurology clinic  -Propanolol started in the ED  -Monitor    Anemia of chronic disease- (present on admission)  Assessment & Plan  H/H 8.5/25.8 this admission  Per chart review, chronically low  Iron studies support anemia of chronic disease  B12 level high, folate level normal, iron level high  -Monitor    Seizure (HCC)- (present on admission)  Assessment & Plan  History of  -continue keppra    Essential hypertension- (present on admission)  Assessment & Plan  Normotensive  -monitor and start oral antihypertensives if persistent       VTE prophylaxis: enoxaparin ppx    I have performed a physical exam and reviewed and updated ROS and Plan today (12/22/2021). In review of yesterday's note (12/21/2021), there are no changes except as documented above.

## 2021-12-22 NOTE — PROGRESS NOTES
[facility] e[facility]GENCY dEPARTMENT encounter:      CHIEF COMPLAINT:    Chief Complaint   Patient presents with   • Sore Throat   • Abdominal Pain       HPI:    Jose Abarca is a 19 year old female with hx of RAD who presented with the history of sore throat x 3 days. Associated symptoms include productive cough with yellow/green sputum, abdominal pain and diarrhea this morning. She was also febrile in triage. She is tolerating fluids but has not eaten today because of sore throat.  Denies SOB and chest pain, headache, mouth/facial swelling, drooling, nausea, vomiting, inability to tolerate oral fluids, and any other symptoms.    Denies sick contacts.  No treatments tried at home, she does have ibuprofen at home.  [facility] 5/6/17, denies possibility of pregnancy.       ALLERGIES:    ALLERGIES:  No Known Allergies    CURRENT MEDICATIONS:      No current facility-administered medications on file prior to encounter.   Current Outpatient Prescriptions on File Prior to Encounter:  ibuprofen (MOTRIN) 200 MG tablet   VITAMIN D, CHOLECALCIFEROL, PO   ALBUTEROL IN       PROBLEM LIST:  There is no problem list on file for this patient.       PAST MEDICAL HISTORY:    Past Medical History:   Diagnosis Date   • RAD (reactive airway disease)        SURGICAL HISTORY:    No past surgical history on file.    SOCIAL HISTORY:    Social History     Social History   • Marital status: Single     Spouse name: N/A   • Number of children: N/A   • Years of education: N/A     Social History Main Topics   • Smoking status: Never Smoker   • Smokeless tobacco: None   • Alcohol use No   • Drug use: No   • Sexual activity: Not Asked     Other Topics Concern   • None     Social History Narrative    ** Merged History Encounter **            FAMILY HISTORY:    No family history on file.      REVIEW OF SYSTEMS:    As above per the HPI.  All other systems reviewed and otherwise negative.  Constitutional: As per the HPI.   Skin: Negative for rash or  Received bedside report from RN, pt care assumed, VSS. Patient asleep in bed, calm/unlabored breathing. No signs of acute distress noted at this time. Bed locked/in lowest position, bed alarm on, call light within reach, hourly rounding initiated.    diaphoresis  HEENT: As per the HPI.  Respiratory: As per the HPI.    Cardiovascular: See history of present illness  Gastrointestinal: Negative for nausea, vomiting, diarrhea or abdominal pain.   Genitourinary: Negative for dysuria, urgency, frequency or hematuria  Extremities:  Negative for joint swelling or joint pain  Neuro: Negative for disorientation    PHYSICAL EXAM:   ED Triage Vitals   BP 05/12/17 1345 116/72   Heart Rate 05/12/17 1345 122   Resp 05/12/17 1345 18   Temp 05/12/17 1345 103.5 °F (39.7 °C)   SpO2 05/12/17 1345 98 %        Pulse Ox Interpretation: Within normal limits.  General: Alert, cooperative, conversive. No acute distress.  Skin:  Warm, dry and intact without rash.     HEENT: Normocephalic.   PERRL   The sclerae and conjunctivae are normal bilaterally, no drainage. No nasal congestion or rhinorrhea.  Bilateral normal TM's and external auditory canals.   The posterior pharynx is erythematous, with tonsillar edema and white exudates covering both tonsils. Uvula is midline.  Moist mucus membranes.  Neck:  The trachea is midline.  No cervical or supraclavicular lymphadenopathy.  Respiratory:  Bilaterally CTA.  Non-labored respirations.  Normal respiratory effort.  Cardiovascular:  Regular rate and rhythm.  No murmur.  Normal S1 and S2.  Gastrointestinal: Soft and non-tender.  Positive bowel sounds.  No hepatomegaly or splenomegaly.  No CVA tenderness.  Musculoskeletal:  No edema.  No joint effusions.    Neuro: A & O x 4, no weakness or loss of sensation.      LABORATORY AND RADIOLOGY RESULTS:  No orders to display       Results for orders placed or performed during the hospital encounter of 05/12/17   Rapid strep A POC   Result Value Ref Range    GRP A STREP Positive          COURSE & MEDICAL DECISION MAKING:   Vitals:    05/12/17 1345 05/12/17 1454   BP: 116/72 122/58   Pulse: 122 95   Resp: 18 16   Temp: (!) 103.5 °F (39.7 °C) 99.9 °F (37.7 °C)   TempSrc: Oral Oral   SpO2: 98% 99%   Weight:  (!) 111.1 kg    Height: 5' 3\" (1.6 m)        Medications   acetaminophen (TYLENOL) tablet 650 mg (650 mg Oral Given 5/12/17 1418)   ibuprofen (MOTRIN) tablet 600 mg (600 mg Oral Given 5/12/17 1418)       On exam, vitals show fever of 103.5 with corresponding tachycardia at 122 and patient is in NAD, nontoxic, she does appear mildly dehydrated.    Differential diagnosis includes but not limited to acute URI, viral syndrome, acute otitis media, viral versus bacterial pharyngitis, pneumonia, laryngitis. No physical exam findings consistent with AOM or pneumonia.    Exam c/w strep pharyngitis and rapid strep is positive. Rx for Amoxicillin 500mg BID for 10 days.  Was given tylenol in triage, pt denies possibility of pregnancy, LNMP 5/6/17, will also order ibuprofen and have pt drink water, then recheck vitals.    1500  Blood pressure 122/58, pulse 95, temperature 99.9 °F (37.7 °C), temperature source Oral, resp. rate 16, height 5' 3\" (1.6 m), weight (!) 111.1 kg, last menstrual period 04/16/2017, SpO2 99 %.  Patient is tolerating oral fluids.     I encouraged rest and fluids. Follow up with PCP in 3 days if not improving.  Return to the emergency department with fever, chills, inability to tolerate oral fluids or foods, difficult to breathing, wheezing and any other new or worsening symptoms. Patient understands and is appropriate for discharge.      DIAGNOSIS:  1. Acute streptococcal pharyngitis          Prescriptions:  New Prescriptions    AMOXICILLIN (AMOXIL) 500 MG CAPSULE    Take 1 capsule by mouth 2 times daily for 10 days.       Patient Care Instructions:   1. [facility] Instructions were provided for strep pharyngitis.      Follow Up Plan:  1.  Encouraged contacting the following provider or clinic to schedule an appointment:     Follow-up Information     Follow up With Details Comments Contact Info    Rashad Gonzalez Schedule an appointment as soon as possible for a visit in 3 days If you do not improve [address]  [facility] [address]  [facility] [address]  [phone]             2.  An appointment is needed :   > for re-examination.    3.  Return to the ED was encouraged for any change in symptoms or status.      This patient was seen under supervision of Dr. Emma Seaman, PA-C  05/12/17 4142

## 2021-12-22 NOTE — CARE PLAN
The patient is Stable - Low risk of patient condition declining or worsening    Shift Goals  Clinical Goals: safety, skin integrity  Patient Goals: rest  Family Goals: NA    Progress made toward(s) clinical / shift goals:  Fall precautions in place: treaded slipper socks on, mobility signs posted, hourly rounding, bed in lowest position, belongings and call light are within reach, bed alarm on, and near nurses station. Skin preventative measures in place: waffle cushion, Q2H turns, barrier cream, purewick for incontinence, mepilex on bony prominences.       Problem: Knowledge Deficit - Standard  Goal: Patient and family/care givers will demonstrate understanding of plan of care, disease process/condition, diagnostic tests and medications  Outcome: Progressing     Problem: Psychosocial  Goal: Patient's level of anxiety will decrease  Outcome: Progressing     Problem: Pain - Standard  Goal: Alleviation of pain or a reduction in pain to the patient’s comfort goal  Outcome: Progressing     Problem: Fall Risk  Goal: Patient will remain free from falls  Outcome: Progressing

## 2021-12-22 NOTE — CARE PLAN
The patient is Stable - Low risk of patient condition declining or worsening    Shift Goals  Clinical Goals: Safety  Patient Goals: Rest    Progress made toward(s) clinical / shift goals:      Problem: Knowledge Deficit - Standard  Goal: Patient and family/care givers will demonstrate understanding of plan of care, disease process/condition, diagnostic tests and medications  Outcome: Progressing  Note: Educated patient on POC, pain management, medication administration, ambulation, safety, interventions to maintain/ improve skin integrity, rest, diet, reorientation, Will continue to educate patient on POC accordingly.     Problem: Fall Risk  Goal: Patient will remain free from falls  Outcome: Progressing  Note: Appropriate fall precautions in place.

## 2021-12-22 NOTE — DISCHARGE PLANNING
Agency/Facility Name: [facility]  Outcome: Left Vmail for cooper regarding bed availability     [id number]  Agency/Facility Name: [facility]  Spoke To: yelena  Outcome: Per yelena she advised the pt has to be off all restraints, physically and chemically, also the pt has to be off CIWA, DPA advised yelena the pt was off the restraints and she  Disagreed.    Agency[facility]Facility Name: [facility]  Outcome: Left Vmail regarding referral       [id number]  Agency/Facility Name: [facility]  Spoke To: Bruce  Outcome: Per bruce pt is still chemically restrained. I will follow up with RNCM

## 2021-12-23 PROCEDURE — 92526 ORAL FUNCTION THERAPY: CPT

## 2021-12-23 PROCEDURE — 700111 HCHG RX REV CODE 636 W/ 250 OVERRIDE (IP): Performed by: STUDENT IN AN ORGANIZED HEALTH CARE EDUCATION/TRAINING PROGRAM

## 2021-12-23 PROCEDURE — A9270 NON-COVERED ITEM OR SERVICE: HCPCS | Performed by: HOSPITALIST

## 2021-12-23 PROCEDURE — 700102 HCHG RX REV CODE 250 W/ 637 OVERRIDE(OP): Performed by: HOSPITALIST

## 2021-12-23 PROCEDURE — 99231 SBSQ HOSP IP/OBS SF/LOW 25: CPT | Performed by: HOSPITALIST

## 2021-12-23 PROCEDURE — A9270 NON-COVERED ITEM OR SERVICE: HCPCS | Performed by: INTERNAL MEDICINE

## 2021-12-23 PROCEDURE — A9270 NON-COVERED ITEM OR SERVICE: HCPCS | Performed by: STUDENT IN AN ORGANIZED HEALTH CARE EDUCATION/TRAINING PROGRAM

## 2021-12-23 PROCEDURE — 770001 HCHG ROOM/CARE - MED/SURG/GYN PRIV*

## 2021-12-23 PROCEDURE — 700102 HCHG RX REV CODE 250 W/ 637 OVERRIDE(OP): Performed by: STUDENT IN AN ORGANIZED HEALTH CARE EDUCATION/TRAINING PROGRAM

## 2021-12-23 PROCEDURE — 700102 HCHG RX REV CODE 250 W/ 637 OVERRIDE(OP): Performed by: INTERNAL MEDICINE

## 2021-12-23 RX ORDER — IBUPROFEN 800 MG/1
400 TABLET ORAL EVERY 6 HOURS PRN
Status: DISCONTINUED | OUTPATIENT
Start: 2021-12-23 | End: 2022-01-11 | Stop reason: HOSPADM

## 2021-12-23 RX ADMIN — PROPRANOLOL HYDROCHLORIDE 10 MG: 10 TABLET ORAL at 06:36

## 2021-12-23 RX ADMIN — IBUPROFEN 400 MG: 800 TABLET, FILM COATED ORAL at 10:58

## 2021-12-23 RX ADMIN — LISINOPRIL 20 MG: 20 TABLET ORAL at 06:35

## 2021-12-23 RX ADMIN — LEVETIRACETAM 500 MG: 500 TABLET, FILM COATED ORAL at 17:40

## 2021-12-23 RX ADMIN — ACETAMINOPHEN 650 MG: 325 TABLET, FILM COATED ORAL at 06:50

## 2021-12-23 RX ADMIN — ACETAMINOPHEN 650 MG: 325 TABLET, FILM COATED ORAL at 13:41

## 2021-12-23 RX ADMIN — Medication 100 MG: at 06:35

## 2021-12-23 RX ADMIN — OMEPRAZOLE 40 MG: KIT at 08:20

## 2021-12-23 RX ADMIN — ENOXAPARIN SODIUM 40 MG: 40 INJECTION SUBCUTANEOUS at 06:35

## 2021-12-23 RX ADMIN — PROPRANOLOL HYDROCHLORIDE 10 MG: 10 TABLET ORAL at 22:59

## 2021-12-23 RX ADMIN — LEVETIRACETAM 500 MG: 500 TABLET, FILM COATED ORAL at 06:35

## 2021-12-23 RX ADMIN — OMEPRAZOLE 40 MG: KIT at 17:40

## 2021-12-23 ASSESSMENT — ENCOUNTER SYMPTOMS
COUGH: 0
CHILLS: 0
PALPITATIONS: 0
VOMITING: 0
DIZZINESS: 0
HEADACHES: 0
NAUSEA: 0
FEVER: 0
ABDOMINAL PAIN: 0
SHORTNESS OF BREATH: 0

## 2021-12-23 ASSESSMENT — FIBROSIS 4 INDEX
FIB4 SCORE: 1.31
FIB4 SCORE: 1.31

## 2021-12-23 ASSESSMENT — PAIN DESCRIPTION - PAIN TYPE
TYPE: CHRONIC PAIN
TYPE: CHRONIC PAIN;ACUTE PAIN

## 2021-12-23 NOTE — PROGRESS NOTES
[facility] Medicine Daily Progress Note    Date of Service  12/23/2021    Chief Complaint  Roro Leon is a 66 y.o. female admitted 12/10/2021 with weakness    Hospital Course  Roro Leon is a 66-year-old female with a past medical history of EtOH abuse, hypertension, seizures on Keppra (last seizure June 2021), and subdural hematoma who presented to the ED on 12/10/2021 with complaints of weakness and generalized malaise that have been worsening over the last 3 days.  She was acutely intoxicated on presentation and placed on CIWA protocol for alcohol withdrawal.   She was also found to have a right distal radial hairline fracture and splint has been placed. She will need repeat xray imaging in 1-2 weeks.   Patient also noted worsening tremors over several months for which  Neurology was consulted 12/11/2021 and recommend referral to outpatient neurology clinic.    She is now medically cleared pending SNF acceptance.       Interval Problem Update  BHARAT overnight  Having pain on her left shoulder when she goes to sleep  Pending placement    I have personally seen and examined the patient at bedside. I discussed the plan of care with patient, bedside RN, charge RN,  and pharmacy.    Consultants/Specialty  none    Code Status  Full Code    Disposition  Patient is medically cleared for discharge.   Anticipate discharge to to skilled nursing facility.  I have placed the appropriate orders for post-discharge needs.    Review of Systems  Review of Systems   Constitutional: Negative for chills and fever.   Respiratory: Negative for cough and shortness of breath.    Cardiovascular: Negative for chest pain and palpitations.   Gastrointestinal: Negative for abdominal pain, nausea and vomiting.   Genitourinary: Negative for dysuria and urgency.   Musculoskeletal: Positive for joint pain.   Neurological: Negative for dizziness and headaches.   All other systems reviewed and are negative.       Physical  Exam  Temp:  [36.2 °C (97.1 °F)-37.2 °C (98.9 °F)] 37.2 °C (98.9 °F)  Pulse:  [78-95] 94  Resp:  [16-18] 17  BP: ()/(53-77) 101/65  SpO2:  [94 %-97 %] 94 %    Physical Exam  Vitals and nursing note reviewed.   Constitutional:       Appearance: Normal appearance.   Cardiovascular:      Rate and Rhythm: Normal rate and regular rhythm.   Pulmonary:      Effort: Pulmonary effort is normal.      Breath sounds: Normal breath sounds.   Skin:     General: Skin is warm and dry.   Neurological:      General: No focal deficit present.      Mental Status: She is alert and oriented to person, place, and time.         Fluids  No intake or output data in the 24 hours ending 12/23/21 0906    Laboratory                        Imaging  CT-HEAD W/O   Final Result         1.  No acute intracranial abnormality is identified, there are nonspecific white matter changes, commonly associated with small vessel ischemic disease.  Associated mild cerebral atrophy is noted.   2.  Atherosclerosis.         DX-WRIST-COMPLETE 3+ LEFT   Final Result         1. There is a possible nondisplaced intra-articular fracture of the distal radius seen on the oblique view, correlate for history of trauma and point tenderness.   2. Diffuse, decreased bone mineral density.      DX-CHEST-PORTABLE (1 VIEW)   Final Result      No acute cardiopulmonary abnormality.              Assessment/Plan  Contractures involving both knees  Assessment & Plan  12/15:  Noted by PT/OT.  Patient states she was ambulatory with FWW prior to admission.  12/17:  Able to extend right leg full extension, left leg near complete extension.  Up to chair tid with meals, PT/OT daily.   SNF pending for further strengthening.    Closed fracture of distal end of left radius with routine healing- (present on admission)  Assessment & Plan  Xray wrist with tiny minimal fracture distal radius seen in process of healing, likely 2 weeks old.  Sed rate normal  urice acid elevated  velcro wrist  splint placed, to wear x 6 weeks.  Able to weight bear left elbow for platform walker, non weight bearing left hand.    Mixed action and resting tremor- (present on admission)  Assessment & Plan  Unclear etiology, difficult to determine if secondary to alcohol abuse versus neurologic abnormality  Neurology Dr. Khan consulted, appreciate recommendations  TSH low normal  -Neurology placed referral for outpatient neurology clinic  -Propanolol started in the ED  -Monitor    Anemia of chronic disease- (present on admission)  Assessment & Plan  H/H 8.5/25.8 this admission  Per chart review, chronically low  Iron studies support anemia of chronic disease  B12 level high, folate level normal, iron level high  -Monitor    Seizure (HCC)- (present on admission)  Assessment & Plan  History of  -continue keppra    Essential hypertension- (present on admission)  Assessment & Plan  Normotensive  -monitor and start oral antihypertensives if persistent       VTE prophylaxis: enoxaparin ppx    I have performed a physical exam and reviewed and updated ROS and Plan today (12/23/2021). In review of yesterday's note (12/22/2021), there are no changes except as documented above.

## 2021-12-23 NOTE — THERAPY
"Speech Language Pathology  Daily Treatment     Patient Name: Roro Leon  Age:  66 y.o., Sex:  female  Medical Record #: [id number]  Today's Date: 12/23/2021     Precautions  Precautions: Fall Risk,Swallow Precautions ( See Comments),Platform Weight Bearing Left Upper Extremity  Comments: L wrist brace    Assessment    Patient was seen for dysphagia tx with focus on swallowing reassessment with advanced textures. Pt was A&Ox4, able to sit upright and feed herself. She has upper dentures in place, does not wear lower dentures to eat. Oral containment, mastication and A-P lingual transit were functional for soft and regular solids. Pharyngeal swallow response appeared timely. No s/sx of aspiration occurred with solids or with thin liquids via straw. Pt appears appropriate for a full diet upgrade.    Recommendations:  Upgrade diet to Regular Solids (RG7) and Thin Liquids (TN0)   -Monitor during meals   -Pills whole w/ thins   -Sit upright for all PO intake, small bites/sips, slow rate   -Oral care BID to mitigate risk of aspiration PNA    Plan    Continue current treatment plan.    Discharge Recommendations: Anticipate that the patient will have no further speech therapy needs after discharge from the hospital       Objective       12/23/21 1201   Dysphagia    Positioning / Behavior Modification Modulate Rate or Bite Size;Other (see Comments)  (upright positioning)   Other Treatments tx trials, SB6, RG7, TN0 via straw   Diet / Liquid Recommendation Regular (7);Thin (0)   Recommended Route of Medication Administration   Medication Administration  Whole with Liquid Wash   Patient / Family Goals   Patient / Family Goal #1 \"Can you give me the cup\"   Goal #1 Outcome Goal met   Short Term Goals   Short Term Goal # 1 Patient will consume meals of pureed solids and thin liquids with no s/sx of aspiration given 1:1 feeding.   Goal Outcome # 1 Goal met, new goal added   Short Term Goal # 1 B  Patient will consume meals of " regular solids and thin liquids with no s/sx of aspiration given I use of safe swallow precautions.

## 2021-12-23 NOTE — CARE PLAN
Problem: Knowledge Deficit - Standard  Goal: Patient and family/care givers will demonstrate understanding of plan of care, disease process/condition, diagnostic tests and medications  Outcome: Progressing     Problem: Seizure Precautions  Goal: Implementation of seizure precautions  Outcome: Progressing     Problem: Fall Risk  Goal: Patient will remain free from falls  Outcome: Progressing   The patient is Stable - Low risk of patient condition declining or worsening    Shift Goals  Clinical Goals: pain control  Patient Goals: rest  Family Goals: na    Progress made toward(s) clinical / shift goals:  awaiting placement    Patient is not progressing towards the following goals:

## 2021-12-23 NOTE — PROGRESS NOTES
Received bedside report from RN, pt care assumed, VSS. Patient asleep in bed, calm/unlabored breathing. No signs of acute distress noted at this time. Bed locked/in lowest position, bed alarm on, call light within reach, hourly rounding initiated.

## 2021-12-23 NOTE — PROGRESS NOTES
Bedside report received from nurse. Assumed care of pt. Pt resting comfortably in bed. A/Ox4 intrmittently confused in conversation, VSS,  All needs met. POC reviewed and white board updated. Call light in reach. Bed locked in lowest position with 2 upper bed rails up. Mild pain BLE

## 2021-12-23 NOTE — CARE PLAN
Problem: Seizure Precautions  Goal: Implementation of seizure precautions  Outcome: Progressing     Problem: Psychosocial  Goal: Patient's level of anxiety will decrease  Outcome: Progressing     Problem: Risk for Aspiration  Goal: Patient's risk for aspiration will be absent or decrease  Outcome: Progressing   The patient is Watcher - Medium risk of patient condition declining or worsening    Shift Goals  Clinical Goals: rest  Patient Goals: rest  Family Goals: na    Progress made toward(s) clinical / shift goals:  progressing

## 2021-12-24 PROCEDURE — 99231 SBSQ HOSP IP/OBS SF/LOW 25: CPT | Performed by: HOSPITALIST

## 2021-12-24 PROCEDURE — 700111 HCHG RX REV CODE 636 W/ 250 OVERRIDE (IP): Performed by: STUDENT IN AN ORGANIZED HEALTH CARE EDUCATION/TRAINING PROGRAM

## 2021-12-24 PROCEDURE — 97530 THERAPEUTIC ACTIVITIES: CPT

## 2021-12-24 PROCEDURE — 700102 HCHG RX REV CODE 250 W/ 637 OVERRIDE(OP): Performed by: HOSPITALIST

## 2021-12-24 PROCEDURE — A9270 NON-COVERED ITEM OR SERVICE: HCPCS | Performed by: STUDENT IN AN ORGANIZED HEALTH CARE EDUCATION/TRAINING PROGRAM

## 2021-12-24 PROCEDURE — 700102 HCHG RX REV CODE 250 W/ 637 OVERRIDE(OP): Performed by: INTERNAL MEDICINE

## 2021-12-24 PROCEDURE — 700102 HCHG RX REV CODE 250 W/ 637 OVERRIDE(OP): Performed by: STUDENT IN AN ORGANIZED HEALTH CARE EDUCATION/TRAINING PROGRAM

## 2021-12-24 PROCEDURE — 770001 HCHG ROOM/CARE - MED/SURG/GYN PRIV*

## 2021-12-24 PROCEDURE — A9270 NON-COVERED ITEM OR SERVICE: HCPCS | Performed by: HOSPITALIST

## 2021-12-24 PROCEDURE — A9270 NON-COVERED ITEM OR SERVICE: HCPCS | Performed by: INTERNAL MEDICINE

## 2021-12-24 PROCEDURE — 92526 ORAL FUNCTION THERAPY: CPT

## 2021-12-24 PROCEDURE — 97535 SELF CARE MNGMENT TRAINING: CPT

## 2021-12-24 RX ADMIN — LEVETIRACETAM 500 MG: 500 TABLET, FILM COATED ORAL at 18:11

## 2021-12-24 RX ADMIN — LEVETIRACETAM 500 MG: 500 TABLET, FILM COATED ORAL at 05:22

## 2021-12-24 RX ADMIN — PROPRANOLOL HYDROCHLORIDE 10 MG: 10 TABLET ORAL at 05:23

## 2021-12-24 RX ADMIN — ENOXAPARIN SODIUM 40 MG: 40 INJECTION SUBCUTANEOUS at 05:23

## 2021-12-24 RX ADMIN — ACETAMINOPHEN 650 MG: 325 TABLET, FILM COATED ORAL at 14:47

## 2021-12-24 RX ADMIN — OMEPRAZOLE 40 MG: KIT at 05:21

## 2021-12-24 RX ADMIN — IBUPROFEN 400 MG: 800 TABLET, FILM COATED ORAL at 10:44

## 2021-12-24 RX ADMIN — PROPRANOLOL HYDROCHLORIDE 10 MG: 10 TABLET ORAL at 21:40

## 2021-12-24 RX ADMIN — Medication 100 MG: at 05:22

## 2021-12-24 RX ADMIN — PROPRANOLOL HYDROCHLORIDE 10 MG: 10 TABLET ORAL at 14:47

## 2021-12-24 RX ADMIN — IBUPROFEN 400 MG: 800 TABLET, FILM COATED ORAL at 18:17

## 2021-12-24 RX ADMIN — OMEPRAZOLE 40 MG: KIT at 18:12

## 2021-12-24 RX ADMIN — LISINOPRIL 20 MG: 20 TABLET ORAL at 05:23

## 2021-12-24 ASSESSMENT — ENCOUNTER SYMPTOMS
COUGH: 0
NAUSEA: 0
HEADACHES: 0
FEVER: 0
PALPITATIONS: 0
VOMITING: 0
DIZZINESS: 0
ABDOMINAL PAIN: 0
SHORTNESS OF BREATH: 0
CHILLS: 0

## 2021-12-24 ASSESSMENT — GAIT ASSESSMENTS: GAIT LEVEL OF ASSIST: UNABLE TO PARTICIPATE

## 2021-12-24 ASSESSMENT — COGNITIVE AND FUNCTIONAL STATUS - GENERAL
TOILETING: A LOT
CLIMB 3 TO 5 STEPS WITH RAILING: TOTAL
PERSONAL GROOMING: A LITTLE
MOVING TO AND FROM BED TO CHAIR: UNABLE
STANDING UP FROM CHAIR USING ARMS: A LOT
WALKING IN HOSPITAL ROOM: TOTAL
EATING MEALS: A LOT
SUGGESTED CMS G CODE MODIFIER MOBILITY: CM
DRESSING REGULAR UPPER BODY CLOTHING: A LOT
SUGGESTED CMS G CODE MODIFIER DAILY ACTIVITY: CL
MOBILITY SCORE: 8
DRESSING REGULAR LOWER BODY CLOTHING: A LOT
HELP NEEDED FOR BATHING: A LOT
DAILY ACTIVITIY SCORE: 13
TURNING FROM BACK TO SIDE WHILE IN FLAT BAD: A LOT
MOVING FROM LYING ON BACK TO SITTING ON SIDE OF FLAT BED: UNABLE

## 2021-12-24 ASSESSMENT — PAIN DESCRIPTION - PAIN TYPE: TYPE: ACUTE PAIN

## 2021-12-24 ASSESSMENT — FIBROSIS 4 INDEX: FIB4 SCORE: 1.31

## 2021-12-24 NOTE — PROGRESS NOTES
Received bedside report from RN, pt care assumed, VSS, pt assessment complete. Pt AAOx4 but confused in conversation, sleep in at this time. No signs of acute distress noted at this time. Plan of care discussed with pt and verbalizes no questions. Pt denies any additional needs at this time. Bed locked/in lowest position, bed alarm on, pt educated on fall risk and verbalized understanding, call light within reach, hourly rounding initiated.

## 2021-12-24 NOTE — THERAPY
Physical Therapy   Daily Treatment     Patient Name: Roro Leon  Age:  66 y.o., Sex:  female  Medical Record #: [id number]  Today's Date: 12/24/2021     Precautions  Precautions: Fall Risk;Swallow Precautions ( See Comments);Platform Weight Bearing Left Upper Extremity  Comments: L wrist brace    Assessment    Patient's mentation seemed improved today compared to previous sessions.  Patient required mod A for supine > sit.  Patient able to perform LAQ with less effort than previous session however knee and ankle ROM continue to be limited.  Patient required max A x 2 people to stand with HHA.  Patient was able to squat pivot transfer to chair with max A x 2 people.  Continue to recommend post acute placement, PT to continue to follow.    Plan    Continue current treatment plan.    DC Equipment Recommendations: Unable to determine at this time  Discharge Recommendations: Recommend post-acute placement for additional physical therapy services prior to discharge home     Objective     12/24/21 0935   Cognition    Comments Pleasant & cooperative, mentation seems improved today   Sitting Lower Body Exercises   Sitting Lower Body Exercises Yes   Long Arc Quad (1 set of 8, bilateral LEs)   Balance   Sitting Balance (Static) Fair   Sitting Balance (Dynamic) Fair -   Standing Balance (Static) Trace +   Standing Balance (Dynamic) Trace   Weight Shift Sitting Poor   Weight Shift Standing Poor   Skilled Intervention Verbal Cuing;Postural Facilitation;Sequencing   Comments w/ HHA x 2 people   Gait Analysis   Gait Level Of Assist Unable to Participate   Bed Mobility    Supine to Sit Moderate Assist   Sit to Supine (NT, left up in chair)   Scooting Maximal Assist (seated EOB)   Skilled Intervention Verbal Cuing;Postural Facilitation;Sequencing   Comments HOB elevated   Functional Mobility   Sit to Stand Maximal Assist (x 2 people)   Bed, Chair, Wheelchair Transfer Maximal Assist (x 2 people)   Transfer Method Squat Pivot    Mobility bed mobility, STS, transfer to chair   Skilled Intervention Verbal Cuing;Postural Facilitation;Sequencing   Activity Tolerance   Sitting in Chair 10+ min (post session)   Sitting Edge of Bed 10 min   Standing 3 min   Short Term Goals    Short Term Goal # 1 Pt will perform supine <> sit with min A within 6 visits in order to progress functional mobility   Goal Outcome # 1 goal not met   Short Term Goal # 2 Pt will perform STS with FWW and min A within 6 visits in order to progress OOB activity   Goal Outcome # 2 Goal not met   Short Term Goal # 3 Pt will perform functional transfers with FWW and mod A within 6 visits in order to progress OOB activity   Goal Outcome # 3 Goal not met   Session Information   Date / Session Number  12/24 - 5 (2/3, 12/27)

## 2021-12-24 NOTE — THERAPY
Occupational Therapy  Daily Treatment     Patient Name: Roro Leon  Age:  66 y.o., Sex:  female  Medical Record #: [id number]  Today's Date: 12/24/2021     Precautions  Precautions: Fall Risk,Swallow Precautions ( See Comments),Platform Weight Bearing Left Upper Extremity  Comments: L wrist brace    Assessment    Patient progressing towards short term goals. Patient still with decreased activity tolerance, safety awareness, independence in ADL and functional mobility. Max A for LB dressing, simple grooming task sitting EOB. Patient with squat pivot transfer from bed to chair level. Educated on importance of OOB activity and safety with functional mobility.      Plan    Continue current treatment plan.    DC Equipment Recommendations: (P)  (Continue to assess for D/C needs )  Discharge Recommendations: (P) Recommend post-acute placement for additional occupational therapy services prior to discharge home    Subjective    Patient alert and cooperative during session. Nurse reinaldo occupational therapist to work with patient. Supine in bed upon arrival.      Objective       12/24/21 0920   Total Time Spent   Total Time Spent (Mins) 30   Treatment Charges   OT Self Care / ADL 1   OT Therapy Activity 1   Precautions   Precautions Fall Risk   Comments L wrist brace    Pain   Pain Scales 0 to 10 Scale    Pain 0 - 10 Group   Location Leg   Location Orientation   (Bilaterally )   Therapist Pain Assessment Post Activity Pain Same as Prior to Activity;Nurse Notified;3   Bed Mobility    Supine to Sit Moderate Assist   Scooting Moderate Assist   Activities of Daily Living   Grooming   (Setup, washing face sitting EOB, combing hair)   Upper Body Dressing Moderate Assist  (Donning gown )   Lower Body Dressing Maximal Assist  (donning socks)   How much help from another person does the patient currently need...   Putting on and taking off regular lower body clothing? 2   Bathing (including washing, rinsing, and drying)? 2    Toileting, which includes using a toilet, bedpan, or urinal? 2   Putting on and taking off regular upper body clothing? 2   Taking care of personal grooming such as brushing teeth? 3   Eating meals? 2   6 Clicks Daily Activity Score 13   Functional Mobility   Sit to Stand Maximal Assist  (HHA )   Bed, Chair, Wheelchair Transfer Maximal Assist  (HHA, squat pivot transfer to R. )   Activity Tolerance   Sitting in Chair Left sitting in chair with all needs met and nurse call button in reach    Sitting Edge of Bed ~10 minutes    Standing less than 2 minutes    Comments Limited by weakness    Patient / Family Goals   Patient / Family Goal #1 Get stronger    Short Term Goals   Short Term Goal # 1 Pt will self feed with Min A & extra time   Goal Outcome # 1 Progressing as expected   Short Term Goal # 2 Pt will groom seated EOB with Min A   Goal Outcome # 2 Progressing as expected   Short Term Goal # 3 Pt will be Min A for ADL transfers   Goal Outcome # 3 Progressing as expected   Education Group   Role of Occupational Therapist Patient Response Patient;Acceptance;Explanation;Reinforcement Needed   Additional Comments Educated on importance of OOB activity and safety with functional mobility    Anticipated Discharge Equipment and Recommendations   DC Equipment Recommendations   (Continue to assess for D/C needs )   Discharge Recommendations Recommend post-acute placement for additional occupational therapy services prior to discharge home   Interdisciplinary Plan of Care Collaboration   IDT Collaboration with  Nursing;Physical Therapist   Patient Position at End of Therapy Chair Alarm On;Call Light within Reach;Tray Table within Reach   Collaboration Comments OLGA LIDIA madsen functional mobility    Session Information   Date / Session Number  12/24, #4 (1/3x 12/28)   Priority 2

## 2021-12-24 NOTE — CARE PLAN
Problem: Knowledge Deficit - Standard  Goal: Patient and family/care givers will demonstrate understanding of plan of care, disease process/condition, diagnostic tests and medications  Outcome: Progressing     Problem: Seizure Precautions  Goal: Implementation of seizure precautions  Outcome: Progressing     Problem: Psychosocial  Goal: Patient's level of anxiety will decrease  Outcome: Progressing   The patient is Stable - Low risk of patient condition declining or worsening    Shift Goals  Clinical Goals: rest  Patient Goals: rest  Family Goals: na    Progress made toward(s) clinical / shift goals:  progressing

## 2021-12-24 NOTE — THERAPY
"Speech Language Pathology  Daily Treatment     Patient Name: Roro Leon  Age:  66 y.o., Sex:  female  Medical Record #: [id number]  Today's Date: 12/24/2021     Precautions  Precautions: (P) Fall Risk  Comments: L wrist brace    Assessment    Pt was seen for dysphagia tx with lunch meal of regular solids and thin liquids, pills whole w/ thins w/ RN. Pt was A&Ox4, repositioned upright and able to feed self. Pt independently took small bites. Efficient mastication, timely pharyngeal swallow and no oral residue appreciated. No s/sx of aspiration occurred with PO intake. Pt reports no difficulty swallowing and feels competent to continue to feed herself. No further skilled SLP intervention is indicated for dysphagia. SLP will no longer actively follow. Continue regular/thin diet.     Plan    Discharge secondary to goals met.    Discharge Recommendations: (P) Anticipate that the patient will have no further speech therapy needs after discharge from the hospital       Objective       12/24/21 1514   Dysphagia    Positioning / Behavior Modification Self Monitoring   Other Treatments meal tx RG7/TN0   Diet / Liquid Recommendation Regular (7);Thin (0)   Recommended Route of Medication Administration   Medication Administration  Whole with Liquid Wash   Patient / Family Goals   Patient / Family Goal #1 \"Can you give me the cup\"   Goal #1 Outcome Goal met   Short Term Goals   Short Term Goal # 1 B  Patient will consume meals of regular solids and thin liquids with no s/sx of aspiration given I use of safe swallow precautions.   Goal Outcome  # 1 B Goal met   Short Term Goal # 2 Patient will complete lingual strengthening exercises x15 reps per session given mod cues.  (not targeted; pt is no longer presenting w/ dysphagia)         "

## 2021-12-24 NOTE — PROGRESS NOTES
[facility] Medicine Daily Progress Note    Date of Service  12/24/2021    Chief Complaint  Roro Leon is a 66 y.o. female admitted 12/10/2021 with weakness    Hospital Course  Roro Leon is a 66-year-old female with a past medical history of EtOH abuse, hypertension, seizures on Keppra (last seizure June 2021), and subdural hematoma who presented to the ED on 12/10/2021 with complaints of weakness and generalized malaise that have been worsening over the last 3 days.  She was acutely intoxicated on presentation and placed on CIWA protocol for alcohol withdrawal.   She was also found to have a right distal radial hairline fracture and splint has been placed. She will need repeat xray imaging in 1-2 weeks.   Patient also noted worsening tremors over several months for which  Neurology was consulted 12/11/2021 and recommend referral to outpatient neurology clinic.    She is now medically cleared pending SNF acceptance.       Interval Problem Update  BHARAT overnight  Awaiting placement  No new complaints    I have personally seen and examined the patient at bedside. I discussed the plan of care with patient, bedside RN, charge RN,  and pharmacy.    Consultants/Specialty  none    Code Status  Full Code    Disposition  Patient is medically cleared for discharge.   Anticipate discharge to to skilled nursing facility.  I have placed the appropriate orders for post-discharge needs.    Review of Systems  Review of Systems   Constitutional: Negative for chills and fever.   Respiratory: Negative for cough and shortness of breath.    Cardiovascular: Negative for chest pain and palpitations.   Gastrointestinal: Negative for abdominal pain, nausea and vomiting.   Genitourinary: Negative for dysuria and urgency.   Musculoskeletal: Positive for joint pain.   Neurological: Negative for dizziness and headaches.   All other systems reviewed and are negative.       Physical Exam  Temp:  [36.8 °C (98.3 °F)-37.7 °C (99.8  °F)] 37 °C (98.6 °F)  Pulse:  [83-90] 83  Resp:  [15-17] 15  BP: ()/(60-76) 103/64  SpO2:  [93 %-95 %] 93 %    Physical Exam  Vitals and nursing note reviewed.   Constitutional:       Appearance: Normal appearance.   Cardiovascular:      Rate and Rhythm: Normal rate and regular rhythm.   Pulmonary:      Effort: Pulmonary effort is normal.      Breath sounds: Normal breath sounds.   Skin:     General: Skin is warm and dry.   Neurological:      General: No focal deficit present.      Mental Status: She is alert and oriented to person, place, and time.         Fluids  No intake or output data in the 24 hours ending 12/24/21 0951    Laboratory                        Imaging  CT-HEAD W/O   Final Result         1.  No acute intracranial abnormality is identified, there are nonspecific white matter changes, commonly associated with small vessel ischemic disease.  Associated mild cerebral atrophy is noted.   2.  Atherosclerosis.         DX-WRIST-COMPLETE 3+ LEFT   Final Result         1. There is a possible nondisplaced intra-articular fracture of the distal radius seen on the oblique view, correlate for history of trauma and point tenderness.   2. Diffuse, decreased bone mineral density.      DX-CHEST-PORTABLE (1 VIEW)   Final Result      No acute cardiopulmonary abnormality.              Assessment/Plan  Contractures involving both knees  Assessment & Plan  12/15:  Noted by PT/OT.  Patient states she was ambulatory with FWW prior to admission.  12/17:  Able to extend right leg full extension, left leg near complete extension.  Up to chair tid with meals, PT/OT daily.   SNF pending for further strengthening.    Closed fracture of distal end of left radius with routine healing- (present on admission)  Assessment & Plan  Xray wrist with tiny minimal fracture distal radius seen in process of healing, likely 2 weeks old.  Sed rate normal  urice acid elevated  velcro wrist splint placed, to wear x 6 weeks.  Able to weight  bear left elbow for platform walker, non weight bearing left hand.    Mixed action and resting tremor- (present on admission)  Assessment & Plan  Unclear etiology, difficult to determine if secondary to alcohol abuse versus neurologic abnormality  Neurology Dr. Khan consulted, appreciate recommendations  TSH low normal  -Neurology placed referral for outpatient neurology clinic  -Propanolol started in the ED  -Monitor    Anemia of chronic disease- (present on admission)  Assessment & Plan  H/H 8.5/25.8 this admission  Per chart review, chronically low  Iron studies support anemia of chronic disease  B12 level high, folate level normal, iron level high  -Monitor    Seizure (HCC)- (present on admission)  Assessment & Plan  History of  -continue keppra    Essential hypertension- (present on admission)  Assessment & Plan  Normotensive  -monitor and start oral antihypertensives if persistent       VTE prophylaxis: enoxaparin ppx    I have performed a physical exam and reviewed and updated ROS and Plan today (12/24/2021). In review of yesterday's note (12/23/2021), there are no changes except as documented above.

## 2021-12-25 PROCEDURE — 770001 HCHG ROOM/CARE - MED/SURG/GYN PRIV*

## 2021-12-25 PROCEDURE — 700102 HCHG RX REV CODE 250 W/ 637 OVERRIDE(OP): Performed by: HOSPITALIST

## 2021-12-25 PROCEDURE — 700111 HCHG RX REV CODE 636 W/ 250 OVERRIDE (IP): Performed by: STUDENT IN AN ORGANIZED HEALTH CARE EDUCATION/TRAINING PROGRAM

## 2021-12-25 PROCEDURE — A9270 NON-COVERED ITEM OR SERVICE: HCPCS | Performed by: INTERNAL MEDICINE

## 2021-12-25 PROCEDURE — A9270 NON-COVERED ITEM OR SERVICE: HCPCS | Performed by: HOSPITALIST

## 2021-12-25 PROCEDURE — 700102 HCHG RX REV CODE 250 W/ 637 OVERRIDE(OP): Performed by: INTERNAL MEDICINE

## 2021-12-25 PROCEDURE — 700102 HCHG RX REV CODE 250 W/ 637 OVERRIDE(OP): Performed by: STUDENT IN AN ORGANIZED HEALTH CARE EDUCATION/TRAINING PROGRAM

## 2021-12-25 PROCEDURE — A9270 NON-COVERED ITEM OR SERVICE: HCPCS | Performed by: STUDENT IN AN ORGANIZED HEALTH CARE EDUCATION/TRAINING PROGRAM

## 2021-12-25 RX ADMIN — PROPRANOLOL HYDROCHLORIDE 10 MG: 10 TABLET ORAL at 22:54

## 2021-12-25 RX ADMIN — OMEPRAZOLE 40 MG: KIT at 05:52

## 2021-12-25 RX ADMIN — ACETAMINOPHEN 650 MG: 325 TABLET, FILM COATED ORAL at 06:04

## 2021-12-25 RX ADMIN — LEVETIRACETAM 500 MG: 500 TABLET, FILM COATED ORAL at 18:01

## 2021-12-25 RX ADMIN — ENOXAPARIN SODIUM 40 MG: 40 INJECTION SUBCUTANEOUS at 05:51

## 2021-12-25 RX ADMIN — Medication 100 MG: at 05:53

## 2021-12-25 RX ADMIN — LEVETIRACETAM 500 MG: 500 TABLET, FILM COATED ORAL at 05:53

## 2021-12-25 RX ADMIN — LISINOPRIL 20 MG: 20 TABLET ORAL at 05:54

## 2021-12-25 RX ADMIN — ACETAMINOPHEN 650 MG: 325 TABLET, FILM COATED ORAL at 18:01

## 2021-12-25 RX ADMIN — OMEPRAZOLE 40 MG: KIT at 18:01

## 2021-12-25 RX ADMIN — PROPRANOLOL HYDROCHLORIDE 10 MG: 10 TABLET ORAL at 13:54

## 2021-12-25 RX ADMIN — PROPRANOLOL HYDROCHLORIDE 10 MG: 10 TABLET ORAL at 05:54

## 2021-12-25 ASSESSMENT — FIBROSIS 4 INDEX: FIB4 SCORE: 1.31

## 2021-12-25 NOTE — PROGRESS NOTES
4 Eyes Skin Assessment Completed by MIRIAN Wilson and Wendy King RN.    Head WDL  Ears WDL  Nose WDL  Mouth WDL  Neck WDL  Breast/Chest WDL  Shoulder Blades WDL  Spine WDL  (R) Arm/Elbow/Hand WDL  (L) Arm/Elbow/Hand WDL  Abdomen WDL  Groin Redness  Scrotum/Coccyx/Buttocks Redness and Blanching  (R) Leg WDL  (L) Leg WDL  (R) Heel/Foot/Toe WDL  (L) Heel/Foot/Toe WDL          Devices In Places Nasal Cannula      Interventions In Place Gray Ear Foams, Waffle Overlay    Possible Skin Injury No    Pictures Uploaded Into Epic N/A  Wound Consult Placed N/A  RN Wound Prevention Protocol Ordered No

## 2021-12-25 NOTE — PROGRESS NOTES
Patient arrived to unit on hospital bed. Patient oriented to unit as well as updated on plan of care. Patient states all of her questions/concerns have been addressed and answered appropriately. Patient states all of her needs are being met at this time. Safety precautions in place including patient call light within reach, personal possessions nearby, bed in low position and locked, patient rounding in practice, and non-skid socks in place.

## 2021-12-26 PROCEDURE — A9270 NON-COVERED ITEM OR SERVICE: HCPCS | Performed by: INTERNAL MEDICINE

## 2021-12-26 PROCEDURE — 700102 HCHG RX REV CODE 250 W/ 637 OVERRIDE(OP): Performed by: STUDENT IN AN ORGANIZED HEALTH CARE EDUCATION/TRAINING PROGRAM

## 2021-12-26 PROCEDURE — 94760 N-INVAS EAR/PLS OXIMETRY 1: CPT

## 2021-12-26 PROCEDURE — 700102 HCHG RX REV CODE 250 W/ 637 OVERRIDE(OP): Performed by: INTERNAL MEDICINE

## 2021-12-26 PROCEDURE — 700102 HCHG RX REV CODE 250 W/ 637 OVERRIDE(OP): Performed by: HOSPITALIST

## 2021-12-26 PROCEDURE — A9270 NON-COVERED ITEM OR SERVICE: HCPCS | Performed by: STUDENT IN AN ORGANIZED HEALTH CARE EDUCATION/TRAINING PROGRAM

## 2021-12-26 PROCEDURE — A9270 NON-COVERED ITEM OR SERVICE: HCPCS | Performed by: HOSPITALIST

## 2021-12-26 PROCEDURE — 700111 HCHG RX REV CODE 636 W/ 250 OVERRIDE (IP): Performed by: STUDENT IN AN ORGANIZED HEALTH CARE EDUCATION/TRAINING PROGRAM

## 2021-12-26 PROCEDURE — 770001 HCHG ROOM/CARE - MED/SURG/GYN PRIV*

## 2021-12-26 RX ADMIN — PROPRANOLOL HYDROCHLORIDE 10 MG: 10 TABLET ORAL at 05:53

## 2021-12-26 RX ADMIN — ACETAMINOPHEN 650 MG: 325 TABLET, FILM COATED ORAL at 12:17

## 2021-12-26 RX ADMIN — ENOXAPARIN SODIUM 40 MG: 40 INJECTION SUBCUTANEOUS at 05:53

## 2021-12-26 RX ADMIN — OMEPRAZOLE 40 MG: KIT at 18:15

## 2021-12-26 RX ADMIN — Medication 100 MG: at 05:53

## 2021-12-26 RX ADMIN — LEVETIRACETAM 500 MG: 500 TABLET, FILM COATED ORAL at 05:53

## 2021-12-26 RX ADMIN — PROPRANOLOL HYDROCHLORIDE 10 MG: 10 TABLET ORAL at 21:47

## 2021-12-26 RX ADMIN — PROPRANOLOL HYDROCHLORIDE 10 MG: 10 TABLET ORAL at 14:02

## 2021-12-26 RX ADMIN — LEVETIRACETAM 500 MG: 500 TABLET, FILM COATED ORAL at 18:15

## 2021-12-26 RX ADMIN — LISINOPRIL 20 MG: 20 TABLET ORAL at 05:53

## 2021-12-26 NOTE — CARE PLAN
Problem: Knowledge Deficit - Standard  Goal: Patient and family/care givers will demonstrate understanding of plan of care, disease process/condition, diagnostic tests and medications  Outcome: Progressing     Problem: Psychosocial  Goal: Patient's level of anxiety will decrease  Outcome: Progressing   The patient is Stable - Low risk of patient condition declining or worsening    Shift Goals  Clinical Goals: Rest   Patient Goals: Rest  Family Goals: N/A    Progress made toward(s) clinical / shift goals:  Pt reoriented to time and educated on POC. Pt agreeable to skilled bed on Sunday. Pt reports feeling more calm after discussing POC.     Patient is not progressing towards the following goals:

## 2021-12-26 NOTE — CARE PLAN
The patient is Stable - Low risk of patient condition declining or worsening    Shift Goals  Clinical Goals: rest  Patient Goals: sleep  Family Goals: N/A    Progress made toward(s) clinical / shift goals:  pt updated on POC, Q2 turns in place, bed locked and lowest position    Patient is not progressing towards the following goals:

## 2021-12-26 NOTE — PROGRESS NOTES
Assumed care at 0700. Received report from NOC shift RN. Pt is asleep in bed, on RA. Fall precautions and appropriate signs in place. Call light and belongings within reach. Bed alarm and hourly rounding in place. Communication board updated.

## 2021-12-26 NOTE — PROGRESS NOTES
Assumed care at 0700. Received report from NOC shift RN. Pt is awake and alert in bed, A&Ox 3, disoriented to time, on RA, reports a pain level of 0/10. Fall precautions and appropriate signs in place. Call light and belongings within reach. Hourly rounding in place. Communication board updated. Pt denies any additional needs at this time.

## 2021-12-26 NOTE — CARE PLAN
Problem: Knowledge Deficit - Standard  Goal: Patient and family/care givers will demonstrate understanding of plan of care, disease process/condition, diagnostic tests and medications  Outcome: Progressing     Problem: Psychosocial  Goal: Patient's level of anxiety will decrease  Outcome: Progressing   The patient is Stable - Low risk of patient condition declining or worsening    Shift Goals  Clinical Goals: Rest  Patient Goals: Discharge  Family Goals: N/A    Progress made toward(s) clinical / shift goals:  Pt able to verbalize discharge needs, agreeable to long term care bed.     Patient is not progressing towards the following goals:

## 2021-12-27 LAB
ANION GAP SERPL CALC-SCNC: 13 MMOL/L (ref 7–16)
ANISOCYTOSIS BLD QL SMEAR: ABNORMAL
BASOPHILS # BLD AUTO: 2.4 % (ref 0–1.8)
BASOPHILS # BLD: 0.16 K/UL (ref 0–0.12)
BUN SERPL-MCNC: 11 MG/DL (ref 8–22)
CALCIUM SERPL-MCNC: 8.7 MG/DL (ref 8.5–10.5)
CHLORIDE SERPL-SCNC: 105 MMOL/L (ref 96–112)
CO2 SERPL-SCNC: 23 MMOL/L (ref 20–33)
COMMENT 1642: NORMAL
CREAT SERPL-MCNC: 0.79 MG/DL (ref 0.5–1.4)
EOSINOPHIL # BLD AUTO: 0.23 K/UL (ref 0–0.51)
EOSINOPHIL NFR BLD: 3.5 % (ref 0–6.9)
ERYTHROCYTE [DISTWIDTH] IN BLOOD BY AUTOMATED COUNT: 68.4 FL (ref 35.9–50)
GLUCOSE SERPL-MCNC: 98 MG/DL (ref 65–99)
HCT VFR BLD AUTO: 30.1 % (ref 37–47)
HGB BLD-MCNC: 9 G/DL (ref 12–16)
HYPOCHROMIA BLD QL SMEAR: ABNORMAL
IMM GRANULOCYTES # BLD AUTO: 0.03 K/UL (ref 0–0.11)
IMM GRANULOCYTES NFR BLD AUTO: 0.5 % (ref 0–0.9)
LG PLATELETS BLD QL SMEAR: NORMAL
LYMPHOCYTES # BLD AUTO: 2.47 K/UL (ref 1–4.8)
LYMPHOCYTES NFR BLD: 37.7 % (ref 22–41)
MACROCYTES BLD QL SMEAR: ABNORMAL
MCH RBC QN AUTO: 28.8 PG (ref 27–33)
MCHC RBC AUTO-ENTMCNC: 29.9 G/DL (ref 33.6–35)
MCV RBC AUTO: 96.5 FL (ref 81.4–97.8)
MONOCYTES # BLD AUTO: 0.88 K/UL (ref 0–0.85)
MONOCYTES NFR BLD AUTO: 13.4 % (ref 0–13.4)
MORPHOLOGY BLD-IMP: NORMAL
NEUTROPHILS # BLD AUTO: 2.79 K/UL (ref 2–7.15)
NEUTROPHILS NFR BLD: 42.5 % (ref 44–72)
NRBC # BLD AUTO: 0 K/UL
NRBC BLD-RTO: 0 /100 WBC
OVALOCYTES BLD QL SMEAR: NORMAL
PLATELET # BLD AUTO: 572 K/UL (ref 164–446)
PLATELET BLD QL SMEAR: NORMAL
PMV BLD AUTO: 11.5 FL (ref 9–12.9)
POIKILOCYTOSIS BLD QL SMEAR: NORMAL
POTASSIUM SERPL-SCNC: 4.3 MMOL/L (ref 3.6–5.5)
RBC # BLD AUTO: 3.12 M/UL (ref 4.2–5.4)
RBC BLD AUTO: PRESENT
SCHISTOCYTES BLD QL SMEAR: NORMAL
SODIUM SERPL-SCNC: 141 MMOL/L (ref 135–145)
WBC # BLD AUTO: 6.6 K/UL (ref 4.8–10.8)

## 2021-12-27 PROCEDURE — A9270 NON-COVERED ITEM OR SERVICE: HCPCS | Performed by: HOSPITALIST

## 2021-12-27 PROCEDURE — 80048 BASIC METABOLIC PNL TOTAL CA: CPT

## 2021-12-27 PROCEDURE — A9270 NON-COVERED ITEM OR SERVICE: HCPCS | Performed by: INTERNAL MEDICINE

## 2021-12-27 PROCEDURE — 99231 SBSQ HOSP IP/OBS SF/LOW 25: CPT | Performed by: STUDENT IN AN ORGANIZED HEALTH CARE EDUCATION/TRAINING PROGRAM

## 2021-12-27 PROCEDURE — 700102 HCHG RX REV CODE 250 W/ 637 OVERRIDE(OP): Performed by: STUDENT IN AN ORGANIZED HEALTH CARE EDUCATION/TRAINING PROGRAM

## 2021-12-27 PROCEDURE — 36415 COLL VENOUS BLD VENIPUNCTURE: CPT

## 2021-12-27 PROCEDURE — 85025 COMPLETE CBC W/AUTO DIFF WBC: CPT

## 2021-12-27 PROCEDURE — 700111 HCHG RX REV CODE 636 W/ 250 OVERRIDE (IP): Performed by: STUDENT IN AN ORGANIZED HEALTH CARE EDUCATION/TRAINING PROGRAM

## 2021-12-27 PROCEDURE — 700102 HCHG RX REV CODE 250 W/ 637 OVERRIDE(OP): Performed by: HOSPITALIST

## 2021-12-27 PROCEDURE — A9270 NON-COVERED ITEM OR SERVICE: HCPCS | Performed by: STUDENT IN AN ORGANIZED HEALTH CARE EDUCATION/TRAINING PROGRAM

## 2021-12-27 PROCEDURE — 97530 THERAPEUTIC ACTIVITIES: CPT

## 2021-12-27 PROCEDURE — 770001 HCHG ROOM/CARE - MED/SURG/GYN PRIV*

## 2021-12-27 PROCEDURE — 700102 HCHG RX REV CODE 250 W/ 637 OVERRIDE(OP): Performed by: INTERNAL MEDICINE

## 2021-12-27 RX ADMIN — PROPRANOLOL HYDROCHLORIDE 10 MG: 10 TABLET ORAL at 13:47

## 2021-12-27 RX ADMIN — PROPRANOLOL HYDROCHLORIDE 10 MG: 10 TABLET ORAL at 22:00

## 2021-12-27 RX ADMIN — IBUPROFEN 400 MG: 800 TABLET, FILM COATED ORAL at 21:57

## 2021-12-27 RX ADMIN — ACETAMINOPHEN 650 MG: 325 TABLET, FILM COATED ORAL at 03:47

## 2021-12-27 RX ADMIN — LEVETIRACETAM 500 MG: 500 TABLET, FILM COATED ORAL at 17:15

## 2021-12-27 RX ADMIN — LISINOPRIL 20 MG: 20 TABLET ORAL at 05:47

## 2021-12-27 RX ADMIN — OMEPRAZOLE 40 MG: KIT at 05:46

## 2021-12-27 RX ADMIN — LEVETIRACETAM 500 MG: 500 TABLET, FILM COATED ORAL at 05:46

## 2021-12-27 RX ADMIN — ENOXAPARIN SODIUM 40 MG: 40 INJECTION SUBCUTANEOUS at 05:46

## 2021-12-27 RX ADMIN — OMEPRAZOLE 40 MG: KIT at 17:48

## 2021-12-27 RX ADMIN — ACETAMINOPHEN 650 MG: 325 TABLET, FILM COATED ORAL at 17:15

## 2021-12-27 RX ADMIN — PROPRANOLOL HYDROCHLORIDE 10 MG: 10 TABLET ORAL at 05:47

## 2021-12-27 RX ADMIN — Medication 100 MG: at 05:47

## 2021-12-27 ASSESSMENT — ENCOUNTER SYMPTOMS
DIZZINESS: 0
FEVER: 0
NAUSEA: 0
CHILLS: 0
PALPITATIONS: 0
ABDOMINAL PAIN: 0
VOMITING: 0
HEADACHES: 0
COUGH: 0
SHORTNESS OF BREATH: 0

## 2021-12-27 ASSESSMENT — COGNITIVE AND FUNCTIONAL STATUS - GENERAL
MOBILITY SCORE: 6
MOVING TO AND FROM BED TO CHAIR: UNABLE
SUGGESTED CMS G CODE MODIFIER MOBILITY: CN
STANDING UP FROM CHAIR USING ARMS: TOTAL
TURNING FROM BACK TO SIDE WHILE IN FLAT BAD: UNABLE
MOVING FROM LYING ON BACK TO SITTING ON SIDE OF FLAT BED: UNABLE
CLIMB 3 TO 5 STEPS WITH RAILING: TOTAL
WALKING IN HOSPITAL ROOM: TOTAL

## 2021-12-27 ASSESSMENT — GAIT ASSESSMENTS: GAIT LEVEL OF ASSIST: UNABLE TO PARTICIPATE

## 2021-12-27 NOTE — CARE PLAN
The patient is Stable - Low risk of patient condition declining or worsening    Shift Goals  Clinical Goals: rest   Patient Goals: discharge   Family Goals: NA    Progress made toward(s) clinical / shift goals:  Pt understands need for splint on L wrist. Pt denies pain at the moment. Pt calls appropriately.       Problem: Knowledge Deficit - Standard  Goal: Patient and family/care givers will demonstrate understanding of plan of care, disease process/condition, diagnostic tests and medications  Outcome: Progressing     Problem: Pain - Standard  Goal: Alleviation of pain or a reduction in pain to the patient’s comfort goal  Outcome: Progressing     Problem: Fall Risk  Goal: Patient will remain free from falls  Outcome: Progressing     Problem: Skin Integrity  Goal: Skin integrity is maintained or improved  Outcome: Progressing

## 2021-12-27 NOTE — PROGRESS NOTES
Report received from previous shift. Assumed care of patient at 2030, patient is A&O x 3; pt needs education on poc. Patient is a moderate Fall risk, bed locked and in lowest position, bed alarm on. Patient reports pain 0/10. Plan of care reviewed with patient, will implement and continue to monitor. Hourly rounding is in place and call light/belongings within reach.

## 2021-12-27 NOTE — PROGRESS NOTES
[facility] Medicine Daily Progress Note    Date of Service  12/27/2021    Chief Complaint  Roro Leon is a 66 y.o. female admitted 12/10/2021 with weakness    Hospital Course  Roro Leon is a 66-year-old female with a past medical history of EtOH abuse, hypertension, seizures on Keppra (last seizure June 2021), and subdural hematoma who presented to the ED on 12/10/2021 with complaints of weakness and generalized malaise that have been worsening over the last 3 days.  She was acutely intoxicated on presentation and placed on CIWA protocol for alcohol withdrawal.   She was also found to have a right distal radial hairline fracture and splint has been placed. She will need repeat xray imaging in 1-2 weeks.   Patient also noted worsening tremors over several months for which  Neurology was consulted 12/11/2021 and recommend referral to outpatient neurology clinic.    She is now medically cleared pending SNF acceptance.       Interval Problem Update  Patient seen at bedside, eating breakfast, no complaints   BHARAT overnight  Awaiting placement      I have personally seen and examined the patient at bedside. I discussed the plan of care with patient, bedside RN, charge RN,  and pharmacy.    Consultants/Specialty  none    Code Status  Full Code    Disposition  Patient is medically cleared for discharge.   Anticipate discharge to to skilled nursing facility.  I have placed the appropriate orders for post-discharge needs.    Review of Systems  Review of Systems   Constitutional: Negative for chills and fever.   Respiratory: Negative for cough and shortness of breath.    Cardiovascular: Negative for chest pain and palpitations.   Gastrointestinal: Negative for abdominal pain, nausea and vomiting.   Genitourinary: Negative for dysuria and urgency.   Musculoskeletal: Positive for joint pain.   Neurological: Negative for dizziness and headaches.   All other systems reviewed and are negative.       Physical  Exam  Temp:  [36.4 °C (97.5 °F)-36.8 °C (98.3 °F)] 36.8 °C (98.3 °F)  Pulse:  [70-77] 74  Resp:  [16-19] 16  BP: (112-132)/(63-79) 123/77  SpO2:  [96 %-99 %] 99 %    Physical Exam  Vitals and nursing note reviewed.   Constitutional:       Appearance: Normal appearance.   Cardiovascular:      Rate and Rhythm: Normal rate and regular rhythm.   Pulmonary:      Effort: Pulmonary effort is normal.      Breath sounds: Normal breath sounds.   Skin:     General: Skin is warm and dry.   Neurological:      General: No focal deficit present.      Mental Status: She is alert and oriented to person, place, and time.      Comments: Mild bilateral upper extremity tremor         Fluids    Intake/Output Summary (Last 24 hours) at 12/27/2021 1254  Last data filed at 12/27/2021 1000  Gross per 24 hour   Intake 960 ml   Output --   Net 960 ml       Laboratory  Recent Labs     12/27/21  0323   WBC 6.6   RBC 3.12*   HEMOGLOBIN 9.0*   HEMATOCRIT 30.1*   MCV 96.5   MCH 28.8   MCHC 29.9*   RDW 68.4*   PLATELETCT 572*   MPV 11.5     Recent Labs     12/27/21  0323   SODIUM 141   POTASSIUM 4.3   CHLORIDE 105   CO2 23   GLUCOSE 98   BUN 11   CREATININE 0.79   CALCIUM 8.7                   Imaging  CT-HEAD W/O   Final Result         1.  No acute intracranial abnormality is identified, there are nonspecific white matter changes, commonly associated with small vessel ischemic disease.  Associated mild cerebral atrophy is noted.   2.  Atherosclerosis.         DX-WRIST-COMPLETE 3+ LEFT   Final Result         1. There is a possible nondisplaced intra-articular fracture of the distal radius seen on the oblique view, correlate for history of trauma and point tenderness.   2. Diffuse, decreased bone mineral density.      DX-CHEST-PORTABLE (1 VIEW)   Final Result      No acute cardiopulmonary abnormality.              Assessment/Plan  Contractures involving both knees  Assessment & Plan  12/15:  Noted by PT/OT.  Patient states she was ambulatory with FWW  prior to admission.  12/17:  Able to extend right leg full extension, left leg near complete extension.  Up to chair tid with meals, PT/OT daily.   SNF pending for further strengthening.    Closed fracture of distal end of left radius with routine healing- (present on admission)  Assessment & Plan  Xray wrist with tiny minimal fracture distal radius seen in process of healing, likely 2 weeks old.  Sed rate normal  urice acid elevated  velcro wrist splint placed, to wear x 6 weeks.  Able to weight bear left elbow for platform walker, non weight bearing left hand.    Mixed action and resting tremor- (present on admission)  Assessment & Plan  Unclear etiology, difficult to determine if secondary to alcohol abuse versus neurologic abnormality  Neurology Dr. Khan consulted, appreciate recommendations  TSH low normal  -Neurology placed referral for outpatient neurology clinic  -Propanolol started in the ED  -Monitor    Anemia of chronic disease- (present on admission)  Assessment & Plan  H/H 8.5/25.8 this admission  Per chart review, chronically low  Iron studies support anemia of chronic disease  B12 level high, folate level normal, iron level high  -Monitor    Seizure (HCC)- (present on admission)  Assessment & Plan  History of  -continue keppra    Essential hypertension- (present on admission)  Assessment & Plan  Normotensive  -monitor and start oral antihypertensives if persistent       VTE prophylaxis: enoxaparin ppx    I have performed a physical exam and reviewed and updated ROS and Plan today (12/27/2021). In review of yesterday's note (12/26/2021), there are no changes except as documented above.

## 2021-12-27 NOTE — THERAPY
Physical Therapy   Daily Treatment     Patient Name: Roro Leon  Age:  66 y.o., Sex:  female  Medical Record #: [id number]  Today's Date: 12/27/2021     Precautions  Precautions: Fall Risk  Comments: L wrist brace    Assessment    Rec'd pt alert, in bed, pleasant and agreeable to work w/ PT.  With HOB at 30 degrees and use of siderail, she is able to sit eob w/ min assist.  She is able to maintain her sitting balance w/o physical assist and w/o use of UE's.  Worked on sit to stand transfers, several repetition, w/ each needing max assist w/ significant posterior weight shift, due to the fact that pt presents w/ bilateral knee and ankle contractures.  She reports being bedbound since August.  She did agree to sit eob for breakfast.  Recommend eob all meals and prn w/ nsg.      Plan    Continue current treatment plan.    DC Equipment Recommendations: Unable to determine at this time  Discharge Recommendations: Recommend post-acute placement for additional physical therapy services prior to discharge home        Objective       12/27/21 0702   Balance   Sitting Balance (Static) Fair   Sitting Balance (Dynamic) Fair   Standing Balance (Static) Trace +   Standing Balance (Dynamic) Trace +   Weight Shift Sitting Fair   Weight Shift Standing Absent   Skilled Intervention Verbal Cuing;Tactile Cuing;Sequencing;Facilitation   Comments w/ platform fww   Gait Analysis   Gait Level Of Assist Unable to Participate   Bed Mobility    Supine to Sit Minimal Assist   Skilled Intervention Verbal Cuing;Tactile Cuing;Sequencing;Compensatory Strategies   Comments w/ use of siderail and HOB at 30 degrees   Functional Mobility   Sit to Stand Maximal Assist   Bed, Chair, Wheelchair Transfer Unable to Participate   Skilled Intervention Verbal Cuing;Tactile Cuing;Sequencing;Postural Facilitation   Short Term Goals    Short Term Goal # 1 Pt will perform supine <> sit with min A within 6 visits in order to progress functional mobility   Goal  Outcome # 1 goal not met   Short Term Goal # 2 Pt will perform STS with FWW and min A within 6 visits in order to progress OOB activity   Goal Outcome # 2 Goal not met   Short Term Goal # 3 Pt will perform functional transfers with FWW and mod A within 6 visits in order to progress OOB activity   Goal Outcome # 3 Goal not met   Anticipated Discharge Equipment and Recommendations   DC Equipment Recommendations Unable to determine at this time   Discharge Recommendations Recommend post-acute placement for additional physical therapy services prior to discharge home

## 2021-12-27 NOTE — DISCHARGE PLANNING
Agency/Facility Name: [facility] & [facility]  Spoke To: Grayson   Outcome:  Liaison stated patient would need a long term payor for both service, however with [facility] they are not taking any long term patients which this will result in a declination for patient.   In regards to [facility], if patient qualifies for Medicaid or patient can pay privately she will be able to be accepted into facility. However, both facilities are experiencing  limited bed availability, Lialynette mentioned the earlest expected open bed  for [facility] will be for Thursday and [facility] will have available beds by Friday.     LSW notified     [id number]   Agency/Facility Name: [facility]   Spoke To: Francesca   Outcome: DPA called regarding patients acceptation or declination for referral. Lialynette mentioned referral is still under review, stated if  patient is accepted to one of the following facilities, [facility] will not be able to admit patient until Wednesday the latest due to bed availability.      LSLLOYD notified

## 2021-12-27 NOTE — CARE PLAN
The patient is Stable - Low risk of patient condition declining or worsening    Shift Goals  Clinical Goals: work with PT/OT  Patient Goals: Rest  Family Goals: n/a    Progress made toward(s) clinical / shift goals:  PT worked with patient this morning. Patient able to sit EOB only, has BLE contractures. Patient states she has not ambulated since August 2021.    Patient is not progressing towards the following goals:

## 2021-12-27 NOTE — PROGRESS NOTES
Received report from night RN, assumed care at 0700. Pt A&OX3, re-oriented to event. Pt sitting up in bed, no signs/symptoms of distress noted. Pt with splint to left wrist, able to move all digits. Pt reports pain 1/10 declines pain intervention at this time. Pt states all needs met at this time. POC discussed, communication board updated. Call light in reach, hourly rounding in place.

## 2021-12-28 PROCEDURE — 700102 HCHG RX REV CODE 250 W/ 637 OVERRIDE(OP): Performed by: INTERNAL MEDICINE

## 2021-12-28 PROCEDURE — 770001 HCHG ROOM/CARE - MED/SURG/GYN PRIV*

## 2021-12-28 PROCEDURE — A9270 NON-COVERED ITEM OR SERVICE: HCPCS | Performed by: HOSPITALIST

## 2021-12-28 PROCEDURE — 99232 SBSQ HOSP IP/OBS MODERATE 35: CPT | Performed by: GENERAL PRACTICE

## 2021-12-28 PROCEDURE — 700102 HCHG RX REV CODE 250 W/ 637 OVERRIDE(OP): Performed by: STUDENT IN AN ORGANIZED HEALTH CARE EDUCATION/TRAINING PROGRAM

## 2021-12-28 PROCEDURE — 700102 HCHG RX REV CODE 250 W/ 637 OVERRIDE(OP): Performed by: HOSPITALIST

## 2021-12-28 PROCEDURE — A9270 NON-COVERED ITEM OR SERVICE: HCPCS | Performed by: INTERNAL MEDICINE

## 2021-12-28 PROCEDURE — 700111 HCHG RX REV CODE 636 W/ 250 OVERRIDE (IP): Performed by: STUDENT IN AN ORGANIZED HEALTH CARE EDUCATION/TRAINING PROGRAM

## 2021-12-28 PROCEDURE — A9270 NON-COVERED ITEM OR SERVICE: HCPCS | Performed by: STUDENT IN AN ORGANIZED HEALTH CARE EDUCATION/TRAINING PROGRAM

## 2021-12-28 RX ADMIN — LEVETIRACETAM 500 MG: 500 TABLET, FILM COATED ORAL at 18:06

## 2021-12-28 RX ADMIN — PROPRANOLOL HYDROCHLORIDE 10 MG: 10 TABLET ORAL at 06:00

## 2021-12-28 RX ADMIN — Medication 100 MG: at 06:32

## 2021-12-28 RX ADMIN — PROPRANOLOL HYDROCHLORIDE 10 MG: 10 TABLET ORAL at 14:40

## 2021-12-28 RX ADMIN — ACETAMINOPHEN 650 MG: 325 TABLET, FILM COATED ORAL at 21:37

## 2021-12-28 RX ADMIN — LEVETIRACETAM 500 MG: 500 TABLET, FILM COATED ORAL at 06:32

## 2021-12-28 RX ADMIN — OMEPRAZOLE 40 MG: KIT at 06:44

## 2021-12-28 RX ADMIN — OMEPRAZOLE 40 MG: KIT at 21:37

## 2021-12-28 RX ADMIN — ACETAMINOPHEN 650 MG: 325 TABLET, FILM COATED ORAL at 06:44

## 2021-12-28 RX ADMIN — LISINOPRIL 20 MG: 20 TABLET ORAL at 06:36

## 2021-12-28 RX ADMIN — ENOXAPARIN SODIUM 40 MG: 40 INJECTION SUBCUTANEOUS at 06:32

## 2021-12-28 RX ADMIN — PROPRANOLOL HYDROCHLORIDE 10 MG: 10 TABLET ORAL at 21:37

## 2021-12-28 NOTE — DISCHARGE PLANNING
Agency/Facility Name: [facility]   Spoke To: Francesca   Outcome: Per Liaison, She will not be responding until the middle of the week the earliest because facility does not have any beds at the  moment.     LSW  Notified     1216  Agency/Facility Name: [facility]  Spoke To: Grayson  Outcome: DPA  left voicemail for Liaison   stating resubmission  of referrals to facilities with updates regarding patients care is now short term and patients discharge plan is to be admitted into Assisted Living.     LSW notified

## 2021-12-28 NOTE — PROGRESS NOTES
Pt. Received in bed awake oriented x3, with complaints of pain on her L wrist PRN Ibuprofen given. Pt. Has her L wrist on a splint. purewick changed. Fall precautions in place. Able to move digits on L hand. Due meds given and tolerated. Needs attended.

## 2021-12-28 NOTE — CARE PLAN
The patient is Stable - Low risk of patient condition declining or worsening    Shift Goals  Clinical Goals: Pt. will be free from falls      Progress made toward(s) clinical / shift goals:  Pt. On bed alarm, treaded socks on.     Problem: Knowledge Deficit - Standard  Goal: Patient and family/care givers will demonstrate understanding of plan of care, disease process/condition, diagnostic tests and medications  Outcome: Progressing     Problem: Pain - Standard  Goal: Alleviation of pain or a reduction in pain to the patient’s comfort goal  Outcome: Progressing     Problem: Fall Risk  Goal: Patient will remain free from falls  Outcome: Progressing

## 2021-12-28 NOTE — DISCHARGE PLANNING
Anticipated Discharge Disposition: SNF    Action: Pt discussed in IDT rounds. She would like to go to assisted living after SNF. LSW requested DPA update SNFs that Pt will not be a LT SNF placement.     Barriers to Discharge: SNF acceptance    Plan: F/u with SNF referrals

## 2021-12-29 PROCEDURE — A9270 NON-COVERED ITEM OR SERVICE: HCPCS | Performed by: INTERNAL MEDICINE

## 2021-12-29 PROCEDURE — 700102 HCHG RX REV CODE 250 W/ 637 OVERRIDE(OP): Performed by: HOSPITALIST

## 2021-12-29 PROCEDURE — 0004A HCHG PFIZER COVID ADMIN BOOSTER: CPT

## 2021-12-29 PROCEDURE — 91300 HCHG RX REV CODE 636 W/ 250 OVERRIDE (IP): CPT | Performed by: GENERAL PRACTICE

## 2021-12-29 PROCEDURE — 700111 HCHG RX REV CODE 636 W/ 250 OVERRIDE (IP): Performed by: STUDENT IN AN ORGANIZED HEALTH CARE EDUCATION/TRAINING PROGRAM

## 2021-12-29 PROCEDURE — 700111 HCHG RX REV CODE 636 W/ 250 OVERRIDE (IP): Performed by: GENERAL PRACTICE

## 2021-12-29 PROCEDURE — A9270 NON-COVERED ITEM OR SERVICE: HCPCS | Performed by: HOSPITALIST

## 2021-12-29 PROCEDURE — XW023U6 INTRODUCTION OF COVID-19 VACCINE INTO MUSCLE, PERCUTANEOUS APPROACH, NEW TECHNOLOGY GROUP 6: ICD-10-PCS | Performed by: GENERAL PRACTICE

## 2021-12-29 PROCEDURE — A9270 NON-COVERED ITEM OR SERVICE: HCPCS | Performed by: STUDENT IN AN ORGANIZED HEALTH CARE EDUCATION/TRAINING PROGRAM

## 2021-12-29 PROCEDURE — 700102 HCHG RX REV CODE 250 W/ 637 OVERRIDE(OP): Performed by: INTERNAL MEDICINE

## 2021-12-29 PROCEDURE — 700102 HCHG RX REV CODE 250 W/ 637 OVERRIDE(OP): Performed by: STUDENT IN AN ORGANIZED HEALTH CARE EDUCATION/TRAINING PROGRAM

## 2021-12-29 PROCEDURE — 99232 SBSQ HOSP IP/OBS MODERATE 35: CPT | Performed by: GENERAL PRACTICE

## 2021-12-29 PROCEDURE — 770001 HCHG ROOM/CARE - MED/SURG/GYN PRIV*

## 2021-12-29 RX ADMIN — ENOXAPARIN SODIUM 40 MG: 40 INJECTION SUBCUTANEOUS at 05:53

## 2021-12-29 RX ADMIN — ACETAMINOPHEN 650 MG: 325 TABLET, FILM COATED ORAL at 05:57

## 2021-12-29 RX ADMIN — OMEPRAZOLE 40 MG: KIT at 18:15

## 2021-12-29 RX ADMIN — RNA INGREDIENT BNT-162B2 0.3 ML: 0.23 INJECTION, SUSPENSION INTRAMUSCULAR at 12:53

## 2021-12-29 RX ADMIN — PROPRANOLOL HYDROCHLORIDE 10 MG: 10 TABLET ORAL at 15:03

## 2021-12-29 RX ADMIN — LEVETIRACETAM 500 MG: 500 TABLET, FILM COATED ORAL at 05:53

## 2021-12-29 RX ADMIN — PROPRANOLOL HYDROCHLORIDE 10 MG: 10 TABLET ORAL at 20:33

## 2021-12-29 RX ADMIN — Medication 100 MG: at 05:53

## 2021-12-29 RX ADMIN — ACETAMINOPHEN 650 MG: 325 TABLET, FILM COATED ORAL at 20:33

## 2021-12-29 RX ADMIN — LISINOPRIL 20 MG: 20 TABLET ORAL at 05:53

## 2021-12-29 RX ADMIN — LEVETIRACETAM 500 MG: 500 TABLET, FILM COATED ORAL at 18:15

## 2021-12-29 RX ADMIN — OMEPRAZOLE 40 MG: KIT at 06:23

## 2021-12-29 RX ADMIN — PROPRANOLOL HYDROCHLORIDE 10 MG: 10 TABLET ORAL at 05:53

## 2021-12-29 NOTE — PROGRESS NOTES
Assumed care at 0700. Received report from NOC shift RN. Pt is awake and alert in bed, A&Ox 4, on RA, reports a pain level of 0/10. Fall precautions and appropriate signs in place. Call light and belongings within reach. Bed alarm and hourly rounding in place. Communication board updated. Pt denies any additional needs at this time.

## 2021-12-29 NOTE — PROGRESS NOTES
Assume care of pt at 1900. Report received from day RN. Pt is A/O x4. Pain is 3/10. Pt is resting in bed. Bed in lowest and locked position, call light in reach, hourly rounding in place. Bed alarm in place. Labs reviewed. Communication board updated. Will continue to monitor.

## 2021-12-29 NOTE — PROGRESS NOTES
Hospital Medicine Daily Progress Note    Date of Service  12/28/2021    Chief Complaint  Roro Leon is a 66 y.o. female admitted 12/10/2021 with AMS    Hospital Course  This is a 66 year old female with PMHx of hypertension, seizures on Keppra (last seizure June 2021), alcohol use disorder and subdural hematoma who was admitted on 12/10/2021 with acute alcohol intoxication. CIWA protocol in place.    She was also found to have a right distal radial hairline fracture and splint has been placed. Velcro splint in place x 6 weeks. Nonweightbearing able to use platform front wheel walker able to bear weight on left elbow.    Patient also noted worsening tremors over several months for which Neurology was consulted and recommended putpatient follow up.       She is now medically cleared pending SNF acceptance.     Interval Problem Update  Patient reports he is doing well today.  She is been off restraints.    Patient reports after SNF, she would like to look into assisted living facilities as she does not believe she can care for self.    CM to provide patient with information for assisted living facilities.    Medically clear, pending SNF.    I have personally seen and examined the patient at bedside. I discussed the plan of care with patient and bedside RN.    Consultants/Specialty  None    Code Status  Full Code    Disposition  Patient is medically cleared for discharge.   Anticipate discharge to to skilled nursing facility.  I have placed the appropriate orders for post-discharge needs.    Review of Systems  Review of Systems   All other systems reviewed and are negative.       Physical Exam  Temp:  [36.4 °C (97.6 °F)-37.3 °C (99.2 °F)] 37.3 °C (99.2 °F)  Pulse:  [64-81] 78  Resp:  [16-18] 18  BP: (100-116)/(63-74) 105/64  SpO2:  [96 %-98 %] 98 %    Physical Exam  Vitals and nursing note reviewed.   Constitutional:       Appearance: Normal appearance.   Cardiovascular:      Rate and Rhythm: Normal rate and  regular rhythm.   Pulmonary:      Effort: Pulmonary effort is normal.      Breath sounds: Normal breath sounds.   Skin:     General: Skin is warm and dry.      Comments: Left arm splint in place   Neurological:      General: No focal deficit present.      Mental Status: She is alert and oriented to person, place, and time.      Comments: Mild bilateral upper extremity tremor         Fluids    Intake/Output Summary (Last 24 hours) at 12/28/2021 1638  Last data filed at 12/28/2021 1600  Gross per 24 hour   Intake 1017 ml   Output 1100 ml   Net -83 ml       Laboratory  Recent Labs     12/27/21  0323   WBC 6.6   RBC 3.12*   HEMOGLOBIN 9.0*   HEMATOCRIT 30.1*   MCV 96.5   MCH 28.8   MCHC 29.9*   RDW 68.4*   PLATELETCT 572*   MPV 11.5     Recent Labs     12/27/21  0323   SODIUM 141   POTASSIUM 4.3   CHLORIDE 105   CO2 23   GLUCOSE 98   BUN 11   CREATININE 0.79   CALCIUM 8.7                   Imaging  CT-HEAD W/O   Final Result         1.  No acute intracranial abnormality is identified, there are nonspecific white matter changes, commonly associated with small vessel ischemic disease.  Associated mild cerebral atrophy is noted.   2.  Atherosclerosis.         DX-WRIST-COMPLETE 3+ LEFT   Final Result         1. There is a possible nondisplaced intra-articular fracture of the distal radius seen on the oblique view, correlate for history of trauma and point tenderness.   2. Diffuse, decreased bone mineral density.      DX-CHEST-PORTABLE (1 VIEW)   Final Result      No acute cardiopulmonary abnormality.              Assessment/Plan  * Alcohol withdrawal (HCC)- (present on admission)  Assessment & Plan  resolved    Contractures involving both knees  Assessment & Plan  12/15:  Noted by PT/OT.  Patient states she was ambulatory with FWW prior to admission.  12/17:  Able to extend right leg full extension, left leg near complete extension.  Up to chair tid with meals, PT/OT daily.   SNF pending for further strengthening.    Closed  fracture of distal end of left radius with routine healing- (present on admission)  Assessment & Plan  Xray wrist with tiny minimal fracture distal radius seen in process of healing, likely 2 weeks old.  Sed rate normal  urice acid elevated  velcro wrist splint placed, to wear x 6 weeks.  Able to weight bear left elbow for platform walker, non weight bearing left hand.    Mixed action and resting tremor- (present on admission)  Assessment & Plan  Unclear etiology, difficult to determine if secondary to alcohol abuse versus neurologic abnormality  Neurology Dr. Khan consulted, appreciate recommendations  TSH low normal  -Neurology placed referral for outpatient neurology clinic  -Propanolol started in the ED  -Monitor    Anemia of chronic disease- (present on admission)  Assessment & Plan  H/H 8.5/25.8 this admission  Per chart review, chronically low  Iron studies support anemia of chronic disease  B12 level high, folate level normal, iron level high  -Monitor    Seizure (HCC)- (present on admission)  Assessment & Plan  History of  -continue keppra    Essential hypertension- (present on admission)  Assessment & Plan  Normotensive  -monitor and start oral antihypertensives if persistent       VTE prophylaxis: SCDs/TEDs and enoxaparin ppx    I have performed a physical exam and reviewed and updated ROS and Plan today (12/28/2021). In review of yesterday's note (12/27/2021), there are no changes except as documented above.

## 2021-12-29 NOTE — DISCHARGE PLANNING
Agency/Facility Name: [facility]   Spoke To: Grayson   Outcome: DPA confirmed that Liaison communicated with Lialynette Ma that patient will be accepted at [facility]   Grayson mentioned overall status of bed availability for both [facility] only able take one patient a day due to staffing and bed availability.       LSW notified     929  Agency/Facility Name: [facility]   Spoke To: Francesca  Outcome:  Liaison confirmed no possible beds for patient at [facility] and confirmed  acceptance for  patient at [facility] facility. Liaison mentioned there will be beds available on Monday of next week. Encouraged to call then for further details.    LSW notified

## 2021-12-29 NOTE — PROGRESS NOTES
I certify that the patient requires continued medically necessary hospital services for the treatment of acute alcohol intoxication and will remain in the hospital for 1-7days, pending bed availibility to SNF.  Discharge plan is anticipated to be discharge to a skilled nursing facility.    - GRANT ARREAGA DO

## 2021-12-29 NOTE — CARE PLAN
The patient is Stable - Low risk of patient condition declining or worsening    Shift Goals  Clinical Goals: pain management    Progress made toward(s) clinical / shift goals:  Medicated pt with PRN Tylenol and pt verbalized relief.     Patient is not progressing towards the following goals:

## 2021-12-29 NOTE — PROGRESS NOTES
Hospital Medicine Daily Progress Note    Date of Service  12/29/2021    Chief Complaint  Roro Leon is a 66 y.o. female admitted 12/10/2021 with AMS    Hospital Course  This is a 66 year old female with PMHx of hypertension, seizures on Keppra (last seizure June 2021), alcohol use disorder and subdural hematoma who was admitted on 12/10/2021 with acute alcohol intoxication. CIWA protocol in place.    She was also found to have a right distal radial hairline fracture and splint has been placed. Velcro splint in place x 6 weeks. Nonweightbearing able to use platform front wheel walker able to bear weight on left elbow.    Patient also noted worsening tremors over several months for which Neurology was consulted and recommended outpatient follow up.       She is now medically cleared pending SNF acceptance.     Interval Problem Update  Patient reports after SNF, she would like to look into assisted living facilities as she does not believe she can care for self.    CM provided patient with information for assisted living facilities.    Medically clear, pending SNF bed availability.     I have personally seen and examined the patient at bedside. I discussed the plan of care with patient and bedside RN.    Consultants/Specialty  None    Code Status  Full Code    Disposition  Patient is medically cleared for discharge.   Anticipate discharge to to skilled nursing facility.  I have placed the appropriate orders for post-discharge needs.    Review of Systems  Review of Systems   All other systems reviewed and are negative.       Physical Exam  Temp:  [36.6 °C (97.8 °F)-37.3 °C (99.2 °F)] 36.7 °C (98.1 °F)  Pulse:  [73-80] 74  Resp:  [17-18] 18  BP: (101-115)/(64-73) 101/71  SpO2:  [97 %-98 %] 97 %    Physical Exam  Vitals and nursing note reviewed.   Constitutional:       Appearance: Normal appearance.   Cardiovascular:      Rate and Rhythm: Normal rate and regular rhythm.   Pulmonary:      Effort: Pulmonary effort is  normal.      Breath sounds: Normal breath sounds.   Skin:     General: Skin is warm and dry.      Comments: Left arm splint in place   Neurological:      General: No focal deficit present.      Mental Status: She is alert and oriented to person, place, and time.      Comments: Mild bilateral upper extremity tremor         Fluids    Intake/Output Summary (Last 24 hours) at 12/29/2021 1351  Last data filed at 12/29/2021 1100  Gross per 24 hour   Intake 1140 ml   Output 1450 ml   Net -310 ml       Laboratory  Recent Labs     12/27/21  0323   WBC 6.6   RBC 3.12*   HEMOGLOBIN 9.0*   HEMATOCRIT 30.1*   MCV 96.5   MCH 28.8   MCHC 29.9*   RDW 68.4*   PLATELETCT 572*   MPV 11.5     Recent Labs     12/27/21  0323   SODIUM 141   POTASSIUM 4.3   CHLORIDE 105   CO2 23   GLUCOSE 98   BUN 11   CREATININE 0.79   CALCIUM 8.7                   Imaging  CT-HEAD W/O   Final Result         1.  No acute intracranial abnormality is identified, there are nonspecific white matter changes, commonly associated with small vessel ischemic disease.  Associated mild cerebral atrophy is noted.   2.  Atherosclerosis.         DX-WRIST-COMPLETE 3+ LEFT   Final Result         1. There is a possible nondisplaced intra-articular fracture of the distal radius seen on the oblique view, correlate for history of trauma and point tenderness.   2. Diffuse, decreased bone mineral density.      DX-CHEST-PORTABLE (1 VIEW)   Final Result      No acute cardiopulmonary abnormality.              Assessment/Plan  * Alcohol withdrawal (HCC)- (present on admission)  Assessment & Plan  resolved    Contractures involving both knees  Assessment & Plan  12/15:  Noted by PT/OT.  Patient states she was ambulatory with FWW prior to admission.  12/17:  Able to extend right leg full extension, left leg near complete extension.  Up to chair tid with meals, PT/OT daily.   SNF pending for further strengthening.    Closed fracture of distal end of left radius with routine healing-  (present on admission)  Assessment & Plan  Xray wrist with tiny minimal fracture distal radius seen in process of healing, likely 2 weeks old.  Sed rate normal  urice acid elevated  velcro wrist splint placed, to wear x 6 weeks.  Able to weight bear left elbow for platform walker, non weight bearing left hand.    Mixed action and resting tremor- (present on admission)  Assessment & Plan  Unclear etiology, difficult to determine if secondary to alcohol abuse versus neurologic abnormality  Neurology Dr. Khan consulted, appreciate recommendations  TSH low normal  -Neurology placed referral for outpatient neurology clinic  -Propanolol started in the ED  -Monitor    Anemia of chronic disease- (present on admission)  Assessment & Plan  H/H 8.5/25.8 this admission  Per chart review, chronically low  Iron studies support anemia of chronic disease  B12 level high, folate level normal, iron level high  -Monitor    Seizure (HCC)- (present on admission)  Assessment & Plan  History of  -continue keppra    Essential hypertension- (present on admission)  Assessment & Plan  Normotensive  -monitor and start oral antihypertensives if persistent       VTE prophylaxis: SCDs/TEDs and enoxaparin ppx    I have performed a physical exam and reviewed and updated ROS and Plan today (12/29/2021). In review of yesterday's note (12/28/2021), there are no changes except as documented above.

## 2021-12-29 NOTE — CARE PLAN
Problem: Knowledge Deficit - Standard  Goal: Patient and family/care givers will demonstrate understanding of plan of care, disease process/condition, diagnostic tests and medications  Outcome: Progressing     Problem: Seizure Precautions  Goal: Implementation of seizure precautions  Outcome: Progressing   The patient is Stable - Low risk of patient condition declining or worsening    Shift Goals  Clinical Goals: Pt education on POC  Patient Goals: Discharge   Family Goals: N/A    Progress made toward(s) clinical / shift goals:  Seizure precautions in place, pt educated throughout shift on POC, pt agreeable to SNF.     Patient is not progressing towards the following goals:

## 2021-12-30 PROCEDURE — A9270 NON-COVERED ITEM OR SERVICE: HCPCS | Performed by: INTERNAL MEDICINE

## 2021-12-30 PROCEDURE — A9270 NON-COVERED ITEM OR SERVICE: HCPCS | Performed by: STUDENT IN AN ORGANIZED HEALTH CARE EDUCATION/TRAINING PROGRAM

## 2021-12-30 PROCEDURE — 700111 HCHG RX REV CODE 636 W/ 250 OVERRIDE (IP): Performed by: STUDENT IN AN ORGANIZED HEALTH CARE EDUCATION/TRAINING PROGRAM

## 2021-12-30 PROCEDURE — 700102 HCHG RX REV CODE 250 W/ 637 OVERRIDE(OP): Performed by: STUDENT IN AN ORGANIZED HEALTH CARE EDUCATION/TRAINING PROGRAM

## 2021-12-30 PROCEDURE — 770001 HCHG ROOM/CARE - MED/SURG/GYN PRIV*

## 2021-12-30 PROCEDURE — 700102 HCHG RX REV CODE 250 W/ 637 OVERRIDE(OP): Performed by: HOSPITALIST

## 2021-12-30 PROCEDURE — A9270 NON-COVERED ITEM OR SERVICE: HCPCS | Performed by: HOSPITALIST

## 2021-12-30 PROCEDURE — 97530 THERAPEUTIC ACTIVITIES: CPT

## 2021-12-30 PROCEDURE — 94760 N-INVAS EAR/PLS OXIMETRY 1: CPT

## 2021-12-30 PROCEDURE — 700102 HCHG RX REV CODE 250 W/ 637 OVERRIDE(OP): Performed by: INTERNAL MEDICINE

## 2021-12-30 PROCEDURE — 99232 SBSQ HOSP IP/OBS MODERATE 35: CPT | Performed by: GENERAL PRACTICE

## 2021-12-30 RX ADMIN — OMEPRAZOLE 40 MG: KIT at 17:08

## 2021-12-30 RX ADMIN — LEVETIRACETAM 500 MG: 500 TABLET, FILM COATED ORAL at 04:52

## 2021-12-30 RX ADMIN — LISINOPRIL 20 MG: 20 TABLET ORAL at 04:52

## 2021-12-30 RX ADMIN — OMEPRAZOLE 40 MG: KIT at 04:57

## 2021-12-30 RX ADMIN — ENOXAPARIN SODIUM 40 MG: 40 INJECTION SUBCUTANEOUS at 04:57

## 2021-12-30 RX ADMIN — PROPRANOLOL HYDROCHLORIDE 10 MG: 10 TABLET ORAL at 21:00

## 2021-12-30 RX ADMIN — PROPRANOLOL HYDROCHLORIDE 10 MG: 10 TABLET ORAL at 04:52

## 2021-12-30 RX ADMIN — Medication 100 MG: at 04:52

## 2021-12-30 RX ADMIN — LEVETIRACETAM 500 MG: 500 TABLET, FILM COATED ORAL at 17:08

## 2021-12-30 ASSESSMENT — COGNITIVE AND FUNCTIONAL STATUS - GENERAL
WALKING IN HOSPITAL ROOM: TOTAL
MOBILITY SCORE: 11
CLIMB 3 TO 5 STEPS WITH RAILING: TOTAL
MOVING FROM LYING ON BACK TO SITTING ON SIDE OF FLAT BED: UNABLE
MOVING TO AND FROM BED TO CHAIR: A LITTLE
STANDING UP FROM CHAIR USING ARMS: A LOT
SUGGESTED CMS G CODE MODIFIER MOBILITY: CL
TURNING FROM BACK TO SIDE WHILE IN FLAT BAD: A LITTLE

## 2021-12-30 ASSESSMENT — GAIT ASSESSMENTS: GAIT LEVEL OF ASSIST: UNABLE TO PARTICIPATE

## 2021-12-30 NOTE — PROGRESS NOTES
Hospital Medicine Daily Progress Note    Date of Service  12/30/2021    Chief Complaint  Roro Leon is a 66 y.o. female admitted 12/10/2021 with AMS    Hospital Course  This is a 66 year old female with PMHx of hypertension, seizures on Keppra (last seizure June 2021), alcohol use disorder and subdural hematoma who was admitted on 12/10/2021 with acute alcohol intoxication. CIWA protocol in place.    She was also found to have a right distal radial hairline fracture and splint has been placed. Velcro splint in place x 6 weeks. Nonweightbearing able to use platform front wheel walker able to bear weight on left elbow.    Patient also noted worsening tremors over several months for which Neurology was consulted and recommended outpatient follow up.       She is now medically cleared pending SNF acceptance.     Interval Problem Update  Patient reports after SNF, she would like to look into assisted living facilities as she does not believe she can care for self.    CM provided patient with information for assisted living facilities.    Medically clear, pending SNF bed availability.     I have personally seen and examined the patient at bedside. I discussed the plan of care with patient and bedside RN.    Consultants/Specialty  None    Code Status  Full Code    Disposition  Patient is medically cleared for discharge.   Anticipate discharge to to skilled nursing facility.  I have placed the appropriate orders for post-discharge needs.    Review of Systems  Review of Systems   All other systems reviewed and are negative.       Physical Exam  Temp:  [36.4 °C (97.5 °F)-37.3 °C (99.2 °F)] 36.6 °C (97.9 °F)  Pulse:  [66-90] 90  Resp:  [16-18] 16  BP: ()/(55-71) 105/71  SpO2:  [95 %-98 %] 95 %    Physical Exam  Vitals and nursing note reviewed.   Constitutional:       Appearance: Normal appearance.   Cardiovascular:      Rate and Rhythm: Normal rate and regular rhythm.   Pulmonary:      Effort: Pulmonary effort is  normal.      Breath sounds: Normal breath sounds.   Skin:     General: Skin is warm and dry.      Comments: Left arm splint in place   Neurological:      General: No focal deficit present.      Mental Status: She is alert and oriented to person, place, and time.      Comments: Mild bilateral upper extremity tremor         Fluids    Intake/Output Summary (Last 24 hours) at 12/30/2021 1457  Last data filed at 12/30/2021 1330  Gross per 24 hour   Intake 717 ml   Output 1150 ml   Net -433 ml       Laboratory                        Imaging  CT-HEAD W/O   Final Result         1.  No acute intracranial abnormality is identified, there are nonspecific white matter changes, commonly associated with small vessel ischemic disease.  Associated mild cerebral atrophy is noted.   2.  Atherosclerosis.         DX-WRIST-COMPLETE 3+ LEFT   Final Result         1. There is a possible nondisplaced intra-articular fracture of the distal radius seen on the oblique view, correlate for history of trauma and point tenderness.   2. Diffuse, decreased bone mineral density.      DX-CHEST-PORTABLE (1 VIEW)   Final Result      No acute cardiopulmonary abnormality.              Assessment/Plan  * Alcohol withdrawal (HCC)- (present on admission)  Assessment & Plan  resolved    Contractures involving both knees  Assessment & Plan  12/15:  Noted by PT/OT.  Patient states she was ambulatory with FWW prior to admission.  12/17:  Able to extend right leg full extension, left leg near complete extension.  Up to chair tid with meals, PT/OT daily.   SNF pending for further strengthening.    Closed fracture of distal end of left radius with routine healing- (present on admission)  Assessment & Plan  Xray wrist with tiny minimal fracture distal radius seen in process of healing, likely 2 weeks old.  Sed rate normal  urice acid elevated  velcro wrist splint placed, to wear x 6 weeks.  Able to weight bear left elbow for platform walker, non weight bearing left  hand.    Mixed action and resting tremor- (present on admission)  Assessment & Plan  Unclear etiology, difficult to determine if secondary to alcohol abuse versus neurologic abnormality  Neurology Dr. Khan consulted, appreciate recommendations  TSH low normal  -Neurology placed referral for outpatient neurology clinic  -Propanolol started in the ED  -Monitor    Anemia of chronic disease- (present on admission)  Assessment & Plan  H/H 8.5/25.8 this admission  Per chart review, chronically low  Iron studies support anemia of chronic disease  B12 level high, folate level normal, iron level high  -Monitor    Seizure (HCC)- (present on admission)  Assessment & Plan  History of  -continue keppra    Essential hypertension- (present on admission)  Assessment & Plan  Normotensive  -monitor and start oral antihypertensives if persistent       VTE prophylaxis: SCDs/TEDs and enoxaparin ppx    I have performed a physical exam and reviewed and updated ROS and Plan today (12/30/2021). In review of yesterday's note (12/29/2021), there are no changes except as documented above.

## 2021-12-30 NOTE — PROGRESS NOTES
Assumed care of pt at 0700. Report received by NOC RN. Pt is A&O x 4. Pain is 0. Pt is sitting up in bed. Patient incontinent, changed. Bed in lowest locked position, call light in reach, hourly rounding in place. Labs reviewed, orders reviewed, communication board updated. WCTM.

## 2021-12-30 NOTE — CARE PLAN
The patient is Stable - Low risk of patient condition declining or worsening    Shift Goals  Clinical Goals: placement  Patient Goals: pain management  Family Goals: N/A    Progress made toward(s) clinical / shift goals:  Pt accepted to [facility], no bed available until next week Monday. Pt complaints of left arm pain, medicated pt per MAR.    Patient is not progressing towards the following goals:

## 2021-12-30 NOTE — CARE PLAN
Problem: Knowledge Deficit - Standard  Goal: Patient and family/care givers will demonstrate understanding of plan of care, disease process/condition, diagnostic tests and medications  Outcome: Progressing     Problem: Lifestyle Changes  Goal: Patient's ability to identify lifestyle changes and available resources to help reduce recurrence of condition will improve  Outcome: Progressing     Problem: Fall Risk  Goal: Patient will remain free from falls  Outcome: Progressing     Problem: Hemodynamics  Goal: Patient's hemodynamics, fluid balance and neurologic status will be stable or improve  Outcome: Progressing     The patient is Stable - Low risk of patient condition declining or worsening    Shift Goals  Clinical Goals: PT/OT   Patient Goals: placement  Family Goals: N/A    Progress made toward(s) clinical / shift goals:  patient eager to work with PT/OT today and did well, discharge planning in progress    Patient is not progressing towards the following goals:

## 2021-12-30 NOTE — CARE PLAN
Problem: Knowledge Deficit - Standard  Goal: Patient and family/care givers will demonstrate understanding of plan of care, disease process/condition, diagnostic tests and medications  Outcome: Progressing     Problem: Fall Risk  Goal: Patient will remain free from falls  Outcome: Progressing   The patient is Stable - Low risk of patient condition declining or worsening    Shift Goals  Clinical Goals: Comfort  Patient Goals: Discharge  Family Goals: N/A    Progress made toward(s) clinical / shift goals:  Fall precautions in place, bed alarm in use. Pt shows understanding of call light usage.     Patient is not progressing towards the following goals:

## 2021-12-31 PROCEDURE — 700102 HCHG RX REV CODE 250 W/ 637 OVERRIDE(OP): Performed by: HOSPITALIST

## 2021-12-31 PROCEDURE — A9270 NON-COVERED ITEM OR SERVICE: HCPCS | Performed by: HOSPITALIST

## 2021-12-31 PROCEDURE — 700111 HCHG RX REV CODE 636 W/ 250 OVERRIDE (IP): Performed by: STUDENT IN AN ORGANIZED HEALTH CARE EDUCATION/TRAINING PROGRAM

## 2021-12-31 PROCEDURE — 770001 HCHG ROOM/CARE - MED/SURG/GYN PRIV*

## 2021-12-31 PROCEDURE — 700102 HCHG RX REV CODE 250 W/ 637 OVERRIDE(OP): Performed by: STUDENT IN AN ORGANIZED HEALTH CARE EDUCATION/TRAINING PROGRAM

## 2021-12-31 PROCEDURE — 700102 HCHG RX REV CODE 250 W/ 637 OVERRIDE(OP): Performed by: INTERNAL MEDICINE

## 2021-12-31 PROCEDURE — A9270 NON-COVERED ITEM OR SERVICE: HCPCS | Performed by: STUDENT IN AN ORGANIZED HEALTH CARE EDUCATION/TRAINING PROGRAM

## 2021-12-31 PROCEDURE — 99232 SBSQ HOSP IP/OBS MODERATE 35: CPT | Performed by: GENERAL PRACTICE

## 2021-12-31 PROCEDURE — A9270 NON-COVERED ITEM OR SERVICE: HCPCS | Performed by: INTERNAL MEDICINE

## 2021-12-31 RX ADMIN — OMEPRAZOLE 40 MG: KIT at 16:52

## 2021-12-31 RX ADMIN — ACETAMINOPHEN 650 MG: 325 TABLET, FILM COATED ORAL at 04:51

## 2021-12-31 RX ADMIN — ACETAMINOPHEN 650 MG: 325 TABLET, FILM COATED ORAL at 16:52

## 2021-12-31 RX ADMIN — ENOXAPARIN SODIUM 40 MG: 40 INJECTION SUBCUTANEOUS at 04:51

## 2021-12-31 RX ADMIN — PROPRANOLOL HYDROCHLORIDE 10 MG: 10 TABLET ORAL at 14:48

## 2021-12-31 RX ADMIN — OMEPRAZOLE 40 MG: KIT at 04:51

## 2021-12-31 RX ADMIN — PROPRANOLOL HYDROCHLORIDE 10 MG: 10 TABLET ORAL at 20:52

## 2021-12-31 RX ADMIN — LEVETIRACETAM 500 MG: 500 TABLET, FILM COATED ORAL at 16:52

## 2021-12-31 RX ADMIN — LISINOPRIL 20 MG: 20 TABLET ORAL at 04:51

## 2021-12-31 RX ADMIN — LEVETIRACETAM 500 MG: 500 TABLET, FILM COATED ORAL at 04:51

## 2021-12-31 RX ADMIN — Medication 100 MG: at 04:51

## 2021-12-31 NOTE — PROGRESS NOTES
Hospital Medicine Daily Progress Note    Date of Service  12/31/2021    Chief Complaint  Roro Leno is a 66 y.o. female admitted 12/10/2021 with AMS    Hospital Course  This is a 66 year old female with PMHx of hypertension, seizures on Keppra (last seizure June 2021), alcohol use disorder and subdural hematoma who was admitted on 12/10/2021 with acute alcohol intoxication. CIWA protocol in place.    She was also found to have a right distal radial hairline fracture and splint has been placed. Velcro splint in place x 6 weeks. Nonweightbearing able to use platform front wheel walker able to bear weight on left elbow.    Patient also noted worsening tremors over several months for which Neurology was consulted and recommended outpatient follow up.       She is now medically cleared pending SNF acceptance.     Interval Problem Update  Patient reports after SNF, she would like to look into assisted living facilities as she does not believe she can care for self.    CM provided patient with information for assisted living facilities.    Medically clear, pending SNF bed availability.     I have personally seen and examined the patient at bedside. I discussed the plan of care with patient and bedside RN.    Consultants/Specialty  None    Code Status  Full Code    Disposition  Patient is medically cleared for discharge.   Anticipate discharge to to skilled nursing facility.  I have placed the appropriate orders for post-discharge needs.    Review of Systems  Review of Systems   All other systems reviewed and are negative.       Physical Exam  Temp:  [36.3 °C (97.4 °F)-37.1 °C (98.7 °F)] 36.3 °C (97.4 °F)  Pulse:  [81-90] 81  Resp:  [16-18] 16  BP: (101-113)/(62-71) 101/65  SpO2:  [91 %-98 %] 91 %    Physical Exam  Vitals and nursing note reviewed.   Constitutional:       Appearance: Normal appearance.   Cardiovascular:      Rate and Rhythm: Normal rate and regular rhythm.   Pulmonary:      Effort: Pulmonary effort is  normal.      Breath sounds: Normal breath sounds.   Skin:     General: Skin is warm and dry.      Comments: Left arm splint in place   Neurological:      General: No focal deficit present.      Mental Status: She is alert and oriented to person, place, and time.      Comments: Mild bilateral upper extremity tremor         Fluids    Intake/Output Summary (Last 24 hours) at 12/31/2021 1022  Last data filed at 12/31/2021 1008  Gross per 24 hour   Intake 480 ml   Output 800 ml   Net -320 ml       Laboratory                        Imaging  CT-HEAD W/O   Final Result         1.  No acute intracranial abnormality is identified, there are nonspecific white matter changes, commonly associated with small vessel ischemic disease.  Associated mild cerebral atrophy is noted.   2.  Atherosclerosis.         DX-WRIST-COMPLETE 3+ LEFT   Final Result         1. There is a possible nondisplaced intra-articular fracture of the distal radius seen on the oblique view, correlate for history of trauma and point tenderness.   2. Diffuse, decreased bone mineral density.      DX-CHEST-PORTABLE (1 VIEW)   Final Result      No acute cardiopulmonary abnormality.              Assessment/Plan  * Alcohol withdrawal (HCC)- (present on admission)  Assessment & Plan  resolved    Contractures involving both knees  Assessment & Plan  12/15:  Noted by PT/OT.  Patient states she was ambulatory with FWW prior to admission.  12/17:  Able to extend right leg full extension, left leg near complete extension.  Up to chair tid with meals, PT/OT daily.   SNF pending for further strengthening.    Closed fracture of distal end of left radius with routine healing- (present on admission)  Assessment & Plan  Xray wrist with tiny minimal fracture distal radius seen in process of healing, likely 2 weeks old.  Sed rate normal  urice acid elevated  velcro wrist splint placed, to wear x 6 weeks.  Able to weight bear left elbow for platform walker, non weight bearing left  hand.    Mixed action and resting tremor- (present on admission)  Assessment & Plan  Unclear etiology, difficult to determine if secondary to alcohol abuse versus neurologic abnormality  Neurology Dr. Khan consulted, appreciate recommendations  TSH low normal  -Neurology placed referral for outpatient neurology clinic  -Propanolol started in the ED  -Monitor    Anemia of chronic disease- (present on admission)  Assessment & Plan  H/H 8.5/25.8 this admission  Per chart review, chronically low  Iron studies support anemia of chronic disease  B12 level high, folate level normal, iron level high  -Monitor    Seizure (HCC)- (present on admission)  Assessment & Plan  History of  -continue keppra    Essential hypertension- (present on admission)  Assessment & Plan  Normotensive  -monitor and start oral antihypertensives if persistent       VTE prophylaxis: SCDs/TEDs and enoxaparin ppx    I have performed a physical exam and reviewed and updated ROS and Plan today (12/31/2021). In review of yesterday's note (12/30/2021), there are no changes except as documented above.

## 2021-12-31 NOTE — THERAPY
Physical Therapy   Daily Treatment     Patient Name: Roro Leon  Age:  66 y.o., Sex:  female  Medical Record #: [id number]  Today's Date: 12/30/2021     Precautions  Precautions: Fall Risk    Assessment    Pt motivated to participate. Able to perform bed mobility without assist. Pt discussing how much she struggles with walker. Trialed alternative for STS using high back chair to pull up on and come to stand. Significant improvement in ability to anteriorly weight shift and pull self into standing. Performed STS x4, able to stand for 30 sec to 1 min. Initiated standing weight shift, unable to perform SLS to take steps. Continue to recommend placement.    Plan    Continue current treatment plan.    DC Equipment Recommendations: Unable to determine at this time  Discharge Recommendations: Recommend post-acute placement for additional physical therapy services prior to discharge home           Objective       12/30/21 1436   Cognition    Level of Consciousness Alert   Balance   Sitting Balance (Static) Fair +   Sitting Balance (Dynamic) Fair   Standing Balance (Static) Fair -   Standing Balance (Dynamic) Poor +   Weight Shift Sitting Fair   Weight Shift Standing Poor   Skilled Intervention Verbal Cuing;Tactile Cuing;Sequencing   Comments using chair to pull up   Gait Analysis   Gait Level Of Assist Unable to Participate   Bed Mobility    Supine to Sit Supervised   Sit to Supine Supervised   Scooting Supervised   Skilled Intervention Verbal Cuing   Functional Mobility   Sit to Stand Minimal Assist  (using back of chair to pull up)   Skilled Intervention Verbal Cuing;Sequencing;Tactile Cuing   How much difficulty does the patient currently have...   Turning over in bed (including adjusting bedclothes, sheets and blankets)? 3   Sitting down on and standing up from a chair with arms (e.g., wheelchair, bedside commode, etc.) 1   Moving from lying on back to sitting on the side of the bed? 3   How much help from another  person does the patient currently need...   Moving to and from a bed to a chair (including a wheelchair)? 2   Need to walk in a hospital room? 1   Climbing 3-5 steps with a railing? 1   6 clicks Mobility Score 11   Short Term Goals    Short Term Goal # 1 Pt will perform supine <> sit with min A within 6 visits in order to progress functional mobility   Goal Outcome # 1 Goal met   Short Term Goal # 2 Pt will perform STS with FWW and min A within 6 visits in order to progress OOB activity   Goal Outcome # 2 Goal not met   Short Term Goal # 3 Pt will perform functional transfers with FWW and mod A within 6 visits in order to progress OOB activity   Goal Outcome # 3 Goal not met

## 2021-12-31 NOTE — PROGRESS NOTES
Received report from night RN, assumed care at 0700. Pt A&OX4, able specify needs. Pt sitting up in bed, no signs/symptoms of distress noted. Pt denies acute pain. Bed alarm in use, bed in lowest and locked position, non-skid socks in place. POC discussed, communication board updated. Call light in reach, hourly rounding in place.

## 2021-12-31 NOTE — CARE PLAN
The patient is Stable - Low risk of patient condition declining or worsening    Shift Goals  Clinical Goals: increase mobility  Patient Goals: comfort  Family Goals: n/a    Progress made toward(s) clinical / shift goals:  Patient educated and encouraged to sit on EOB, change positions at least every two hours.    Patient is not progressing towards the following goals:

## 2021-12-31 NOTE — PROGRESS NOTES
Received bedside report from day-shift RN and assumed care of patient. Labs and orders noted. Assessment performed. Patient is alert and oriented x4 with no signs of labored breathing or distress. Patient denies any dizziness, chest pain, nausea, numbness, tingling, or discomfort at this time. Patient updated on plan of care; verbalizes understanding and states all of her questions/concerns have been addressed and answered appropriately. Patient states all of her needs are being met at this time. Safety precautions in place including patient call light within reach, personal possessions nearby, bed in low position and locked, patient rounding in practice, bed strip alarm on and working properly, and non-skid socks in place.

## 2021-12-31 NOTE — CARE PLAN
The patient is Stable - Low risk of patient condition declining or worsening    Shift Goals  Clinical Goals: Maintain skin integrity  Patient Goals: Get sleep tonight  Family Goals: N/A    Progress made toward(s) clinical / shift goals: Cluster care being provided by staff to help promote sleep.    Patient is not progressing towards the following goals: Patient incontinent of urine and stool. Draw sheet/pad being checked during patient rounding and changed as needed. Patient cleaned and dried after incontinent episodes. Purewick in use; changed at start of shift.

## 2022-01-01 PROCEDURE — 700102 HCHG RX REV CODE 250 W/ 637 OVERRIDE(OP): Performed by: INTERNAL MEDICINE

## 2022-01-01 PROCEDURE — 99232 SBSQ HOSP IP/OBS MODERATE 35: CPT | Performed by: GENERAL PRACTICE

## 2022-01-01 PROCEDURE — A9270 NON-COVERED ITEM OR SERVICE: HCPCS | Performed by: INTERNAL MEDICINE

## 2022-01-01 PROCEDURE — 770001 HCHG ROOM/CARE - MED/SURG/GYN PRIV*

## 2022-01-01 PROCEDURE — 700111 HCHG RX REV CODE 636 W/ 250 OVERRIDE (IP): Performed by: STUDENT IN AN ORGANIZED HEALTH CARE EDUCATION/TRAINING PROGRAM

## 2022-01-01 PROCEDURE — 700102 HCHG RX REV CODE 250 W/ 637 OVERRIDE(OP): Performed by: STUDENT IN AN ORGANIZED HEALTH CARE EDUCATION/TRAINING PROGRAM

## 2022-01-01 PROCEDURE — 700102 HCHG RX REV CODE 250 W/ 637 OVERRIDE(OP): Performed by: HOSPITALIST

## 2022-01-01 PROCEDURE — A9270 NON-COVERED ITEM OR SERVICE: HCPCS | Performed by: HOSPITALIST

## 2022-01-01 PROCEDURE — 302146: Performed by: GENERAL PRACTICE

## 2022-01-01 PROCEDURE — A9270 NON-COVERED ITEM OR SERVICE: HCPCS | Performed by: STUDENT IN AN ORGANIZED HEALTH CARE EDUCATION/TRAINING PROGRAM

## 2022-01-01 RX ADMIN — LISINOPRIL 20 MG: 20 TABLET ORAL at 05:15

## 2022-01-01 RX ADMIN — OMEPRAZOLE 40 MG: KIT at 17:24

## 2022-01-01 RX ADMIN — OMEPRAZOLE 40 MG: KIT at 05:15

## 2022-01-01 RX ADMIN — ACETAMINOPHEN 650 MG: 325 TABLET, FILM COATED ORAL at 05:18

## 2022-01-01 RX ADMIN — ENOXAPARIN SODIUM 40 MG: 40 INJECTION SUBCUTANEOUS at 05:16

## 2022-01-01 RX ADMIN — LEVETIRACETAM 500 MG: 500 TABLET, FILM COATED ORAL at 17:24

## 2022-01-01 RX ADMIN — PROPRANOLOL HYDROCHLORIDE 10 MG: 10 TABLET ORAL at 20:39

## 2022-01-01 RX ADMIN — PROPRANOLOL HYDROCHLORIDE 10 MG: 10 TABLET ORAL at 05:16

## 2022-01-01 RX ADMIN — LEVETIRACETAM 500 MG: 500 TABLET, FILM COATED ORAL at 05:15

## 2022-01-01 RX ADMIN — PROPRANOLOL HYDROCHLORIDE 10 MG: 10 TABLET ORAL at 14:46

## 2022-01-01 RX ADMIN — Medication 100 MG: at 05:15

## 2022-01-01 NOTE — PROGRESS NOTES
Report received from previous shift. Assumed care of patient at 1900, patient is A&O x 4; pt is a little forgetful but just needs re orientation. Patient is a high Fall risk, bed locked and in lowest position, bed alarm on. Patient reports pain 0/10. Plan of care reviewed with patient, will implement and continue to monitor. Hourly rounding is in place and call light/belongings within reach.

## 2022-01-01 NOTE — PROGRESS NOTES
Received report and assumed care of patient. Plan of care discussed. Patient has request for clothing to be brought in since she may discharge to SNF on Monday. RN brought clothing to patient. No further needs at this time.

## 2022-01-01 NOTE — PROGRESS NOTES
[facility] Medicine Daily Progress Note    Date of Service  1/1/2022    Chief Complaint  Roro Leon is a 66 y.o. female admitted 12/10/2021 with AMS    Hospital Course  This is a 66 year old female with PMHx of hypertension, seizures on Keppra (last seizure June 2021), alcohol use disorder and subdural hematoma who was admitted on 12/10/2021 with acute alcohol intoxication. CIWA protocol in place.    She was also found to have a right distal radial hairline fracture and splint has been placed. Velcro splint in place x 6 weeks. Nonweightbearing able to use platform front wheel walker able to bear weight on left elbow.    Patient also noted worsening tremors over several months for which Neurology was consulted and recommended outpatient follow up.       She is now medically cleared pending SNF acceptance.     Interval Problem Update  Patient reports after SNF, she would like to look into assisted living facilities as she does not believe she can care for self.    CM provided patient with information for assisted living facilities.    Medically clear, pending SNF bed availability.     I have personally seen and examined the patient at bedside. I discussed the plan of care with patient and bedside RN.    Consultants/Specialty  None    Code Status  Full Code    Disposition  Patient is medically cleared for discharge.   Anticipate discharge to to skilled nursing facility.  I have placed the appropriate orders for post-discharge needs.    Review of Systems  Review of Systems   All other systems reviewed and are negative.       Physical Exam  Temp:  [36.5 °C (97.7 °F)-36.9 °C (98.5 °F)] 36.9 °C (98.5 °F)  Pulse:  [73-86] 73  Resp:  [18] 18  BP: (100-113)/(68-75) 105/70  SpO2:  [96 %-99 %] 96 %    Physical Exam  Vitals and nursing note reviewed.   Constitutional:       Appearance: Normal appearance.   Cardiovascular:      Rate and Rhythm: Normal rate and regular rhythm.   Pulmonary:      Effort: Pulmonary effort is  normal.      Breath sounds: Normal breath sounds.   Skin:     General: Skin is warm and dry.      Comments: Left arm splint in place   Neurological:      General: No focal deficit present.      Mental Status: She is alert and oriented to person, place, and time.      Comments: Mild bilateral upper extremity tremor         Fluids    Intake/Output Summary (Last 24 hours) at 1/1/2022 1151  Last data filed at 1/1/2022 1030  Gross per 24 hour   Intake 745 ml   Output 450 ml   Net 295 ml       Laboratory                        Imaging  CT-HEAD W/O   Final Result         1.  No acute intracranial abnormality is identified, there are nonspecific white matter changes, commonly associated with small vessel ischemic disease.  Associated mild cerebral atrophy is noted.   2.  Atherosclerosis.         DX-WRIST-COMPLETE 3+ LEFT   Final Result         1. There is a possible nondisplaced intra-articular fracture of the distal radius seen on the oblique view, correlate for history of trauma and point tenderness.   2. Diffuse, decreased bone mineral density.      DX-CHEST-PORTABLE (1 VIEW)   Final Result      No acute cardiopulmonary abnormality.              Assessment/Plan  * Alcohol withdrawal (HCC)- (present on admission)  Assessment & Plan  resolved    Contractures involving both knees  Assessment & Plan  12/15:  Noted by PT/OT.  Patient states she was ambulatory with FWW prior to admission.  12/17:  Able to extend right leg full extension, left leg near complete extension.  Up to chair tid with meals, PT/OT daily.   SNF pending for further strengthening.    Closed fracture of distal end of left radius with routine healing- (present on admission)  Assessment & Plan  Xray wrist with tiny minimal fracture distal radius seen in process of healing, likely 2 weeks old.  Sed rate normal  urice acid elevated  velcro wrist splint placed, to wear x 6 weeks.  Able to weight bear left elbow for platform walker, non weight bearing left  hand.    Mixed action and resting tremor- (present on admission)  Assessment & Plan  Unclear etiology, difficult to determine if secondary to alcohol abuse versus neurologic abnormality  Neurology Dr. Khan consulted, appreciate recommendations  TSH low normal  -Neurology placed referral for outpatient neurology clinic  -Propanolol started in the ED  -Monitor    Anemia of chronic disease- (present on admission)  Assessment & Plan  H/H 8.5/25.8 this admission  Per chart review, chronically low  Iron studies support anemia of chronic disease  B12 level high, folate level normal, iron level high  -Monitor    Seizure (HCC)- (present on admission)  Assessment & Plan  History of  -continue keppra    Essential hypertension- (present on admission)  Assessment & Plan  Normotensive  -monitor and start oral antihypertensives if persistent       VTE prophylaxis: SCDs/TEDs and enoxaparin ppx    I have performed a physical exam and reviewed and updated ROS and Plan today (1/1/2022). In review of yesterday's note (12/31/2021), there are no changes except as documented above.

## 2022-01-01 NOTE — CARE PLAN
The patient is Stable - Low risk of patient condition declining or worsening    Shift Goals  Clinical Goals: rest   Patient Goals: comfort   Family Goals: NA    Progress made toward(s) clinical / shift goals:  Pt's sacrum is red and blanching. Pt has been cleaned and barrier cream has been applied. Pt declines pain and bed alarm is on.         Problem: Knowledge Deficit - Standard  Goal: Patient and family/care givers will demonstrate understanding of plan of care, disease process/condition, diagnostic tests and medications  Outcome: Progressing     Problem: Pain - Standard  Goal: Alleviation of pain or a reduction in pain to the patient’s comfort goal  Outcome: Progressing     Problem: Fall Risk  Goal: Patient will remain free from falls  Outcome: Progressing     Problem: Skin Integrity  Goal: Skin integrity is maintained or improved  Outcome: Progressing

## 2022-01-01 NOTE — CARE PLAN
The patient is Stable - Low risk of patient condition declining or worsening    Shift Goals  Clinical Goals: Maintain skin integrity  Patient Goals: Get clothing in preparation for discharge  Family Goals: na    Progress made toward(s) clinical / shift goals:  Patient's skin is intact and blanching. No new issues identified.     Patient is not progressing towards the following goals:

## 2022-01-02 LAB — VIT B12 SERPL-MCNC: 794 PG/ML (ref 211–911)

## 2022-01-02 PROCEDURE — A9270 NON-COVERED ITEM OR SERVICE: HCPCS | Performed by: STUDENT IN AN ORGANIZED HEALTH CARE EDUCATION/TRAINING PROGRAM

## 2022-01-02 PROCEDURE — 82607 VITAMIN B-12: CPT

## 2022-01-02 PROCEDURE — 99232 SBSQ HOSP IP/OBS MODERATE 35: CPT | Performed by: GENERAL PRACTICE

## 2022-01-02 PROCEDURE — 700102 HCHG RX REV CODE 250 W/ 637 OVERRIDE(OP): Performed by: HOSPITALIST

## 2022-01-02 PROCEDURE — 36415 COLL VENOUS BLD VENIPUNCTURE: CPT

## 2022-01-02 PROCEDURE — 770001 HCHG ROOM/CARE - MED/SURG/GYN PRIV*

## 2022-01-02 PROCEDURE — 700111 HCHG RX REV CODE 636 W/ 250 OVERRIDE (IP): Performed by: STUDENT IN AN ORGANIZED HEALTH CARE EDUCATION/TRAINING PROGRAM

## 2022-01-02 PROCEDURE — 700102 HCHG RX REV CODE 250 W/ 637 OVERRIDE(OP): Performed by: INTERNAL MEDICINE

## 2022-01-02 PROCEDURE — 700102 HCHG RX REV CODE 250 W/ 637 OVERRIDE(OP): Performed by: STUDENT IN AN ORGANIZED HEALTH CARE EDUCATION/TRAINING PROGRAM

## 2022-01-02 PROCEDURE — A9270 NON-COVERED ITEM OR SERVICE: HCPCS | Performed by: INTERNAL MEDICINE

## 2022-01-02 PROCEDURE — A9270 NON-COVERED ITEM OR SERVICE: HCPCS | Performed by: HOSPITALIST

## 2022-01-02 RX ADMIN — OMEPRAZOLE 40 MG: KIT at 05:07

## 2022-01-02 RX ADMIN — Medication 100 MG: at 05:07

## 2022-01-02 RX ADMIN — OMEPRAZOLE 40 MG: KIT at 17:20

## 2022-01-02 RX ADMIN — IBUPROFEN 400 MG: 800 TABLET, FILM COATED ORAL at 09:11

## 2022-01-02 RX ADMIN — LISINOPRIL 20 MG: 20 TABLET ORAL at 05:07

## 2022-01-02 RX ADMIN — ACETAMINOPHEN 650 MG: 325 TABLET, FILM COATED ORAL at 20:35

## 2022-01-02 RX ADMIN — ENOXAPARIN SODIUM 40 MG: 40 INJECTION SUBCUTANEOUS at 05:07

## 2022-01-02 RX ADMIN — PROPRANOLOL HYDROCHLORIDE 10 MG: 10 TABLET ORAL at 05:07

## 2022-01-02 RX ADMIN — PROPRANOLOL HYDROCHLORIDE 10 MG: 10 TABLET ORAL at 14:15

## 2022-01-02 RX ADMIN — LEVETIRACETAM 500 MG: 500 TABLET, FILM COATED ORAL at 17:20

## 2022-01-02 RX ADMIN — LEVETIRACETAM 500 MG: 500 TABLET, FILM COATED ORAL at 05:07

## 2022-01-02 NOTE — PROGRESS NOTES
Report received from previous shift. Assumed care of patient at 1900, patient is A&O x4. Patient is a high Fall risk, bed locked and in lowest position, bed alarm on. Patient reports pain 0/10. Plan of care reviewed with patient, will implement and continue to monitor. Hourly rounding is in place and call light/belongings within reach.

## 2022-01-02 NOTE — PROGRESS NOTES
Hospital Medicine Daily Progress Note    Date of Service  1/2/2022    Chief Complaint  Roro Leon is a 66 y.o. female admitted 12/10/2021 with AMS    Hospital Course  This is a 66 year old female with PMHx of hypertension, seizures on Keppra (last seizure June 2021), alcohol use disorder and subdural hematoma who was admitted on 12/10/2021 with acute alcohol intoxication. CIWA protocol in place.    She was also found to have a right distal radial hairline fracture and splint has been placed. Velcro splint in place x 6 weeks. Nonweightbearing able to use platform front wheel walker able to bear weight on left elbow.    Patient also noted worsening tremors over several months for which Neurology was consulted and recommended outpatient follow up.       She is now medically cleared pending SNF acceptance.     Interval Problem Update  Patient reports after SNF, she would like to look into assisted living facilities as she does not believe she can care for self.    CM provided patient with information for assisted living facilities.    Medically clear, pending SNF bed availability.     I have personally seen and examined the patient at bedside. I discussed the plan of care with patient and bedside RN.    Consultants/Specialty  None    Code Status  Full Code    Disposition  Patient is medically cleared for discharge.   Anticipate discharge to to skilled nursing facility.  I have placed the appropriate orders for post-discharge needs.    Review of Systems  Review of Systems   All other systems reviewed and are negative.       Physical Exam  Temp:  [36.4 °C (97.6 °F)-36.7 °C (98 °F)] 36.7 °C (98 °F)  Pulse:  [73-76] 73  Resp:  [16-18] 18  BP: ()/(57-70) 107/68  SpO2:  [97 %-99 %] 98 %    Physical Exam  Vitals and nursing note reviewed.   Constitutional:       Appearance: Normal appearance.   Cardiovascular:      Rate and Rhythm: Normal rate and regular rhythm.   Pulmonary:      Effort: Pulmonary effort is normal.       Breath sounds: Normal breath sounds.   Skin:     General: Skin is warm and dry.      Comments: Left arm splint in place   Neurological:      General: No focal deficit present.      Mental Status: She is alert and oriented to person, place, and time.      Comments: Mild bilateral upper extremity tremor         Fluids    Intake/Output Summary (Last 24 hours) at 1/2/2022 1028  Last data filed at 1/1/2022 1607  Gross per 24 hour   Intake 705 ml   Output 875 ml   Net -170 ml       Laboratory                        Imaging  CT-HEAD W/O   Final Result         1.  No acute intracranial abnormality is identified, there are nonspecific white matter changes, commonly associated with small vessel ischemic disease.  Associated mild cerebral atrophy is noted.   2.  Atherosclerosis.         DX-WRIST-COMPLETE 3+ LEFT   Final Result         1. There is a possible nondisplaced intra-articular fracture of the distal radius seen on the oblique view, correlate for history of trauma and point tenderness.   2. Diffuse, decreased bone mineral density.      DX-CHEST-PORTABLE (1 VIEW)   Final Result      No acute cardiopulmonary abnormality.              Assessment/Plan  * Alcohol withdrawal (HCC)- (present on admission)  Assessment & Plan  resolved    Contractures involving both knees  Assessment & Plan  12/15:  Noted by PT/OT.  Patient states she was ambulatory with FWW prior to admission.  12/17:  Able to extend right leg full extension, left leg near complete extension.  Up to chair tid with meals, PT/OT daily.   SNF pending for further strengthening.    Closed fracture of distal end of left radius with routine healing- (present on admission)  Assessment & Plan  Xray wrist with tiny minimal fracture distal radius seen in process of healing, likely 2 weeks old.  Sed rate normal  urice acid elevated  velcro wrist splint placed, to wear x 6 weeks.  Able to weight bear left elbow for platform walker, non weight bearing left  hand.    Mixed action and resting tremor- (present on admission)  Assessment & Plan  Unclear etiology, difficult to determine if secondary to alcohol abuse versus neurologic abnormality  Neurology Dr. Khan consulted, appreciate recommendations  TSH low normal  -Neurology placed referral for outpatient neurology clinic  -Propanolol started in the ED  -Monitor    Anemia of chronic disease- (present on admission)  Assessment & Plan  H/H 8.5/25.8 this admission  Per chart review, chronically low  Iron studies support anemia of chronic disease  B12 level high, folate level normal, iron level high  -Monitor    Seizure (HCC)- (present on admission)  Assessment & Plan  History of  -continue keppra    Essential hypertension- (present on admission)  Assessment & Plan  Normotensive  -monitor and start oral antihypertensives if persistent       VTE prophylaxis: SCDs/TEDs and enoxaparin ppx    I have performed a physical exam and reviewed and updated ROS and Plan today (1/2/2022). In review of yesterday's note (1/1/2022), there are no changes except as documented above.

## 2022-01-02 NOTE — CARE PLAN
The patient is Stable - Low risk of patient condition declining or worsening    Shift Goals  Clinical Goals: maintain skin integrity   Patient Goals: sleep and eat snacks   Family Goals: NA    Progress made toward(s) clinical / shift goals:  Pt denies pain at the moment. Pt uses call light appropriately. Skin is intact, red, and blanching.     Problem: Knowledge Deficit - Standard  Goal: Patient and family/care givers will demonstrate understanding of plan of care, disease process/condition, diagnostic tests and medications  Outcome: Progressing     Problem: Pain - Standard  Goal: Alleviation of pain or a reduction in pain to the patient’s comfort goal  Outcome: Progressing     Problem: Fall Risk  Goal: Patient will remain free from falls  Outcome: Progressing     Problem: Skin Integrity  Goal: Skin integrity is maintained or improved  Outcome: Progressing

## 2022-01-02 NOTE — CARE PLAN
The patient is Stable - Low risk of patient condition declining or worsening    Shift Goals  Clinical Goals: Maintain skin integrity  Patient Goals: Sleep and pain control  Family Goals: N/A    Progress made toward(s) clinical / shift goals:      Patient is not progressing towards the following goals: Pt stated 4/10 pain in L wrist upon assessment, received as ordered per MD pain medication. Calls appropriately, skin is red and blanching.      Problem: Pain - Standard  Goal: Alleviation of pain or a reduction in pain to the patient’s comfort goal  Outcome: Not Progressing     Problem: Skin Integrity  Goal: Skin integrity is maintained or improved  Outcome: Not Progressing

## 2022-01-03 PROCEDURE — 700102 HCHG RX REV CODE 250 W/ 637 OVERRIDE(OP): Performed by: HOSPITALIST

## 2022-01-03 PROCEDURE — 770001 HCHG ROOM/CARE - MED/SURG/GYN PRIV*

## 2022-01-03 PROCEDURE — A9270 NON-COVERED ITEM OR SERVICE: HCPCS | Performed by: STUDENT IN AN ORGANIZED HEALTH CARE EDUCATION/TRAINING PROGRAM

## 2022-01-03 PROCEDURE — 99232 SBSQ HOSP IP/OBS MODERATE 35: CPT | Performed by: GENERAL PRACTICE

## 2022-01-03 PROCEDURE — A9270 NON-COVERED ITEM OR SERVICE: HCPCS | Performed by: HOSPITALIST

## 2022-01-03 PROCEDURE — 700102 HCHG RX REV CODE 250 W/ 637 OVERRIDE(OP): Performed by: INTERNAL MEDICINE

## 2022-01-03 PROCEDURE — 700111 HCHG RX REV CODE 636 W/ 250 OVERRIDE (IP): Performed by: STUDENT IN AN ORGANIZED HEALTH CARE EDUCATION/TRAINING PROGRAM

## 2022-01-03 PROCEDURE — 700102 HCHG RX REV CODE 250 W/ 637 OVERRIDE(OP): Performed by: STUDENT IN AN ORGANIZED HEALTH CARE EDUCATION/TRAINING PROGRAM

## 2022-01-03 PROCEDURE — A9270 NON-COVERED ITEM OR SERVICE: HCPCS | Performed by: INTERNAL MEDICINE

## 2022-01-03 RX ORDER — LANOLIN ALCOHOL/MO/W.PET/CERES
100 CREAM (GRAM) TOPICAL DAILY
Qty: 30 TABLET | Status: SHIPPED
Start: 2022-01-04

## 2022-01-03 RX ORDER — LISINOPRIL 20 MG/1
20 TABLET ORAL DAILY
Qty: 30 TABLET | Status: SHIPPED
Start: 2022-01-04

## 2022-01-03 RX ORDER — PROPRANOLOL HYDROCHLORIDE 10 MG/1
10 TABLET ORAL EVERY 8 HOURS
Qty: 90 TABLET | Refills: 11 | Status: SHIPPED
Start: 2022-01-03

## 2022-01-03 RX ORDER — AMOXICILLIN 250 MG
1 CAPSULE ORAL 2 TIMES DAILY
Qty: 30 TABLET | Refills: 0 | Status: SHIPPED
Start: 2022-01-03

## 2022-01-03 RX ADMIN — ACETAMINOPHEN 650 MG: 325 TABLET, FILM COATED ORAL at 16:41

## 2022-01-03 RX ADMIN — LEVETIRACETAM 500 MG: 500 TABLET, FILM COATED ORAL at 16:41

## 2022-01-03 RX ADMIN — OMEPRAZOLE 40 MG: KIT at 04:52

## 2022-01-03 RX ADMIN — LEVETIRACETAM 500 MG: 500 TABLET, FILM COATED ORAL at 04:51

## 2022-01-03 RX ADMIN — LISINOPRIL 20 MG: 20 TABLET ORAL at 04:51

## 2022-01-03 RX ADMIN — PROPRANOLOL HYDROCHLORIDE 10 MG: 10 TABLET ORAL at 20:56

## 2022-01-03 RX ADMIN — PROPRANOLOL HYDROCHLORIDE 10 MG: 10 TABLET ORAL at 14:34

## 2022-01-03 RX ADMIN — ENOXAPARIN SODIUM 40 MG: 40 INJECTION SUBCUTANEOUS at 04:52

## 2022-01-03 RX ADMIN — Medication 100 MG: at 04:51

## 2022-01-03 RX ADMIN — OMEPRAZOLE 40 MG: KIT at 16:41

## 2022-01-03 RX ADMIN — PROPRANOLOL HYDROCHLORIDE 10 MG: 10 TABLET ORAL at 04:51

## 2022-01-03 NOTE — PROGRESS NOTES
Hospital Medicine Daily Progress Note    Date of Service  1/3/2022    Chief Complaint  Roro Leon is a 66 y.o. female admitted 12/10/2021 with AMS    Hospital Course  This is a 66 year old female with PMHx of hypertension, seizures on Keppra (last seizure June 2021), alcohol use disorder and subdural hematoma who was admitted on 12/10/2021 with acute alcohol intoxication. CIWA protocol in place.    She was also found to have a right distal radial hairline fracture and splint has been placed. Velcro splint in place x 6 weeks. Nonweightbearing able to use platform front wheel walker able to bear weight on left elbow.    Patient also noted worsening tremors over several months for which Neurology was consulted and recommended outpatient follow up.       She is now medically cleared pending SNF acceptance.     Interval Problem Update  Medically clear, patient accepted at [facility], however no bed available due to recent COVID exposure. CM will contact other SNFs for any bed availability.     I have personally seen and examined the patient at bedside. I discussed the plan of care with patient and bedside RN.    Consultants/Specialty  None    Code Status  Full Code    Disposition  Patient is medically cleared for discharge.   Anticipate discharge to to skilled nursing facility.  I have placed the appropriate orders for post-discharge needs.    Review of Systems  Review of Systems   All other systems reviewed and are negative.       Physical Exam  Temp:  [36.4 °C (97.6 °F)-36.8 °C (98.2 °F)] 36.6 °C (97.8 °F)  Pulse:  [69-76] 70  Resp:  [16-18] 18  BP: (104-109)/(57-69) 109/68  SpO2:  [97 %-98 %] 97 %    Physical Exam  Vitals and nursing note reviewed.   Constitutional:       Appearance: Normal appearance.   Cardiovascular:      Rate and Rhythm: Normal rate and regular rhythm.   Pulmonary:      Effort: Pulmonary effort is normal.      Breath sounds: Normal breath sounds.   Skin:     General: Skin is warm and dry.       Comments: Left arm splint in place   Neurological:      General: No focal deficit present.      Mental Status: She is alert and oriented to person, place, and time.      Comments: Mild bilateral upper extremity tremor         Fluids    Intake/Output Summary (Last 24 hours) at 1/3/2022 1107  Last data filed at 1/3/2022 0613  Gross per 24 hour   Intake 480 ml   Output 1400 ml   Net -920 ml       Laboratory                        Imaging  CT-HEAD W/O   Final Result         1.  No acute intracranial abnormality is identified, there are nonspecific white matter changes, commonly associated with small vessel ischemic disease.  Associated mild cerebral atrophy is noted.   2.  Atherosclerosis.         DX-WRIST-COMPLETE 3+ LEFT   Final Result         1. There is a possible nondisplaced intra-articular fracture of the distal radius seen on the oblique view, correlate for history of trauma and point tenderness.   2. Diffuse, decreased bone mineral density.      DX-CHEST-PORTABLE (1 VIEW)   Final Result      No acute cardiopulmonary abnormality.              Assessment/Plan  * Alcohol withdrawal (HCC)- (present on admission)  Assessment & Plan  resolved    Contractures involving both knees  Assessment & Plan  12/15:  Noted by PT/OT.  Patient states she was ambulatory with FWW prior to admission.  12/17:  Able to extend right leg full extension, left leg near complete extension.  Up to chair tid with meals, PT/OT daily.   SNF pending for further strengthening.    Closed fracture of distal end of left radius with routine healing- (present on admission)  Assessment & Plan  Xray wrist with tiny minimal fracture distal radius seen in process of healing, likely 2 weeks old.  Sed rate normal  urice acid elevated  velcro wrist splint placed, to wear x 6 weeks.  Able to weight bear left elbow for platform walker, non weight bearing left hand.    Mixed action and resting tremor- (present on admission)  Assessment & Plan  Unclear  etiology, difficult to determine if secondary to alcohol abuse versus neurologic abnormality  Neurology Dr. Khan consulted, appreciate recommendations  TSH low normal  -Neurology placed referral for outpatient neurology clinic  -Propanolol started in the ED  -Monitor    Anemia of chronic disease- (present on admission)  Assessment & Plan  H/H 8.5/25.8 this admission  Per chart review, chronically low  Iron studies support anemia of chronic disease  B12 level high, folate level normal, iron level high  -Monitor    Seizure (HCC)- (present on admission)  Assessment & Plan  History of  -continue keppra    Essential hypertension- (present on admission)  Assessment & Plan  Normotensive  -monitor and start oral antihypertensives if persistent       VTE prophylaxis: SCDs/TEDs and enoxaparin ppx    I have performed a physical exam and reviewed and updated ROS and Plan today (1/3/2022). In review of yesterday's note (1/2/2022), there are no changes except as documented above.

## 2022-01-03 NOTE — CARE PLAN
The patient is Stable - Low risk of patient condition declining or worsening    Shift Goals  Clinical Goals: Pain control and restful night  Patient Goals: Sleep tonight and discharge soon  Family Goals: N/A    Progress made toward(s) clinical / shift goals:  due med given for pain and reassessed within 2 hours per protocol. Will follow up tomorrow bed availability at [facility] per .  Problem: Knowledge Deficit - Standard  Goal: Patient and family/care givers will demonstrate understanding of plan of care, disease process/condition, diagnostic tests and medications  Outcome: Progressing     Problem: Seizure Precautions  Goal: Implementation of seizure precautions  Outcome: Progressing     Problem: Lifestyle Changes  Goal: Patient's ability to identify lifestyle changes and available resources to help reduce recurrence of condition will improve  Outcome: Progressing     Problem: Psychosocial  Goal: Patient's level of anxiety will decrease  Outcome: Progressing     Problem: Pain - Standard  Goal: Alleviation of pain or a reduction in pain to the patient’s comfort goal  Outcome: Progressing     Problem: Fall Risk  Goal: Patient will remain free from falls  Outcome: Progressing       Patient is not progressing towards the following goals:

## 2022-01-03 NOTE — DISCHARGE PLANNING
Received Choice form at 1145  Agency/Facility Name: [facility]  Referral sent per Choice form @ 1145      CM informed

## 2022-01-03 NOTE — DISCHARGE PLANNING
Agency/Facility Name: [facility]  Spoke To: Lucero  Outcome: On an admissions hold. No beds today      CM informed

## 2022-01-03 NOTE — PROGRESS NOTES
"Received alert and oriented x 4. Check vitals sign and recorded accordingly and due med given per MAR. Monitor sign and symptoms of respiratory distress and treatment given accordingly per MAR.Medicated per MAR and reassessed every 2 hours per protocol. Call light within reach. Bed alarm in placed. Needs attended. Will continue to monitor./57   Pulse 72   Temp 36.4 °C (97.6 °F) (Temporal)   Resp 18   Ht 1.651 m (5' 5\")   Wt 62.9 kg (138 lb 10.7 oz)   SpO2 97%   BMI 23.08 kg/m² . Left wrist immobilizer in placed. C/o left wrist pain and med given. Call appropriately.  "

## 2022-01-04 PROBLEM — F10.939 ALCOHOL WITHDRAWAL (HCC): Status: RESOLVED | Noted: 2021-06-19 | Resolved: 2022-01-04

## 2022-01-04 PROCEDURE — 700102 HCHG RX REV CODE 250 W/ 637 OVERRIDE(OP): Performed by: HOSPITALIST

## 2022-01-04 PROCEDURE — A9270 NON-COVERED ITEM OR SERVICE: HCPCS | Performed by: INTERNAL MEDICINE

## 2022-01-04 PROCEDURE — A9270 NON-COVERED ITEM OR SERVICE: HCPCS | Performed by: HOSPITALIST

## 2022-01-04 PROCEDURE — 97530 THERAPEUTIC ACTIVITIES: CPT

## 2022-01-04 PROCEDURE — 99231 SBSQ HOSP IP/OBS SF/LOW 25: CPT | Performed by: INTERNAL MEDICINE

## 2022-01-04 PROCEDURE — 700102 HCHG RX REV CODE 250 W/ 637 OVERRIDE(OP): Performed by: STUDENT IN AN ORGANIZED HEALTH CARE EDUCATION/TRAINING PROGRAM

## 2022-01-04 PROCEDURE — 97112 NEUROMUSCULAR REEDUCATION: CPT

## 2022-01-04 PROCEDURE — 700102 HCHG RX REV CODE 250 W/ 637 OVERRIDE(OP): Performed by: INTERNAL MEDICINE

## 2022-01-04 PROCEDURE — 770001 HCHG ROOM/CARE - MED/SURG/GYN PRIV*

## 2022-01-04 PROCEDURE — 97535 SELF CARE MNGMENT TRAINING: CPT

## 2022-01-04 PROCEDURE — A9270 NON-COVERED ITEM OR SERVICE: HCPCS | Performed by: STUDENT IN AN ORGANIZED HEALTH CARE EDUCATION/TRAINING PROGRAM

## 2022-01-04 PROCEDURE — 700111 HCHG RX REV CODE 636 W/ 250 OVERRIDE (IP): Performed by: STUDENT IN AN ORGANIZED HEALTH CARE EDUCATION/TRAINING PROGRAM

## 2022-01-04 RX ADMIN — OMEPRAZOLE 40 MG: KIT at 17:09

## 2022-01-04 RX ADMIN — PROPRANOLOL HYDROCHLORIDE 10 MG: 10 TABLET ORAL at 21:15

## 2022-01-04 RX ADMIN — PROPRANOLOL HYDROCHLORIDE 10 MG: 10 TABLET ORAL at 05:16

## 2022-01-04 RX ADMIN — ACETAMINOPHEN 650 MG: 325 TABLET, FILM COATED ORAL at 16:05

## 2022-01-04 RX ADMIN — Medication 100 MG: at 05:49

## 2022-01-04 RX ADMIN — IBUPROFEN 400 MG: 800 TABLET, FILM COATED ORAL at 19:28

## 2022-01-04 RX ADMIN — LISINOPRIL 20 MG: 20 TABLET ORAL at 05:16

## 2022-01-04 RX ADMIN — LEVETIRACETAM 500 MG: 500 TABLET, FILM COATED ORAL at 05:16

## 2022-01-04 RX ADMIN — ENOXAPARIN SODIUM 40 MG: 40 INJECTION SUBCUTANEOUS at 05:16

## 2022-01-04 RX ADMIN — LEVETIRACETAM 500 MG: 500 TABLET, FILM COATED ORAL at 17:08

## 2022-01-04 RX ADMIN — OMEPRAZOLE 40 MG: KIT at 05:16

## 2022-01-04 ASSESSMENT — COGNITIVE AND FUNCTIONAL STATUS - GENERAL
SUGGESTED CMS G CODE MODIFIER DAILY ACTIVITY: CK
SUGGESTED CMS G CODE MODIFIER MOBILITY: CK
DRESSING REGULAR UPPER BODY CLOTHING: A LITTLE
MOVING FROM LYING ON BACK TO SITTING ON SIDE OF FLAT BED: A LITTLE
HELP NEEDED FOR BATHING: A LOT
WALKING IN HOSPITAL ROOM: A LOT
DAILY ACTIVITIY SCORE: 15
EATING MEALS: A LITTLE
TOILETING: A LOT
CLIMB 3 TO 5 STEPS WITH RAILING: TOTAL
TURNING FROM BACK TO SIDE WHILE IN FLAT BAD: A LITTLE
DRESSING REGULAR LOWER BODY CLOTHING: A LOT
MOVING TO AND FROM BED TO CHAIR: A LITTLE
MOBILITY SCORE: 15
PERSONAL GROOMING: A LITTLE
STANDING UP FROM CHAIR USING ARMS: A LITTLE

## 2022-01-04 ASSESSMENT — ENCOUNTER SYMPTOMS
VOMITING: 0
CONSTIPATION: 0
MYALGIAS: 0
CHILLS: 0
FEVER: 0
SHORTNESS OF BREATH: 0
NAUSEA: 0

## 2022-01-04 ASSESSMENT — GAIT ASSESSMENTS: GAIT LEVEL OF ASSIST: UNABLE TO PARTICIPATE

## 2022-01-04 NOTE — CARE PLAN
The patient is Stable - Low risk of patient condition declining or worsening    Shift Goals  Clinical Goals: discharge   Patient Goals: discharge  Family Goals: N/A    Progress made toward(s) clinical / shift goals:    Problem: Lifestyle Changes  Goal: Patient's ability to identify lifestyle changes and available resources to help reduce recurrence of condition will improve  Outcome: Progressing     Problem: Fall Risk  Goal: Patient will remain free from falls  Outcome: Progressing  Note: Free of falls.  Fall precautions in place.  Patient using call light appropriately.       Problem: Skin Integrity  Goal: Skin integrity is maintained or improved  Outcome: Progressing       Patient is not progressing towards the following goals:

## 2022-01-04 NOTE — THERAPY
Occupational Therapy  Daily Treatment     Patient Name: Roro Leon  Age:  66 y.o., Sex:  female  Medical Record #: [id number]  Today's Date: 1/4/2022     Precautions  Precautions: (P) Fall Risk,Platform Weight Bearing Left Upper Extremity  Comments: (P) L wrist brace     Assessment    Pt seen for follow up OT tx session, pt required multiple cues for not using LUE for functional mobility unless platform weightbearing pt continued to use LUE. Pt still required significant assistance for all ADLs due to multiple deficits. Will continue to see for skilled therapy while admitted as well as recommend post-acute placement.    Plan    Continue current treatment plan.    DC Equipment Recommendations: (P) Unable to determine at this time  Discharge Recommendations: (P) Recommend post-acute placement for additional occupational therapy services prior to discharge home    Objective       01/04/22 1054   Precautions   Precautions Fall Risk;Platform Weight Bearing Left Upper Extremity   Comments L wrist brace    Pain 0 - 10 Group   Therapist Pain Assessment Post Activity Pain Same as Prior to Activity;Nurse Notified   Non Verbal Descriptors   Non Verbal Scale  Calm   Cognition    Cognition / Consciousness X   Safety Awareness Impaired   New Learning Impaired   Attention Impaired   Comments Cooperative, continues to grab at therapist versus attempt use of AD as well as continued to use LUE depsite education not to    Other Treatments   Other Treatments Provided Educated patient on LUE NWB or platform only, pt continued to use for mobility despite education   Balance   Sitting Balance (Static) Fair +   Sitting Balance (Dynamic) Fair   Standing Balance (Static) Fair -   Standing Balance (Dynamic) Poor +   Weight Shift Sitting Fair   Weight Shift Standing Poor   Skilled Intervention Verbal Cuing   Bed Mobility    Supine to Sit Supervised   Sit to Supine Supervised   Skilled Intervention Verbal Cuing   Activities of Daily Living    Upper Body Dressing Minimal Assist   Lower Body Dressing Maximal Assist   Toileting Total Assist   Skilled Intervention Verbal Cuing   How much help from another person does the patient currently need...   Putting on and taking off regular lower body clothing? 2   Bathing (including washing, rinsing, and drying)? 2   Toileting, which includes using a toilet, bedpan, or urinal? 2   Putting on and taking off regular upper body clothing? 3   Taking care of personal grooming such as brushing teeth? 3   Eating meals? 3   6 Clicks Daily Activity Score 15   Functional Mobility   Sit to Stand Minimal Assist   Toilet Transfers Minimal Assist   Transfer Method Squat Pivot   Mobility bed mobility, transfer to [facility] and back   Activity Tolerance   Sitting in Chair 10 min  ([facility])   Sitting Edge of Bed 2 min   Standing 1 min   Patient / Family Goals   Patient / Family Goal #1 Get stronger    Goal #1 Outcome Progressing slower than expected   Short Term Goals   Short Term Goal # 1 Pt will self feed with Min A & extra time   Goal Outcome # 1 Goal not met   Short Term Goal # 2 Pt will groom seated EOB with Min A   Goal Outcome # 2 Goal met   Short Term Goal # 3 Pt will be Min A for ADL transfers   Goal Outcome # 3 Progressing as expected   Education Group   Education Provided Role of Occupational Therapist;Weight Bearing Precautions   Role of Occupational Therapist Patient Response Patient;Acceptance;Explanation;Reinforcement Needed   Weight Bearing Precautions Patient Response Patient;Acceptance;Explanation;Action Demonstration;Reinforcement Needed   Interdisciplinary Plan of Care Collaboration   IDT Collaboration with  Nursing;Physical Therapist   Patient Position at End of Therapy In Bed;Bed Alarm On;Call Light within Reach;Tray Table within Reach;Phone within Reach   Collaboration Comments RN updated

## 2022-01-04 NOTE — CARE PLAN
The patient is Stable - Low risk of patient condition declining or worsening    Shift Goals  Clinical Goals: Discharge  Patient Goals: Discharge  Family Goals: N/A    Progress made toward(s) clinical / shift goals:  Discussed plan of care with Pt. Currently waiting for open bed at SNF.     Patient is not progressing towards the following goals:  Problem: Knowledge Deficit - Standard  Goal: Patient and family/care givers will demonstrate understanding of plan of care, disease process/condition, diagnostic tests and medications  Outcome: Progressing

## 2022-01-04 NOTE — CARE PLAN
The patient is Stable - Low risk of patient condition declining or worsening    Shift Goals  Clinical Goals: discharge  Patient Goals: discharge      Progress made toward(s) clinical / shift goals:  Pending discharge to [facility] per  notes.    Problem: Psychosocial  Goal: Patient's level of anxiety will decrease  Outcome: Progressing     Problem: Pain - Standard  Goal: Alleviation of pain or a reduction in pain to the patient’s comfort goal  Outcome: Progressing       Patient is not progressing towards the following goals:

## 2022-01-04 NOTE — PROGRESS NOTES
Rec'd report from day shift RN. Assumed pt care. Assessment completed. AA&OX4. Denies pain at this time. No s/s of discomfort or distress. Have not assisted pt to the BSC at this time. Per report pt transfers to BSC with 1 assist. Left wrist immobilizer is in plcae. Bed in lowest position, bed locked, bed alarm on for safety, treaded socks in place, RN and CNA numbers provided, call light within reach.

## 2022-01-04 NOTE — DISCHARGE PLANNING
Agency/Facility Name: [facility]  Spoke To: Grayson  Outcome: Called regarding patients referral status.     930  Agency/Facility Name: [facility]   Spoke To: Sandi  Outcome: Per Liaison, [facility] and following facilities have no available beds  at the moment. Encouraged to call  In the middle of the week to discuss bed availability.     LSW notified

## 2022-01-04 NOTE — PROGRESS NOTES
Assumed care at 0700, report received from NOC RN.  Pt A&O x 4, denies pain. Bed locked, 2 rails up, bed in lowest position. Call light in place, belongings at bedside, no needs at this time and hourly rounding in place.

## 2022-01-04 NOTE — PROGRESS NOTES
Hospital Medicine Daily Progress Note    Date of Service  1/4/2022    Chief Complaint  Roro Leon is a 66 y.o. female admitted 12/10/2021 with AMS    Hospital Course  This is a 66 year old female with PMHx of hypertension, seizures on Keppra (last seizure June 2021), alcohol use disorder and subdural hematoma who was admitted on 12/10/2021 with acute alcohol intoxication. CIWA protocol in place.    She was also found to have a right distal radial hairline fracture and splint has been placed. Velcro splint in place x 6 weeks. Nonweightbearing able to use platform front wheel walker able to bear weight on left elbow.    Patient also noted worsening tremors over several months for which Neurology was consulted and recommended outpatient follow up.       She is now medically cleared pending SNF acceptance.     Interval Problem Update  Vitals stable.  Pending SNF placement. No new complaints, just states she is ready to start rehab.     I have personally seen and examined the patient at bedside. I discussed the plan of care with patient, bedside RN, charge RN and .    Consultants/Specialty  None    Code Status  Full Code    Disposition  Patient is medically cleared for discharge.   Anticipate discharge to to skilled nursing facility.  I have placed the appropriate orders for post-discharge needs.    Review of Systems  Review of Systems   Constitutional: Negative for chills and fever.   Respiratory: Negative for shortness of breath.    Cardiovascular: Negative for chest pain.   Gastrointestinal: Negative for constipation, nausea and vomiting.   Musculoskeletal: Negative for myalgias.   All other systems reviewed and are negative.       Physical Exam  Temp:  [36.6 °C (97.9 °F)-36.7 °C (98.1 °F)] 36.7 °C (98.1 °F)  Pulse:  [72-80] 80  Resp:  [16-19] 16  BP: ()/(60-75) 106/75  SpO2:  [98 %] 98 %    Physical Exam  Vitals and nursing note reviewed.   Constitutional:       General: She is not in acute  distress.     Appearance: Normal appearance. She is not ill-appearing.   HENT:      Head: Normocephalic and atraumatic.   Eyes:      Conjunctiva/sclera: Conjunctivae normal.   Cardiovascular:      Rate and Rhythm: Normal rate and regular rhythm.   Pulmonary:      Effort: Pulmonary effort is normal.      Breath sounds: Normal breath sounds.   Abdominal:      General: Abdomen is flat. There is no distension.      Palpations: Abdomen is soft.      Tenderness: There is no abdominal tenderness.   Musculoskeletal:      Cervical back: Neck supple.   Skin:     General: Skin is warm and dry.      Comments: Left arm splint in place   Neurological:      General: No focal deficit present.      Mental Status: She is alert and oriented to person, place, and time.      Comments: Mild bilateral upper extremity tremor   Psychiatric:         Mood and Affect: Mood normal.         Behavior: Behavior normal.         Fluids    Intake/Output Summary (Last 24 hours) at 1/4/2022 1302  Last data filed at 1/4/2022 1000  Gross per 24 hour   Intake 900 ml   Output --   Net 900 ml       Laboratory                        Imaging  CT-HEAD W/O   Final Result         1.  No acute intracranial abnormality is identified, there are nonspecific white matter changes, commonly associated with small vessel ischemic disease.  Associated mild cerebral atrophy is noted.   2.  Atherosclerosis.         DX-WRIST-COMPLETE 3+ LEFT   Final Result         1. There is a possible nondisplaced intra-articular fracture of the distal radius seen on the oblique view, correlate for history of trauma and point tenderness.   2. Diffuse, decreased bone mineral density.      DX-CHEST-PORTABLE (1 VIEW)   Final Result      No acute cardiopulmonary abnormality.              Assessment/Plan  Contractures involving both knees  Assessment & Plan  12/15:  Noted by PT/OT.  Patient states she was ambulatory with FWW prior to admission.  12/17:  Able to extend right leg full extension,  left leg near complete extension.  Up to chair tid with meals, PT/OT daily.   SNF pending for further strengthening.    Closed fracture of distal end of left radius with routine healing- (present on admission)  Assessment & Plan  Xray wrist with tiny minimal fracture distal radius seen in process of healing, likely 2 weeks old.  Sed rate normal  urice acid elevated  velcro wrist splint placed, to wear x 6 weeks.  Able to weight bear left elbow for platform walker, non weight bearing left hand.    Mixed action and resting tremor- (present on admission)  Assessment & Plan  Unclear etiology, difficult to determine if secondary to alcohol abuse versus neurologic abnormality  Neurology Dr. Khan consulted, appreciate recommendations  TSH low normal  -Neurology placed referral for outpatient neurology clinic  -Propanolol started in the ED  -Monitor    Anemia of chronic disease- (present on admission)  Assessment & Plan  H/H 8.5/25.8 this admission  Per chart review, chronically low  Iron studies support anemia of chronic disease  B12 level high, folate level normal, iron level high  -Monitor    Seizure (HCC)- (present on admission)  Assessment & Plan  History of  -continue keppra    Essential hypertension- (present on admission)  Assessment & Plan  Normotensive  -monitor and start oral antihypertensives if persistent       VTE prophylaxis: SCDs/TEDs and enoxaparin ppx    I have performed a physical exam and reviewed and updated ROS and Plan today (1/4/2022). In review of yesterday's note (1/3/2022), there are no changes except as documented above.

## 2022-01-05 PROCEDURE — A9270 NON-COVERED ITEM OR SERVICE: HCPCS | Performed by: INTERNAL MEDICINE

## 2022-01-05 PROCEDURE — 700102 HCHG RX REV CODE 250 W/ 637 OVERRIDE(OP): Performed by: HOSPITALIST

## 2022-01-05 PROCEDURE — 700111 HCHG RX REV CODE 636 W/ 250 OVERRIDE (IP): Performed by: STUDENT IN AN ORGANIZED HEALTH CARE EDUCATION/TRAINING PROGRAM

## 2022-01-05 PROCEDURE — A9270 NON-COVERED ITEM OR SERVICE: HCPCS | Performed by: HOSPITALIST

## 2022-01-05 PROCEDURE — 700102 HCHG RX REV CODE 250 W/ 637 OVERRIDE(OP): Performed by: INTERNAL MEDICINE

## 2022-01-05 PROCEDURE — 770001 HCHG ROOM/CARE - MED/SURG/GYN PRIV*

## 2022-01-05 PROCEDURE — 99231 SBSQ HOSP IP/OBS SF/LOW 25: CPT | Performed by: INTERNAL MEDICINE

## 2022-01-05 PROCEDURE — 700102 HCHG RX REV CODE 250 W/ 637 OVERRIDE(OP): Performed by: STUDENT IN AN ORGANIZED HEALTH CARE EDUCATION/TRAINING PROGRAM

## 2022-01-05 PROCEDURE — A9270 NON-COVERED ITEM OR SERVICE: HCPCS | Performed by: STUDENT IN AN ORGANIZED HEALTH CARE EDUCATION/TRAINING PROGRAM

## 2022-01-05 RX ORDER — VITAMIN B COMPLEX
1000 TABLET ORAL DAILY
Status: DISCONTINUED | OUTPATIENT
Start: 2022-01-05 | End: 2022-01-11 | Stop reason: HOSPADM

## 2022-01-05 RX ADMIN — IBUPROFEN 400 MG: 800 TABLET, FILM COATED ORAL at 11:00

## 2022-01-05 RX ADMIN — OMEPRAZOLE 40 MG: KIT at 17:33

## 2022-01-05 RX ADMIN — LEVETIRACETAM 500 MG: 500 TABLET, FILM COATED ORAL at 17:33

## 2022-01-05 RX ADMIN — Medication 1000 UNITS: at 10:12

## 2022-01-05 RX ADMIN — PROPRANOLOL HYDROCHLORIDE 10 MG: 10 TABLET ORAL at 13:44

## 2022-01-05 RX ADMIN — ENOXAPARIN SODIUM 40 MG: 40 INJECTION SUBCUTANEOUS at 05:35

## 2022-01-05 RX ADMIN — Medication 100 MG: at 05:35

## 2022-01-05 RX ADMIN — IBUPROFEN 400 MG: 800 TABLET, FILM COATED ORAL at 17:33

## 2022-01-05 RX ADMIN — OMEPRAZOLE 40 MG: KIT at 05:36

## 2022-01-05 RX ADMIN — LISINOPRIL 20 MG: 20 TABLET ORAL at 05:35

## 2022-01-05 RX ADMIN — LEVETIRACETAM 500 MG: 500 TABLET, FILM COATED ORAL at 05:35

## 2022-01-05 ASSESSMENT — ENCOUNTER SYMPTOMS
NAUSEA: 0
VOMITING: 0
SHORTNESS OF BREATH: 0
FEVER: 0
MYALGIAS: 0
CONSTIPATION: 0
CHILLS: 0

## 2022-01-05 NOTE — CARE PLAN
The patient is Stable - Low risk of patient condition declining or worsening    Shift Goals  Clinical Goals: Discharge   Patient Goals: Discharge      Progress made toward(s) clinical / shift goals:  Pending bed availability at SNF for D/C.    Problem: Knowledge Deficit - Standard  Goal: Patient and family/care givers will demonstrate understanding of plan of care, disease process/condition, diagnostic tests and medications  Outcome: Progressing     Problem: Pain - Standard  Goal: Alleviation of pain or a reduction in pain to the patient’s comfort goal  Outcome: Progressing       Patient is not progressing towards the following goals:

## 2022-01-05 NOTE — THERAPY
"Physical Therapy   Daily Treatment     Patient Name: Roro Leon  Age:  66 y.o., Sex:  female  Medical Record #: [id number]  Today's Date: 1/4/2022     Precautions  Precautions: Fall Risk;Platform Weight Bearing Left Upper Extremity  Comments: L wrist brace     Assessment    Patient seen for follow up PT treatment session. She demos improved command following and LE ROM to assist with improved transfers.  Patient is fearful on falling and needing Boni for HHA for pivot to and from commode. Patient demos impaired ROM at B knees and L ankle and as a result stands on her toes with transfers. Provided further therex to assist with increasing ROM and will trial gait with FWW with platform next session. She continues to need placement at this time.     Plan    Continue current treatment plan.    DC Equipment Recommendations: Unable to determine at this time  Discharge Recommendations: Recommend post-acute placement for additional physical therapy services prior to discharge home      Subjective    \"I need to go to the bathroom\"     Objective     01/04/22 1045   Precautions   Precautions Fall Risk;Platform Weight Bearing Left Upper Extremity   Comments L wrist in brace for 6 weeks    Pain 0 - 10 Group   Location Wrist   Location Orientation Left   Therapist Pain Assessment Post Activity Pain Same as Prior to Activity;3   Cognition    Level of Consciousness Alert   Ability To Follow Commands 2 Step   Attention Impaired   Sequencing Impaired   Comments pleasant and cooperative and fearful of falling    Passive ROM Lower Body   Comments B knees neg 15 deg extension    Active ROM Lower Body    Active ROM Lower Body  X   Comments L DF neg 10 deg  (Patient reports it has been like this for over a year)   Strength Lower Body   Lower Body Strength  X   Comments grossly 3/5   Supine Lower Body Exercise   Supine Lower Body Exercises Yes   Straight Leg Raises 1 set of 10;Bilateral   Ankle Pumps 1 set of 10;Bilateral   Quadriceps " Isometrics 1 set of 10;Bilateral   Comments bolster under ankles for knee bridging with quad sets. Education about DF stretching with band    Neuro-Muscular Treatments   Neuro-Muscular Treatments Weight Shift Right;Weight Shift Left;Postural Facilitation;Postural Changes;Compensatory Strategies;Anterior weight shift   Balance   Sitting Balance (Static) Fair +   Sitting Balance (Dynamic) Fair   Standing Balance (Static) Fair -   Standing Balance (Dynamic) Poor +   Weight Shift Sitting Fair   Weight Shift Standing Poor   Skilled Intervention Verbal Cuing   Comments w/HHA and then baudilio-walker    Gait Analysis   Gait Level Of Assist Unable to Participate   Comments attempted but unable to tolerate with baudilio-walker, will benefit from FWW with platform attachment, left in room at end of session    Bed Mobility    Supine to Sit Supervised   Sit to Supine Supervised   Scooting Supervised   Skilled Intervention Verbal Cuing   Functional Mobility   Sit to Stand Minimal Assist   Toilet Transfers Minimal Assist   Transfer Method Stand Pivot   How much difficulty does the patient currently have...   Turning over in bed (including adjusting bedclothes, sheets and blankets)? 3   Sitting down on and standing up from a chair with arms (e.g., wheelchair, bedside commode, etc.) 3   Moving from lying on back to sitting on the side of the bed? 3   How much help from another person does the patient currently need...   Moving to and from a bed to a chair (including a wheelchair)? 3   Need to walk in a hospital room? 2   Climbing 3-5 steps with a railing? 1   6 clicks Mobility Score 15   Activity Tolerance   Sitting in Chair 10 min on commode   Sitting Edge of Bed 5 min    Standing 1 min    Comments limited by fatigue and weakness    Short Term Goals    Short Term Goal # 2 Pt will perform STS with FWW and min A within 6 visits in order to progress OOB activity   Goal Outcome # 2 Progressing as expected   Short Term Goal # 3 Pt will perform  functional transfers with FWW and mod A within 6 visits in order to progress OOB activity   Goal Outcome # 3 Progressing as expected   Anticipated Discharge Equipment and Recommendations   DC Equipment Recommendations Unable to determine at this time   Discharge Recommendations Recommend post-acute placement for additional physical therapy services prior to discharge home     Cindi Edgar, PT, DPT, GCS

## 2022-01-05 NOTE — PROGRESS NOTES
Hospital Medicine Daily Progress Note    Date of Service  1/5/2022    Chief Complaint  Roro Leon is a 66 y.o. female admitted 12/10/2021 with AMS    Hospital Course  This is a 66 year old female with PMHx of hypertension, seizures on Keppra (last seizure June 2021), alcohol use disorder and subdural hematoma who was admitted on 12/10/2021 with acute alcohol intoxication. CIWA protocol in place.    She was also found to have a right distal radial hairline fracture and splint has been placed. Velcro splint in place x 6 weeks. Nonweightbearing able to use platform front wheel walker able to bear weight on left elbow.    Patient also noted worsening tremors over several months for which Neurology was consulted and recommended outpatient follow up.       She is now medically cleared pending SNF acceptance.     Interval Problem Update  No changes. Accepted at [facility], currently no beds available. Patient continues to have mild pain in her left hand, reports worse when she forgets NWB status and puts pressure on it.    I have personally seen and examined the patient at bedside. I discussed the plan of care with patient, bedside RN, charge RN and .    Consultants/Specialty  None    Code Status  Full Code    Disposition  Patient is medically cleared for discharge.   Anticipate discharge to to skilled nursing facility.  I have placed the appropriate orders for post-discharge needs.    Review of Systems  Review of Systems   Constitutional: Negative for chills and fever.   Respiratory: Negative for shortness of breath.    Cardiovascular: Negative for chest pain.   Gastrointestinal: Negative for constipation, nausea and vomiting.   Musculoskeletal: Negative for myalgias.        Left hand pain   All other systems reviewed and are negative.       Physical Exam  Temp:  [36.2 °C (97.1 °F)-37.3 °C (99.1 °F)] 36.7 °C (98 °F)  Pulse:  [69-81] 71  Resp:  [16-18] 18  BP: ()/(58-75) 95/65  SpO2:  [93 %-96 %] 96  %    Physical Exam  Vitals and nursing note reviewed.   Constitutional:       General: She is not in acute distress.     Appearance: Normal appearance. She is not ill-appearing.   HENT:      Head: Normocephalic and atraumatic.   Eyes:      Conjunctiva/sclera: Conjunctivae normal.   Cardiovascular:      Rate and Rhythm: Normal rate and regular rhythm.   Pulmonary:      Effort: Pulmonary effort is normal.      Breath sounds: Normal breath sounds.   Abdominal:      General: Abdomen is flat. There is no distension.      Palpations: Abdomen is soft.      Tenderness: There is no abdominal tenderness.   Musculoskeletal:      Cervical back: Neck supple.   Skin:     General: Skin is warm and dry.      Comments: Left arm splint in place   Neurological:      General: No focal deficit present.      Mental Status: She is alert and oriented to person, place, and time.      Comments: Mild bilateral upper extremity tremor   Psychiatric:         Mood and Affect: Mood normal.         Behavior: Behavior normal.         Fluids    Intake/Output Summary (Last 24 hours) at 1/5/2022 1044  Last data filed at 1/5/2022 1000  Gross per 24 hour   Intake 720 ml   Output 900 ml   Net -180 ml       Laboratory                        Imaging  CT-HEAD W/O   Final Result         1.  No acute intracranial abnormality is identified, there are nonspecific white matter changes, commonly associated with small vessel ischemic disease.  Associated mild cerebral atrophy is noted.   2.  Atherosclerosis.         DX-WRIST-COMPLETE 3+ LEFT   Final Result         1. There is a possible nondisplaced intra-articular fracture of the distal radius seen on the oblique view, correlate for history of trauma and point tenderness.   2. Diffuse, decreased bone mineral density.      DX-CHEST-PORTABLE (1 VIEW)   Final Result      No acute cardiopulmonary abnormality.              Assessment/Plan  Contractures involving both knees  Assessment & Plan  12/15:  Noted by  PT/OT.  Patient states she was ambulatory with FWW prior to admission.  12/17:  Able to extend right leg full extension, left leg near complete extension.  Up to chair tid with meals, PT/OT daily.   SNF pending for further strengthening.    Closed fracture of distal end of left radius with routine healing- (present on admission)  Assessment & Plan  Xray wrist with tiny minimal fracture distal radius seen in process of healing, likely 2 weeks old.  Sed rate normal  urice acid elevated  velcro wrist splint placed, to wear x 6 weeks.  Able to weight bear left elbow for platform walker, non weight bearing left hand.    Mixed action and resting tremor- (present on admission)  Assessment & Plan  Unclear etiology, difficult to determine if secondary to alcohol abuse versus neurologic abnormality  Neurology Dr. Khan consulted, appreciate recommendations  TSH low normal  -Neurology placed referral for outpatient neurology clinic  -Propanolol started in the ED  -Monitor    Anemia of chronic disease- (present on admission)  Assessment & Plan  H/H 8.5/25.8 this admission  Per chart review, chronically low  Iron studies support anemia of chronic disease  B12 level high, folate level normal, iron level high  -Monitor    Seizure (HCC)- (present on admission)  Assessment & Plan  History of  -continue keppra    Essential hypertension- (present on admission)  Assessment & Plan  Normotensive  -monitor and start oral antihypertensives if persistent       VTE prophylaxis: SCDs/TEDs and enoxaparin ppx    I have performed a physical exam and reviewed and updated ROS and Plan today (1/5/2022). In review of yesterday's note (1/4/2022), there are no changes except as documented above.

## 2022-01-05 NOTE — PROGRESS NOTES
Rec'd report from day shift RN. Assumed pt care. Assessment completed. AA&OX4. Reports pain to left wrist, medicated per MAR. No s/s of discomfort or distress. Have not assisted pt to the BSC at this time. Per report pt transfers to BSC with 1 assist. Left wrist splint is in place. Bed in lowest position, bed locked, bed alarm on for safety, treaded socks in place, RN and CNA numbers provided, call light within reach.

## 2022-01-05 NOTE — PROGRESS NOTES
Assumed care at 0700, report received from NOC RN.  Pt A&O x 4, denies pain. Bed alarm in use. Bed locked, 2 rails up, bed in lowest position. Call light in place, belongings at bedside, no needs at this time and hourly rounding in place.

## 2022-01-05 NOTE — CARE PLAN
The patient is Stable - Low risk of patient condition declining or worsening    Shift Goals  Clinical Goals: Discharge  Patient Goals: Discharge  Family Goals: N/A    Progress made toward(s) clinical / shift goals:  Pending DC to accepting SNF.     Patient is not progressing towards the following goals:

## 2022-01-06 PROCEDURE — A9270 NON-COVERED ITEM OR SERVICE: HCPCS | Performed by: HOSPITALIST

## 2022-01-06 PROCEDURE — 97530 THERAPEUTIC ACTIVITIES: CPT | Mod: CQ

## 2022-01-06 PROCEDURE — 99232 SBSQ HOSP IP/OBS MODERATE 35: CPT | Performed by: INTERNAL MEDICINE

## 2022-01-06 PROCEDURE — 700102 HCHG RX REV CODE 250 W/ 637 OVERRIDE(OP): Performed by: INTERNAL MEDICINE

## 2022-01-06 PROCEDURE — 700102 HCHG RX REV CODE 250 W/ 637 OVERRIDE(OP): Performed by: STUDENT IN AN ORGANIZED HEALTH CARE EDUCATION/TRAINING PROGRAM

## 2022-01-06 PROCEDURE — A9270 NON-COVERED ITEM OR SERVICE: HCPCS | Performed by: INTERNAL MEDICINE

## 2022-01-06 PROCEDURE — 700111 HCHG RX REV CODE 636 W/ 250 OVERRIDE (IP): Performed by: STUDENT IN AN ORGANIZED HEALTH CARE EDUCATION/TRAINING PROGRAM

## 2022-01-06 PROCEDURE — 700102 HCHG RX REV CODE 250 W/ 637 OVERRIDE(OP): Performed by: HOSPITALIST

## 2022-01-06 PROCEDURE — A9270 NON-COVERED ITEM OR SERVICE: HCPCS | Performed by: STUDENT IN AN ORGANIZED HEALTH CARE EDUCATION/TRAINING PROGRAM

## 2022-01-06 PROCEDURE — 770001 HCHG ROOM/CARE - MED/SURG/GYN PRIV*

## 2022-01-06 PROCEDURE — 97535 SELF CARE MNGMENT TRAINING: CPT | Mod: CO

## 2022-01-06 RX ORDER — CHOLECALCIFEROL (VITAMIN D3) 125 MCG
5 CAPSULE ORAL NIGHTLY PRN
Status: DISCONTINUED | OUTPATIENT
Start: 2022-01-06 | End: 2022-01-11 | Stop reason: HOSPADM

## 2022-01-06 RX ADMIN — LEVETIRACETAM 500 MG: 500 TABLET, FILM COATED ORAL at 05:31

## 2022-01-06 RX ADMIN — PROPRANOLOL HYDROCHLORIDE 10 MG: 10 TABLET ORAL at 20:43

## 2022-01-06 RX ADMIN — Medication 1000 UNITS: at 05:32

## 2022-01-06 RX ADMIN — IBUPROFEN 400 MG: 800 TABLET, FILM COATED ORAL at 06:29

## 2022-01-06 RX ADMIN — LEVETIRACETAM 500 MG: 500 TABLET, FILM COATED ORAL at 18:26

## 2022-01-06 RX ADMIN — IBUPROFEN 400 MG: 800 TABLET, FILM COATED ORAL at 18:25

## 2022-01-06 RX ADMIN — LISINOPRIL 20 MG: 20 TABLET ORAL at 05:31

## 2022-01-06 RX ADMIN — OMEPRAZOLE 40 MG: KIT at 05:32

## 2022-01-06 RX ADMIN — Medication 5 MG: at 22:03

## 2022-01-06 RX ADMIN — Medication 100 MG: at 05:32

## 2022-01-06 RX ADMIN — OMEPRAZOLE 40 MG: KIT at 18:26

## 2022-01-06 RX ADMIN — ENOXAPARIN SODIUM 40 MG: 40 INJECTION SUBCUTANEOUS at 05:32

## 2022-01-06 ASSESSMENT — ENCOUNTER SYMPTOMS
SHORTNESS OF BREATH: 0
DIZZINESS: 0
CHILLS: 0
CONSTIPATION: 0
FEVER: 0
TREMORS: 1
MYALGIAS: 0
VOMITING: 0
NAUSEA: 0
HEADACHES: 0

## 2022-01-06 ASSESSMENT — GAIT ASSESSMENTS
DISTANCE (FEET): 4
GAIT LEVEL OF ASSIST: MINIMAL ASSIST
ASSISTIVE DEVICE: PLATFORM WALKER

## 2022-01-06 ASSESSMENT — COGNITIVE AND FUNCTIONAL STATUS - GENERAL
MOVING TO AND FROM BED TO CHAIR: A LITTLE
DAILY ACTIVITIY SCORE: 15
SUGGESTED CMS G CODE MODIFIER DAILY ACTIVITY: CK
HELP NEEDED FOR BATHING: A LOT
TURNING FROM BACK TO SIDE WHILE IN FLAT BAD: A LITTLE
WALKING IN HOSPITAL ROOM: A LITTLE
MOVING FROM LYING ON BACK TO SITTING ON SIDE OF FLAT BED: A LITTLE
MOBILITY SCORE: 16
DRESSING REGULAR UPPER BODY CLOTHING: A LITTLE
TOILETING: A LOT
DRESSING REGULAR LOWER BODY CLOTHING: A LOT
CLIMB 3 TO 5 STEPS WITH RAILING: TOTAL
STANDING UP FROM CHAIR USING ARMS: A LITTLE
EATING MEALS: A LITTLE
PERSONAL GROOMING: A LITTLE
SUGGESTED CMS G CODE MODIFIER MOBILITY: CK

## 2022-01-06 NOTE — PROGRESS NOTES
Hospital Medicine Daily Progress Note    Date of Service  1/6/2022    Chief Complaint  Roro Leon is a 66 y.o. female admitted 12/10/2021 with AMS    Hospital Course  This is a 66 year old female with PMHx of hypertension, seizures on Keppra (last seizure June 2021), alcohol use disorder and subdural hematoma who was admitted on 12/10/2021 with acute alcohol intoxication. CIWA protocol in place.    She was also found to have a right distal radial hairline fracture and splint has been placed. Velcro splint in place x 6 weeks. Nonweightbearing able to use platform front wheel walker able to bear weight on left elbow.    Patient also noted worsening tremors over several months for which Neurology was consulted and recommended outpatient follow up.       She is now medically cleared pending SNF acceptance.     Interval Problem Update  No acute events overnight. Pending placement, still needs covid prior to dc. Patient reports she is ready to do more therapy, her plan after SNF is to go to assisted living in [place] where her family is. Also reports difficulty sleeping, added melatonin prn.     I have personally seen and examined the patient at bedside. I discussed the plan of care with patient, bedside RN, charge RN and .    Consultants/Specialty  None    Code Status  Full Code    Disposition  Patient is medically cleared for discharge.   Anticipate discharge to to skilled nursing facility.  I have placed the appropriate orders for post-discharge needs.    Review of Systems  Review of Systems   Constitutional: Negative for chills and fever.   Respiratory: Negative for shortness of breath.    Cardiovascular: Negative for chest pain.   Gastrointestinal: Negative for constipation, nausea and vomiting.   Musculoskeletal: Negative for myalgias.        Left hand pain   Neurological: Positive for tremors. Negative for dizziness and headaches.   All other systems reviewed and are negative.       Physical Exam  Temp:   [36.6 °C (97.8 °F)-37.2 °C (98.9 °F)] 36.6 °C (97.8 °F)  Pulse:  [68-89] 89  Resp:  [16-18] 16  BP: ()/(62-70) 96/65  SpO2:  [95 %-97 %] 96 %    Physical Exam  Vitals and nursing note reviewed.   Constitutional:       General: She is not in acute distress.     Appearance: Normal appearance. She is not ill-appearing.   HENT:      Head: Normocephalic and atraumatic.   Eyes:      Conjunctiva/sclera: Conjunctivae normal.   Cardiovascular:      Rate and Rhythm: Normal rate and regular rhythm.   Pulmonary:      Effort: Pulmonary effort is normal.      Breath sounds: Normal breath sounds.   Abdominal:      General: Abdomen is flat. There is no distension.      Palpations: Abdomen is soft.      Tenderness: There is no abdominal tenderness.   Musculoskeletal:      Cervical back: Neck supple.   Skin:     General: Skin is warm and dry.      Comments: Left arm splint in place   Neurological:      General: No focal deficit present.      Mental Status: She is alert and oriented to person, place, and time.      Comments: Mild left upper extremity tremor   Psychiatric:         Mood and Affect: Mood normal.         Behavior: Behavior normal.         Fluids    Intake/Output Summary (Last 24 hours) at 1/6/2022 1023  Last data filed at 1/6/2022 0735  Gross per 24 hour   Intake 960 ml   Output --   Net 960 ml       Laboratory                        Imaging  CT-HEAD W/O   Final Result         1.  No acute intracranial abnormality is identified, there are nonspecific white matter changes, commonly associated with small vessel ischemic disease.  Associated mild cerebral atrophy is noted.   2.  Atherosclerosis.         DX-WRIST-COMPLETE 3+ LEFT   Final Result         1. There is a possible nondisplaced intra-articular fracture of the distal radius seen on the oblique view, correlate for history of trauma and point tenderness.   2. Diffuse, decreased bone mineral density.      DX-CHEST-PORTABLE (1 VIEW)   Final Result      No acute  cardiopulmonary abnormality.              Assessment/Plan  Contractures involving both knees  Assessment & Plan  12/15:  Noted by PT/OT.  Patient states she was ambulatory with FWW prior to admission.  12/17:  Able to extend right leg full extension, left leg near complete extension.  Up to chair tid with meals, PT/OT daily.   SNF pending for further strengthening.    Closed fracture of distal end of left radius with routine healing- (present on admission)  Assessment & Plan  Xray wrist with tiny minimal fracture distal radius seen in process of healing, likely 2 weeks old.  Sed rate normal  urice acid elevated  velcro wrist splint placed, to wear x 6 weeks.  Able to weight bear left elbow for platform walker, non weight bearing left hand.    Mixed action and resting tremor- (present on admission)  Assessment & Plan  Unclear etiology, difficult to determine if secondary to alcohol abuse versus neurologic abnormality  Neurology Dr. Khan consulted, appreciate recommendations  TSH low normal  -Neurology placed referral for outpatient neurology clinic  -Propanolol started in the ED  -Monitor    Anemia of chronic disease- (present on admission)  Assessment & Plan  H/H 8.5/25.8 this admission  Per chart review, chronically low  Iron studies support anemia of chronic disease  B12 level high, folate level normal, iron level high  -Monitor    Seizure (HCC)- (present on admission)  Assessment & Plan  History of  -continue keppra    Essential hypertension- (present on admission)  Assessment & Plan  Normotensive  -monitor and start oral antihypertensives if persistent       VTE prophylaxis: SCDs/TEDs and enoxaparin ppx    I have performed a physical exam and reviewed and updated ROS and Plan today (1/6/2022). In review of yesterday's note (1/5/2022), there are no changes except as documented above.

## 2022-01-06 NOTE — DISCHARGE PLANNING
Anticipated Discharge Disposition: SNF    Action: LSW met with Pt to update on d/c plan and confirm facesheet/assessment information. Pt corrected address with LSW. Prior to admission she was independent with all I/ADLs and used a walker for ambulation. Pt stopped driving because she has had seizures in the past. She has friends drive her to appointments. Pt has an  in [place], [place] who is her DPOA named Cruz Scruggs. Eventually she would like to move back to [place] to be closer to her brother and friends and live in assisted living.     Barriers to Discharge: SNF bed    Plan: LSW to f/u on SNF bed.       Care Transition Team Assessment    Information Source  Orientation Level: Oriented X4  Information Given By: Patient  Who is responsible for making decisions for patient? : Patient    Readmission Evaluation  Is this a readmission?: No    Elopement Risk  Legal Hold: No  Ambulatory or Self Mobile in Wheelchair: Yes  Disoriented: No  Psychiatric Symptoms: None  History of Wandering: No  Elopement this Admit: No  Vocalizing Wanting to Leave: No  Displays Behaviors, Body Language Wanting to Leave: No-Not at Risk for Elopement  Elopement Risk: Not at Risk for Elopement    Interdisciplinary Discharge Planning  Lives with - Patient's Self Care Capacity: Alone and Able to Care For Self  Patient or legal guardian wants to designate a caregiver: No  Housing / Facility: 1 Story Apartment / Condo  Prior Services: Unable To Determine At This Time    Discharge Preparedness  What is your plan after discharge?: Skilled nursing facility  What are your discharge supports?: Other (comment) (Friends)  Prior Functional Level: Independent with Activities of Daily Living,Independent with Medication Management,Ambulatory  Difficulity with ADLs: Walking  Difficulity with IADLs: Driving    Functional Assesment  Prior Functional Level: Independent with Activities of Daily Living,Independent with Medication  Management,Ambulatory    Finances  Financial Barriers to Discharge: No  Prescription Coverage: Yes    Vision / Hearing Impairment  Vision Impairment : Yes  Right Eye Vision: Impaired,Wears Glasses  Left Eye Vision: Impaired,Wears Glasses  Hearing Impairment : No         Advance Directive  Advance Directive?: DPOA for Health Care    Domestic Abuse  Have you ever been the victim of abuse or violence?: No  Physical Abuse or Sexual Abuse: No  Verbal Abuse or Emotional Abuse: No  Possible Abuse/Neglect Reported to:: Not Applicable    Psychological Assessment  History of Substance Abuse: None  History of Psychiatric Problems: No  Non-compliant with Treatment: No  Newly Diagnosed Illness: No    Discharge Risks or Barriers  Discharge risks or barriers?: Non-adherence to medication or treatment,Substance abuse  Patient risk factors: Substance abuse,Noncompliance    Anticipated Discharge Information  Discharge Disposition: D/T to SNF with Medicare cert in anticipation of skilled care (03)  Discharge Address: [address], [place], [place]  Discharge Contact Phone Number: [phone]

## 2022-01-06 NOTE — PROGRESS NOTES
Assumed care of patient at 0700 from Sherly VELA. Patient is A&Ox 4, states pain level is 0/10. Bed locked in lowest position with 2 rails up. Call light in place, belongings at bedside. Patient expresses zero needs at this time and hourly rounding is in place.

## 2022-01-06 NOTE — CARE PLAN
The patient is Stable - Low risk of patient condition declining or worsening    Shift Goals  Clinical Goals: Discharge to SNF  Patient Goals: Discharge  Family Goals: N/A    Progress made toward(s) clinical / shift goals:  Pt used instacart to purchase toiletries and to have them delivered to the hospital, CNA retrieving them currently. Pt stated this would help her feel a lot better.     Patient is not progressing towards the following goals:

## 2022-01-06 NOTE — CARE PLAN
The patient is Stable - Low risk of patient condition declining or worsening    Shift Goals  Clinical Goals: Discharge   Patient Goals: Discharge      Progress made toward(s) clinical / shift goals:  Per SW note, pending bed availability to SNF.    Problem: Knowledge Deficit - Standard  Goal: Patient and family/care givers will demonstrate understanding of plan of care, disease process/condition, diagnostic tests and medications  Outcome: Progressing     Problem: Hemodynamics  Goal: Patient's hemodynamics, fluid balance and neurologic status will be stable or improve  Outcome: Progressing       Patient is not progressing towards the following goals:

## 2022-01-06 NOTE — PROGRESS NOTES
Rec'd report from day shift RN. Assumed pt care. Assessment completed. AA&OX4. Denies pain at this time. No s/s of discomfort or distress. Have not assisted pt to the BSC at this time. Per report pt transfers to BSC with 1 assist. Left wrist splint is in place. Bed in lowest position, bed locked, bed alarm on for safety, treaded socks in place, RN and CNA numbers provided, call light within reach.

## 2022-01-07 PROCEDURE — A9270 NON-COVERED ITEM OR SERVICE: HCPCS | Performed by: INTERNAL MEDICINE

## 2022-01-07 PROCEDURE — 99232 SBSQ HOSP IP/OBS MODERATE 35: CPT | Performed by: INTERNAL MEDICINE

## 2022-01-07 PROCEDURE — 770001 HCHG ROOM/CARE - MED/SURG/GYN PRIV*

## 2022-01-07 PROCEDURE — 700102 HCHG RX REV CODE 250 W/ 637 OVERRIDE(OP): Performed by: HOSPITALIST

## 2022-01-07 PROCEDURE — 700111 HCHG RX REV CODE 636 W/ 250 OVERRIDE (IP): Performed by: STUDENT IN AN ORGANIZED HEALTH CARE EDUCATION/TRAINING PROGRAM

## 2022-01-07 PROCEDURE — A9270 NON-COVERED ITEM OR SERVICE: HCPCS | Performed by: HOSPITALIST

## 2022-01-07 PROCEDURE — 700102 HCHG RX REV CODE 250 W/ 637 OVERRIDE(OP): Performed by: INTERNAL MEDICINE

## 2022-01-07 PROCEDURE — A9270 NON-COVERED ITEM OR SERVICE: HCPCS | Performed by: STUDENT IN AN ORGANIZED HEALTH CARE EDUCATION/TRAINING PROGRAM

## 2022-01-07 PROCEDURE — 700102 HCHG RX REV CODE 250 W/ 637 OVERRIDE(OP): Performed by: STUDENT IN AN ORGANIZED HEALTH CARE EDUCATION/TRAINING PROGRAM

## 2022-01-07 RX ORDER — PROPRANOLOL HYDROCHLORIDE 10 MG/1
10 TABLET ORAL TWICE DAILY
Status: DISCONTINUED | OUTPATIENT
Start: 2022-01-07 | End: 2022-01-11 | Stop reason: HOSPADM

## 2022-01-07 RX ADMIN — ENOXAPARIN SODIUM 40 MG: 40 INJECTION SUBCUTANEOUS at 05:24

## 2022-01-07 RX ADMIN — PROPRANOLOL HYDROCHLORIDE 10 MG: 10 TABLET ORAL at 17:25

## 2022-01-07 RX ADMIN — IBUPROFEN 400 MG: 800 TABLET, FILM COATED ORAL at 18:09

## 2022-01-07 RX ADMIN — Medication 100 MG: at 05:23

## 2022-01-07 RX ADMIN — Medication 1000 UNITS: at 05:23

## 2022-01-07 RX ADMIN — ACETAMINOPHEN 650 MG: 325 TABLET, FILM COATED ORAL at 21:39

## 2022-01-07 RX ADMIN — LEVETIRACETAM 500 MG: 500 TABLET, FILM COATED ORAL at 17:25

## 2022-01-07 RX ADMIN — LISINOPRIL 20 MG: 20 TABLET ORAL at 05:23

## 2022-01-07 RX ADMIN — OMEPRAZOLE 40 MG: KIT at 05:24

## 2022-01-07 RX ADMIN — Medication 5 MG: at 21:39

## 2022-01-07 RX ADMIN — ACETAMINOPHEN 650 MG: 325 TABLET, FILM COATED ORAL at 06:55

## 2022-01-07 RX ADMIN — LEVETIRACETAM 500 MG: 500 TABLET, FILM COATED ORAL at 05:23

## 2022-01-07 RX ADMIN — OMEPRAZOLE 40 MG: KIT at 17:25

## 2022-01-07 ASSESSMENT — ENCOUNTER SYMPTOMS
DIZZINESS: 0
NAUSEA: 0
MYALGIAS: 0
HEADACHES: 0
SHORTNESS OF BREATH: 0
FEVER: 0
CHILLS: 0
VOMITING: 0
CONSTIPATION: 0
TREMORS: 1

## 2022-01-07 NOTE — CARE PLAN
The patient is Stable - Low risk of patient condition declining or worsening    Shift Goals  Clinical Goals: Discharge  Patient Goals: Discharge      Progress made toward(s) clinical / shift goals:  Pending bed availability at SNF.     Problem: Knowledge Deficit - Standard  Goal: Patient and family/care givers will demonstrate understanding of plan of care, disease process/condition, diagnostic tests and medications  Outcome: Progressing     Problem: Skin Integrity  Goal: Skin integrity is maintained or improved  Outcome: Progressing       Patient is not progressing towards the following goals:

## 2022-01-07 NOTE — PROGRESS NOTES
Rec'd report from day shift RN. Assumed pt care. Assessment completed. AA&OX4. Denies pain at this time. No s/s of discomfort or distress. Pt transfers to BSC with 1 assist, calls appropriately. Left wrist splint is in place. Bed in lowest position, bed locked, bed alarm on for safety, treaded socks in place, RN and CNA numbers provided, call light within reach.

## 2022-01-07 NOTE — PROGRESS NOTES
Hospital Medicine Daily Progress Note    Date of Service  1/7/2022    Chief Complaint  Roro Leon is a 66 y.o. female admitted 12/10/2021 with AMS    Hospital Course  This is a 66 year old female with PMHx of hypertension, seizures on Keppra (last seizure June 2021), alcohol use disorder and subdural hematoma who was admitted on 12/10/2021 with acute alcohol intoxication. CIWA protocol in place.    She was also found to have a right distal radial hairline fracture and splint has been placed. Velcro splint in place x 6 weeks. Nonweightbearing able to use platform front wheel walker able to bear weight on left elbow.    Patient also noted worsening tremors over several months for which Neurology was consulted and recommended outpatient follow up.       She is now medically cleared pending SNF acceptance.     Interval Problem Update  Vitals stable. Patient worked with PT/OT yesterday who continue to recommend SNF. Pending placement. Discussed her propanolol and that is used to help her tremor. She states she hasnt been wanting to take it when her BP is low, will change to BID and monitor.     I have personally seen and examined the patient at bedside. I discussed the plan of care with patient, bedside RN, charge RN and .    Consultants/Specialty  None    Code Status  Full Code    Disposition  Patient is medically cleared for discharge.   Anticipate discharge to to skilled nursing facility.  I have placed the appropriate orders for post-discharge needs.    Review of Systems  Review of Systems   Constitutional: Negative for chills and fever.   Respiratory: Negative for shortness of breath.    Cardiovascular: Negative for chest pain.   Gastrointestinal: Negative for constipation, nausea and vomiting.   Musculoskeletal: Negative for myalgias.        Left hand pain   Neurological: Positive for tremors. Negative for dizziness and headaches.   All other systems reviewed and are negative.       Physical  Exam  Temp:  [36.6 °C (97.8 °F)-36.8 °C (98.3 °F)] 36.7 °C (98 °F)  Pulse:  [61-85] 76  Resp:  [16-18] 16  BP: (103-116)/(65-67) 103/67  SpO2:  [97 %-98 %] 98 %    Physical Exam  Vitals and nursing note reviewed.   Constitutional:       General: She is not in acute distress.     Appearance: She is not ill-appearing.      Comments: Sitting up in bed   HENT:      Head: Normocephalic and atraumatic.   Eyes:      Conjunctiva/sclera: Conjunctivae normal.   Cardiovascular:      Rate and Rhythm: Normal rate and regular rhythm.   Pulmonary:      Effort: Pulmonary effort is normal.      Breath sounds: Normal breath sounds.   Abdominal:      General: Abdomen is flat. There is no distension.      Palpations: Abdomen is soft.      Tenderness: There is no abdominal tenderness.   Musculoskeletal:      Cervical back: Neck supple.   Skin:     General: Skin is warm and dry.      Comments: Left arm splint in place   Neurological:      General: No focal deficit present.      Mental Status: She is alert and oriented to person, place, and time.      Comments: Mild left upper extremity tremor   Psychiatric:         Mood and Affect: Mood normal.         Behavior: Behavior normal.         Fluids    Intake/Output Summary (Last 24 hours) at 1/7/2022 1041  Last data filed at 1/6/2022 1231  Gross per 24 hour   Intake 480 ml   Output --   Net 480 ml       Laboratory                        Imaging  CT-HEAD W/O   Final Result         1.  No acute intracranial abnormality is identified, there are nonspecific white matter changes, commonly associated with small vessel ischemic disease.  Associated mild cerebral atrophy is noted.   2.  Atherosclerosis.         DX-WRIST-COMPLETE 3+ LEFT   Final Result         1. There is a possible nondisplaced intra-articular fracture of the distal radius seen on the oblique view, correlate for history of trauma and point tenderness.   2. Diffuse, decreased bone mineral density.      DX-CHEST-PORTABLE (1 VIEW)    Final Result      No acute cardiopulmonary abnormality.              Assessment/Plan  Contractures involving both knees  Assessment & Plan  12/15:  Noted by PT/OT.  Patient states she was ambulatory with FWW prior to admission.  12/17:  Able to extend right leg full extension, left leg near complete extension.  Up to chair tid with meals, PT/OT daily.   SNF pending for further strengthening.    Closed fracture of distal end of left radius with routine healing- (present on admission)  Assessment & Plan  Xray wrist with tiny minimal fracture distal radius seen in process of healing, likely 2 weeks old.  Sed rate normal  urice acid elevated  velcro wrist splint placed, to wear x 6 weeks.  Able to weight bear left elbow for platform walker, non weight bearing left hand.    Mixed action and resting tremor- (present on admission)  Assessment & Plan  Unclear etiology, difficult to determine if secondary to alcohol abuse versus neurologic abnormality  Neurology Dr. Khan consulted, appreciate recommendations  TSH low normal  -Neurology placed referral for outpatient neurology clinic  -Propanolol BID  -Monitor    Anemia of chronic disease- (present on admission)  Assessment & Plan  H/H 8.5/25.8 this admission  Per chart review, chronically low  Iron studies support anemia of chronic disease  B12 level high, folate level normal, iron level high  -Monitor    Seizure (HCC)- (present on admission)  Assessment & Plan  History of  -continue keppra    Essential hypertension- (present on admission)  Assessment & Plan  Normotensive  -monitor and start oral antihypertensives if persistent       VTE prophylaxis: SCDs/TEDs and enoxaparin ppx    I have performed a physical exam and reviewed and updated ROS and Plan today (1/7/2022). In review of yesterday's note (1/6/2022), there are no changes except as documented above.

## 2022-01-07 NOTE — THERAPY
Physical Therapy   Daily Treatment     Patient Name: Roro Leon  Age:  66 y.o., Sex:  female  Medical Record #: [id number]  Today's Date: 1/6/2022       Precautions: Fall Risk;Platform Weight Bearing Left Upper Extremity  Comments: L wrist splint    Assessment    Pt was pleasant and highly motivated to participate in therapy session. Pt presents with limited DF on LLE and educated on exercises and calf stretch using sheet. She reached EOB with spv. Performed standing and seated weight shifting onto LLE to promote weight bearing and increased ROM. She completed multiple STS transfers with platform fww and Boni for balance. She was able to take a few steps with platform fww and Boni for balance. Again limited due to weakness and decreased ROM on LLE. She was able to side step over to chair with Boni again for balance. She did require occasional vc to not use LUE at this time. Will continue to follow while in house.     Plan    Continue current treatment plan.    DC Equipment Recommendations: Unable to determine at this time  Discharge Recommendations: (P) Recommend post-acute placement for additional physical therapy services prior to discharge home         01/06/22 1154   Other Treatments   Other Treatments Provided Taught seated DF execises for LLE and calf stretch. Performed standing alteral weight shifting to increase foot flat on LLE while standing. Extensive discussion regarding using platform instead of HHA to increase independence    Balance   Sitting Balance (Static) Fair +   Sitting Balance (Dynamic) Fair   Standing Balance (Static) Fair -   Standing Balance (Dynamic) Poor +   Weight Shift Sitting Fair   Weight Shift Standing Poor   Skilled Intervention Verbal Cuing   Comments with platform fww    Gait Analysis   Gait Level Of Assist Minimal Assist   Assistive Device Platform Walker    3   Skilled Intervention Verbal Cuing;Sequencing   Comments pt with decreased DF on LLE and walks on toes. Boni for  balance and safety.    Bed Mobility    Supine to Sit Supervised   Sit to Supine   (left up in chair )   Scooting Supervised   Functional Mobility   Sit to Stand Minimal Assist   Bed, Chair, Wheelchair Transfer Minimal Assist   Skilled Intervention Verbal Cuing;Sequencing   Comments Multiple STS practice with fww and Eben for balance. Able to side step over to chair with good technique. did require vc to not use LUE at times when reaching bacl    Short Term Goals    Short Term Goal # 2 Pt will perform STS with FWW and min A within 6 visits in order to progress OOB activity   Goal Outcome # 2 Goal met   Short Term Goal # 3 Pt will perform functional transfers with FWW and mod A within 6 visits in order to progress OOB activity   Goal Outcome # 3 Goal met

## 2022-01-07 NOTE — DISCHARGE PLANNING
Agency/Facility Name: [facility]   Spoke To: Sandi  Outcome: DPA requested an update regarding status of bed availability. Was informed that Liaison will have an update after  Her meeting     LSW notfified     1021  Agency/Facility Name: [facility]   Spoke To: Sandi   Outcome: DPA  Left voicemail regarding bed availability.    LSW notified        gait, locomotion, and balance/muscle strength/aerobic capacity/endurance

## 2022-01-07 NOTE — THERAPY
Physical Therapy     Patient Name: Roro Leon  Age:  66 y.o., Sex:  female  Medical Record #: [id number]  Today's Date: 1/7/2022    Discussed progress in fxnl mob with PTA. Updated goals to reflect new short term goals, please reference below.     01/07/22 1231   Short Term Goals    Short Term Goal # 3 Pt will perform functional transfers with FWW and mod A within 6 visits in order to progress OOB activity   Goal Outcome # 3 Goal met, new goal added   Short Term Goal # 3 B Pt will perform bed<>chair transfers with FWW and SPV within 6 visits to increase ind with OOB activity.   Short Term Goal # 4 Pt will amb >50ft with Platform FWW and min A within 6 visits to progress toward household negotiation.       Vandana Acosta, PT, DPT  Ext. [phone]

## 2022-01-07 NOTE — THERAPY
Occupational Therapy  Daily Treatment     Patient Name: Roro Leon  Age:  66 y.o., Sex:  female  Medical Record #: [id number]  Today's Date: 1/6/2022       Precautions: Fall Risk,Platform Weight Bearing Left Upper Extremity  Comments: L wrist splint    Assessment    Pt seen for OT tx. Continues to be limited by decreased activity tolerance, balance deficits and LUE weakness impacting ability to complete ADLs and ADL transfers independently. Supv supine > sit, min A sit > stand, min A txf from EOB > chair/BSC. Supv seated grooming/self-feeding. Encouraged pt to txf > BSC w/ nrsg staff as tolerated. Will continue to benefit from OT services while in house.     Plan    Continue current treatment plan.    DC Equipment Recommendations: Unable to determine at this time  Discharge Recommendations: Recommend post-acute placement for additional occupational therapy services prior to discharge home       01/06/22 1231   Cognition    Cognition / Consciousness X   Safety Awareness Impaired   New Learning Impaired   Attention Impaired   Active ROM Upper Body   Active ROM Upper Body  X   Comments L wrist splinted    Strength Upper Body   Upper Body Strength  X   Comments L wrist splinted    Balance   Sitting Balance (Static) Fair +   Sitting Balance (Dynamic) Fair   Standing Balance (Static) Fair -   Standing Balance (Dynamic) Poor +   Weight Shift Sitting Fair   Weight Shift Standing Fair   Bed Mobility    Supine to Sit Supervised   Sit to Supine Supervised   Activities of Daily Living   Grooming Supervision;Seated   Upper Body Dressing Minimal Assist   Lower Body Dressing Moderate Assist   Toileting Minimal Assist   Functional Mobility   Sit to Stand Minimal Assist   Bed, Chair, Wheelchair Transfer Minimal Assist   Toilet Transfers Minimal Assist   Short Term Goals   Short Term Goal # 1 Pt will self feed with Min A & extra time   Goal Outcome # 1 Goal met   Short Term Goal # 2 Pt will groom seated EOB with Min A   Goal  Outcome # 2 Goal met   Short Term Goal # 3 Pt will be Min A for ADL transfers   Goal Outcome # 3 Goal met   Anticipated Discharge Equipment and Recommendations   DC Equipment Recommendations Unable to determine at this time   Discharge Recommendations Recommend post-acute placement for additional occupational therapy services prior to discharge home

## 2022-01-08 PROCEDURE — 700102 HCHG RX REV CODE 250 W/ 637 OVERRIDE(OP): Performed by: HOSPITALIST

## 2022-01-08 PROCEDURE — 770001 HCHG ROOM/CARE - MED/SURG/GYN PRIV*

## 2022-01-08 PROCEDURE — 700102 HCHG RX REV CODE 250 W/ 637 OVERRIDE(OP): Performed by: INTERNAL MEDICINE

## 2022-01-08 PROCEDURE — A9270 NON-COVERED ITEM OR SERVICE: HCPCS | Performed by: INTERNAL MEDICINE

## 2022-01-08 PROCEDURE — 700102 HCHG RX REV CODE 250 W/ 637 OVERRIDE(OP): Performed by: STUDENT IN AN ORGANIZED HEALTH CARE EDUCATION/TRAINING PROGRAM

## 2022-01-08 PROCEDURE — 99232 SBSQ HOSP IP/OBS MODERATE 35: CPT | Performed by: INTERNAL MEDICINE

## 2022-01-08 PROCEDURE — A9270 NON-COVERED ITEM OR SERVICE: HCPCS | Performed by: HOSPITALIST

## 2022-01-08 PROCEDURE — A9270 NON-COVERED ITEM OR SERVICE: HCPCS | Performed by: STUDENT IN AN ORGANIZED HEALTH CARE EDUCATION/TRAINING PROGRAM

## 2022-01-08 PROCEDURE — 700111 HCHG RX REV CODE 636 W/ 250 OVERRIDE (IP): Performed by: STUDENT IN AN ORGANIZED HEALTH CARE EDUCATION/TRAINING PROGRAM

## 2022-01-08 RX ORDER — ACETAMINOPHEN 500 MG
500 TABLET ORAL 3 TIMES DAILY
Status: DISCONTINUED | OUTPATIENT
Start: 2022-01-08 | End: 2022-01-11 | Stop reason: HOSPADM

## 2022-01-08 RX ADMIN — Medication 100 MG: at 04:42

## 2022-01-08 RX ADMIN — LEVETIRACETAM 500 MG: 500 TABLET, FILM COATED ORAL at 16:51

## 2022-01-08 RX ADMIN — Medication 1000 UNITS: at 04:42

## 2022-01-08 RX ADMIN — ACETAMINOPHEN 500 MG: 500 TABLET ORAL at 11:00

## 2022-01-08 RX ADMIN — OMEPRAZOLE 40 MG: KIT at 16:51

## 2022-01-08 RX ADMIN — Medication 5 MG: at 23:22

## 2022-01-08 RX ADMIN — PROPRANOLOL HYDROCHLORIDE 10 MG: 10 TABLET ORAL at 04:41

## 2022-01-08 RX ADMIN — LEVETIRACETAM 500 MG: 500 TABLET, FILM COATED ORAL at 04:42

## 2022-01-08 RX ADMIN — ACETAMINOPHEN 500 MG: 500 TABLET ORAL at 19:38

## 2022-01-08 RX ADMIN — PROPRANOLOL HYDROCHLORIDE 10 MG: 10 TABLET ORAL at 16:51

## 2022-01-08 RX ADMIN — ENOXAPARIN SODIUM 40 MG: 40 INJECTION SUBCUTANEOUS at 04:41

## 2022-01-08 RX ADMIN — IBUPROFEN 400 MG: 800 TABLET, FILM COATED ORAL at 07:10

## 2022-01-08 RX ADMIN — ACETAMINOPHEN 500 MG: 500 TABLET ORAL at 15:05

## 2022-01-08 RX ADMIN — LISINOPRIL 20 MG: 20 TABLET ORAL at 04:41

## 2022-01-08 ASSESSMENT — ENCOUNTER SYMPTOMS
VOMITING: 0
TREMORS: 1
CHILLS: 0
FEVER: 0
HEADACHES: 0
NAUSEA: 0
DIZZINESS: 0
SHORTNESS OF BREATH: 0
MYALGIAS: 0
CONSTIPATION: 0

## 2022-01-08 ASSESSMENT — FIBROSIS 4 INDEX: FIB4 SCORE: 0.66

## 2022-01-08 NOTE — CARE PLAN
The patient is Stable - Low risk of patient condition declining or worsening    Shift Goals  Clinical Goals: discharge  Patient Goals: discharge  Family Goals: N/A    Progress made toward(s) clinical / shift goals:    Problem: Pain - Standard  Goal: Alleviation of pain or a reduction in pain to the patient’s comfort goal  Outcome: Progressing  Note: No complaints of pain.      Problem: Fall Risk  Goal: Patient will remain free from falls  Outcome: Progressing  Note: Free of falls.  Fall precautions in place.  Patient using call light appropriately.       Problem: Skin Integrity  Goal: Skin integrity is maintained or improved  Outcome: Progressing  Note: Patient turns self Q2 hours.  Up to chair today.         Patient is not progressing towards the following goals:

## 2022-01-08 NOTE — CARE PLAN
Problem: Knowledge Deficit - Standard  Goal: Patient and family/care givers will demonstrate understanding of plan of care, disease process/condition, diagnostic tests and medications  Outcome: Progressing     Problem: Pain - Standard  Goal: Alleviation of pain or a reduction in pain to the patient’s comfort goal  Outcome: Progressing     Problem: Fall Risk  Goal: Patient will remain free from falls  Outcome: Progressing     The patient is Stable - Low risk of patient condition declining or worsening    Shift Goals  Clinical Goals: safety  Patient Goals: placement  Family Goals: N/A    Progress made toward(s) clinical / shift goals:  patient remains safe and free of falls during shift. Patient awaiting bed availability     Patient is not progressing towards the following goals:

## 2022-01-08 NOTE — PROGRESS NOTES
Assumed care of pt at 0700. Report received by NOC RN. Pt is A&O x 4. Pain is 3/10 in left arm, medicated prior. Pt is sitting up in bed. Bed in lowest locked position, call light in reach, hourly rounding in place. Labs reviewed, orders reviewed, communication board updated. WCTM.

## 2022-01-08 NOTE — PROGRESS NOTES
Hospital Medicine Daily Progress Note    Date of Service  1/8/2022    Chief Complaint  Roro Leon is a 66 y.o. female admitted 12/10/2021 with AMS    Hospital Course  This is a 66 year old female with PMHx of hypertension, seizures on Keppra (last seizure June 2021), alcohol use disorder and subdural hematoma who was admitted on 12/10/2021 with acute alcohol intoxication. CIWA protocol in place.    She was also found to have a right distal radial hairline fracture and splint has been placed. Velcro splint in place x 6 weeks. Nonweightbearing able to use platform front wheel walker able to bear weight on left elbow.    Patient also noted worsening tremors over several months for which Neurology was consulted and recommended outpatient follow up.       She is now medically cleared pending SNF acceptance.     Interval Problem Update  No acute events overnight. Pending placement. Patient reports ibuprofen isn't working as well for her hand pain, will schedule tylenol dosing.    I have personally seen and examined the patient at bedside. I discussed the plan of care with patient, bedside RN, charge RN and .    Consultants/Specialty  None    Code Status  Full Code    Disposition  Patient is medically cleared for discharge.   Anticipate discharge to to skilled nursing facility.  I have placed the appropriate orders for post-discharge needs.    Review of Systems  Review of Systems   Constitutional: Negative for chills and fever.   Respiratory: Negative for shortness of breath.    Cardiovascular: Negative for chest pain.   Gastrointestinal: Negative for constipation, nausea and vomiting.   Musculoskeletal: Negative for myalgias.        Left hand pain   Neurological: Positive for tremors. Negative for dizziness and headaches.   All other systems reviewed and are negative.       Physical Exam  Temp:  [36.4 °C (97.6 °F)-36.7 °C (98.1 °F)] 36.6 °C (97.8 °F)  Pulse:  [69-76] 69  Resp:  [16-19] 18  BP:  (111-123)/(65-75) 116/65  SpO2:  [94 %-98 %] 98 %    Physical Exam  Vitals and nursing note reviewed.   Constitutional:       General: She is not in acute distress.     Appearance: She is not ill-appearing.   HENT:      Head: Normocephalic and atraumatic.   Eyes:      Conjunctiva/sclera: Conjunctivae normal.   Cardiovascular:      Rate and Rhythm: Normal rate and regular rhythm.   Pulmonary:      Effort: Pulmonary effort is normal.      Breath sounds: Normal breath sounds.   Abdominal:      General: Abdomen is flat. There is no distension.      Palpations: Abdomen is soft.      Tenderness: There is no abdominal tenderness.   Musculoskeletal:      Cervical back: Neck supple.   Skin:     General: Skin is warm and dry.      Comments: Left arm splint in place   Neurological:      General: No focal deficit present.      Mental Status: She is alert and oriented to person, place, and time.      Comments: No left UE tremor today    Psychiatric:         Mood and Affect: Mood normal.         Behavior: Behavior normal.         Fluids    Intake/Output Summary (Last 24 hours) at 1/8/2022 1137  Last data filed at 1/8/2022 1048  Gross per 24 hour   Intake 1577 ml   Output --   Net 1577 ml       Laboratory                        Imaging  CT-HEAD W/O   Final Result         1.  No acute intracranial abnormality is identified, there are nonspecific white matter changes, commonly associated with small vessel ischemic disease.  Associated mild cerebral atrophy is noted.   2.  Atherosclerosis.         DX-WRIST-COMPLETE 3+ LEFT   Final Result         1. There is a possible nondisplaced intra-articular fracture of the distal radius seen on the oblique view, correlate for history of trauma and point tenderness.   2. Diffuse, decreased bone mineral density.      DX-CHEST-PORTABLE (1 VIEW)   Final Result      No acute cardiopulmonary abnormality.              Assessment/Plan  Contractures involving both knees  Assessment & Plan  12/15:  Noted  by PT/OT.  Patient states she was ambulatory with FWW prior to admission.  12/17:  Able to extend right leg full extension, left leg near complete extension.  Up to chair tid with meals, PT/OT daily.   SNF pending for further strengthening.    Closed fracture of distal end of left radius with routine healing- (present on admission)  Assessment & Plan  Xray wrist with tiny minimal fracture distal radius seen in process of healing, likely 2 weeks old.  Sed rate normal  urice acid elevated  velcro wrist splint placed, to wear x 6 weeks.  Able to weight bear left elbow for platform walker, non weight bearing left hand.    Mixed action and resting tremor- (present on admission)  Assessment & Plan  Unclear etiology, difficult to determine if secondary to alcohol abuse versus neurologic abnormality  Neurology Dr. Khan consulted, appreciate recommendations  TSH low normal  -Neurology placed referral for outpatient neurology clinic  -Propanolol BID  -Monitor    Anemia of chronic disease- (present on admission)  Assessment & Plan  H/H 8.5/25.8 this admission  Per chart review, chronically low  Iron studies support anemia of chronic disease  B12 level high, folate level normal, iron level high  -Monitor    Seizure (HCC)- (present on admission)  Assessment & Plan  History of  -continue keppra    Essential hypertension- (present on admission)  Assessment & Plan  Normotensive  -monitor and start oral antihypertensives if persistent       VTE prophylaxis: SCDs/TEDs and enoxaparin ppx    I have performed a physical exam and reviewed and updated ROS and Plan today (1/8/2022). In review of yesterday's note (1/7/2022), there are no changes except as documented above.

## 2022-01-09 PROCEDURE — A9270 NON-COVERED ITEM OR SERVICE: HCPCS | Performed by: HOSPITALIST

## 2022-01-09 PROCEDURE — 770001 HCHG ROOM/CARE - MED/SURG/GYN PRIV*

## 2022-01-09 PROCEDURE — 700111 HCHG RX REV CODE 636 W/ 250 OVERRIDE (IP): Performed by: STUDENT IN AN ORGANIZED HEALTH CARE EDUCATION/TRAINING PROGRAM

## 2022-01-09 PROCEDURE — 700102 HCHG RX REV CODE 250 W/ 637 OVERRIDE(OP): Performed by: HOSPITALIST

## 2022-01-09 PROCEDURE — A9270 NON-COVERED ITEM OR SERVICE: HCPCS | Performed by: STUDENT IN AN ORGANIZED HEALTH CARE EDUCATION/TRAINING PROGRAM

## 2022-01-09 PROCEDURE — 700102 HCHG RX REV CODE 250 W/ 637 OVERRIDE(OP): Performed by: INTERNAL MEDICINE

## 2022-01-09 PROCEDURE — A9270 NON-COVERED ITEM OR SERVICE: HCPCS | Performed by: INTERNAL MEDICINE

## 2022-01-09 PROCEDURE — 700102 HCHG RX REV CODE 250 W/ 637 OVERRIDE(OP): Performed by: STUDENT IN AN ORGANIZED HEALTH CARE EDUCATION/TRAINING PROGRAM

## 2022-01-09 PROCEDURE — 99231 SBSQ HOSP IP/OBS SF/LOW 25: CPT | Performed by: INTERNAL MEDICINE

## 2022-01-09 RX ADMIN — LEVETIRACETAM 500 MG: 500 TABLET, FILM COATED ORAL at 05:19

## 2022-01-09 RX ADMIN — ENOXAPARIN SODIUM 40 MG: 40 INJECTION SUBCUTANEOUS at 05:19

## 2022-01-09 RX ADMIN — LEVETIRACETAM 500 MG: 500 TABLET, FILM COATED ORAL at 17:15

## 2022-01-09 RX ADMIN — PROPRANOLOL HYDROCHLORIDE 10 MG: 10 TABLET ORAL at 05:19

## 2022-01-09 RX ADMIN — ACETAMINOPHEN 500 MG: 500 TABLET ORAL at 08:18

## 2022-01-09 RX ADMIN — OMEPRAZOLE 40 MG: KIT at 05:20

## 2022-01-09 RX ADMIN — Medication 100 MG: at 05:19

## 2022-01-09 RX ADMIN — PROPRANOLOL HYDROCHLORIDE 10 MG: 10 TABLET ORAL at 17:15

## 2022-01-09 RX ADMIN — LISINOPRIL 20 MG: 20 TABLET ORAL at 05:19

## 2022-01-09 RX ADMIN — Medication 1000 UNITS: at 05:19

## 2022-01-09 RX ADMIN — OMEPRAZOLE 40 MG: KIT at 17:15

## 2022-01-09 RX ADMIN — ACETAMINOPHEN 500 MG: 500 TABLET ORAL at 14:57

## 2022-01-09 RX ADMIN — ACETAMINOPHEN 500 MG: 500 TABLET ORAL at 21:22

## 2022-01-09 RX ADMIN — Medication 5 MG: at 21:22

## 2022-01-09 ASSESSMENT — ENCOUNTER SYMPTOMS
SHORTNESS OF BREATH: 0
NAUSEA: 0
CHILLS: 0
TREMORS: 1
VOMITING: 0
FEVER: 0
DIZZINESS: 0
MYALGIAS: 0
CONSTIPATION: 0
HEADACHES: 0

## 2022-01-09 NOTE — PROGRESS NOTES
Assumed care of pt at 0700. Report received by NOC RN. Pt is A&O x 4. Pain is 4/10 in left wrist, administered scheduled tylenol. Pt is sitting up in bed. Bed in lowest locked position, call light in reach, hourly rounding in place. Labs reviewed, orders reviewed, communication board updated. WCTM.

## 2022-01-09 NOTE — PROGRESS NOTES
Assumed care of pt at 1900.   Report received from MIRIAN Ordoñez. Assessment completed.  Pt in bed, A/O x  4. Pt reports pain  3/10. Bed in lowest and locked position, fall precautions in place, call light within pt reach. Hourly rounding in place. Pt denies other needs at this time.

## 2022-01-09 NOTE — PROGRESS NOTES
Hospital Medicine Daily Progress Note    Date of Service  1/9/2022    Chief Complaint  Roro Leon is a 66 y.o. female admitted 12/10/2021 with AMS    Hospital Course  This is a 66 year old female with PMHx of hypertension, seizures on Keppra (last seizure June 2021), alcohol use disorder and subdural hematoma who was admitted on 12/10/2021 with acute alcohol intoxication. CIWA protocol in place.    She was also found to have a right distal radial hairline fracture and splint has been placed. Velcro splint in place x 6 weeks. Nonweightbearing able to use platform front wheel walker able to bear weight on left elbow.    Patient also noted worsening tremors over several months for which Neurology was consulted and recommended outpatient follow up.       She is now medically cleared pending SNF acceptance.     Interval Problem Update  Vitals stable. States the pain in her hand is much improved with the scheduled tylenol. Still anxious to go to SNF to start more intensive therapy    I have personally seen and examined the patient at bedside. I discussed the plan of care with patient, bedside RN, charge RN and .    Consultants/Specialty  None    Code Status  Full Code    Disposition  Patient is medically cleared for discharge.   Anticipate discharge to to skilled nursing facility.  I have placed the appropriate orders for post-discharge needs.    Review of Systems  Review of Systems   Constitutional: Negative for chills and fever.   Respiratory: Negative for shortness of breath.    Cardiovascular: Negative for chest pain.   Gastrointestinal: Negative for constipation, nausea and vomiting.   Musculoskeletal: Negative for myalgias.        Left hand pain   Neurological: Positive for tremors. Negative for dizziness and headaches.   All other systems reviewed and are negative.       Physical Exam  Temp:  [36.7 °C (98.1 °F)-36.9 °C (98.5 °F)] 36.7 °C (98.1 °F)  Pulse:  [67-77] 74  Resp:  [16-18] 16  BP:  (120-132)/(55-79) 120/55  SpO2:  [96 %-99 %] 97 %    Physical Exam  Vitals and nursing note reviewed.   Constitutional:       General: She is not in acute distress.     Appearance: She is not ill-appearing.   HENT:      Head: Normocephalic and atraumatic.   Eyes:      Conjunctiva/sclera: Conjunctivae normal.   Cardiovascular:      Rate and Rhythm: Normal rate and regular rhythm.   Pulmonary:      Effort: Pulmonary effort is normal.      Breath sounds: Normal breath sounds.   Abdominal:      General: Abdomen is flat. There is no distension.      Palpations: Abdomen is soft.      Tenderness: There is no abdominal tenderness.   Musculoskeletal:      Cervical back: Neck supple.   Skin:     General: Skin is warm and dry.      Comments: Left arm splint in place   Neurological:      General: No focal deficit present.      Mental Status: She is alert and oriented to person, place, and time.      Comments: No left UE tremor today    Psychiatric:         Mood and Affect: Mood normal.         Behavior: Behavior normal.         Fluids    Intake/Output Summary (Last 24 hours) at 1/9/2022 1047  Last data filed at 1/9/2022 0830  Gross per 24 hour   Intake 1197 ml   Output --   Net 1197 ml       Laboratory                        Imaging  CT-HEAD W/O   Final Result         1.  No acute intracranial abnormality is identified, there are nonspecific white matter changes, commonly associated with small vessel ischemic disease.  Associated mild cerebral atrophy is noted.   2.  Atherosclerosis.         DX-WRIST-COMPLETE 3+ LEFT   Final Result         1. There is a possible nondisplaced intra-articular fracture of the distal radius seen on the oblique view, correlate for history of trauma and point tenderness.   2. Diffuse, decreased bone mineral density.      DX-CHEST-PORTABLE (1 VIEW)   Final Result      No acute cardiopulmonary abnormality.              Assessment/Plan  Contractures involving both knees  Assessment & Plan  12/15:  Noted  by PT/OT.  Patient states she was ambulatory with FWW prior to admission.  12/17:  Able to extend right leg full extension, left leg near complete extension.  Up to chair tid with meals, PT/OT daily.   SNF pending for further strengthening.    Closed fracture of distal end of left radius with routine healing- (present on admission)  Assessment & Plan  Xray wrist with tiny minimal fracture distal radius seen in process of healing, likely 2 weeks old.  Sed rate normal  urice acid elevated  velcro wrist splint placed, to wear x 6 weeks.  Able to weight bear left elbow for platform walker, non weight bearing left hand.    Mixed action and resting tremor- (present on admission)  Assessment & Plan  Unclear etiology, difficult to determine if secondary to alcohol abuse versus neurologic abnormality  Neurology Dr. Khan consulted, appreciate recommendations  TSH low normal  -Neurology placed referral for outpatient neurology clinic  -Propanolol BID  -Monitor    Anemia of chronic disease- (present on admission)  Assessment & Plan  H/H 8.5/25.8 this admission  Per chart review, chronically low  Iron studies support anemia of chronic disease  B12 level high, folate level normal, iron level high  -Monitor    Seizure (HCC)- (present on admission)  Assessment & Plan  History of  -continue keppra    Essential hypertension- (present on admission)  Assessment & Plan  Normotensive  -monitor and start oral antihypertensives if persistent       VTE prophylaxis: SCDs/TEDs and enoxaparin ppx    I have performed a physical exam and reviewed and updated ROS and Plan today (1/9/2022). In review of yesterday's note (1/8/2022), there are no changes except as documented above.

## 2022-01-09 NOTE — CARE PLAN
The patient is Stable - Low risk of patient condition declining or worsening    Shift Goals  Clinical Goals: safety  Patient Goals: Rest  Family Goals: NA    Progress made toward(s) clinical / shift goals:  safety precautions in place    Patient is not progressing towards the following goals:NA

## 2022-01-10 PROCEDURE — 700102 HCHG RX REV CODE 250 W/ 637 OVERRIDE(OP): Performed by: INTERNAL MEDICINE

## 2022-01-10 PROCEDURE — A9270 NON-COVERED ITEM OR SERVICE: HCPCS | Performed by: INTERNAL MEDICINE

## 2022-01-10 PROCEDURE — 770001 HCHG ROOM/CARE - MED/SURG/GYN PRIV*

## 2022-01-10 PROCEDURE — 97116 GAIT TRAINING THERAPY: CPT

## 2022-01-10 PROCEDURE — 99231 SBSQ HOSP IP/OBS SF/LOW 25: CPT | Performed by: INTERNAL MEDICINE

## 2022-01-10 PROCEDURE — 97530 THERAPEUTIC ACTIVITIES: CPT

## 2022-01-10 PROCEDURE — 97112 NEUROMUSCULAR REEDUCATION: CPT

## 2022-01-10 PROCEDURE — 700111 HCHG RX REV CODE 636 W/ 250 OVERRIDE (IP): Performed by: INTERNAL MEDICINE

## 2022-01-10 RX ADMIN — ACETAMINOPHEN 500 MG: 500 TABLET ORAL at 14:51

## 2022-01-10 RX ADMIN — Medication 1000 UNITS: at 05:32

## 2022-01-10 RX ADMIN — ACETAMINOPHEN 500 MG: 500 TABLET ORAL at 20:07

## 2022-01-10 RX ADMIN — Medication 5 MG: at 22:43

## 2022-01-10 RX ADMIN — PROPRANOLOL HYDROCHLORIDE 10 MG: 10 TABLET ORAL at 05:32

## 2022-01-10 RX ADMIN — PROPRANOLOL HYDROCHLORIDE 10 MG: 10 TABLET ORAL at 17:56

## 2022-01-10 RX ADMIN — LEVETIRACETAM 500 MG: 500 TABLET, FILM COATED ORAL at 17:56

## 2022-01-10 RX ADMIN — Medication 100 MG: at 05:32

## 2022-01-10 RX ADMIN — ACETAMINOPHEN 500 MG: 500 TABLET ORAL at 08:15

## 2022-01-10 RX ADMIN — LEVETIRACETAM 500 MG: 500 TABLET, FILM COATED ORAL at 05:32

## 2022-01-10 RX ADMIN — OMEPRAZOLE 40 MG: KIT at 05:32

## 2022-01-10 RX ADMIN — ENOXAPARIN SODIUM 40 MG: 40 INJECTION SUBCUTANEOUS at 05:32

## 2022-01-10 RX ADMIN — OMEPRAZOLE 40 MG: KIT at 17:56

## 2022-01-10 RX ADMIN — LISINOPRIL 20 MG: 20 TABLET ORAL at 05:32

## 2022-01-10 ASSESSMENT — ENCOUNTER SYMPTOMS
FEVER: 0
CONSTIPATION: 0
HEADACHES: 0
TREMORS: 1
NAUSEA: 0
VOMITING: 0
DIZZINESS: 0
SHORTNESS OF BREATH: 0
MYALGIAS: 0
CHILLS: 0

## 2022-01-10 ASSESSMENT — COGNITIVE AND FUNCTIONAL STATUS - GENERAL
MOVING TO AND FROM BED TO CHAIR: A LITTLE
SUGGESTED CMS G CODE MODIFIER MOBILITY: CK
MOBILITY SCORE: 17
WALKING IN HOSPITAL ROOM: A LITTLE
MOVING FROM LYING ON BACK TO SITTING ON SIDE OF FLAT BED: A LITTLE
CLIMB 3 TO 5 STEPS WITH RAILING: A LOT
STANDING UP FROM CHAIR USING ARMS: A LITTLE
TURNING FROM BACK TO SIDE WHILE IN FLAT BAD: A LITTLE

## 2022-01-10 ASSESSMENT — GAIT ASSESSMENTS
ASSISTIVE DEVICE: PLATFORM WALKER
DISTANCE (FEET): 15
GAIT LEVEL OF ASSIST: MINIMAL ASSIST
DEVIATION: ATAXIC;DECREASED HEEL STRIKE

## 2022-01-10 NOTE — CARE PLAN
The patient is Stable - Low risk of patient condition declining or worsening    Shift Goals  Clinical Goals: Patient will be free from falls      Progress made toward(s) clinical / shift goals:  Pt. Verbalized understanding of education about falls, demonstrated proper use of call light for assistance.      Problem: Pain - Standard  Goal: Alleviation of pain or a reduction in pain to the patient’s comfort goal  Outcome: Progressing     Problem: Fall Risk  Goal: Patient will remain free from falls  Outcome: Progressing

## 2022-01-10 NOTE — CARE PLAN
Problem: Knowledge Deficit - Standard  Goal: Patient and family/care givers will demonstrate understanding of plan of care, disease process/condition, diagnostic tests and medications  Outcome: Progressing     Problem: Psychosocial  Goal: Patient's level of anxiety will decrease  Outcome: Progressing   The patient is Stable - Low risk of patient condition declining or worsening    Shift Goals  Clinical Goals: Safety  Patient Goals: Discharge   Family Goals: N/A    Progress made toward(s) clinical / shift goals:  Pt shows understanding of POC, discharging to [facility] tomorrow. Pt reports decreased anxiety because of soon discharge.     Patient is not progressing towards the following goals:

## 2022-01-10 NOTE — CARE PLAN
Problem: Knowledge Deficit - Standard  Goal: Patient and family/care givers will demonstrate understanding of plan of care, disease process/condition, diagnostic tests and medications  Outcome: Progressing     Problem: Pain - Standard  Goal: Alleviation of pain or a reduction in pain to the patient’s comfort goal  Outcome: Progressing     Problem: Fall Risk  Goal: Patient will remain free from falls  Outcome: Progressing     The patient is Stable - Low risk of patient condition declining or worsening    Shift Goals  Clinical Goals: safety  Patient Goals: discharge planning  Family Goals: NA    Progress made toward(s) clinical / shift goals:  patient remained safe during my shift. Awaiting bed availability    Patient is not progressing towards the following goals:

## 2022-01-10 NOTE — THERAPY
"Physical Therapy   Daily Treatment     Patient Name: Roro Leon  Age:  66 y.o., Sex:  female  Medical Record #: [id number]  Today's Date: 1/10/2022     Precautions  Precautions: Fall Risk;Platform Weight Bearing Left Upper Extremity  Comments: L wrist splint     Assessment    Patient seen for follow up PT treatment session. She is motivated to work with therapy and demos improved independence with strength and balance and demos ambulation of 15' x2 w/PFW and CGA for safety.  Gait is limited 2/2 L foot pain and fatigue.  Patient will benefit from placement for further therapy prior to DC home.  Recommend ambulating to and from the bathroom w/PFM and RN assist. OOB to chair for all meals.     Plan    Continue current treatment plan.    DC Equipment Recommendations: Unable to determine at this time  Discharge Recommendations: Recommend post-acute placement for additional physical therapy services prior to discharge home      Subjective    \"I'm ready to go to rehab\"      Objective       01/10/22 1050   Precautions   Precautions Fall Risk;Platform Weight Bearing Left Upper Extremity   Comments L wrist splint    Pain 0 - 10 Group   Location Wrist;Foot   Location Orientation Left   Therapist Pain Assessment 3;During Activity   Cognition    Level of Consciousness Alert   Comments Pleasant and cooperative. Motivated and receptive to education    Supine Lower Body Exercise   Quadriceps Isometrics 2 sets of 10;Bilateral   Comments knee bridging with quad sets    Standing Lower Body Exercises   Comments repeated STS for improved technique    Neuro-Muscular Treatments   Neuro-Muscular Treatments Weight Shift Right;Weight Shift Left;Postural Facilitation;Postural Changes;Compensatory Strategies;Anterior weight shift   Other Treatments   Other Treatments Provided Provided education about the importance of getting OOB to chair and daily ambulation w/RN staff    Balance   Sitting Balance (Static) Fair +   Sitting Balance (Dynamic) " Fair   Standing Balance (Static) Fair -   Standing Balance (Dynamic) Fair -   Weight Shift Sitting Fair   Weight Shift Standing Fair   Skilled Intervention Compensatory Strategies;Postural Facilitation;Tactile Cuing;Verbal Cuing   Comments w/platform walker    Gait Analysis   Gait Level Of Assist Minimal Assist   Assistive Device Platform Walker   Distance (Feet) 15   # of Times Distance was Traveled 2   Deviation Ataxic;Decreased Heel Strike   Weight Bearing Status NWB L UE   Skilled Intervention Verbal Cuing;Tactile Cuing;Sequencing;Postural Facilitation   Comments 1 posterior LOB when approaching transfer back to bed, able to self-correct    Bed Mobility    Supine to Sit Supervised   Sit to Supine Supervised   Scooting Supervised   Functional Mobility   Sit to Stand Minimal Assist   Bed, Chair, Wheelchair Transfer Minimal Assist   Skilled Intervention Compensatory Strategies;Postural Facilitation;Sequencing;Tactile Cuing;Verbal Cuing   Comments posterior lean upon standing, able to correct with cueing    How much difficulty does the patient currently have...   Turning over in bed (including adjusting bedclothes, sheets and blankets)? 3   Sitting down on and standing up from a chair with arms (e.g., wheelchair, bedside commode, etc.) 3   Moving from lying on back to sitting on the side of the bed? 3   How much help from another person does the patient currently need...   Moving to and from a bed to a chair (including a wheelchair)? 3   Need to walk in a hospital room? 3   Climbing 3-5 steps with a railing? 2   6 clicks Mobility Score 17   Activity Tolerance   Sitting Edge of Bed 10 min    Standing 5 min x2    Comments improved activity tolerance today    Short Term Goals    Short Term Goal # 3 B Pt will perform bed<>chair transfers with FWW and SPV within 6 visits to increase ind with OOB activity.   Goal Outcome # 3 B Progressing as expected   Short Term Goal # 4 Pt will amb >50ft with Platform FWW and min A  within 6 visits to progress toward household negotiation.   Goal Outcome # 4 Progressing as expected   Anticipated Discharge Equipment and Recommendations   DC Equipment Recommendations Unable to determine at this time   Discharge Recommendations Recommend post-acute placement for additional physical therapy services prior to discharge home     Cindi Edgar, PT, DPT, GCS

## 2022-01-10 NOTE — PROGRESS NOTES
Assumed care at 0700. Received report from NOC shift RN. Pt is awake and alert in bed, A&Ox 4, on RA, reports a pain level of 3/10, medicated per MAR. Fall precautions and appropriate signs in place. Call light and belongings within reach. Hourly rounding in place. Communication board updated. Pt denies any additional needs at this time.

## 2022-01-10 NOTE — PROGRESS NOTES
Pt. Received in bed awake, orientedx4. With complaints of mild discomfort on L wrist, scheduled Tylenol given and pt. Was able to sleep comfortably. Educated about falls and to use call light for assistance. Due med given and tolerated. Needs attended.

## 2022-01-10 NOTE — PROGRESS NOTES
Hospital Medicine Daily Progress Note    Date of Service  1/10/2022    Chief Complaint  Roro Leon is a 66 y.o. female admitted 12/10/2021 with AMS    Hospital Course  This is a 66 year old female with PMHx of hypertension, seizures on Keppra (last seizure June 2021), alcohol use disorder and subdural hematoma who was admitted on 12/10/2021 with acute alcohol intoxication. CIWA protocol in place.    She was also found to have a right distal radial hairline fracture and splint has been placed. Velcro splint in place x 6 weeks. Nonweightbearing able to use platform front wheel walker able to bear weight on left elbow.    Patient also noted worsening tremors over several months for which Neurology was consulted and recommended outpatient follow up. Started on propanolol with improvement in tremor.      She is now medically cleared pending SNF acceptance.     Interval Problem Update  No acute events overnight. Tremor controlled on the BID propanolol. Per case management, [place] believes they will have an open bed tomorrow. Covid test ordered. Patient has no new complaints, excited to go to rehab.     I have personally seen and examined the patient at bedside. I discussed the plan of care with patient, bedside RN, charge RN and .    Consultants/Specialty  None    Code Status  Full Code    Disposition  Patient is medically cleared for discharge.   Anticipate discharge to to skilled nursing facility.  I have placed the appropriate orders for post-discharge needs.    Review of Systems  Review of Systems   Constitutional: Negative for chills and fever.   Respiratory: Negative for shortness of breath.    Cardiovascular: Negative for chest pain.   Gastrointestinal: Negative for constipation, nausea and vomiting.   Musculoskeletal: Negative for myalgias.        Left hand pain   Neurological: Positive for tremors. Negative for dizziness and headaches.   All other systems reviewed and are negative.        Physical Exam  Temp:  [36.1 °C (97 °F)-36.5 °C (97.7 °F)] 36.1 °C (97 °F)  Pulse:  [66-76] 66  Resp:  [18-19] 18  BP: (113-132)/(67-70) 113/68  SpO2:  [95 %-96 %] 96 %    Physical Exam  Vitals and nursing note reviewed.   Constitutional:       General: She is not in acute distress.     Appearance: She is not ill-appearing.   HENT:      Head: Normocephalic and atraumatic.   Eyes:      Conjunctiva/sclera: Conjunctivae normal.   Cardiovascular:      Rate and Rhythm: Normal rate and regular rhythm.   Pulmonary:      Effort: Pulmonary effort is normal.      Breath sounds: Normal breath sounds.   Abdominal:      General: Abdomen is flat. There is no distension.      Palpations: Abdomen is soft.      Tenderness: There is no abdominal tenderness.   Musculoskeletal:      Cervical back: Neck supple.   Skin:     General: Skin is warm and dry.      Comments: Left arm splint in place   Neurological:      General: No focal deficit present.      Mental Status: She is alert and oriented to person, place, and time.      Comments: No left UE tremor today    Psychiatric:         Mood and Affect: Mood normal.         Behavior: Behavior normal.         Fluids    Intake/Output Summary (Last 24 hours) at 1/10/2022 0839  Last data filed at 1/9/2022 1909  Gross per 24 hour   Intake 900 ml   Output --   Net 900 ml       Laboratory                        Imaging  CT-HEAD W/O   Final Result         1.  No acute intracranial abnormality is identified, there are nonspecific white matter changes, commonly associated with small vessel ischemic disease.  Associated mild cerebral atrophy is noted.   2.  Atherosclerosis.         DX-WRIST-COMPLETE 3+ LEFT   Final Result         1. There is a possible nondisplaced intra-articular fracture of the distal radius seen on the oblique view, correlate for history of trauma and point tenderness.   2. Diffuse, decreased bone mineral density.      DX-CHEST-PORTABLE (1 VIEW)   Final Result      No acute  cardiopulmonary abnormality.              Assessment/Plan  Contractures involving both knees  Assessment & Plan  12/15:  Noted by PT/OT.  Patient states she was ambulatory with FWW prior to admission.  12/17:  Able to extend right leg full extension, left leg near complete extension.  Up to chair tid with meals, PT/OT daily.   SNF pending for further strengthening.    Closed fracture of distal end of left radius with routine healing- (present on admission)  Assessment & Plan  Xray wrist with tiny minimal fracture distal radius seen in process of healing, likely 2 weeks old.  Sed rate normal  urice acid elevated  velcro wrist splint placed, to wear x 6 weeks.  Able to weight bear left elbow for platform walker, non weight bearing left hand.    Mixed action and resting tremor- (present on admission)  Assessment & Plan  Unclear etiology, difficult to determine if secondary to alcohol abuse versus neurologic abnormality  Neurology Dr. Khan consulted, appreciate recommendations  TSH low normal  -Neurology placed referral for outpatient neurology clinic  -Propanolol BID  -Monitor    Anemia of chronic disease- (present on admission)  Assessment & Plan  H/H 8.5/25.8 this admission  Per chart review, chronically low  Iron studies support anemia of chronic disease  B12 level high, folate level normal, iron level high  -Monitor    Seizure (HCC)- (present on admission)  Assessment & Plan  History of  -continue keppra    Essential hypertension- (present on admission)  Assessment & Plan  Normotensive  -monitor and start oral antihypertensives if persistent       VTE prophylaxis: SCDs/TEDs and enoxaparin ppx    I have performed a physical exam and reviewed and updated ROS and Plan today (1/10/2022). In review of yesterday's note (1/9/2022), there are no changes except as documented above.

## 2022-01-10 NOTE — DISCHARGE PLANNING
Agency/Facility Name: [facility]  Spoke To: Francesca  Outcome: DPA confirmed acceptation of patient into facility. Willing to admit patient tomorrow.  Per LSW, encouraged to set up transport for tomorrow morning  after confirming bed is still available.     LSW notified

## 2022-01-11 ENCOUNTER — PHARMACY VISIT (OUTPATIENT)
Dept: PHARMACY | Facility: MEDICAL CENTER | Age: 67
End: 2022-01-11
Payer: COMMERCIAL

## 2022-01-11 VITALS
TEMPERATURE: 97.3 F | WEIGHT: 151.24 LBS | DIASTOLIC BLOOD PRESSURE: 68 MMHG | SYSTOLIC BLOOD PRESSURE: 116 MMHG | HEIGHT: 65 IN | OXYGEN SATURATION: 98 % | RESPIRATION RATE: 18 BRPM | BODY MASS INDEX: 25.2 KG/M2 | HEART RATE: 77 BPM

## 2022-01-11 LAB
SARS-COV+SARS-COV-2 AG RESP QL IA.RAPID: NOTDETECTED
SPECIMEN SOURCE: NORMAL

## 2022-01-11 PROCEDURE — A9270 NON-COVERED ITEM OR SERVICE: HCPCS | Performed by: INTERNAL MEDICINE

## 2022-01-11 PROCEDURE — 700102 HCHG RX REV CODE 250 W/ 637 OVERRIDE(OP): Performed by: INTERNAL MEDICINE

## 2022-01-11 PROCEDURE — 99239 HOSP IP/OBS DSCHRG MGMT >30: CPT | Performed by: INTERNAL MEDICINE

## 2022-01-11 PROCEDURE — 87426 SARSCOV CORONAVIRUS AG IA: CPT

## 2022-01-11 PROCEDURE — RXMED WILLOW AMBULATORY MEDICATION CHARGE: Performed by: INTERNAL MEDICINE

## 2022-01-11 PROCEDURE — 700111 HCHG RX REV CODE 636 W/ 250 OVERRIDE (IP): Performed by: INTERNAL MEDICINE

## 2022-01-11 RX ORDER — CHOLECALCIFEROL (VITAMIN D3) 125 MCG
5 CAPSULE ORAL NIGHTLY PRN
Qty: 30 TABLET | Refills: 0 | Status: SHIPPED | OUTPATIENT
Start: 2022-01-11

## 2022-01-11 RX ADMIN — OMEPRAZOLE 40 MG: KIT at 04:37

## 2022-01-11 RX ADMIN — Medication 100 MG: at 04:38

## 2022-01-11 RX ADMIN — Medication 1000 UNITS: at 04:38

## 2022-01-11 RX ADMIN — PROPRANOLOL HYDROCHLORIDE 10 MG: 10 TABLET ORAL at 04:38

## 2022-01-11 RX ADMIN — ACETAMINOPHEN 500 MG: 500 TABLET ORAL at 08:58

## 2022-01-11 RX ADMIN — ENOXAPARIN SODIUM 40 MG: 40 INJECTION SUBCUTANEOUS at 04:37

## 2022-01-11 RX ADMIN — ACETAMINOPHEN 500 MG: 500 TABLET ORAL at 14:43

## 2022-01-11 RX ADMIN — LISINOPRIL 20 MG: 20 TABLET ORAL at 04:38

## 2022-01-11 RX ADMIN — LEVETIRACETAM 500 MG: 500 TABLET, FILM COATED ORAL at 04:38

## 2022-01-11 NOTE — PROGRESS NOTES
Assume care of pt at 0700. Report received from NOC RN. Pt is A/O x4. Pain is being controlled. Pt is resting in bed. Bed in lowest and locked position, call light within reach, hourly rounding in place. Labs reviewed. Communication board updated. Will continue to monitor.

## 2022-01-11 NOTE — DISCHARGE PLANNING
DC Transport Scheduled    Received request at: 1045    Transport Company Scheduled: [employer]      Scheduled Date: 01/11/22  Scheduled Time: [id number]    Destination: [facility] & [address]    Notified care team of scheduled transport via [employer].     If there are any changes needed to the DC transportation scheduled, please contact [facility] at ext. [phone] between the hours of [phone] Mon-Fri. If outside those hours, contact the ED Case Manager at ext. [phone].

## 2022-01-11 NOTE — CARE PLAN
"The patient is Stable - Low risk of patient condition declining or worsening    Shift Goals  Clinical Goals: Maintain skin integrity  Patient Goals: Get sleep tonight  Family Goals: N/A    Progress made toward(s) clinical / shift goals: Cluster care being provided by staff to help promote sleep.    Patient is not progressing towards the following goals: Patient incontinent of urine and stool at times. Draw sheet/pad being checked during patient rounding and changed as needed. Patient cleaned and dried after incontinent episodes. However, patient usually calls to use [facility] when she \"feels\" she needs \"to go\"      "

## 2022-01-11 NOTE — DISCHARGE INSTRUCTIONS
Discharge Instructions    Discharged to other by medical transportation with escort. Discharged via ambulance, hospital escort: Yes.  Special equipment needed: Walker    Be sure to schedule a follow-up appointment with your primary care doctor or any specialists as instructed.     Discharge Plan:   Influenza Vaccine Indication: Patient Refuses    I understand that a diet low in cholesterol, fat, and sodium is recommended for good health. Unless I have been given specific instructions below for another diet, I accept this instruction as my diet prescription.   Other diet: Regular    Special Instructions: None    · Is patient discharged on Warfarin / Coumadin?   No     Depression / Suicide Risk    As you are discharged from this [facility] facility, it is important to learn how to keep safe from harming yourself.    Recognize the warning signs:  · Abrupt changes in personality, positive or negative- including increase in energy   · Giving away possessions  · Change in eating patterns- significant weight changes-  positive or negative  · Change in sleeping patterns- unable to sleep or sleeping all the time   · Unwillingness or inability to communicate  · Depression  · Unusual sadness, discouragement and loneliness  · Talk of wanting to die  · Neglect of personal appearance   · Rebelliousness- reckless behavior  · Withdrawal from people/activities they love  · Confusion- inability to concentrate     If you or a loved one observes any of these behaviors or has concerns about self-harm, here's what you can do:  · Talk about it- your feelings and reasons for harming yourself  · Remove any means that you might use to hurt yourself (examples: pills, rope, extension cords, firearm)  · Get professional help from the community (Mental Health, Substance Abuse, psychological counseling)  · Do not be alone:Call your Safe Contact- someone whom you trust who will be there for you.  · Call your local CRISIS HOTLINE 141-3214 or  308-710-8636  · Call your local Children's Mobile Crisis Response Team [place] (381) 148-8768 or www.[employer].com.Xpresso  · Call the toll free National Suicide Prevention Hotlines   · National Suicide Prevention Lifeline 218-423-CUPA (9271)  · National [employer] Line Network 800-SUICIDE (375-3002)

## 2022-01-11 NOTE — DISCHARGE PLANNING
Agency/Facility Name: [facility]  Spoke To: Francesca  Outcome: Liaison confirmed admission to facility requesting discharge summary can be resent and  if transport can be arranged for 1400 on our behalf.     LSW notified
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
## 2022-11-07 ENCOUNTER — PATIENT MESSAGE (OUTPATIENT)
Dept: HEALTH INFORMATION MANAGEMENT | Facility: OTHER | Age: 67
End: 2022-11-07

## 2022-12-22 ENCOUNTER — HOSPITAL ENCOUNTER (EMERGENCY)
Facility: MEDICAL CENTER | Age: 67
End: 2022-12-22
Attending: EMERGENCY MEDICINE
Payer: MEDICARE

## 2022-12-22 ENCOUNTER — APPOINTMENT (OUTPATIENT)
Dept: RADIOLOGY | Facility: MEDICAL CENTER | Age: 67
End: 2022-12-22
Attending: EMERGENCY MEDICINE
Payer: MEDICARE

## 2022-12-22 VITALS
BODY MASS INDEX: 22.66 KG/M2 | HEIGHT: 66 IN | RESPIRATION RATE: 18 BRPM | SYSTOLIC BLOOD PRESSURE: 103 MMHG | TEMPERATURE: 97.4 F | DIASTOLIC BLOOD PRESSURE: 62 MMHG | OXYGEN SATURATION: 99 % | WEIGHT: 141 LBS | HEART RATE: 78 BPM

## 2022-12-22 DIAGNOSIS — R30.0 DYSURIA: ICD-10-CM

## 2022-12-22 DIAGNOSIS — S83.90XA SPRAIN OF KNEE, UNSPECIFIED LATERALITY, UNSPECIFIED LIGAMENT, INITIAL ENCOUNTER: ICD-10-CM

## 2022-12-22 DIAGNOSIS — T14.8XXA ABRASION: ICD-10-CM

## 2022-12-22 LAB
APPEARANCE UR: CLEAR
BACTERIA #/AREA URNS HPF: ABNORMAL /HPF
BILIRUB UR QL STRIP.AUTO: ABNORMAL
COLOR UR: ABNORMAL
EPI CELLS #/AREA URNS HPF: ABNORMAL /HPF
GLUCOSE UR STRIP.AUTO-MCNC: 100 MG/DL
HYALINE CASTS #/AREA URNS LPF: ABNORMAL /LPF
KETONES UR STRIP.AUTO-MCNC: ABNORMAL MG/DL
LEUKOCYTE ESTERASE UR QL STRIP.AUTO: ABNORMAL
MICRO URNS: ABNORMAL
MUCOUS THREADS #/AREA URNS HPF: ABNORMAL /HPF
NITRITE UR QL STRIP.AUTO: POSITIVE
PH UR STRIP.AUTO: 5 [PH] (ref 5–8)
PROT UR QL STRIP: NEGATIVE MG/DL
RBC # URNS HPF: ABNORMAL /HPF
RBC UR QL AUTO: NEGATIVE
SP GR UR STRIP.AUTO: 1.02
WBC #/AREA URNS HPF: ABNORMAL /HPF

## 2022-12-22 PROCEDURE — 90471 IMMUNIZATION ADMIN: CPT

## 2022-12-22 PROCEDURE — 73564 X-RAY EXAM KNEE 4 OR MORE: CPT | Mod: LT

## 2022-12-22 PROCEDURE — 81001 URINALYSIS AUTO W/SCOPE: CPT

## 2022-12-22 PROCEDURE — 90715 TDAP VACCINE 7 YRS/> IM: CPT | Performed by: EMERGENCY MEDICINE

## 2022-12-22 PROCEDURE — 99284 EMERGENCY DEPT VISIT MOD MDM: CPT

## 2022-12-22 PROCEDURE — 87077 CULTURE AEROBIC IDENTIFY: CPT

## 2022-12-22 PROCEDURE — 87186 SC STD MICRODIL/AGAR DIL: CPT

## 2022-12-22 PROCEDURE — 700111 HCHG RX REV CODE 636 W/ 250 OVERRIDE (IP): Performed by: EMERGENCY MEDICINE

## 2022-12-22 PROCEDURE — 87086 URINE CULTURE/COLONY COUNT: CPT

## 2022-12-22 RX ORDER — CEFDINIR 300 MG/1
300 CAPSULE ORAL 2 TIMES DAILY
Qty: 14 CAPSULE | Refills: 0 | Status: SHIPPED | OUTPATIENT
Start: 2022-12-22 | End: 2022-12-29

## 2022-12-22 RX ORDER — NAPROXEN 500 MG/1
500 TABLET ORAL 2 TIMES DAILY PRN
Qty: 20 TABLET | Refills: 0 | Status: SHIPPED | OUTPATIENT
Start: 2022-12-22

## 2022-12-22 RX ADMIN — CLOSTRIDIUM TETANI TOXOID ANTIGEN (FORMALDEHYDE INACTIVATED), CORYNEBACTERIUM DIPHTHERIAE TOXOID ANTIGEN (FORMALDEHYDE INACTIVATED), BORDETELLA PERTUSSIS TOXOID ANTIGEN (GLUTARALDEHYDE INACTIVATED), BORDETELLA PERTUSSIS FILAMENTOUS HEMAGGLUTININ ANTIGEN (FORMALDEHYDE INACTIVATED), BORDETELLA PERTUSSIS PERTACTIN ANTIGEN, AND BORDETELLA PERTUSSIS FIMBRIAE 2/3 ANTIGEN 0.5 ML: 5; 2; 2.5; 5; 3; 5 INJECTION, SUSPENSION INTRAMUSCULAR at 14:15

## 2022-12-22 ASSESSMENT — FIBROSIS 4 INDEX: FIB4 SCORE: 0.67
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
## 2022-12-22 NOTE — ED PROVIDER NOTES
"CHIEF COMPLAINT  Chief Complaint   Patient presents with    Leg Pain     Reports \"my leg gave out in the bathroom 2 days ago\". Denies H/N/B pain, blood thinners or LOC. L knee appears swollen.    Rash     To back and bilateral arms. Denies fevers.    Painful Urination     Denies flank pain.        ANDREW Leon is a 67 y.o. female who presents with knee pain that occurred when her leg \"gave out on her\" 2 days ago while she was walking to the restroom.  She did not hit her head or lose consciousness.  She does have a seizure disorder but states her last seizure was in July.  She also notes that she has had some painful urination over the last couple of days as well.  No blood in the urine.  No flank pain fever or body aches.  No vomiting.  Her third complaint is that she has a rash that seems to be improving on both arms.  Patient denies any chest pain or shortness of breath.  No neck pain.  No paresthesias or weakness in the legs.    REVIEW OF SYSTEMS  All other systems are negative.     PAST MEDICAL HISTORY  Past Medical History:   Diagnosis Date    YEISON (acute kidney injury) (HCC)     ETOH abuse 6/19/2021    Hypertension     Seizure (HCC)     Subdural hematoma        FAMILY HISTORY  Family History   Problem Relation Age of Onset    Cancer Mother 77        colon cancer    Heart Attack Father 67        MI    Heart Disease Brother         angina    Stroke Maternal Grandfather         parkinsons    Dementia Maternal Aunt         parkinsons and alzhiemers       SOCIAL HISTORY  Social History     Socioeconomic History    Marital status: Single    Number of children: 2    Years of education: some college   Occupational History    Occupation: medical assistant     Comment: [employer]    Tobacco Use    Smoking status: Never    Smokeless tobacco: Never   Vaping Use    Vaping Use: Never used   Substance and Sexual Activity    Alcohol use: Yes     Alcohol/week: 3.6 oz     Types: 6 Glasses of wine per week     " "Comment: weekly    Drug use: No       SURGICAL HISTORY  Past Surgical History:   Procedure Laterality Date    TN UPPER GI ENDOSCOPY,DIAGNOSIS  6/18/2021    Procedure: GASTROSCOPY;  Surgeon: Frankie Castellon M.D.;  Location: ENDOSCOPY [facility];  Service: Gastroenterology       CURRENT MEDICATIONS  Home Medications       Reviewed by Ada Denise R.N. (Registered Nurse) on 12/22/22 at 1129  Med List Status: Not Addressed     Medication Last Dose Status   ibuprofen (MOTRIN) 200 MG Tab  Active   levetiracetam (KEPPRA) 500 MG Tab  Active   lisinopril (PRINIVIL) 20 MG Tab  Active   melatonin 5 mg Tab  Active   nitrofurantoin (MACROBID) 100 MG Cap  Active   omeprazole (PRILOSEC) 20 MG delayed-release capsule  Active   propranolol (INDERAL) 10 MG Tab  Active   senna-docusate (PERICOLACE OR SENOKOT S) 8.6-50 MG Tab  Active   thiamine (THIAMINE) 100 MG tablet  Active   vitamin D (VITAMIND D3) 1000 UNIT Tab  Active                    ALLERGIES  No Known Allergies    PHYSICAL EXAM  VITAL SIGNS: /64   Pulse 96   Temp 36.9 °C (98.4 °F) (Temporal)   Resp 18   Ht 1.676 m (5' 6\")   Wt 64 kg (141 lb)   SpO2 96%   BMI 22.76 kg/m²      Constitutional: Well developed, Well nourished, No acute distress, Non-toxic appearance.   HENT: Normocephalic, Atraumatic, mucous membranes moist, no erythema, exudates, swelling, or masses, nares patent  Eyes: nonicteric  Neck: Supple, no meningismus  Lymphatic: No lymphadenopathy noted.   Cardiovascular: Regular rate and rhythm, no gallops rubs or murmurs  Lungs: Clear bilaterally   Abdomen: Soft and nontender  Skin: There appear to be scabbed over punctate areas that appear to subacute  Back: No tenderness, No CVA tenderness.   Genitalia: Deferred  Rectal: Deferred  Extremities: Left knee demonstrates an abrasion inferior to the patella-there is no patellar tenderness, the patient has full range of motion with some pain noted with that, negative drawer testing, no medial or " lateral laxity, distal pulses 2+, motor and sensory function grossly intact  Neurologic: Alert, appropriate, follows commands, moving all extremities, normal speech   Psychiatric: Affect normal    RADIOLOGY/PROCEDURES  DX-KNEE COMPLETE 4+ LEFT   Final Result      1.  No evidence of acute fracture or dislocation.      2.  Minimal early      3.  Joint effusion.      4.  Osteopenia.        Results for orders placed or performed during the hospital encounter of 12/22/22   Urinalysis, Culture if Indicated    Specimen: Urine   Result Value Ref Range    Color Orange (A)     Character Clear     Specific Gravity 1.020 <1.035    Ph 5.0 5.0 - 8.0    Glucose 100 (A) Negative mg/dL    Ketones Trace (A) Negative mg/dL    Protein Negative Negative mg/dL    Bilirubin Moderate (A) Negative    Nitrite Positive (A) Negative    Leukocyte Esterase Trace (A) Negative    Occult Blood Negative Negative    Micro Urine Req Microscopic    URINE MICROSCOPIC (W/UA)   Result Value Ref Range    WBC 2-5 /hpf    RBC Rare /hpf    Bacteria Few (A) None /hpf    Epithelial Cells Few Few /hpf    Mucous Threads Few /hpf    Hyaline Cast 0-2 /lpf         COURSE & MEDICAL DECISION MAKING  Pertinent Labs & Imaging studies reviewed. (See chart for details)  This is a 67-year-old female with burning with urination and positive nitrite urine with no blood but 2-5 whites.  The patient will be covered with antibiotics and we will send her urine for culture.  She otherwise reports no flank pain fever body aches vomiting to suggest pyelonephritis or nephrolithiasis.  Additionally she complains of knee pain where she had a ground-level fall-the knee demonstrates a trace effusion on x-ray but there is no fracture.  She has an abrasion anterior to the area will be covered with a Band-Aid.  Otherwise she will follow-up with a primary care doctor in regards to her knee.  In terms of her rash, I see no evidence of scabies or other mite infestation currently.    FINAL  IMPRESSION  1.  Abrasion  2.  Knee sprain  3.  Dysuria         Electronically signed by: Saleem Yanez M.D., 12/22/2022 1:20 PM

## 2022-12-22 NOTE — ED NOTES
Patient is stable for discharge at this time, anticipatory guidance provided, close follow-up is encouraged, and ED return instructions have been detailed. Patient and family are both agreeable to the disposition and plan and discharged home in good condition.     Rx education provided, Pt verbalized understanding;
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
## (undated) DEVICE — BITE BLOCK ADULT 60FR (100EA/CA)

## (undated) DEVICE — KIT CUSTOM PROCEDURE SINGLE FOR ENDO  (15/CA)

## (undated) DEVICE — FILM CASSETTE ENDO

## (undated) DEVICE — FORCEP RADIAL JAW 4 STANDARD CAPACITY W/NEEDLE 240CM (40EA/BX)